# Patient Record
Sex: FEMALE | Race: WHITE | Employment: OTHER | URBAN - METROPOLITAN AREA
[De-identification: names, ages, dates, MRNs, and addresses within clinical notes are randomized per-mention and may not be internally consistent; named-entity substitution may affect disease eponyms.]

---

## 2017-07-04 ENCOUNTER — HOSPITAL ENCOUNTER (OUTPATIENT)
Dept: OTHER | Age: 67
Discharge: HOME | End: 2017-07-04
Attending: INTERNAL MEDICINE | Admitting: INTERNAL MEDICINE

## 2017-07-04 LAB
A/G RATIO: 2.2 (ref 1.1–2.2)
ALBUMIN SERPL-MCNC: 3.9 G/DL (ref 3.4–5)
ALP BLD-CCNC: 56 U/L (ref 40–129)
ALT SERPL-CCNC: 18 U/L (ref 10–40)
ANION GAP SERPL CALCULATED.3IONS-SCNC: 10 MMOL/L (ref 3–16)
AST SERPL-CCNC: 15 U/L (ref 15–37)
BASOPHILS ABSOLUTE: 0 K/UL (ref 0–0.2)
BASOPHILS RELATIVE PERCENT: 0.3 %
BILIRUB SERPL-MCNC: 0.5 MG/DL (ref 0–1)
BUN BLDV-MCNC: 11 MG/DL (ref 7–20)
CALCIUM SERPL-MCNC: 8.8 MG/DL (ref 8.3–10.6)
CHLORIDE BLD-SCNC: 101 MMOL/L (ref 99–110)
CO2: 30 MMOL/L (ref 21–32)
CREAT SERPL-MCNC: 0.7 MG/DL (ref 0.6–1.2)
EOSINOPHILS ABSOLUTE: 0 K/UL (ref 0–0.6)
EOSINOPHILS RELATIVE PERCENT: 0 %
GFR AFRICAN AMERICAN: >60
GFR NON-AFRICAN AMERICAN: >60
GLOBULIN: 1.8 G/DL
GLUCOSE BLD-MCNC: 181 MG/DL (ref 70–99)
HCT VFR BLD CALC: 37.2 % (ref 36–48)
HEMOGLOBIN: 11.9 G/DL (ref 12–16)
LYMPHOCYTES ABSOLUTE: 0.8 K/UL (ref 1–5.1)
LYMPHOCYTES RELATIVE PERCENT: 8.1 %
MCH RBC QN AUTO: 29.1 PG (ref 26–34)
MCHC RBC AUTO-ENTMCNC: 32 G/DL (ref 31–36)
MCV RBC AUTO: 90.9 FL (ref 80–100)
MONOCYTES ABSOLUTE: 0.6 K/UL (ref 0–1.3)
MONOCYTES RELATIVE PERCENT: 5.8 %
NEUTROPHILS ABSOLUTE: 8.3 K/UL (ref 1.7–7.7)
NEUTROPHILS RELATIVE PERCENT: 85.8 %
PDW BLD-RTO: 15.9 % (ref 12.4–15.4)
PLATELET # BLD: 206 K/UL (ref 135–450)
PMV BLD AUTO: 8.6 FL (ref 5–10.5)
POTASSIUM SERPL-SCNC: 3.9 MMOL/L (ref 3.5–5.1)
RBC # BLD: 4.1 M/UL (ref 4–5.2)
SODIUM BLD-SCNC: 141 MMOL/L (ref 136–145)
TOTAL PROTEIN: 5.7 G/DL (ref 6.4–8.2)
WBC # BLD: 9.7 K/UL (ref 4–11)

## 2017-07-05 LAB — C-REACTIVE PROTEIN: 5.2 MG/L (ref 0–5.1)

## 2019-12-28 ENCOUNTER — APPOINTMENT (OUTPATIENT)
Dept: CT IMAGING | Age: 69
DRG: 193 | End: 2019-12-28
Payer: MEDICARE

## 2019-12-28 ENCOUNTER — APPOINTMENT (OUTPATIENT)
Dept: GENERAL RADIOLOGY | Age: 69
DRG: 193 | End: 2019-12-28
Payer: MEDICARE

## 2019-12-28 ENCOUNTER — HOSPITAL ENCOUNTER (INPATIENT)
Age: 69
LOS: 6 days | Discharge: HOME HEALTH | DRG: 193 | End: 2020-01-03
Attending: EMERGENCY MEDICINE | Admitting: HOSPITALIST
Payer: MEDICARE

## 2019-12-28 PROBLEM — R06.09 DOE (DYSPNEA ON EXERTION): Status: ACTIVE | Noted: 2019-12-28

## 2019-12-28 LAB
A/G RATIO: 1.2 (ref 1.1–2.2)
ALBUMIN SERPL-MCNC: 3.5 G/DL (ref 3.4–5)
ALP BLD-CCNC: 54 U/L (ref 40–129)
ALT SERPL-CCNC: 9 U/L (ref 10–40)
ANION GAP SERPL CALCULATED.3IONS-SCNC: 11 MMOL/L (ref 3–16)
AST SERPL-CCNC: 10 U/L (ref 15–37)
BASOPHILS ABSOLUTE: 0 K/UL (ref 0–0.2)
BASOPHILS RELATIVE PERCENT: 0.3 %
BILIRUB SERPL-MCNC: 1.2 MG/DL (ref 0–1)
BUN BLDV-MCNC: 12 MG/DL (ref 7–20)
CALCIUM SERPL-MCNC: 9 MG/DL (ref 8.3–10.6)
CHLORIDE BLD-SCNC: 97 MMOL/L (ref 99–110)
CO2: 30 MMOL/L (ref 21–32)
CREAT SERPL-MCNC: 0.5 MG/DL (ref 0.6–1.2)
EOSINOPHILS ABSOLUTE: 0 K/UL (ref 0–0.6)
EOSINOPHILS RELATIVE PERCENT: 0 %
GFR AFRICAN AMERICAN: >60
GFR NON-AFRICAN AMERICAN: >60
GLOBULIN: 2.9 G/DL
GLUCOSE BLD-MCNC: 216 MG/DL (ref 70–99)
GLUCOSE BLD-MCNC: 249 MG/DL (ref 70–99)
HCT VFR BLD CALC: 41.7 % (ref 36–48)
HEMOGLOBIN: 13.7 G/DL (ref 12–16)
LACTIC ACID, SEPSIS: 0.9 MMOL/L (ref 0.4–1.9)
LYMPHOCYTES ABSOLUTE: 0.5 K/UL (ref 1–5.1)
LYMPHOCYTES RELATIVE PERCENT: 5.3 %
MCH RBC QN AUTO: 29.4 PG (ref 26–34)
MCHC RBC AUTO-ENTMCNC: 32.8 G/DL (ref 31–36)
MCV RBC AUTO: 89.8 FL (ref 80–100)
MONOCYTES ABSOLUTE: 0.7 K/UL (ref 0–1.3)
MONOCYTES RELATIVE PERCENT: 8.1 %
NEUTROPHILS ABSOLUTE: 7.5 K/UL (ref 1.7–7.7)
NEUTROPHILS RELATIVE PERCENT: 86.3 %
PDW BLD-RTO: 16.4 % (ref 12.4–15.4)
PERFORMED ON: ABNORMAL
PLATELET # BLD: 171 K/UL (ref 135–450)
PMV BLD AUTO: 7.9 FL (ref 5–10.5)
POTASSIUM SERPL-SCNC: 4.2 MMOL/L (ref 3.5–5.1)
PRO-BNP: 364 PG/ML (ref 0–124)
PROCALCITONIN: 0.09 NG/ML (ref 0–0.15)
RAPID INFLUENZA  B AGN: NEGATIVE
RAPID INFLUENZA A AGN: NEGATIVE
RBC # BLD: 4.64 M/UL (ref 4–5.2)
SODIUM BLD-SCNC: 138 MMOL/L (ref 136–145)
TOTAL PROTEIN: 6.4 G/DL (ref 6.4–8.2)
TROPONIN: <0.01 NG/ML
WBC # BLD: 8.7 K/UL (ref 4–11)

## 2019-12-28 PROCEDURE — 6370000000 HC RX 637 (ALT 250 FOR IP): Performed by: HOSPITALIST

## 2019-12-28 PROCEDURE — 83605 ASSAY OF LACTIC ACID: CPT

## 2019-12-28 PROCEDURE — 99291 CRITICAL CARE FIRST HOUR: CPT

## 2019-12-28 PROCEDURE — 6360000002 HC RX W HCPCS: Performed by: HOSPITALIST

## 2019-12-28 PROCEDURE — 2700000000 HC OXYGEN THERAPY PER DAY

## 2019-12-28 PROCEDURE — 80053 COMPREHEN METABOLIC PANEL: CPT

## 2019-12-28 PROCEDURE — 87804 INFLUENZA ASSAY W/OPTIC: CPT

## 2019-12-28 PROCEDURE — 84145 PROCALCITONIN (PCT): CPT

## 2019-12-28 PROCEDURE — 96374 THER/PROPH/DIAG INJ IV PUSH: CPT

## 2019-12-28 PROCEDURE — 2500000003 HC RX 250 WO HCPCS: Performed by: HOSPITALIST

## 2019-12-28 PROCEDURE — 6370000000 HC RX 637 (ALT 250 FOR IP): Performed by: NURSE PRACTITIONER

## 2019-12-28 PROCEDURE — 71260 CT THORAX DX C+: CPT

## 2019-12-28 PROCEDURE — 85025 COMPLETE CBC W/AUTO DIFF WBC: CPT

## 2019-12-28 PROCEDURE — 84484 ASSAY OF TROPONIN QUANT: CPT

## 2019-12-28 PROCEDURE — 6360000002 HC RX W HCPCS: Performed by: NURSE PRACTITIONER

## 2019-12-28 PROCEDURE — 87040 BLOOD CULTURE FOR BACTERIA: CPT

## 2019-12-28 PROCEDURE — 94640 AIRWAY INHALATION TREATMENT: CPT

## 2019-12-28 PROCEDURE — 2580000003 HC RX 258: Performed by: HOSPITALIST

## 2019-12-28 PROCEDURE — 94761 N-INVAS EAR/PLS OXIMETRY MLT: CPT

## 2019-12-28 PROCEDURE — 6360000002 HC RX W HCPCS: Performed by: EMERGENCY MEDICINE

## 2019-12-28 PROCEDURE — 71045 X-RAY EXAM CHEST 1 VIEW: CPT

## 2019-12-28 PROCEDURE — 94760 N-INVAS EAR/PLS OXIMETRY 1: CPT

## 2019-12-28 PROCEDURE — 83880 ASSAY OF NATRIURETIC PEPTIDE: CPT

## 2019-12-28 PROCEDURE — 6360000004 HC RX CONTRAST MEDICATION: Performed by: NURSE PRACTITIONER

## 2019-12-28 PROCEDURE — 93005 ELECTROCARDIOGRAM TRACING: CPT | Performed by: NURSE PRACTITIONER

## 2019-12-28 PROCEDURE — 1200000000 HC SEMI PRIVATE

## 2019-12-28 RX ORDER — NICOTINE POLACRILEX 4 MG
15 LOZENGE BUCCAL PRN
Status: DISCONTINUED | OUTPATIENT
Start: 2019-12-28 | End: 2020-01-03 | Stop reason: HOSPADM

## 2019-12-28 RX ORDER — INSULIN LISPRO 100 [IU]/ML
0-12 INJECTION, SOLUTION INTRAVENOUS; SUBCUTANEOUS
Status: DISCONTINUED | OUTPATIENT
Start: 2019-12-28 | End: 2020-01-03 | Stop reason: HOSPADM

## 2019-12-28 RX ORDER — TIMOLOL 5 MG/ML
1 SOLUTION/ DROPS OPHTHALMIC 2 TIMES DAILY
COMMUNITY
End: 2020-10-14 | Stop reason: SINTOL

## 2019-12-28 RX ORDER — TRAVOPROST OPHTHALMIC SOLUTION 0.04 MG/ML
1 SOLUTION OPHTHALMIC NIGHTLY
Status: DISCONTINUED | OUTPATIENT
Start: 2019-12-28 | End: 2020-01-03 | Stop reason: HOSPADM

## 2019-12-28 RX ORDER — ONDANSETRON 2 MG/ML
4 INJECTION INTRAMUSCULAR; INTRAVENOUS EVERY 6 HOURS PRN
Status: DISCONTINUED | OUTPATIENT
Start: 2019-12-28 | End: 2020-01-03 | Stop reason: HOSPADM

## 2019-12-28 RX ORDER — SPIRONOLACTONE 25 MG/1
12.5 TABLET ORAL DAILY
COMMUNITY
End: 2022-07-22

## 2019-12-28 RX ORDER — ALBUTEROL SULFATE 2.5 MG/3ML
5 SOLUTION RESPIRATORY (INHALATION) ONCE
Status: DISCONTINUED | OUTPATIENT
Start: 2019-12-28 | End: 2019-12-28

## 2019-12-28 RX ORDER — SODIUM CHLORIDE 0.9 % (FLUSH) 0.9 %
10 SYRINGE (ML) INJECTION EVERY 12 HOURS SCHEDULED
Status: DISCONTINUED | OUTPATIENT
Start: 2019-12-28 | End: 2020-01-03 | Stop reason: HOSPADM

## 2019-12-28 RX ORDER — TRAVOPROST OPHTHALMIC SOLUTION 0.04 MG/ML
1 SOLUTION OPHTHALMIC NIGHTLY
COMMUNITY

## 2019-12-28 RX ORDER — ASPIRIN 81 MG/1
81 TABLET ORAL DAILY
Status: DISCONTINUED | OUTPATIENT
Start: 2019-12-28 | End: 2020-01-03 | Stop reason: HOSPADM

## 2019-12-28 RX ORDER — ROSUVASTATIN CALCIUM 10 MG/1
10 TABLET, COATED ORAL NIGHTLY
Status: DISCONTINUED | OUTPATIENT
Start: 2019-12-28 | End: 2020-01-03 | Stop reason: HOSPADM

## 2019-12-28 RX ORDER — DEXTROSE MONOHYDRATE 25 G/50ML
12.5 INJECTION, SOLUTION INTRAVENOUS PRN
Status: DISCONTINUED | OUTPATIENT
Start: 2019-12-28 | End: 2020-01-03 | Stop reason: HOSPADM

## 2019-12-28 RX ORDER — ACETAMINOPHEN 325 MG/1
650 TABLET ORAL EVERY 4 HOURS PRN
Status: DISCONTINUED | OUTPATIENT
Start: 2019-12-28 | End: 2020-01-03 | Stop reason: HOSPADM

## 2019-12-28 RX ORDER — IPRATROPIUM BROMIDE AND ALBUTEROL SULFATE 2.5; .5 MG/3ML; MG/3ML
1 SOLUTION RESPIRATORY (INHALATION) ONCE
Status: COMPLETED | OUTPATIENT
Start: 2019-12-28 | End: 2019-12-28

## 2019-12-28 RX ORDER — LEVOTHYROXINE SODIUM 0.1 MG/1
200 TABLET ORAL DAILY
Status: DISCONTINUED | OUTPATIENT
Start: 2019-12-28 | End: 2020-01-03 | Stop reason: HOSPADM

## 2019-12-28 RX ORDER — FUROSEMIDE 10 MG/ML
40 INJECTION INTRAMUSCULAR; INTRAVENOUS DAILY
Status: DISCONTINUED | OUTPATIENT
Start: 2019-12-28 | End: 2019-12-31

## 2019-12-28 RX ORDER — TIMOLOL 5 MG/ML
1 SOLUTION/ DROPS OPHTHALMIC 2 TIMES DAILY
Status: DISCONTINUED | OUTPATIENT
Start: 2019-12-28 | End: 2020-01-03 | Stop reason: HOSPADM

## 2019-12-28 RX ORDER — INSULIN LISPRO 100 [IU]/ML
0.08 INJECTION, SOLUTION INTRAVENOUS; SUBCUTANEOUS
Status: DISCONTINUED | OUTPATIENT
Start: 2019-12-28 | End: 2020-01-03 | Stop reason: HOSPADM

## 2019-12-28 RX ORDER — INSULIN LISPRO 100 [IU]/ML
0-6 INJECTION, SOLUTION INTRAVENOUS; SUBCUTANEOUS NIGHTLY
Status: DISCONTINUED | OUTPATIENT
Start: 2019-12-28 | End: 2020-01-03 | Stop reason: HOSPADM

## 2019-12-28 RX ORDER — IPRATROPIUM BROMIDE AND ALBUTEROL SULFATE 2.5; .5 MG/3ML; MG/3ML
1 SOLUTION RESPIRATORY (INHALATION) 2 TIMES DAILY
Status: DISCONTINUED | OUTPATIENT
Start: 2019-12-28 | End: 2020-01-03 | Stop reason: HOSPADM

## 2019-12-28 RX ORDER — LOSARTAN POTASSIUM 25 MG/1
50 TABLET ORAL DAILY
Status: DISCONTINUED | OUTPATIENT
Start: 2019-12-28 | End: 2020-01-03 | Stop reason: HOSPADM

## 2019-12-28 RX ORDER — SODIUM CHLORIDE 0.9 % (FLUSH) 0.9 %
10 SYRINGE (ML) INJECTION PRN
Status: DISCONTINUED | OUTPATIENT
Start: 2019-12-28 | End: 2020-01-03 | Stop reason: HOSPADM

## 2019-12-28 RX ORDER — FUROSEMIDE 10 MG/ML
40 INJECTION INTRAMUSCULAR; INTRAVENOUS ONCE
Status: COMPLETED | OUTPATIENT
Start: 2019-12-28 | End: 2019-12-28

## 2019-12-28 RX ORDER — DEXTROSE MONOHYDRATE 50 MG/ML
100 INJECTION, SOLUTION INTRAVENOUS PRN
Status: DISCONTINUED | OUTPATIENT
Start: 2019-12-28 | End: 2020-01-03 | Stop reason: HOSPADM

## 2019-12-28 RX ORDER — SIMVASTATIN 40 MG
40 TABLET ORAL NIGHTLY
Status: DISCONTINUED | OUTPATIENT
Start: 2019-12-28 | End: 2019-12-28

## 2019-12-28 RX ADMIN — ASPIRIN 81 MG: 81 TABLET, COATED ORAL at 20:38

## 2019-12-28 RX ADMIN — ALBUTEROL SULFATE 5 MG: 2.5 SOLUTION RESPIRATORY (INHALATION) at 10:42

## 2019-12-28 RX ADMIN — INSULIN GLARGINE 29 UNITS: 100 INJECTION, SOLUTION SUBCUTANEOUS at 20:39

## 2019-12-28 RX ADMIN — FUROSEMIDE 40 MG: 10 INJECTION, SOLUTION INTRAMUSCULAR; INTRAVENOUS at 20:38

## 2019-12-28 RX ADMIN — IPRATROPIUM BROMIDE AND ALBUTEROL SULFATE 1 AMPULE: .5; 3 SOLUTION RESPIRATORY (INHALATION) at 10:42

## 2019-12-28 RX ADMIN — ROSUVASTATIN CALCIUM 10 MG: 10 TABLET, FILM COATED ORAL at 20:38

## 2019-12-28 RX ADMIN — TRAVOPROST OPHTHALMIC SOLUTION 1 DROP: 0.04 SOLUTION OPHTHALMIC at 22:15

## 2019-12-28 RX ADMIN — IPRATROPIUM BROMIDE AND ALBUTEROL SULFATE 1 AMPULE: .5; 3 SOLUTION RESPIRATORY (INHALATION) at 20:42

## 2019-12-28 RX ADMIN — LOSARTAN POTASSIUM 50 MG: 25 TABLET, FILM COATED ORAL at 20:38

## 2019-12-28 RX ADMIN — TIMOLOL 1 DROP: 5 SOLUTION/ DROPS OPHTHALMIC at 22:15

## 2019-12-28 RX ADMIN — FUROSEMIDE 40 MG: 10 INJECTION, SOLUTION INTRAMUSCULAR; INTRAVENOUS at 16:57

## 2019-12-28 RX ADMIN — INSULIN LISPRO 2 UNITS: 100 INJECTION, SOLUTION INTRAVENOUS; SUBCUTANEOUS at 20:40

## 2019-12-28 RX ADMIN — TUBERCULIN PURIFIED PROTEIN DERIVATIVE 5 UNITS: 5 INJECTION, SOLUTION INTRADERMAL at 20:24

## 2019-12-28 RX ADMIN — LEVOTHYROXINE SODIUM 200 MCG: 100 TABLET ORAL at 20:38

## 2019-12-28 RX ADMIN — Medication 10 ML: at 20:42

## 2019-12-28 RX ADMIN — IOPAMIDOL 75 ML: 755 INJECTION, SOLUTION INTRAVENOUS at 12:38

## 2019-12-28 ASSESSMENT — ENCOUNTER SYMPTOMS
VOMITING: 0
SHORTNESS OF BREATH: 1
CHEST TIGHTNESS: 1
ABDOMINAL PAIN: 0
COUGH: 1
DIARRHEA: 0
WHEEZING: 1
NAUSEA: 0

## 2019-12-28 ASSESSMENT — PAIN SCALES - GENERAL
PAINLEVEL_OUTOF10: 0
PAINLEVEL_OUTOF10: 6

## 2019-12-28 ASSESSMENT — PAIN DESCRIPTION - LOCATION: LOCATION: BACK

## 2019-12-28 ASSESSMENT — PAIN DESCRIPTION - PAIN TYPE: TYPE: CHRONIC PAIN

## 2019-12-28 NOTE — H&P
Hospital Medicine History & Physical      PCP: Tony Bustillos MD    Date of Admission: 12/28/2019    Date of Service: Pt seen/examined on 12/28/2019 and Admitted to Inpatient with expected LOS greater than two midnights due to medical therapy. Chief Complaint:  Dyspnea on exertion      History Of Present Illness:      68 y.o. female who has history of hypothyroidism, diabetes mellitus insulin-dependent, essential hypertension, hyperlipidemia presented to [facility] with 4 days history of exertional shortness of breath. The patient sleep in the reclining chair for years that is why  I am not sure about orthopnea or paroxysmal nocturnal dyspnea. She is not a good historian, she told me she is diabetic for 10 years and she is currently on 47 units of long-acting and 20 units of short acting with meals. She also says mild cough however no fever no chills. In the emergency room work-up showed blood sugar 249 otherwise normal BMP, troponin 0.01, proBNP 364 lactic acid unremarkable, CBC also unremarkable. CT scan of chest showed multiple pulmonary nodules in the upper lobe but no evidence of pulmonary embolism CXR showed mild pulmonary edema. Hospitalist service called for further management of this patient. Past Medical History:          Diagnosis Date    Cataracts, bilateral     Diabetes mellitus ([address]     Glaucoma     Hyperlipidemia     Hypertension     Thyroid disease        Past Surgical History:          Procedure Laterality Date    CARPAL TUNNEL RELEASE      bilateral    ELBOW SURGERY      EYE SURGERY      FOOT SURGERY      SHOULDER SURGERY         Medications Prior to Admission:      Prior to Admission medications    Medication Sig Start Date End Date Taking? Authorizing Provider   aspirin EC 81 MG EC tablet Take 1 tablet by mouth. May restart in AM. 1/27/12  Yes Alisia Varner MD   losartan (COZAAR) 50 MG tablet Take 50 mg by mouth.      Yes Historical Provider, MD furosemide (LASIX) 40 MG tablet Take 20 mg by mouth. Yes Historical Provider, MD   levothyroxine (LEVOXYL) 200 MCG tablet Take 200 mcg by mouth. Yes Historical Provider, MD   Insulin Detemir (LEVEMIR FLEXPEN SC) Inject 47 Units into the skin    Yes Historical Provider, MD   simvastatin (ZOCOR) 40 MG tablet Take 40 mg by mouth nightly. Yes Historical Provider, MD   Insulin Lispro, Human, (HUMALOG SC) Inject 20 Units into the skin 3 times daily (with meals) With sliding   Yes Historical Provider, MD   DICLOFENAC PO Take  by mouth. Historical Provider, MD   acetaminophen (TYLENOL) 500 MG tablet Take 500 mg by mouth. Historical Provider, MD   Calcium Carbonate-Vitamin D (CALCIUM + D PO) Take  by mouth. Historical Provider, MD       Allergies:  Patient has no known allergies. Social History:      The patient currently lives at home    TOBACCO:   reports that she has never smoked. She does not have any smokeless tobacco history on file. ETOH:   reports previous alcohol use. Family History:      Reviewed in detail and negative for DM, CAD, Cancer, CVA. Positive as follows:    History reviewed. No pertinent family history. REVIEW OF SYSTEMS:   Pertinent positives as noted in the HPI. All other systems reviewed and negative. PHYSICAL EXAM PERFORMED:    BP (!) 142/83   Pulse 99   Temp 98.3 °F (36.8 °C) (Oral)   Resp 23   Ht 5' 6\" (1.676 m)   Wt 260 lb (117.9 kg)   SpO2 96%   BMI 41.97 kg/m²     General appearance:  No apparent distress, appears stated age and cooperative. HEENT:  Normal cephalic, atraumatic without obvious deformity. Pupils equal, round, and reactive to light. Extra ocular muscles intact. Conjunctivae/corneas clear. Oral mucous membranes moist  Neck: Supple, with full range of motion. No jugular venous distention. Trachea midline. Respiratory:  Normal respiratory effort. Scattered wheezing anteriorly and posteriorly noted, no crackles.   Cardiovascular:

## 2019-12-28 NOTE — ED PROVIDER NOTES
Performed at:  [facility]  [address] [facility],  [place], [address]   Phone [phone]   RAPID INFLUENZA A/B ANTIGENS    Narrative:     Performed at:  [facility]  [address] [facility],  [place], [address]   Phone [phone]   CULTURE BLOOD #2   CULTURE BLOOD #1   TROPONIN    Narrative:     Performed at:  [facility]  [address] [facility],  [place], [address]   Phone [phone]   LACTATE, SEPSIS    Narrative:     Performed at:  [facility]  [address] [facility],  [place], [address]   Phone [phone]       All other labs were within normal range or not returned as of this dictation. EKG: All EKG's are interpreted by the Emergency Department Physician in the absence of a cardiologist.  Please see their note for interpretation of EKG. RADIOLOGY:   Non-plain film images such as CT, Ultrasound and MRI are read by the radiologist. Plain radiographic images are visualized and preliminarily interpreted by the  ED Provider with the below findings:        Interpretation per the Radiologist below, if available at the time of this note:    CT Chest Pulmonary Embolism W Contrast   Final Result   No evidence of pulmonary embolism. Multiple pulmonary nodules. Most severe: 11.0 mm ground glass pulmonary   nodule within the upper lobe. Recommend a non-contrast Chest CT at 3-6   months. Subsequent management based on the most suspicious nodule(s). These guidelines do not apply to immunocompromised patients and patients with   cancer. Follow up in patients with significant comorbidities as clinically   warranted. For lung cancer screening, adhere to Lung-RADS guidelines. Reference: Radiology. 2017; 284(1):228-43. XR CHEST PORTABLE   Final Result   Spectrum of findings suggests mild pulmonary edema. Developing pneumonitis   can not be definitively excluded.            No patient is diabetic, insulin-dependent. Troponin negative. . Lactate unremarkable. 2 cultures pending. XR CHEST PORTABLE (Final result)   Result time 12/28/19 11:53:08   Final result by Greta Wray MD (12/28/19 11:53:08)                Impression:    Spectrum of findings suggests mild pulmonary edema.  Developing pneumonitis  can not be definitively excluded. The patient does remain hypoxic, requiring supplemental oxygen. CT Chest Pulmonary Embolism W Contrast (Final result)   Result time 12/28/19 13:05:40   Final result by Sierra Peabody, MD (12/28/19 13:05:40)                Impression:    No evidence of pulmonary embolism. Multiple pulmonary nodules. Most severe: 11.0 mm ground glass pulmonary  nodule within the upper lobe. Recommend a non-contrast Chest CT at 3-6  months. Subsequent management based on the most suspicious nodule(s). These guidelines do not apply to immunocompromised patients and patients with  cancer. Follow up in patients with significant comorbidities as clinically  warranted. For lung cancer screening, adhere to Lung-RADS guidelines. Reference: Radiology. 2017; 284(1):228-43. She was given nebulized treatments in the emergency department. She was also given an injection of Lasix, patient does not have history of heart failure. She will be admitted for acute respiratory failure with hypoxia and new onset congestive heart failure. Hospitalist is agreeable to admit this patient, echocardiogram ordered. The patient is agreeable regarding plan of care      FINAL IMPRESSION      1. Dyspnea, unspecified type    2. Hypoxia    3.  Acute respiratory failure with hypoxemia (HCC)    4. Lung nodule          DISPOSITION/PLAN   DISPOSITION Admitted 12/28/2019 04:12:10 PM      PATIENT REFERREDTO:  Zamzam Bailey, [address] [address] [phone]            DISCHARGE MEDICATIONS:  New Prescriptions    No

## 2019-12-28 NOTE — ED PROVIDER NOTES
I independently performed a history and physical on Silver Mcginnis. All diagnostic, treatment, and disposition decisions were made by myself in conjunction with the advanced practice provider. Briefly, this is a 68 y.o. female here for shortness of breath worsening over the past 3 days but has been ongoing for about 2 weeks. Associated with nasal congestion, occasional cough, wheezing. Has some chest discomfort as well. No history of asthma or COPD. Does not think her legs have become more swollen or that she has been gaining weight. On exam,   General: Patient is in no acute distress  Skin: No cyanosis  HEENT: Moist mucous membranes  Heart: Regular rate, regular rhythm  Lung: No respiratory distress. B/L wheezes  Abdomen: Soft, nontender  Neuro: Moving all extremities, no facial droop, no slurred speech, answers questions appropriately        EKG  The Ekg interpreted by me in the absence of a cardiologist shows. Tachy sinus rhythm  No acute ST changes or T wave abnormalities     Screenings            MDM  Patient is a 64-year-old woman who presents with shortness of breath, chest discomfort, cough, nasal congestion, bilateral wheezing. May be secondary to fluid overload. Initial chest x-ray showed possible pulmonary edema. Will give Lasix 40 mg. No evidence of pneumonia on CT or pulmonary embolism. Patient hypoxic, now on 3 L saturating 97%. Do not believe that she requires BiPAP at this time    Patient Referrals:  No follow-up provider specified. Discharge Medications:  New Prescriptions    No medications on file       FINAL IMPRESSION  1. Dyspnea, unspecified type    2. Hypoxia        Blood pressure (!) 163/50, pulse 94, temperature 98.3 °F (36.8 °C), temperature source Oral, resp. rate 30, height 5' 6\" (1.676 m), weight 260 lb (117.9 kg), SpO2 97 %.      For further details of [address] [place] [address] emergency department encounter, please see documentation by advanced practice provider,

## 2019-12-29 LAB
ANION GAP SERPL CALCULATED.3IONS-SCNC: 12 MMOL/L (ref 3–16)
BACTERIA: ABNORMAL /HPF
BILIRUBIN URINE: NEGATIVE
BLOOD, URINE: ABNORMAL
BUN BLDV-MCNC: 15 MG/DL (ref 7–20)
C-REACTIVE PROTEIN: 70.6 MG/L (ref 0–5.1)
CALCIUM SERPL-MCNC: 8.8 MG/DL (ref 8.3–10.6)
CHLORIDE BLD-SCNC: 93 MMOL/L (ref 99–110)
CLARITY: ABNORMAL
CO2: 31 MMOL/L (ref 21–32)
COLOR: YELLOW
CREAT SERPL-MCNC: 0.9 MG/DL (ref 0.6–1.2)
EKG ATRIAL RATE: 104 BPM
EKG DIAGNOSIS: NORMAL
EKG P AXIS: 95 DEGREES
EKG P-R INTERVAL: 160 MS
EKG Q-T INTERVAL: 332 MS
EKG QRS DURATION: 70 MS
EKG QTC CALCULATION (BAZETT): 436 MS
EKG R AXIS: 30 DEGREES
EKG T AXIS: 70 DEGREES
EKG VENTRICULAR RATE: 104 BPM
EPITHELIAL CELLS, UA: 8 /HPF (ref 0–5)
ESTIMATED AVERAGE GLUCOSE: 145.6 MG/DL
GFR AFRICAN AMERICAN: >60
GFR NON-AFRICAN AMERICAN: >60
GLUCOSE BLD-MCNC: 129 MG/DL (ref 70–99)
GLUCOSE BLD-MCNC: 159 MG/DL (ref 70–99)
GLUCOSE BLD-MCNC: 199 MG/DL (ref 70–99)
GLUCOSE BLD-MCNC: 216 MG/DL (ref 70–99)
GLUCOSE BLD-MCNC: 237 MG/DL (ref 70–99)
GLUCOSE URINE: NEGATIVE MG/DL
HBA1C MFR BLD: 6.7 %
HCT VFR BLD CALC: 41.7 % (ref 36–48)
HEMOGLOBIN: 13.6 G/DL (ref 12–16)
HYALINE CASTS: 4 /LPF (ref 0–8)
KETONES, URINE: NEGATIVE MG/DL
LEUKOCYTE ESTERASE, URINE: ABNORMAL
MAGNESIUM: 1.9 MG/DL (ref 1.8–2.4)
MCH RBC QN AUTO: 29.4 PG (ref 26–34)
MCHC RBC AUTO-ENTMCNC: 32.6 G/DL (ref 31–36)
MCV RBC AUTO: 90.3 FL (ref 80–100)
MICROSCOPIC EXAMINATION: YES
NITRITE, URINE: NEGATIVE
PDW BLD-RTO: 16.4 % (ref 12.4–15.4)
PERFORMED ON: ABNORMAL
PH UA: 6 (ref 5–8)
PHOSPHORUS: 3.4 MG/DL (ref 2.5–4.9)
PLATELET # BLD: 184 K/UL (ref 135–450)
PMV BLD AUTO: 8 FL (ref 5–10.5)
POTASSIUM SERPL-SCNC: 3.8 MMOL/L (ref 3.5–5.1)
PROTEIN UA: NEGATIVE MG/DL
RBC # BLD: 4.62 M/UL (ref 4–5.2)
RBC UA: 4 /HPF (ref 0–4)
SEDIMENTATION RATE, ERYTHROCYTE: 78 MM/HR (ref 0–30)
SODIUM BLD-SCNC: 136 MMOL/L (ref 136–145)
SPECIFIC GRAVITY UA: 1.02 (ref 1–1.03)
T3 FREE: 1.6 PG/ML (ref 2.3–4.2)
T4 FREE: 1.4 NG/DL (ref 0.9–1.8)
TSH SERPL DL<=0.05 MIU/L-ACNC: 5.72 UIU/ML (ref 0.27–4.2)
URINE TYPE: ABNORMAL
UROBILINOGEN, URINE: 1 E.U./DL
WBC # BLD: 9.5 K/UL (ref 4–11)
WBC UA: 11 /HPF (ref 0–5)
YEAST: PRESENT /HPF

## 2019-12-29 PROCEDURE — 83036 HEMOGLOBIN GLYCOSYLATED A1C: CPT

## 2019-12-29 PROCEDURE — 83735 ASSAY OF MAGNESIUM: CPT

## 2019-12-29 PROCEDURE — 6370000000 HC RX 637 (ALT 250 FOR IP): Performed by: INTERNAL MEDICINE

## 2019-12-29 PROCEDURE — 94640 AIRWAY INHALATION TREATMENT: CPT

## 2019-12-29 PROCEDURE — 94669 MECHANICAL CHEST WALL OSCILL: CPT

## 2019-12-29 PROCEDURE — 84100 ASSAY OF PHOSPHORUS: CPT

## 2019-12-29 PROCEDURE — 36415 COLL VENOUS BLD VENIPUNCTURE: CPT

## 2019-12-29 PROCEDURE — 86140 C-REACTIVE PROTEIN: CPT

## 2019-12-29 PROCEDURE — 84443 ASSAY THYROID STIM HORMONE: CPT

## 2019-12-29 PROCEDURE — 99222 1ST HOSP IP/OBS MODERATE 55: CPT | Performed by: INTERNAL MEDICINE

## 2019-12-29 PROCEDURE — 6360000002 HC RX W HCPCS: Performed by: HOSPITALIST

## 2019-12-29 PROCEDURE — 6370000000 HC RX 637 (ALT 250 FOR IP): Performed by: HOSPITALIST

## 2019-12-29 PROCEDURE — 94761 N-INVAS EAR/PLS OXIMETRY MLT: CPT

## 2019-12-29 PROCEDURE — 1200000000 HC SEMI PRIVATE

## 2019-12-29 PROCEDURE — 81001 URINALYSIS AUTO W/SCOPE: CPT

## 2019-12-29 PROCEDURE — 2580000003 HC RX 258: Performed by: HOSPITALIST

## 2019-12-29 PROCEDURE — 85027 COMPLETE CBC AUTOMATED: CPT

## 2019-12-29 PROCEDURE — 84439 ASSAY OF FREE THYROXINE: CPT

## 2019-12-29 PROCEDURE — 85652 RBC SED RATE AUTOMATED: CPT

## 2019-12-29 PROCEDURE — 84481 FREE ASSAY (FT-3): CPT

## 2019-12-29 PROCEDURE — 80048 BASIC METABOLIC PNL TOTAL CA: CPT

## 2019-12-29 PROCEDURE — 93010 ELECTROCARDIOGRAM REPORT: CPT | Performed by: INTERNAL MEDICINE

## 2019-12-29 PROCEDURE — 6370000000 HC RX 637 (ALT 250 FOR IP): Performed by: NURSE PRACTITIONER

## 2019-12-29 PROCEDURE — 2700000000 HC OXYGEN THERAPY PER DAY

## 2019-12-29 PROCEDURE — 94010 BREATHING CAPACITY TEST: CPT

## 2019-12-29 RX ORDER — PREDNISONE 20 MG/1
40 TABLET ORAL DAILY
Status: DISCONTINUED | OUTPATIENT
Start: 2019-12-29 | End: 2020-01-03 | Stop reason: HOSPADM

## 2019-12-29 RX ORDER — GUAIFENESIN/DEXTROMETHORPHAN 100-10MG/5
5 SYRUP ORAL EVERY 4 HOURS PRN
Status: DISCONTINUED | OUTPATIENT
Start: 2019-12-29 | End: 2020-01-03 | Stop reason: HOSPADM

## 2019-12-29 RX ORDER — LIOTHYRONINE SODIUM 5 UG/1
10 TABLET ORAL DAILY
Status: DISCONTINUED | OUTPATIENT
Start: 2019-12-29 | End: 2020-01-03 | Stop reason: HOSPADM

## 2019-12-29 RX ORDER — IPRATROPIUM BROMIDE AND ALBUTEROL SULFATE 2.5; .5 MG/3ML; MG/3ML
1 SOLUTION RESPIRATORY (INHALATION) EVERY 4 HOURS PRN
Status: DISCONTINUED | OUTPATIENT
Start: 2019-12-29 | End: 2020-01-03 | Stop reason: HOSPADM

## 2019-12-29 RX ORDER — DOXYCYCLINE HYCLATE 100 MG
100 TABLET ORAL EVERY 12 HOURS SCHEDULED
Status: DISCONTINUED | OUTPATIENT
Start: 2019-12-29 | End: 2020-01-03 | Stop reason: HOSPADM

## 2019-12-29 RX ADMIN — ASPIRIN 81 MG: 81 TABLET, COATED ORAL at 09:12

## 2019-12-29 RX ADMIN — TIMOLOL 1 DROP: 5 SOLUTION/ DROPS OPHTHALMIC at 09:12

## 2019-12-29 RX ADMIN — INSULIN LISPRO 9 UNITS: 100 INJECTION, SOLUTION INTRAVENOUS; SUBCUTANEOUS at 12:39

## 2019-12-29 RX ADMIN — IPRATROPIUM BROMIDE AND ALBUTEROL SULFATE 1 AMPULE: .5; 3 SOLUTION RESPIRATORY (INHALATION) at 02:21

## 2019-12-29 RX ADMIN — IPRATROPIUM BROMIDE AND ALBUTEROL SULFATE 1 AMPULE: .5; 3 SOLUTION RESPIRATORY (INHALATION) at 19:38

## 2019-12-29 RX ADMIN — Medication 10 ML: at 21:13

## 2019-12-29 RX ADMIN — ACETAMINOPHEN 650 MG: 325 TABLET, FILM COATED ORAL at 01:35

## 2019-12-29 RX ADMIN — GUAIFENESIN AND DEXTROMETHORPHAN 5 ML: 100; 10 SYRUP ORAL at 02:20

## 2019-12-29 RX ADMIN — PREDNISONE 40 MG: 20 TABLET ORAL at 17:27

## 2019-12-29 RX ADMIN — DOXYCYCLINE HYCLATE 100 MG: 100 TABLET, COATED ORAL at 21:12

## 2019-12-29 RX ADMIN — LOSARTAN POTASSIUM 50 MG: 25 TABLET, FILM COATED ORAL at 09:12

## 2019-12-29 RX ADMIN — Medication 10 ML: at 09:12

## 2019-12-29 RX ADMIN — ENOXAPARIN SODIUM 40 MG: 40 INJECTION SUBCUTANEOUS at 09:11

## 2019-12-29 RX ADMIN — ROSUVASTATIN CALCIUM 10 MG: 10 TABLET, FILM COATED ORAL at 21:12

## 2019-12-29 RX ADMIN — INSULIN LISPRO 9 UNITS: 100 INJECTION, SOLUTION INTRAVENOUS; SUBCUTANEOUS at 17:21

## 2019-12-29 RX ADMIN — INSULIN LISPRO 9 UNITS: 100 INJECTION, SOLUTION INTRAVENOUS; SUBCUTANEOUS at 09:13

## 2019-12-29 RX ADMIN — INSULIN LISPRO 1 UNITS: 100 INJECTION, SOLUTION INTRAVENOUS; SUBCUTANEOUS at 21:13

## 2019-12-29 RX ADMIN — TIMOLOL 1 DROP: 5 SOLUTION/ DROPS OPHTHALMIC at 21:12

## 2019-12-29 RX ADMIN — TRAVOPROST OPHTHALMIC SOLUTION 1 DROP: 0.04 SOLUTION OPHTHALMIC at 21:12

## 2019-12-29 RX ADMIN — INSULIN LISPRO 2 UNITS: 100 INJECTION, SOLUTION INTRAVENOUS; SUBCUTANEOUS at 09:13

## 2019-12-29 RX ADMIN — INSULIN LISPRO 4 UNITS: 100 INJECTION, SOLUTION INTRAVENOUS; SUBCUTANEOUS at 12:39

## 2019-12-29 RX ADMIN — LIOTHYRONINE SODIUM 10 MCG: 5 TABLET ORAL at 14:10

## 2019-12-29 RX ADMIN — FUROSEMIDE 40 MG: 10 INJECTION, SOLUTION INTRAMUSCULAR; INTRAVENOUS at 09:12

## 2019-12-29 RX ADMIN — IPRATROPIUM BROMIDE AND ALBUTEROL SULFATE 1 AMPULE: .5; 3 SOLUTION RESPIRATORY (INHALATION) at 07:03

## 2019-12-29 ASSESSMENT — PAIN SCALES - GENERAL
PAINLEVEL_OUTOF10: 5
PAINLEVEL_OUTOF10: 0
PAINLEVEL_OUTOF10: 0

## 2019-12-29 NOTE — CONSULTS
mL, Intravenous, 2 times per day  sodium chloride flush 0.9 % injection 10 mL, 10 mL, Intravenous, PRN  magnesium hydroxide (MILK OF MAGNESIA) 400 MG/5ML suspension 30 mL, 30 mL, Oral, Daily PRN  ondansetron (ZOFRAN) injection 4 mg, 4 mg, Intravenous, Q6H PRN  enoxaparin (LOVENOX) injection 40 mg, 40 mg, Subcutaneous, Daily  ipratropium-albuterol (DUONEB) nebulizer solution 1 ampule, 1 ampule, Inhalation, BID  acetaminophen (TYLENOL) tablet 650 mg, 650 mg, Oral, Q4H PRN  furosemide (LASIX) injection 40 mg, 40 mg, Intravenous, Daily  glucose (GLUTOSE) 40 % oral gel 15 g, 15 g, Oral, PRN  dextrose 50 % IV solution, 12.5 g, Intravenous, PRN  glucagon (rDNA) injection 1 mg, 1 mg, Intramuscular, PRN  dextrose 5 % solution, 100 mL/hr, Intravenous, PRN  insulin lispro (1 Unit Dial) 9 Units, 0.08 Units/kg, Subcutaneous, TID WC  insulin lispro (1 Unit Dial) 0-12 Units, 0-12 Units, Subcutaneous, TID WC  insulin lispro (1 Unit Dial) 0-6 Units, 0-6 Units, Subcutaneous, Nightly  rosuvastatin (CRESTOR) tablet 10 mg, 10 mg, Oral, Nightly  Timolol Maleate PF 0.5 % SOLN 1 drop (PATIENT SUPPLIED), 1 drop, Ophthalmic, BID  Travoprost (OMAR Free) (TRAVATAN Z) 0.004 % ophthalmic solution SOLN 1 drop (PATIENT SUPPLIED), 1 drop, Both Eyes, Nightly    Allergies   Allergen Reactions    Other Other (See Comments)     Preservatives in eyedrops-blurred vision       Social History:    TOBACCO:   reports that she has never smoked. She does not have any smokeless tobacco history on file. ETOH:   reports previous alcohol use. Patient currently lives independently    Family History:   History reviewed. No pertinent family history.     REVIEW OF SYSTEMS:    Constitutional: negative for fatigue, fevers, malaise and weight loss  Ears, nose, mouth, throat: negative for ear drainage, epistaxis, hoarseness, nasal congestion, sore throat and voice change  Respiratory: negative except for cough, shortness of breath, sputum and wheezing  Cardiovascular: negative for chest pain, chest pressure/discomfort, irregular heart beat, lower extremity edema and palpitations  Gastrointestinal: negative for abdominal pain, constipation, diarrhea, jaundice, melena, odynophagia, reflux symptoms and vomiting  Hematologic/lymphatic: negative for bleeding, easy bruising, lymphadenopathy and petechiae  Musculoskeletal:negative for arthralgias, bone pain, muscle weakness, neck pain and stiff joints  Neurological: negative for dizziness, gait problems, headaches, seizures, speech problems, tremors and weakness  Behavioral/Psych: negative for anxiety, behavior problems, depression, fatigue and sleep disturbance  Endocrine: negative for diabetic symptoms including none, neuropathy, polyphagia, polyuria, polydipsia, vomiting and diarrhea and temperature intolerance  Allergic/Immunologic: negative for anaphylaxis, angioedema, hay fever and urticaria      Objective:     Patient Vitals for the past 8 hrs:   BP Temp Pulse Resp SpO2   12/29/19 1615 130/71 97.4 °F (36.3 °C) 99 18 94 %   12/29/19 1230 119/76 97.7 °F (36.5 °C) 92 18 94 %     I/O last 3 completed shifts: In: 250 [P.O.:240;  I.V.:10]  Out: 750 [Urine:750]  I/O this shift:  In: -   Out: 50 [Urine:50]    Physical Exam:  General Appearance: alert and oriented to person, place and time, well developed and well- nourished, in no acute distress  Skin: warm and dry, no rash or erythema  Head: normocephalic and atraumatic  Eyes: pupils equal, round, and reactive to light, extraocular eye movements intact, conjunctivae normal  ENT: external ear and ear canal normal bilaterally, nose without deformity, nasal mucosa and turbinates normal  Neck: supple and non-tender without mass, no cervical lymphadenopathy  Pulmonary/Chest: wheezing present-bilateral, rhonchi present  Cardiovascular: normal rate, regular rhythm,  no murmurs, rubs, distal pulses intact, no carotid bruits  Abdomen: soft, non-tender, non-distended, normal bowel sounds, no

## 2019-12-29 NOTE — PROGRESS NOTES
12/29/19 0228   Cough/Sputum   Sputum How Obtained Spontaneous cough   Cough Congested;Moist;Non-productive   Sputum Amount None   vpep performed

## 2019-12-29 NOTE — PROGRESS NOTES
Brother brought in Pt's specific eye drops from home. Pt is allergic to some of the additives in substitutes which can be used. Message sent through [address] [place] [address] to see if we may have an order for and if Pt may use her own supply. Awaiting response.

## 2019-12-29 NOTE — PROGRESS NOTES
Pt. States that she wears a home cpap but did not bring with her. Pt. Refused RT offer of a hospital unit to wear.

## 2019-12-29 NOTE — PROGRESS NOTES
4 Eyes Skin Assessment      The patient is being assess for  Admission    I agree that 2 RN's have performed a thorough Head to Toe Skin Assessment on the patient. ALL assessment sites listed below have been assessed. Areas assessed by both nurses: Aniya Mejia  [x]   Head, Face, and Ears   [x]   Shoulders, Back, and Chest  [x]   Arms, Elbows, and Hands   [x]   Coccyx, Sacrum, and IschIum  [x]   Legs, Feet, and Heels        Does the Patient have Skin Breakdown?   No         Roque Prevention initiated:  Yes   Wound Care Orders initiated:  NA      [facility] nurse consulted for Pressure Injury (Stage 3,4, Unstageable, DTI, NWPT, and Complex wounds), New and Established Ostomies:  NA      Nurse 1 eSignature: Electronically signed by Vaughn Guerrero RN on 12/29/19 at 7:47 AM    **SHARE this note so that the co-signing nurse is able to place an eSignature**    Nurse 2 eSignature: Electronically signed by Ena Graves RN on 12/30/19 at 9:07 PM

## 2019-12-29 NOTE — PROGRESS NOTES
[facility] HOSPITALISTS PROGRESS NOTE    12/29/2019 11:12 AM        Name: Leander Hernandez . Admitted: 12/28/2019  Primary Care Provider: Tony Bustillos MD (Tel: [phone])                        Hospital course:   68 y.o. female who has history of hypothyroidism, diabetes mellitus insulin-dependent, essential hypertension, hyperlipidemia presented to [facility] with 4 days history of exertional shortness of breath. Subjective:  . No acute events overnight. Resting well. Pain control. Diet ok. Labs reviewed  Denies any chest pain sob.      Reviewed interval ancillary notes    Current Medications  guaiFENesin-dextromethorphan (ROBITUSSIN DM) 100-10 MG/5ML syrup 5 mL, Q4H PRN  ipratropium-albuterol (DUONEB) nebulizer solution 1 ampule, Q4H PRN  aspirin EC tablet 81 mg, Daily  levothyroxine (SYNTHROID) tablet 200 mcg, Daily  losartan (COZAAR) tablet 50 mg, Daily  sodium chloride flush 0.9 % injection 10 mL, 2 times per day  sodium chloride flush 0.9 % injection 10 mL, PRN  magnesium hydroxide (MILK OF MAGNESIA) 400 MG/5ML suspension 30 mL, Daily PRN  ondansetron (ZOFRAN) injection 4 mg, Q6H PRN  enoxaparin (LOVENOX) injection 40 mg, Daily  ipratropium-albuterol (DUONEB) nebulizer solution 1 ampule, BID  acetaminophen (TYLENOL) tablet 650 mg, Q4H PRN  furosemide (LASIX) injection 40 mg, Daily  glucose (GLUTOSE) 40 % oral gel 15 g, PRN  dextrose 50 % IV solution, PRN  glucagon (rDNA) injection 1 mg, PRN  dextrose 5 % solution, PRN  insulin glargine (LANTUS) injection pen 29 Units, Nightly  insulin lispro (1 Unit Dial) 9 Units, TID WC  insulin lispro (1 Unit Dial) 0-12 Units, TID WC  insulin lispro (1 Unit Dial) 0-6 Units, Nightly  rosuvastatin (CRESTOR) tablet 10 mg, Nightly  Timolol Maleate PF 0.5 % SOLN 1 drop (PATIENT SUPPLIED), BID  Travoprost (OMAR Free) (TRAVATAN Z) 0.004 % ophthalmic solution SOLN 1 drop (PATIENT SUPPLIED), Nightly        Objective:  /68   Pulse 94   Temp 97.8 °F (36.6 °C)   Resp 18   Ht 5' 6\" (1.676 m)   Wt 237 lb 12.8 oz (107.9 kg)   SpO2 91%   BMI 38.38 kg/m²     Intake/Output Summary (Last 24 hours) at 12/29/2019 1112  Last data filed at 12/29/2019 0911  Gross per 24 hour   Intake 250 ml   Output 700 ml   Net -450 ml      Wt Readings from Last 3 Encounters:   12/29/19 237 lb 12.8 oz (107.9 kg)   08/22/14 260 lb (117.9 kg)   01/27/12 240 lb (108.9 kg)       General appearance:  Appears comfortable  Eyes: Sclera clear. Pupils equal.  The patient has congenital nystagmus  ENT: Moist oral mucosa. Trachea midline, no adenopathy. Cardiovascular: Regular rhythm, normal S1, S2. No murmur. No edema in lower extremities  Respiratory: Not using accessory muscles. Some rhonchi and wheezing heard all over the posterior lung fields. GI: Abdomen soft, no tenderness, not distended, normal bowel sounds  Musculoskeletal: No cyanosis in digits, neck supple  Neurology: CN 2-12 grossly intact. No speech or motor deficits  Psych: Normal affect. Alert and oriented in time, place and person  Skin: Warm, dry, normal turgor    Labs and Tests:  CBC:   Recent Labs     12/28/19  1058 12/29/19  0525   WBC 8.7 9.5   HGB 13.7 13.6    184     BMP:    Recent Labs     12/28/19  1058 12/29/19  0524    136   K 4.2 3.8   CL 97* 93*   CO2 30 31   BUN 12 15   CREATININE 0.5* 0.9   GLUCOSE 249* 237*     Hepatic:   Recent Labs     12/28/19  1058   AST 10*   ALT 9*   BILITOT 1.2*   ALKPHOS 47         Problem List     WALLER (dyspnea on exertion)  Type 2 diabetes mellitus  Essential hypertension  Hyperlipidemia  Hypothyroidism    Assessment & Plan:     1. Patient requiring 2 to 3 L to be able to maintain above 92%, may need bedside spirometry , pulmonology consult pending, 2D echo with Doppler tomorrow  2.   We will increase long-acting insulin to 37 units, continue basal bolus correction protocol  3. Blood pressure controlled this morning, continue losartan  4.   Low T3, normal T4 levels TSH elevated,  I will add liothyronine small dose and follow-up thyroid hormones and 1 month        Diet: DIET CARB CONTROL;  Code:Full Code  DVT PPX waldemar Coon MD   12/29/2019 11:12 AM

## 2019-12-30 LAB
C DIFF TOXIN/ANTIGEN: NORMAL
GLUCOSE BLD-MCNC: 168 MG/DL (ref 70–99)
GLUCOSE BLD-MCNC: 200 MG/DL (ref 70–99)
GLUCOSE BLD-MCNC: 229 MG/DL (ref 70–99)
GLUCOSE BLD-MCNC: 261 MG/DL (ref 70–99)
GLUCOSE BLD-MCNC: 263 MG/DL (ref 70–99)
PERFORMED ON: ABNORMAL

## 2019-12-30 PROCEDURE — 6370000000 HC RX 637 (ALT 250 FOR IP): Performed by: INTERNAL MEDICINE

## 2019-12-30 PROCEDURE — 6360000002 HC RX W HCPCS: Performed by: HOSPITALIST

## 2019-12-30 PROCEDURE — 6370000000 HC RX 637 (ALT 250 FOR IP): Performed by: HOSPITALIST

## 2019-12-30 PROCEDURE — 97530 THERAPEUTIC ACTIVITIES: CPT

## 2019-12-30 PROCEDURE — 94669 MECHANICAL CHEST WALL OSCILL: CPT

## 2019-12-30 PROCEDURE — 97165 OT EVAL LOW COMPLEX 30 MIN: CPT

## 2019-12-30 PROCEDURE — 94640 AIRWAY INHALATION TREATMENT: CPT

## 2019-12-30 PROCEDURE — 97161 PT EVAL LOW COMPLEX 20 MIN: CPT

## 2019-12-30 PROCEDURE — 87324 CLOSTRIDIUM AG IA: CPT

## 2019-12-30 PROCEDURE — 94761 N-INVAS EAR/PLS OXIMETRY MLT: CPT

## 2019-12-30 PROCEDURE — 2580000003 HC RX 258: Performed by: HOSPITALIST

## 2019-12-30 PROCEDURE — 87449 NOS EACH ORGANISM AG IA: CPT

## 2019-12-30 PROCEDURE — 97116 GAIT TRAINING THERAPY: CPT

## 2019-12-30 PROCEDURE — 97535 SELF CARE MNGMENT TRAINING: CPT

## 2019-12-30 PROCEDURE — 92523 SPEECH SOUND LANG COMPREHEN: CPT

## 2019-12-30 PROCEDURE — 2700000000 HC OXYGEN THERAPY PER DAY

## 2019-12-30 PROCEDURE — 1200000000 HC SEMI PRIVATE

## 2019-12-30 PROCEDURE — 87086 URINE CULTURE/COLONY COUNT: CPT

## 2019-12-30 RX ADMIN — DOXYCYCLINE HYCLATE 100 MG: 100 TABLET, COATED ORAL at 09:19

## 2019-12-30 RX ADMIN — Medication 10 ML: at 09:20

## 2019-12-30 RX ADMIN — ENOXAPARIN SODIUM 40 MG: 40 INJECTION SUBCUTANEOUS at 09:18

## 2019-12-30 RX ADMIN — LEVOTHYROXINE SODIUM 200 MCG: 100 TABLET ORAL at 05:29

## 2019-12-30 RX ADMIN — FUROSEMIDE 40 MG: 10 INJECTION, SOLUTION INTRAMUSCULAR; INTRAVENOUS at 09:18

## 2019-12-30 RX ADMIN — TRAVOPROST OPHTHALMIC SOLUTION 1 DROP: 0.04 SOLUTION OPHTHALMIC at 21:10

## 2019-12-30 RX ADMIN — INSULIN LISPRO 9 UNITS: 100 INJECTION, SOLUTION INTRAVENOUS; SUBCUTANEOUS at 12:24

## 2019-12-30 RX ADMIN — TIMOLOL 1 DROP: 5 SOLUTION/ DROPS OPHTHALMIC at 09:19

## 2019-12-30 RX ADMIN — ROSUVASTATIN CALCIUM 10 MG: 10 TABLET, FILM COATED ORAL at 21:09

## 2019-12-30 RX ADMIN — PREDNISONE 40 MG: 20 TABLET ORAL at 09:18

## 2019-12-30 RX ADMIN — ASPIRIN 81 MG: 81 TABLET, COATED ORAL at 09:18

## 2019-12-30 RX ADMIN — INSULIN LISPRO 9 UNITS: 100 INJECTION, SOLUTION INTRAVENOUS; SUBCUTANEOUS at 09:21

## 2019-12-30 RX ADMIN — IPRATROPIUM BROMIDE AND ALBUTEROL SULFATE 1 AMPULE: .5; 3 SOLUTION RESPIRATORY (INHALATION) at 20:08

## 2019-12-30 RX ADMIN — LOSARTAN POTASSIUM 50 MG: 25 TABLET, FILM COATED ORAL at 09:18

## 2019-12-30 RX ADMIN — TIMOLOL 1 DROP: 5 SOLUTION/ DROPS OPHTHALMIC at 21:10

## 2019-12-30 RX ADMIN — INSULIN LISPRO 2 UNITS: 100 INJECTION, SOLUTION INTRAVENOUS; SUBCUTANEOUS at 21:11

## 2019-12-30 RX ADMIN — INSULIN LISPRO 9 UNITS: 100 INJECTION, SOLUTION INTRAVENOUS; SUBCUTANEOUS at 17:43

## 2019-12-30 RX ADMIN — LIOTHYRONINE SODIUM 10 MCG: 5 TABLET ORAL at 09:18

## 2019-12-30 RX ADMIN — DOXYCYCLINE HYCLATE 100 MG: 100 TABLET, COATED ORAL at 21:09

## 2019-12-30 RX ADMIN — Medication 10 ML: at 21:10

## 2019-12-30 RX ADMIN — INSULIN LISPRO 4 UNITS: 100 INJECTION, SOLUTION INTRAVENOUS; SUBCUTANEOUS at 09:20

## 2019-12-30 RX ADMIN — INSULIN LISPRO 6 UNITS: 100 INJECTION, SOLUTION INTRAVENOUS; SUBCUTANEOUS at 17:43

## 2019-12-30 RX ADMIN — IPRATROPIUM BROMIDE AND ALBUTEROL SULFATE 1 AMPULE: .5; 3 SOLUTION RESPIRATORY (INHALATION) at 08:09

## 2019-12-30 RX ADMIN — INSULIN LISPRO 2 UNITS: 100 INJECTION, SOLUTION INTRAVENOUS; SUBCUTANEOUS at 12:23

## 2019-12-30 ASSESSMENT — PAIN SCALES - GENERAL
PAINLEVEL_OUTOF10: 0

## 2019-12-30 NOTE — DISCHARGE INSTR - COC
Continuity of Care Form    Patient Name: Jag Maldnoado   :  1950  MRN:  [id number]    Admit date:  2019  Discharge date:  ***    Code Status Order: Full Code   Advance Directives:   [facility] Documentation     Date/Time Healthcare Directive Type of Healthcare Directive Copy in [facility] [address] Agent's Name Healthcare Agent's Phone Number    [phone]  --  --  No, copy requested from family  --  Anyi Fly  --    19  Yes, patient has an advance directive for healthcare treatment  Durable power of  for health care  --  -- Nephew  --  --          Admitting Physician:  Norris Barber MD  PCP: Ni Adkins MD    Discharging Nurse: [facility] Unit/Room#: 5VL-2033/6167-01  Discharging Unit Phone Number: ***    Emergency Contact:   Extended Emergency Contact Information  Primary Emergency Contact: [facility] Phone: [phone]  Relation: Brother/Sister  Secondary Emergency Contact: Ivonne Buckley  Mobile Phone: [phone]  Relation: Niece/Nephew    Past Surgical History:  Past Surgical History:   Procedure Laterality Date    CARPAL TUNNEL RELEASE      bilateral    CHOLECYSTECTOMY      COLONOSCOPY      ELBOW SURGERY      ENDOSCOPY, COLON, DIAGNOSTIC      EYE SURGERY      FOOT SURGERY      SHOULDER SURGERY         Immunization History:   Immunization History   Administered Date(s) Administered    PPD Test 2019       Active Problems:  Patient Active Problem List   Diagnosis Code    WALLER (dyspnea on exertion) R06.09       Isolation/Infection:   Isolation          C Diff Contact        Patient Infection Status     Infection Onset Added Last Indicated Last Indicated By Review Planned Expiration Resolved Resolved By    C-diff Rule Out  19 Clostridium difficile toxin/antigen (Ordered)              Nurse Assessment:  Last Vital Signs: BP (!) 157/74   Pulse 76   Temp 97.4 °F (36.3 °C)   Resp 18   Ht 5' 6\" (1.676 m)   Wt 235 lb 9.6 oz (106.9 kg)   SpO2 92%   BMI 38.03 kg/m²     Last documented pain score (0-10 scale): Pain Level: 0  Last Weight:   Wt Readings from Last 1 Encounters:   12/30/19 235 lb 9.6 oz (106.9 kg)     Mental Status:  oriented, alert, coherent, logical, thought processes intact and able to concentrate and follow conversation    IV Access:  - None    Nursing Mobility/ADLs:  Walking   Independent  Transfer  Independent  Bathing  Independent  Dressing  Independent  [address] Delivery   none    Wound Care Documentation and Therapy:        Elimination:  Continence:   · Bowel: Yes  · Bladder: Yes  Urinary Catheter: None   Colostomy/Ileostomy/Ileal Conduit: No       Date of Last BM: 01/1/2020    Intake/Output Summary (Last 24 hours) at 12/30/2019 1207  Last data filed at 12/30/2019 1108  Gross per 24 hour   Intake 10 ml   Output 1000 ml   Net -990 ml     I/O last 3 completed shifts: In: 10 [I.V.:10]  Out: 650 [Urine:650]    Safety Concerns:     None    Impairments/Disabilities:      Vision    Nutrition Therapy:  Current Nutrition Therapy:   - Oral Diet:  Carb Control 4 carbs/meal (1800kcals/day)    Routes of Feeding: Oral  Liquids: No Restrictions  Daily Fluid Restriction: no  Last Modified Barium Swallow with Video (Video Swallowing Test): not done    Treatments at the Time of Hospital Discharge:   Respiratory Treatments: inhalers rx  Oxygen Therapy:  is on oxygen at 1 L/min per nasal cannula.   Ventilator:    - No ventilator support    Rehab Therapies: Nursing, Physical Therapy, Occupational Therapy, Speech Therapy  Weight Bearing Status/Restrictions: No weight bearing restirctions  Other Medical Equipment (for information only, NOT a DME order):  none  Other Treatments: HOME HEALTH CARE:   HOME HEALTH CARE: LEVEL 1 STANDARD     -Initial home health evaluation to occur within 24-48 hours, in patient home    -Home health agency to establish plan of care for patient over 60 day period    -Medication Reconciliation    -PCP Visit scheduled within seven days of discharge    -PT/OT to evaluate with goal of regaining prior level of functioning    -OT to evaluate if patient has [address] needs for personal care                Patient's personal belongings (please select all that are sent with patient):  Javier    RN SIGNATURE:  Electronically signed by López Mcdaniel RN on 1/3/20 at 10:44 AM    CASE MANAGEMENT/SOCIAL WORK SECTION    Inpatient Status Date:     Readmission Risk Assessment Score:  Readmission Risk              Risk of Unplanned Readmission:        9           Discharging to Facility/ Agency     Name: [facility] will call for Appointment  Phone: [phone]  Fax: [phone]      Dialysis Facility (if applicable)   · Name:  · Address:  · Dialysis Schedule:  · Phone:  · Fax:    / signature: {Esignature:[id number]}    PHYSICIAN SECTION    Prognosis: good  Condition at Discharge:   Rehab Potential (if transferring to Rehab):     Recommended Labs or Other Treatments After Discharge: Home O2, cbc and bmp per lashell puncture on 01/10/20 results to PCP    Physician Certification: I certify the above information and transfer of Sara Reyes  is necessary for the continuing treatment of the diagnosis listed and that she requires home care for  30 days.      Update Admission H&P: No change    PHYSICIAN SIGNATURE:  TARI Valladares / Britta Santana MD on 01/03/20

## 2019-12-30 NOTE — FLOWSHEET NOTE
Vitals:    12/30/19 0826   BP: (!) 157/74   Pulse: 76   Resp: 18   Temp: 97.4 °F (36.3 °C)   SpO2: 92%        12/30/19 0826   Assessment   Charting Type Shift assessment   Neurological   Neuro (WDL) WDL   Level of Consciousness 0   Orientation Level Oriented X4   Cognition Short term memory loss;Poor attention/concentration;Poor judgement; Follows commands;Poor safety awareness   Language Delayed responses   Size R Pupil (mm) 3   R Pupil Shape Other (Comment)  (corectopia)   R Pupil Reaction Brisk   Size L Pupil (mm) 2   L Pupil Shape Other (Comment)  (corectopia)   L Pupil Reaction Brisk   HEENT   HEENT (WDL) X   Right Eye Impaired vision;Stagmis; Other (Comment)  (corectopia)   Left Eye Intact; Impaired vision;Stagmis; Other (Comment)  (corectopia)   Teeth Dentures upper;Dentures lower   Respiratory   Respiratory (WDL) X   Respiratory Pattern Regular   Respiratory Depth Normal   Respiratory Quality/Effort Unlabored;Dyspnea with exertion;Orthopneic   Chest Assessment Chest expansion symmetrical;Trachea midline   L Breath Sounds Expiratory Wheezes; Diminished   R Breath Sounds Expiratory Wheezes; Diminished   Breath Sounds   Right Upper Lobe Expiratory Wheezes   Right Middle Lobe Diminished   Right Lower Lobe Diminished   Left Upper Lobe Expiratory Wheezes   Left Lower Lobe Diminished   Cough/Sputum   Sputum How Obtained Spontaneous cough   Cough Congested;Non-productive   Sputum Amount None   Cardiac   Cardiac (WDL) WDL   Cardiac Regularity Regular   Heart Sounds S1, S2   Cardiac Rhythm NSR;ST  (occasional ST)   Rhythm Interpretation   Pulse 76   Cardiac Monitor   Telemetry Monitor On Yes   Telemetry Audible Yes   Telemetry Alarms Set Yes   Telemetry Box Number [id number]   Telemetry Monitor Alarm Parameters    Gastrointestinal   Abdominal (WDL) WDL   Abdomen Inspection Soft;Rotund; Non-distended   Tenderness Soft; No guarding;Nontender; No rebound   RUQ Bowel Sounds Active   LUQ Bowel Sounds Active   RLQ Bowel Sounds Active   LLQ Bowel Sounds Active   Peripheral Vascular   Peripheral Vascular (WDL) WDL   Edema Right lower extremity; Left lower extremity   RLE Edema +2;Pitting   LLE Edema +2;Pitting   Sensation RLE Full sensation; No numbness; No pain; No tingling   Sensation LLE Full sensation; No numbness; No pain; No tingling   RLE Neurovascular Assessment   Capillary Refill Less than/equal to 3 seconds   Color Kuldip   Temperature Warm   R Pedal Pulse +2   LLE Neurovascular Assessment   Capillary Refill Less than/equal to 3 seconds   Color Kuldip   Temperature Warm   L Pedal Pulse +2   Skin Color/Condition   Skin Color/Condition (WDL) WDL   Skin Integrity   Skin Integrity (WDL) WDL   Musculoskeletal   Musculoskeletal (WDL) X   RL Extremity Full movement;Weakness; Unsteady   LL Extremity Full movement;Weakness; Unsteady   Genitourinary   Genitourinary (WDL) WDL   Flank Tenderness No   Suprapubic Tenderness No   Dysuria No   Anus/Rectum   Anus/Rectum (WDL) WDL   Psychosocial   Psychosocial (WDL) WDL

## 2019-12-30 NOTE — PROGRESS NOTES
Physical Therapy    Facility/Department: [facility] 3A NURSING  Initial Assessment    NAME: Pat Tee  : 1950  MRN: [id number]    Date of Service: 2019    Discharge Recommendations:Alysha Goldberg scored a 19/24 on the AM-PAC short mobility form. Current research shows that an AM-PAC score of 18 or greater is typically associated with a discharge to the patient's home setting. Based on the patients AM-PAC score and their current functional mobility deficits, it is recommended that the patient have 2-3 sessions per week of Physical Therapy at d/c to increase the patients independence. HOME HEALTH CARE: LEVEL 1 STANDARD    - Initial home health evaluation to occur within 24-48 hours, in patient home   - Therapy to evaluate with goal of regaining prior level of functioning   - Therapy to evaluate if patient has [address] needs for personal care      PT Equipment Recommendations  Equipment Needed: No    Assessment   Body structures, Functions, Activity limitations: Decreased functional mobility ; Decreased endurance;Decreased balance  Assessment: Pt presents as slightly below her baseline function and would benefit from skilled PT services to promote safe return to PLOF  Prognosis: Good  Decision Making: Low Complexity  PT Education: Goals;PT Role;Plan of Care  Patient Education: D/C recommendations  REQUIRES PT FOLLOW UP: Yes  Activity Tolerance  Activity Tolerance: Patient Tolerated treatment well       Patient Diagnosis(es): The primary encounter diagnosis was Dyspnea, unspecified type. Diagnoses of Hypoxia, Acute respiratory failure with hypoxemia ([address] and Lung nodule were also pertinent to this visit. has a past medical history of Arthritis, Cataracts, bilateral, CHF (congestive heart failure) ([address] Diabetes mellitus ([address] Glaucoma, Hyperlipidemia, Hypertension, and Thyroid disease. has a past surgical history that includes eye surgery; Foot surgery;  Carpal tunnel release;

## 2019-12-30 NOTE — PLAN OF CARE
Problem: Falls - Risk of:  Goal: Absence of physical injury  Description  Absence of physical injury  Outcome: Ongoing  Note:   Pt is wearing the fall bracelet, S.A.F.E. sign is posted outside door, and yellow blanket is on the bed. Pt informed of fall risks, verbalizes understanding, and agrees to ask for help to ambulate. Will monitor.

## 2019-12-30 NOTE — PROGRESS NOTES
Occupational Therapy   Occupational Therapy Initial Assessment  Date: 2019   Patient Name: Renetta Mitchell  MRN: [id number]     : 1950    Date of Service: 2019    Discharge Recommendations:Alysha Goldberg scored a 22/24 on the -[facility] ADL Inpatient form. At this time, no further OT is recommended upon discharge due to patient safe with ADLs . Recommend patient returns to prior setting with prior services. OT Equipment Recommendations  Equipment Needed: No    Assessment   Performance deficits / Impairments: Decreased endurance  Assessment: Patient presents near baseline function but safe with ADLs. No skilled OT recommended at this time. Treatment Diagnosis: Decreased endurance associated with dyspnea on exertion  Prognosis: Good  Decision Making: Low Complexity  Patient Education: OT role, discharge  REQUIRES OT FOLLOW UP: No  Activity Tolerance  Activity Tolerance: Patient Tolerated treatment well  Safety Devices  Safety Devices in place: Yes  Type of devices: Nurse notified;Gait belt;Left in chair;Call light within reach(MFR no alarm)           Patient Diagnosis(es): The primary encounter diagnosis was Dyspnea, unspecified type. Diagnoses of Hypoxia, Acute respiratory failure with hypoxemia ([address] and Lung nodule were also pertinent to this visit. has a past medical history of Arthritis, Cataracts, bilateral, CHF (congestive heart failure) ([address] Diabetes mellitus ([address] Glaucoma, Hyperlipidemia, Hypertension, and Thyroid disease. has a past surgical history that includes eye surgery; Foot surgery; Carpal tunnel release; Elbow surgery; shoulder surgery; Cholecystectomy; Colonoscopy; and Endoscopy, colon, diagnostic.     Treatment Diagnosis: Decreased endurance associated with dyspnea on exertion      Restrictions  Restrictions/Precautions  Restrictions/Precautions: Fall Risk, Modified Diet(Carb control, C-diff, up with assist, strict I and O)  Position Activity Ambulating  Equipment Used: Standard toilet  Toilet Transfer: Modified independent  ADL  LE Dressing: Modified independent (items given to patient by therapist.  Christiana Riley I)  Tone RUE  RUE Tone: Normotonic  Tone LUE  LUE Tone: Normotonic  Coordination  Movements Are Fluid And Coordinated: Yes        Transfers  Stand Step Transfers: Supervision  Sit to stand: Modified independent  Stand to sit: Modified independent     Cognition  Overall Cognitive Status: Exceptions  Arousal/Alertness: Appropriate responses to stimuli  Following Commands: Follows one step commands with increased time; Follows one step commands with repetition  Attention Span: Difficulty dividing attention; Difficulty attending to directions  Memory: Decreased short term memory  Safety Judgement: Good awareness of safety precautions  Insights: Decreased awareness of deficits  Initiation: Does not require cues  Sequencing: Does not require cues                 LUE AROM (degrees)  LUE AROM : WFL  RUE AROM (degrees)  RUE AROM : WFL                      Plan   Plan  Times per week: eval and d/c      AM-PAC Score        AM-PAC Inpatient Daily Activity Raw Score: 22 (12/30/19 1526)  AM-PAC Inpatient ADL T-Scale Score : 47.1 (12/30/19 1526)  ADL Inpatient CMS 0-100% Score: 25.8 (12/30/19 1526)  ADL Inpatient CMS G-Code Modifier : Zohra Jerez (12/30/19 1526)    Goals  Short term goals  Time Frame for Short term goals: no goals set       Therapy Time   Individual Concurrent Group Co-treatment   Time In 1344         Time Out 1423         Minutes 39              Timed Code Treatment Minutes:   24    Total Treatment Minutes:  [address],    Sameera Lloyd [address]

## 2019-12-30 NOTE — FLOWSHEET NOTE
94   Gastrointestinal   Abdominal (WDL) WDL   Abdomen Inspection Soft;Rotund; Non-distended   Tenderness Soft; No guarding;Nontender; No rebound   RUQ Bowel Sounds Active   LUQ Bowel Sounds Active   RLQ Bowel Sounds Active   LLQ Bowel Sounds Active   Peripheral Vascular   Peripheral Vascular (WDL) WDL   Edema Right lower extremity; Left lower extremity   RLE Edema +2;Pitting   LLE Edema +2;Pitting   Sensation RLE Full sensation; No numbness; No pain; No tingling   Sensation LLE Full sensation; No numbness; No pain; No tingling   RLE Neurovascular Assessment   Capillary Refill Less than/equal to 3 seconds   Color Kuldip   Temperature Warm   R Pedal Pulse +2   LLE Neurovascular Assessment   Capillary Refill Less than/equal to 3 seconds   Color Kuldip   Temperature Warm   L Pedal Pulse +2   Skin Color/Condition   Skin Color/Condition (WDL) WDL   Skin Integrity   Skin Integrity (WDL) WDL   Skin Integrity Excoriation   Location Between buttock   Musculoskeletal   Musculoskeletal (WDL) X   RL Extremity Full movement;Weakness; Unsteady   LL Extremity Full movement;Weakness; Unsteady   Genitourinary   Genitourinary (WDL) WDL   Flank Tenderness No   Suprapubic Tenderness No   Dysuria No   Anus/Rectum   Anus/Rectum (WDL) WDL   Psychosocial   Psychosocial (WDL) WDL

## 2019-12-30 NOTE — PROGRESS NOTES
[facility]    12/30/2019 7:41 AM        Name: Cisco Singh . Admitted: 12/28/2019  Primary Care Provider: Roberto Abernathy MD (Tel: [phone])      Subjective:  Patient is a 67 yo female with hx CHF, DM, HTN, hypothyroidism. She presented with sob, hypoxia, and cough. CT chest negative for PE. Rapid AB negative. Up in bedside chair. States she is feeling a little better. Still requiring O2. Reports cough improving. Denies chest pain, shortness of breath at rest. Sleeps in chair for comfort. Denies urinary symptoms.      Reviewed interval ancillary notes    Current Medications  guaiFENesin-dextromethorphan (ROBITUSSIN DM) 100-10 MG/5ML syrup 5 mL, Q4H PRN  ipratropium-albuterol (DUONEB) nebulizer solution 1 ampule, Q4H PRN  insulin glargine (LANTUS) injection pen 37 Units, Nightly  liothyronine (CYTOMEL) tablet 10 mcg, Daily  predniSONE (DELTASONE) tablet 40 mg, Daily  doxycycline hyclate (VIBRA-TABS) tablet 100 mg, 2 times per day  aspirin EC tablet 81 mg, Daily  levothyroxine (SYNTHROID) tablet 200 mcg, Daily  losartan (COZAAR) tablet 50 mg, Daily  sodium chloride flush 0.9 % injection 10 mL, 2 times per day  sodium chloride flush 0.9 % injection 10 mL, PRN  magnesium hydroxide (MILK OF MAGNESIA) 400 MG/5ML suspension 30 mL, Daily PRN  ondansetron (ZOFRAN) injection 4 mg, Q6H PRN  enoxaparin (LOVENOX) injection 40 mg, Daily  ipratropium-albuterol (DUONEB) nebulizer solution 1 ampule, BID  acetaminophen (TYLENOL) tablet 650 mg, Q4H PRN  furosemide (LASIX) injection 40 mg, Daily  glucose (GLUTOSE) 40 % oral gel 15 g, PRN  dextrose 50 % IV solution, PRN  glucagon (rDNA) injection 1 mg, PRN  dextrose 5 % solution, PRN  insulin lispro (1 Unit Dial) 9 Units, TID WC  insulin lispro (1 Unit Dial) 0-12 Units, TID WC  insulin lispro (1 Unit Dial) 0-6 Units, Nightly  rosuvastatin (CRESTOR) tablet 10 mg,

## 2019-12-30 NOTE — PROGRESS NOTES
[address] [phone] was active with this pt prior to admit. Discharge planner notified. Will follow.

## 2019-12-30 NOTE — PROGRESS NOTES
Speech Language/Pathology   SPEECH LANGUAGE AND CLINICAL BEDSIDE SWALLOWING EVALUATION   Speech Therapy Department     Patient Name:  Renetta Mitchell  :  1950  Pain level: Pt did not report pain. Medical Diagnosis:  WALLER (dyspnea on exertion) [R06.09]  WALLER (dyspnea on exertion) [R06.09]    HPI: \"74 y.o. female who has history of hypothyroidism, diabetes mellitus insulin-dependent, essential hypertension, hyperlipidemia presented to [facility] with 4 days history of exertional shortness of breath. The patient sleep in the reclining chair for years that is why  I am not sure about orthopnea or paroxysmal nocturnal dyspnea. She is not a good historian, she told me she is diabetic for 10 years and she is currently on 47 units of long-acting and 20 units of short acting with meals. She also says mild cough however no fever no chills. In the emergency room work-up showed blood sugar 249 otherwise normal BMP, troponin 0.01, proBNP 364 lactic acid unremarkable, CBC also unremarkable. CT scan of chest showed multiple pulmonary nodules in the upper lobe but no evidence of pulmonary embolism CXR showed mild pulmonary edema. Hospitalist service called for further management of this patient. \"    SLP eval and treat orders received. Per RN, orders are for cog-linguistic evaluation rather than dysphagia evaluation. SLP to evaluate cognition and provide recommendations. SPEECH LANGUAGE EVALUATION:  Speech Language Treatment Diagnosis: Mild cognitive-linguistic deficits    Cognitive-Linguistic:   Pt reported that she noticed difficulty with memory and attention skills since her fall. Pt presented with the ability to answer simple questions and follow commands to complete tasks. Pt presented with difficulty with thought organization for following a conversation- often going off topic and for completing divergent naming tasks (1 item provided independently in 1 minute).  Pt perseverating on divergent naming task and provided additional items after the task had been switched. Pt presented with mild cognitive-linguistic deficits characterized by difficulty with attention, thought organization, executive function tasks, and problem solving tasks. Pt participate in the [facility] Cognitive Assessment (MoCA) to further assess cognitive-linguistic skills. Pt scored a 19/30 indicating mild cognitive-linguistic deficits (normal is >26/30). See results below:   -Visuospatial/Executive: 3/5   -Naming: 3/3   -Attention: 2/6    - calculation: 0/3 - unable to complete mental subtraction starting at 100   -Language: 0/3    - repeating sentence- did not change the meaning of the sentence but was unable to immediately    recall it exactly as heard    - divergent naming- letters that start with /f/: 1 word independently in 1 minute   -Abstraction: 2/2   -Delayed recall: 3/5, improved to 5/5 given category cue   -Orientation: 6/6    Plan: Speech therapy 3-5 times a weeks for speech-language treatment    Discharge Recommendations:  Pt will benefit from continued skilled Speech Therapy for Speech services, prior to returning home. Prior Status: Pt reported previously lived at home with one dog. Pt does not have any family close by. Pt reported independently completing finances and medication management. Speech Language Short-term goals: 1. Pt will improve thought organization via divergent and convergent naming tasks to 80% given minimal cues  2. Pt will improve problem solving skills via real life scenarios to 80%, via graded tasks   3. Pt will improve short term and long term recall via graded tasks to 80% using memory strategies    Patient/Family Education:Education, results and recommendations given to the Pt and nurse, who verbalized understanding    Timed Code Treatment: 0 minutes    Total Treatment Time: 25 minutes     Roger Montenegro Emelda Mule  Speech-Language Pathologist [phone]

## 2019-12-31 LAB
ANION GAP SERPL CALCULATED.3IONS-SCNC: 8 MMOL/L (ref 3–16)
BUN BLDV-MCNC: 27 MG/DL (ref 7–20)
CALCIUM SERPL-MCNC: 9.5 MG/DL (ref 8.3–10.6)
CHLORIDE BLD-SCNC: 96 MMOL/L (ref 99–110)
CO2: 35 MMOL/L (ref 21–32)
CREAT SERPL-MCNC: 0.8 MG/DL (ref 0.6–1.2)
GFR AFRICAN AMERICAN: >60
GFR NON-AFRICAN AMERICAN: >60
GLUCOSE BLD-MCNC: 156 MG/DL (ref 70–99)
GLUCOSE BLD-MCNC: 177 MG/DL (ref 70–99)
GLUCOSE BLD-MCNC: 179 MG/DL (ref 70–99)
GLUCOSE BLD-MCNC: 202 MG/DL (ref 70–99)
GLUCOSE BLD-MCNC: 235 MG/DL (ref 70–99)
PERFORMED ON: ABNORMAL
POTASSIUM SERPL-SCNC: 3.7 MMOL/L (ref 3.5–5.1)
SODIUM BLD-SCNC: 139 MMOL/L (ref 136–145)

## 2019-12-31 PROCEDURE — 6370000000 HC RX 637 (ALT 250 FOR IP): Performed by: INTERNAL MEDICINE

## 2019-12-31 PROCEDURE — 6370000000 HC RX 637 (ALT 250 FOR IP): Performed by: HOSPITALIST

## 2019-12-31 PROCEDURE — 94669 MECHANICAL CHEST WALL OSCILL: CPT

## 2019-12-31 PROCEDURE — 94640 AIRWAY INHALATION TREATMENT: CPT

## 2019-12-31 PROCEDURE — 94761 N-INVAS EAR/PLS OXIMETRY MLT: CPT

## 2019-12-31 PROCEDURE — 2580000003 HC RX 258: Performed by: HOSPITALIST

## 2019-12-31 PROCEDURE — 0100U HC RESPIRPTHGN MULT REV TRANS & AMP PRB TECH 21 TRGT: CPT

## 2019-12-31 PROCEDURE — 6360000002 HC RX W HCPCS: Performed by: HOSPITALIST

## 2019-12-31 PROCEDURE — 80048 BASIC METABOLIC PNL TOTAL CA: CPT

## 2019-12-31 PROCEDURE — 2700000000 HC OXYGEN THERAPY PER DAY

## 2019-12-31 PROCEDURE — 99232 SBSQ HOSP IP/OBS MODERATE 35: CPT | Performed by: INTERNAL MEDICINE

## 2019-12-31 PROCEDURE — 6370000000 HC RX 637 (ALT 250 FOR IP): Performed by: NURSE PRACTITIONER

## 2019-12-31 PROCEDURE — 36415 COLL VENOUS BLD VENIPUNCTURE: CPT

## 2019-12-31 PROCEDURE — 1200000000 HC SEMI PRIVATE

## 2019-12-31 RX ORDER — LANOLIN ALCOHOL/MO/W.PET/CERES
3 CREAM (GRAM) TOPICAL NIGHTLY PRN
Status: DISCONTINUED | OUTPATIENT
Start: 2019-12-31 | End: 2020-01-03 | Stop reason: HOSPADM

## 2019-12-31 RX ORDER — POLYVINYL ALCOHOL 14 MG/ML
1 SOLUTION/ DROPS OPHTHALMIC PRN
Status: DISCONTINUED | OUTPATIENT
Start: 2019-12-31 | End: 2020-01-03 | Stop reason: HOSPADM

## 2019-12-31 RX ORDER — PANTOPRAZOLE SODIUM 40 MG/1
40 TABLET, DELAYED RELEASE ORAL
Status: DISCONTINUED | OUTPATIENT
Start: 2019-12-31 | End: 2020-01-03 | Stop reason: HOSPADM

## 2019-12-31 RX ORDER — DULOXETIN HYDROCHLORIDE 60 MG/1
60 CAPSULE, DELAYED RELEASE ORAL DAILY
Status: DISCONTINUED | OUTPATIENT
Start: 2019-12-31 | End: 2020-01-03 | Stop reason: HOSPADM

## 2019-12-31 RX ADMIN — ASPIRIN 81 MG: 81 TABLET, COATED ORAL at 09:15

## 2019-12-31 RX ADMIN — PANTOPRAZOLE SODIUM 40 MG: 40 TABLET, DELAYED RELEASE ORAL at 16:23

## 2019-12-31 RX ADMIN — TIMOLOL 1 DROP: 5 SOLUTION/ DROPS OPHTHALMIC at 22:06

## 2019-12-31 RX ADMIN — ENOXAPARIN SODIUM 40 MG: 40 INJECTION SUBCUTANEOUS at 09:15

## 2019-12-31 RX ADMIN — Medication 10 ML: at 09:16

## 2019-12-31 RX ADMIN — Medication 10 ML: at 22:04

## 2019-12-31 RX ADMIN — INSULIN LISPRO 4 UNITS: 100 INJECTION, SOLUTION INTRAVENOUS; SUBCUTANEOUS at 16:41

## 2019-12-31 RX ADMIN — IPRATROPIUM BROMIDE AND ALBUTEROL SULFATE 1 AMPULE: .5; 3 SOLUTION RESPIRATORY (INHALATION) at 19:21

## 2019-12-31 RX ADMIN — LIOTHYRONINE SODIUM 10 MCG: 5 TABLET ORAL at 09:15

## 2019-12-31 RX ADMIN — INSULIN LISPRO 9 UNITS: 100 INJECTION, SOLUTION INTRAVENOUS; SUBCUTANEOUS at 16:41

## 2019-12-31 RX ADMIN — INSULIN LISPRO 2 UNITS: 100 INJECTION, SOLUTION INTRAVENOUS; SUBCUTANEOUS at 09:19

## 2019-12-31 RX ADMIN — INSULIN LISPRO 2 UNITS: 100 INJECTION, SOLUTION INTRAVENOUS; SUBCUTANEOUS at 12:10

## 2019-12-31 RX ADMIN — LOSARTAN POTASSIUM 50 MG: 25 TABLET, FILM COATED ORAL at 09:15

## 2019-12-31 RX ADMIN — LEVOTHYROXINE SODIUM 200 MCG: 100 TABLET ORAL at 06:10

## 2019-12-31 RX ADMIN — FUROSEMIDE 40 MG: 10 INJECTION, SOLUTION INTRAMUSCULAR; INTRAVENOUS at 09:16

## 2019-12-31 RX ADMIN — TIMOLOL 1 DROP: 5 SOLUTION/ DROPS OPHTHALMIC at 09:18

## 2019-12-31 RX ADMIN — DOXYCYCLINE HYCLATE 100 MG: 100 TABLET, COATED ORAL at 09:15

## 2019-12-31 RX ADMIN — ROSUVASTATIN CALCIUM 10 MG: 10 TABLET, FILM COATED ORAL at 22:04

## 2019-12-31 RX ADMIN — INSULIN LISPRO 2 UNITS: 100 INJECTION, SOLUTION INTRAVENOUS; SUBCUTANEOUS at 22:04

## 2019-12-31 RX ADMIN — DOXYCYCLINE HYCLATE 100 MG: 100 TABLET, COATED ORAL at 22:03

## 2019-12-31 RX ADMIN — PREDNISONE 40 MG: 20 TABLET ORAL at 09:15

## 2019-12-31 RX ADMIN — INSULIN LISPRO 9 UNITS: 100 INJECTION, SOLUTION INTRAVENOUS; SUBCUTANEOUS at 09:19

## 2019-12-31 RX ADMIN — IPRATROPIUM BROMIDE AND ALBUTEROL SULFATE 1 AMPULE: .5; 3 SOLUTION RESPIRATORY (INHALATION) at 08:20

## 2019-12-31 RX ADMIN — TRAVOPROST OPHTHALMIC SOLUTION 1 DROP: 0.04 SOLUTION OPHTHALMIC at 22:05

## 2019-12-31 RX ADMIN — DULOXETINE HYDROCHLORIDE 60 MG: 60 CAPSULE, DELAYED RELEASE ORAL at 16:23

## 2019-12-31 RX ADMIN — INSULIN LISPRO 9 UNITS: 100 INJECTION, SOLUTION INTRAVENOUS; SUBCUTANEOUS at 12:10

## 2019-12-31 ASSESSMENT — PAIN SCALES - GENERAL
PAINLEVEL_OUTOF10: 0

## 2019-12-31 NOTE — PROGRESS NOTES
[facility]    12/31/2019 12:56 PM        Name: Isidro Tavera . Admitted: 12/28/2019  Primary Care Provider: Alina Munoz MD (Tel: [phone])      Subjective:  Patient is a 69 yo female with hx CHF, DM, HTN, hypothyroidism. She presented with sob, hypoxia, and cough. CT chest negative for PE. Rapid AB negative. Presently up in bedside chair. States breathing has improved although still requiring O2 at 4 liters, was not on this at home. Reports no improvement in cough, especially bothersome at nights. Sleeps up in chair for comfort.  Denies chest pain, shortness of breath at rest.     Reviewed interval ancillary notes    Current Medications  guaiFENesin-dextromethorphan (ROBITUSSIN DM) 100-10 MG/5ML syrup 5 mL, Q4H PRN  ipratropium-albuterol (DUONEB) nebulizer solution 1 ampule, Q4H PRN  insulin glargine (LANTUS) injection pen 37 Units, Nightly  liothyronine (CYTOMEL) tablet 10 mcg, Daily  predniSONE (DELTASONE) tablet 40 mg, Daily  doxycycline hyclate (VIBRA-TABS) tablet 100 mg, 2 times per day  aspirin EC tablet 81 mg, Daily  levothyroxine (SYNTHROID) tablet 200 mcg, Daily  losartan (COZAAR) tablet 50 mg, Daily  sodium chloride flush 0.9 % injection 10 mL, 2 times per day  sodium chloride flush 0.9 % injection 10 mL, PRN  magnesium hydroxide (MILK OF MAGNESIA) 400 MG/5ML suspension 30 mL, Daily PRN  ondansetron (ZOFRAN) injection 4 mg, Q6H PRN  enoxaparin (LOVENOX) injection 40 mg, Daily  ipratropium-albuterol (DUONEB) nebulizer solution 1 ampule, BID  acetaminophen (TYLENOL) tablet 650 mg, Q4H PRN  furosemide (LASIX) injection 40 mg, Daily  glucose (GLUTOSE) 40 % oral gel 15 g, PRN  dextrose 50 % IV solution, PRN  glucagon (rDNA) injection 1 mg, PRN  dextrose 5 % solution, PRN  insulin lispro (1 Unit Dial) 9 Units, TID WC  insulin lispro (1 Unit Dial) 0-12 Units, TID WC  insulin lispro (1 Unit

## 2019-12-31 NOTE — PLAN OF CARE
Problem: Injury - Risk of, Physical Injury:  Goal: Absence of physical injury  Description  Absence of physical injury  Outcome: Ongoing  Note:   Pt is wearing the fall bracelet, S.A.F.E. sign is posted outside door, and yellow blanket is on the bed. Pt informed of fall risks, verbalizes understanding, and agrees to ask for help to ambulate. Will monitor. Problem: Risk for Impaired Skin Integrity  Goal: Tissue integrity - skin and mucous membranes  Description  Structural intactness and normal physiological function of skin and  mucous membranes. Outcome: Ongoing  Note:   Pt has small red spot on coccyx. Pt has chair cushion in place. Q2hr and PRN turn as needed. Zinc cream applied to reddened area. Will monitor.

## 2019-12-31 NOTE — PROGRESS NOTES
results for input(s): PHART, NXR2MHH, PO2ART, QSO9AKW, U4UVAATP, BEART in the last 72 hours. Cultures:    Abx:    Radiology Review:  Pertinent images / reports were reviewed as a part of this visit. Assessment:     1. Multifocal pneumonia  · CT imaging showing multifocal groundglass opacity  · No guiding culture data  · On doxycycline and prednisone  · Still wheezing on exam    2.   Acute hypoxemic respiratory failure  · Continues to require up to 4 L nasal cannula oxygen supplement and support of saturations in the low to mid 90s  · Not ready to discharge home yet  · Wean oxygen as tolerated

## 2020-01-01 PROBLEM — R93.89 ABNORMAL CT OF THE CHEST: Status: ACTIVE | Noted: 2020-01-01

## 2020-01-01 LAB
ANION GAP SERPL CALCULATED.3IONS-SCNC: 9 MMOL/L (ref 3–16)
BLOOD CULTURE, ROUTINE: NORMAL
BUN BLDV-MCNC: 27 MG/DL (ref 7–20)
CALCIUM SERPL-MCNC: 9.1 MG/DL (ref 8.3–10.6)
CHLORIDE BLD-SCNC: 97 MMOL/L (ref 99–110)
CO2: 32 MMOL/L (ref 21–32)
CREAT SERPL-MCNC: 0.7 MG/DL (ref 0.6–1.2)
CULTURE, BLOOD 2: NORMAL
GFR AFRICAN AMERICAN: >60
GFR NON-AFRICAN AMERICAN: >60
GLUCOSE BLD-MCNC: 111 MG/DL (ref 70–99)
GLUCOSE BLD-MCNC: 124 MG/DL (ref 70–99)
GLUCOSE BLD-MCNC: 171 MG/DL (ref 70–99)
GLUCOSE BLD-MCNC: 265 MG/DL (ref 70–99)
GLUCOSE BLD-MCNC: 95 MG/DL (ref 70–99)
HCT VFR BLD CALC: 38.9 % (ref 36–48)
HEMOGLOBIN: 12.8 G/DL (ref 12–16)
MCH RBC QN AUTO: 29.6 PG (ref 26–34)
MCHC RBC AUTO-ENTMCNC: 32.9 G/DL (ref 31–36)
MCV RBC AUTO: 90 FL (ref 80–100)
PDW BLD-RTO: 16.6 % (ref 12.4–15.4)
PERFORMED ON: ABNORMAL
PERFORMED ON: NORMAL
PLATELET # BLD: 220 K/UL (ref 135–450)
PMV BLD AUTO: 8.3 FL (ref 5–10.5)
POTASSIUM REFLEX MAGNESIUM: 3.6 MMOL/L (ref 3.5–5.1)
RBC # BLD: 4.32 M/UL (ref 4–5.2)
REPORT: NORMAL
RESPIRATORY PANEL PCR: NORMAL
SODIUM BLD-SCNC: 138 MMOL/L (ref 136–145)
URINE CULTURE, ROUTINE: NORMAL
WBC # BLD: 8.5 K/UL (ref 4–11)

## 2020-01-01 PROCEDURE — 6370000000 HC RX 637 (ALT 250 FOR IP): Performed by: NURSE PRACTITIONER

## 2020-01-01 PROCEDURE — 2580000003 HC RX 258: Performed by: HOSPITALIST

## 2020-01-01 PROCEDURE — 94640 AIRWAY INHALATION TREATMENT: CPT

## 2020-01-01 PROCEDURE — 80048 BASIC METABOLIC PNL TOTAL CA: CPT

## 2020-01-01 PROCEDURE — 6370000000 HC RX 637 (ALT 250 FOR IP): Performed by: HOSPITALIST

## 2020-01-01 PROCEDURE — 6370000000 HC RX 637 (ALT 250 FOR IP): Performed by: INTERNAL MEDICINE

## 2020-01-01 PROCEDURE — 2700000000 HC OXYGEN THERAPY PER DAY

## 2020-01-01 PROCEDURE — 6360000002 HC RX W HCPCS: Performed by: HOSPITALIST

## 2020-01-01 PROCEDURE — 85027 COMPLETE CBC AUTOMATED: CPT

## 2020-01-01 PROCEDURE — 94761 N-INVAS EAR/PLS OXIMETRY MLT: CPT

## 2020-01-01 PROCEDURE — 99232 SBSQ HOSP IP/OBS MODERATE 35: CPT | Performed by: INTERNAL MEDICINE

## 2020-01-01 PROCEDURE — 36415 COLL VENOUS BLD VENIPUNCTURE: CPT

## 2020-01-01 PROCEDURE — 1200000000 HC SEMI PRIVATE

## 2020-01-01 RX ORDER — POTASSIUM CHLORIDE 20 MEQ/1
10 TABLET, EXTENDED RELEASE ORAL
Status: DISCONTINUED | OUTPATIENT
Start: 2020-01-02 | End: 2020-01-03 | Stop reason: HOSPADM

## 2020-01-01 RX ORDER — SPIRONOLACTONE 25 MG/1
12.5 TABLET ORAL DAILY
Status: DISCONTINUED | OUTPATIENT
Start: 2020-01-01 | End: 2020-01-03 | Stop reason: HOSPADM

## 2020-01-01 RX ORDER — FUROSEMIDE 40 MG/1
40 TABLET ORAL DAILY
Status: DISCONTINUED | OUTPATIENT
Start: 2020-01-02 | End: 2020-01-03 | Stop reason: HOSPADM

## 2020-01-01 RX ADMIN — SPIRONOLACTONE 12.5 MG: 25 TABLET ORAL at 15:22

## 2020-01-01 RX ADMIN — INSULIN LISPRO 1 UNITS: 100 INJECTION, SOLUTION INTRAVENOUS; SUBCUTANEOUS at 22:10

## 2020-01-01 RX ADMIN — DULOXETINE HYDROCHLORIDE 60 MG: 60 CAPSULE, DELAYED RELEASE ORAL at 09:57

## 2020-01-01 RX ADMIN — Medication 10 ML: at 09:58

## 2020-01-01 RX ADMIN — TIMOLOL 1 DROP: 5 SOLUTION/ DROPS OPHTHALMIC at 09:57

## 2020-01-01 RX ADMIN — ENOXAPARIN SODIUM 40 MG: 40 INJECTION SUBCUTANEOUS at 09:58

## 2020-01-01 RX ADMIN — PREDNISONE 40 MG: 20 TABLET ORAL at 09:58

## 2020-01-01 RX ADMIN — LEVOTHYROXINE SODIUM 200 MCG: 100 TABLET ORAL at 07:49

## 2020-01-01 RX ADMIN — ASPIRIN 81 MG: 81 TABLET, COATED ORAL at 09:58

## 2020-01-01 RX ADMIN — IPRATROPIUM BROMIDE AND ALBUTEROL SULFATE 1 AMPULE: .5; 3 SOLUTION RESPIRATORY (INHALATION) at 07:58

## 2020-01-01 RX ADMIN — INSULIN LISPRO 9 UNITS: 100 INJECTION, SOLUTION INTRAVENOUS; SUBCUTANEOUS at 07:50

## 2020-01-01 RX ADMIN — TIMOLOL 1 DROP: 5 SOLUTION/ DROPS OPHTHALMIC at 22:11

## 2020-01-01 RX ADMIN — TRAVOPROST OPHTHALMIC SOLUTION 1 DROP: 0.04 SOLUTION OPHTHALMIC at 22:11

## 2020-01-01 RX ADMIN — LOSARTAN POTASSIUM 50 MG: 25 TABLET, FILM COATED ORAL at 09:58

## 2020-01-01 RX ADMIN — DOXYCYCLINE HYCLATE 100 MG: 100 TABLET, COATED ORAL at 22:10

## 2020-01-01 RX ADMIN — MELATONIN TAB 3 MG 3 MG: 3 TAB at 22:10

## 2020-01-01 RX ADMIN — Medication 10 ML: at 22:11

## 2020-01-01 RX ADMIN — INSULIN LISPRO 9 UNITS: 100 INJECTION, SOLUTION INTRAVENOUS; SUBCUTANEOUS at 16:51

## 2020-01-01 RX ADMIN — DOXYCYCLINE HYCLATE 100 MG: 100 TABLET, COATED ORAL at 10:01

## 2020-01-01 RX ADMIN — IPRATROPIUM BROMIDE AND ALBUTEROL SULFATE 1 AMPULE: .5; 3 SOLUTION RESPIRATORY (INHALATION) at 19:48

## 2020-01-01 RX ADMIN — LIOTHYRONINE SODIUM 10 MCG: 5 TABLET ORAL at 09:58

## 2020-01-01 RX ADMIN — MELATONIN TAB 3 MG 3 MG: 3 TAB at 00:54

## 2020-01-01 RX ADMIN — INSULIN LISPRO 6 UNITS: 100 INJECTION, SOLUTION INTRAVENOUS; SUBCUTANEOUS at 16:51

## 2020-01-01 RX ADMIN — ROSUVASTATIN CALCIUM 10 MG: 10 TABLET, FILM COATED ORAL at 22:10

## 2020-01-01 RX ADMIN — PANTOPRAZOLE SODIUM 40 MG: 40 TABLET, DELAYED RELEASE ORAL at 07:49

## 2020-01-01 RX ADMIN — INSULIN LISPRO 9 UNITS: 100 INJECTION, SOLUTION INTRAVENOUS; SUBCUTANEOUS at 13:44

## 2020-01-01 ASSESSMENT — PAIN SCALES - GENERAL
PAINLEVEL_OUTOF10: 0

## 2020-01-01 NOTE — PLAN OF CARE
Problem: Injury - Risk of, Physical Injury:  Goal: Absence of physical injury  Description  Absence of physical injury  1/1/2020 0538 by Renae Malloy RN  Outcome: Ongoing  Note:   Room is picked up, free of clutter, nonskid socks on. Will continue to monitor. Problem: Risk for Impaired Skin Integrity  Goal: Tissue integrity - skin and mucous membranes  Description  Structural intactness and normal physiological function of skin and  mucous membranes. Outcome: Ongoing  Note:   Chair cushions on. Pt ambulating to bed side commode as tolerated. Zinc cream applied to bottom. Problem: Cardiovascular  Goal: Hemodynamic stability  12/31/2019 2007 by Altaf Hanson RN  Note:           On tele monitor. Daily lab monitor. Pt denies pain and dizziness. Will continue to monitor. Problem: Metabolic:  Goal: Ability to maintain appropriate glucose levels will improve  Description  Ability to maintain appropriate glucose levels will improve  Outcome: Ongoing           Blood sugar check ac/hs. Insulin given per MAR. Problem: Respiratory  Goal: No pulmonary complications  Outcome: Ongoing  Note:   PO abx and PO steroids. Attempted to wean pt off O2. Now sating 93% on 3L nasal cannula. No complains of SOB on exertion. Will continue to monitor.

## 2020-01-01 NOTE — PROCEDURES
Pulmonary Function Testing      Patient name:  Milli Forbes     [facility] Unit #:   [id number]   Date of test:  12/29/2019  Date of interpretation:   1/1/2020    Ms. Milli Forbes is a 68y.o. year-old non smoker. The spirometry data were acceptable and reproducible. Spirometry:  Flow volume loops were obstructed. The FEV-1/FVC ratio was decreased. The FEV-1 was 0.49 liters (20% of predicted), which was severely decreased. The FVC was 1.09 liters (34% of predicted), which was decreased. Response to inhaled bronchodilators (albuterol) was not performed. Lung volumes:  Lung volumes were not tested by plethysmography. Diffusion capacity was  not tested. Interpretation:  Very severe obstruction noted on this spirometry.     Comments:

## 2020-01-01 NOTE — PROGRESS NOTES
[facility]    1/1/2020 11:25 AM        Name: Isidro Tavera . Admitted: 12/28/2019  Primary Care Provider: Alina Munoz MD (Tel: [phone])      Subjective:  Patient is a 67 yo female with hx CHF, DM, HTN, hypothyroidism. She presented with sob, hypoxia, and cough. CT chest negative for PE. Rapid AB negative. Presently up in bedside chair. States breathing has improved, O2 now down to 5 liters. Reports improvement in cough. Respiratory panel negative. Sleeps up in chair for comfort. Denies chest pain, shortness of breath at rest or ambulating to commode chair.  .     Reviewed interval ancillary notes    Current Medications  melatonin tablet 3 mg, Nightly PRN  DULoxetine (CYMBALTA) extended release capsule 60 mg, Daily  polyvinyl alcohol (LIQUIFILM TEARS) 1.4 % ophthalmic solution 1 drop, PRN  pantoprazole (PROTONIX) tablet 40 mg, QAM AC  sodium chloride (OCEAN, BABY AYR) 0.65 % nasal spray 1 spray, Q2H PRN  guaiFENesin-dextromethorphan (ROBITUSSIN DM) 100-10 MG/5ML syrup 5 mL, Q4H PRN  ipratropium-albuterol (DUONEB) nebulizer solution 1 ampule, Q4H PRN  insulin glargine (LANTUS) injection pen 37 Units, Nightly  liothyronine (CYTOMEL) tablet 10 mcg, Daily  predniSONE (DELTASONE) tablet 40 mg, Daily  doxycycline hyclate (VIBRA-TABS) tablet 100 mg, 2 times per day  aspirin EC tablet 81 mg, Daily  levothyroxine (SYNTHROID) tablet 200 mcg, Daily  losartan (COZAAR) tablet 50 mg, Daily  sodium chloride flush 0.9 % injection 10 mL, 2 times per day  sodium chloride flush 0.9 % injection 10 mL, PRN  magnesium hydroxide (MILK OF MAGNESIA) 400 MG/5ML suspension 30 mL, Daily PRN  ondansetron (ZOFRAN) injection 4 mg, Q6H PRN  enoxaparin (LOVENOX) injection 40 mg, Daily  ipratropium-albuterol (DUONEB) nebulizer solution 1 ampule, BID  acetaminophen (TYLENOL) tablet 650 mg, Q4H PRN  glucose (GLUTOSE) 40 % oral

## 2020-01-01 NOTE — PROGRESS NOTES
P Pulmonary and Critical Care    Follow Up Note    Subjective:   CHIEF COMPLAINT / HPI:   Chief Complaint   Patient presents with    Shortness of Breath     pt brought in by [facility], sob x few days worsening this am. EMS reports 87 % RA upon arrival. Pt givevn HHN and doing better upon arrival        Interval history: Slow improvement    Past Medical History:    Reviewed; no changes    Social History:    Reviewed; no changes    REVIEW OF SYSTEMS:    CONSTITUTIONAL:  negative for fevers and chills  RESPIRATORY:  See HPI  CARDIOVASCULAR:  negative for chest pain, palpitations, edema  GASTROINTESTINAL:  negative for nausea, vomiting, diarrhea, constipation and abdominal pain    Objective:   PHYSICAL EXAM:        VITALS:  /62   Pulse 81   Temp 97.3 °F (36.3 °C) (Temporal)   Resp 18   Ht 5' 6\" (1.676 m)   Wt 236 lb 3.2 oz (107.1 kg)   SpO2 96%   BMI 38.12 kg/m²  on 4L NC    24HR INTAKE/OUTPUT:      Intake/Output Summary (Last 24 hours) at 1/1/2020 1401  Last data filed at 1/1/2020 1341  Gross per 24 hour   Intake 1560 ml   Output 1460 ml   Net 100 ml       CONSTITUTIONAL:  awake, alert,  no apparent distress, and appears stated age  LUNGS:  No increased work of breathing and clear to auscultation, no crackles or wheezes  CARDIOVASCULAR: S1 and S2 and no JVD  ABDOMEN:  normal bowel sounds, non-distended and non-tender to palpation  EXT: No edema, no calf tenderness. Pulses are present bilaterally. NEUROLOGIC:  Mental Status Exam:  Level of Alertness:   awake  Orientation:   person, place, time.  Non focal  SKIN:  normal skin color, texture, turgor, no redness, warmth, or swelling at IV sites    DATA:    CBC:  Recent Labs     01/01/20  0512   WBC 8.5   RBC 4.32   HGB 12.8   HCT 38.9      MCV 90.0   MCH 29.6   MCHC 32.9   RDW 16.6*      BMP:  Recent Labs     12/31/19  0442 01/01/20  0512    138   K 3.7 3.6   CL 96* 97*   CO2 35* 32   BUN 27* 27*   CREATININE 0.8 0.7   CALCIUM 9.5 9.1 GLUCOSE 179* 111*      ABG:  No results for input(s): PHART, BTI5XRA, PO2ART, VKM4GAQ, J4WJDDTG, BEART in the last 72 hours. Cultures:    Abx:    Radiology Review:  Pertinent images / reports were reviewed as a part of this visit. Assessment:     1. Multifocal pneumonia  · CT imaging showing multifocal groundglass opacity  · No guiding culture data  · On doxycycline and prednisone  · Wheezing is improved    2. Acute hypoxemic respiratory failure  · Continues to require up to 4 L nasal cannula oxygen supplement and support of saturations in the low to mid 90s  · Not ready to discharge home yet  · Wean oxygen as tolerated    3. COPD  · I reviewed results of spirometry from 12/29.   This does reveal evidence of severe obstruction  · Severity of the obstruction may be overestimated in the face of an acute illness  · However, likely she does have COPD  · May need supplemental oxygen at the time of her discharge

## 2020-01-01 NOTE — PLAN OF CARE
Problem: Respiratory  Goal: O2 Sat > 90%  Note:   Pt lung sounds: wheezes. O2sat>90 on 4 L humidified NC. Pt reminded to IS, C&DB. Medications and breathing treatment administered. I&O recorded. Up to chair. Cannot wean off oxygen with O2 sat >90. Will continue to monitor. Problem: Falls - Risk of:  Goal: Absence of physical injury  Description  Absence of physical injury  Note:   Bed locked in lowest position and 2/4 rails up. Nonskid socks on. Call light and belongings within reach. Instructed pt to call out for needs. Pt verbalized understanding. Will continue to monitor. Problem: Cardiovascular  Goal: Hemodynamic stability  Note:   Pt on telemetry; rate and rhythm monitored. Pt denies dizziness, CP, and palpitations. Electrolytes monitored. Pt instructed to notify nurse if symptoms occur. Will continue to monitor.

## 2020-01-02 LAB
GLUCOSE BLD-MCNC: 189 MG/DL (ref 70–99)
GLUCOSE BLD-MCNC: 244 MG/DL (ref 70–99)
GLUCOSE BLD-MCNC: 249 MG/DL (ref 70–99)
GLUCOSE BLD-MCNC: 98 MG/DL (ref 70–99)
LV EF: 65 %
LVEF MODALITY: NORMAL
PERFORMED ON: ABNORMAL
PERFORMED ON: NORMAL

## 2020-01-02 PROCEDURE — 2580000003 HC RX 258: Performed by: HOSPITALIST

## 2020-01-02 PROCEDURE — 6370000000 HC RX 637 (ALT 250 FOR IP): Performed by: INTERNAL MEDICINE

## 2020-01-02 PROCEDURE — 2700000000 HC OXYGEN THERAPY PER DAY

## 2020-01-02 PROCEDURE — 6370000000 HC RX 637 (ALT 250 FOR IP): Performed by: HOSPITALIST

## 2020-01-02 PROCEDURE — 6360000002 HC RX W HCPCS: Performed by: HOSPITALIST

## 2020-01-02 PROCEDURE — 6370000000 HC RX 637 (ALT 250 FOR IP): Performed by: NURSE PRACTITIONER

## 2020-01-02 PROCEDURE — 97129 THER IVNTJ 1ST 15 MIN: CPT

## 2020-01-02 PROCEDURE — 94669 MECHANICAL CHEST WALL OSCILL: CPT

## 2020-01-02 PROCEDURE — 94640 AIRWAY INHALATION TREATMENT: CPT

## 2020-01-02 PROCEDURE — 94761 N-INVAS EAR/PLS OXIMETRY MLT: CPT

## 2020-01-02 PROCEDURE — 93306 TTE W/DOPPLER COMPLETE: CPT

## 2020-01-02 PROCEDURE — 99232 SBSQ HOSP IP/OBS MODERATE 35: CPT | Performed by: INTERNAL MEDICINE

## 2020-01-02 PROCEDURE — 97130 THER IVNTJ EA ADDL 15 MIN: CPT

## 2020-01-02 PROCEDURE — 1200000000 HC SEMI PRIVATE

## 2020-01-02 RX ADMIN — ENOXAPARIN SODIUM 40 MG: 40 INJECTION SUBCUTANEOUS at 08:12

## 2020-01-02 RX ADMIN — LIOTHYRONINE SODIUM 10 MCG: 5 TABLET ORAL at 08:11

## 2020-01-02 RX ADMIN — PANTOPRAZOLE SODIUM 40 MG: 40 TABLET, DELAYED RELEASE ORAL at 06:44

## 2020-01-02 RX ADMIN — TIMOLOL 1 DROP: 5 SOLUTION/ DROPS OPHTHALMIC at 08:13

## 2020-01-02 RX ADMIN — DOXYCYCLINE HYCLATE 100 MG: 100 TABLET, COATED ORAL at 08:12

## 2020-01-02 RX ADMIN — ASPIRIN 81 MG: 81 TABLET, COATED ORAL at 08:11

## 2020-01-02 RX ADMIN — PREDNISONE 40 MG: 20 TABLET ORAL at 08:11

## 2020-01-02 RX ADMIN — DULOXETINE HYDROCHLORIDE 60 MG: 60 CAPSULE, DELAYED RELEASE ORAL at 08:12

## 2020-01-02 RX ADMIN — ROSUVASTATIN CALCIUM 10 MG: 10 TABLET, FILM COATED ORAL at 20:27

## 2020-01-02 RX ADMIN — TRAVOPROST OPHTHALMIC SOLUTION 1 DROP: 0.04 SOLUTION OPHTHALMIC at 20:30

## 2020-01-02 RX ADMIN — INSULIN LISPRO 2 UNITS: 100 INJECTION, SOLUTION INTRAVENOUS; SUBCUTANEOUS at 20:34

## 2020-01-02 RX ADMIN — POTASSIUM CHLORIDE 10 MEQ: 1500 TABLET, EXTENDED RELEASE ORAL at 08:13

## 2020-01-02 RX ADMIN — DOXYCYCLINE HYCLATE 100 MG: 100 TABLET, COATED ORAL at 20:27

## 2020-01-02 RX ADMIN — SPIRONOLACTONE 12.5 MG: 25 TABLET ORAL at 08:11

## 2020-01-02 RX ADMIN — LOSARTAN POTASSIUM 50 MG: 25 TABLET, FILM COATED ORAL at 08:12

## 2020-01-02 RX ADMIN — IPRATROPIUM BROMIDE AND ALBUTEROL SULFATE 1 AMPULE: .5; 3 SOLUTION RESPIRATORY (INHALATION) at 19:39

## 2020-01-02 RX ADMIN — LEVOTHYROXINE SODIUM 200 MCG: 100 TABLET ORAL at 06:44

## 2020-01-02 RX ADMIN — FUROSEMIDE 40 MG: 40 TABLET ORAL at 08:11

## 2020-01-02 RX ADMIN — IPRATROPIUM BROMIDE AND ALBUTEROL SULFATE 1 AMPULE: .5; 3 SOLUTION RESPIRATORY (INHALATION) at 08:25

## 2020-01-02 RX ADMIN — INSULIN LISPRO 4 UNITS: 100 INJECTION, SOLUTION INTRAVENOUS; SUBCUTANEOUS at 17:20

## 2020-01-02 RX ADMIN — INSULIN LISPRO 9 UNITS: 100 INJECTION, SOLUTION INTRAVENOUS; SUBCUTANEOUS at 12:55

## 2020-01-02 RX ADMIN — INSULIN LISPRO 9 UNITS: 100 INJECTION, SOLUTION INTRAVENOUS; SUBCUTANEOUS at 17:21

## 2020-01-02 RX ADMIN — MELATONIN TAB 3 MG 3 MG: 3 TAB at 20:27

## 2020-01-02 RX ADMIN — TIMOLOL 1 DROP: 5 SOLUTION/ DROPS OPHTHALMIC at 20:29

## 2020-01-02 RX ADMIN — Medication 10 ML: at 08:14

## 2020-01-02 RX ADMIN — INSULIN LISPRO 2 UNITS: 100 INJECTION, SOLUTION INTRAVENOUS; SUBCUTANEOUS at 12:55

## 2020-01-02 RX ADMIN — Medication 10 ML: at 20:27

## 2020-01-02 ASSESSMENT — PAIN SCALES - GENERAL
PAINLEVEL_OUTOF10: 0

## 2020-01-02 NOTE — PROGRESS NOTES
[facility] PROGRESS NOTE    1/2/2020 1:34 PM        Name: Renetta Mitchell . Admitted: 12/28/2019  Primary Care Provider: Shannon Lynne MD (Tel: [phone])      Subjective:  Patient is a 67 yo female with hx CHF, DM, HTN, hypothyroidism. She presented with sob, hypoxia, and cough. CT chest negative for PE. Rapid AB negative. Presently up in bedside chair. Says she is feeling better, shortness of breath much improved, still with congested cough. O2 now down to 1 liter, likely will need on DC. Sleeps up in chair for comfort. Denies chest pain, shortness of breath at rest or ambulating to bathroom. PFTs suggestive of severe obstructive disease.       Reviewed interval ancillary notes    Current Medications  furosemide (LASIX) tablet 40 mg, Daily  spironolactone (ALDACTONE) tablet 12.5 mg, Daily  potassium chloride (KLOR-CON M) extended release tablet 10 mEq, Daily with breakfast  melatonin tablet 3 mg, Nightly PRN  DULoxetine (CYMBALTA) extended release capsule 60 mg, Daily  polyvinyl alcohol (LIQUIFILM TEARS) 1.4 % ophthalmic solution 1 drop, PRN  pantoprazole (PROTONIX) tablet 40 mg, QAM AC  sodium chloride (OCEAN, BABY AYR) 0.65 % nasal spray 1 spray, Q2H PRN  guaiFENesin-dextromethorphan (ROBITUSSIN DM) 100-10 MG/5ML syrup 5 mL, Q4H PRN  ipratropium-albuterol (DUONEB) nebulizer solution 1 ampule, Q4H PRN  insulin glargine (LANTUS) injection pen 37 Units, Nightly  liothyronine (CYTOMEL) tablet 10 mcg, Daily  predniSONE (DELTASONE) tablet 40 mg, Daily  doxycycline hyclate (VIBRA-TABS) tablet 100 mg, 2 times per day  aspirin EC tablet 81 mg, Daily  levothyroxine (SYNTHROID) tablet 200 mcg, Daily  losartan (COZAAR) tablet 50 mg, Daily  sodium chloride flush 0.9 % injection 10 mL, 2 times per day  sodium chloride flush 0.9 % injection 10 mL, PRN  magnesium hydroxide (MILK OF MAGNESIA) 400 MG/5ML suspension 30 mL, Daily PRN  ondansetron (ZOFRAN) injection 4 mg, Q6H PRN  enoxaparin (LOVENOX) injection 40 mg, Daily  ipratropium-albuterol (DUONEB) nebulizer solution 1 ampule, BID  acetaminophen (TYLENOL) tablet 650 mg, Q4H PRN  glucose (GLUTOSE) 40 % oral gel 15 g, PRN  dextrose 50 % IV solution, PRN  glucagon (rDNA) injection 1 mg, PRN  dextrose 5 % solution, PRN  insulin lispro (1 Unit Dial) 9 Units, TID WC  insulin lispro (1 Unit Dial) 0-12 Units, TID WC  insulin lispro (1 Unit Dial) 0-6 Units, Nightly  rosuvastatin (CRESTOR) tablet 10 mg, Nightly  Timolol Maleate PF 0.5 % SOLN 1 drop (PATIENT SUPPLIED), BID  Travoprost (OMAR Free) (TRAVATAN Z) 0.004 % ophthalmic solution SOLN 1 drop (PATIENT SUPPLIED), Nightly        Objective:  /74   Pulse 90   Temp 98.2 °F (36.8 °C) (Oral)   Resp 18   Ht 5' 6\" (1.676 m)   Wt 238 lb 3.2 oz (108 kg)   SpO2 94%   BMI 38.45 kg/m²     Intake/Output Summary (Last 24 hours) at 1/2/2020 1334  Last data filed at 1/2/2020 1250  Gross per 24 hour   Intake 1200 ml   Output 420 ml   Net 780 ml      Wt Readings from Last 3 Encounters:   01/02/20 238 lb 3.2 oz (108 kg)   08/22/14 260 lb (117.9 kg)   01/27/12 240 lb (108.9 kg)     General:  Awake, alert, oriented in NAD  HEENT: Congenital nystagmus  Skin:  Warm and dry. No unusual bruising or rash  Neck:  Supple. No JVD appreciated  Chest:  Normal effort. Diminished, congested cough  Cardiovascular:  RRR, normal S1/S2, no murmur/gallop/rub  Abdomen:  Soft, nontender, +bowel sounds  Extremities:  1+ BLE edema  Neurological: No focal deficits  Psychological: Normal mood and affect    Labs and Tests:  CBC:   Recent Labs     01/01/20  0512   WBC 8.5   HGB 12.8        BMP:    Recent Labs     12/31/19  0442 01/01/20  0512    138   K 3.7 3.6   CL 96* 97*   CO2 35* 32   BUN 27* 27*   CREATININE 0.8 0.7   GLUCOSE 179* 111*     Hepatic:   No results for input(s): AST, ALT, ALB, BILITOT, ALKPHOS in the last 72 hours.     Results for Omar Miller (MRN [id number]) as of 1/2/2020 13:46   Ref. Range 1/1/2020 11:04 1/1/2020 16:37 1/1/2020 22:09 1/2/2020 07:16 1/2/2020 11:20   POC Glucose Latest Ref Range: 70 - 99 mg/dl 124 (H) 265 (H) 171 (H) 98 189 (H)       PFTs 1/1/2020: Interpretation:  Very severe obstruction noted on this spirometry.     CT chest 12/28/2019:  No evidence of pulmonary embolism.       Multiple pulmonary nodules. Most severe: 11.0 mm ground glass pulmonary   nodule within the upper lobe. Recommend a non-contrast Chest CT at 3-6   months. Subsequent management based on the most suspicious nodule(s). CXR 12/28/2019:  Spectrum of findings suggests mild pulmonary edema.  Developing pneumonitis   can not be definitively excluded. Echo 8/29/2019:  Summary:  The left ventricular wall motion is normal.  Overall left ventricular ejection fraction is estimated to be 60-65%. There is moderate concentric left ventricular hypertrophy. Mild valvular aortic stenosis. The Aortic Valve is mildly calcified. A contrast agent was used to demonstrate all left ventricular wall segments. Problem List  Active Problems:    WALLER (dyspnea on exertion)    Abnormal CT of the chest  Resolved Problems:    * No resolved hospital problems. *       Assessment & Plan:   1. Acute hypoxic respiratory failure secondary to multifocal pneumonia. Respiratory panel negative. Still requiring O2, now at 1 liters, will likely need this at home. Management per pulmonary. 2. Abnormal CT scan with multiple pulmonary nodules. On doxycycline (day 4) and prednisone. Recommend repeat CT chest in 3-6 months. Management per pulmonary. Remains afebrile. 3. Diabetes mellitus. A1c 6.7. Controlled. Continue basal and mealtime insulin with medium dose correction sliding scale. Patient requesting dietary for information on carb controlled diet. 4. HTN. Controlled. Continue losartan. 5. Hypothyroidism. Started on levothyroxine for elevated TSH, low T3.

## 2020-01-02 NOTE — PROGRESS NOTES
medication set up she has at home using a lazy susan turntable, pt states she has made a mistake only once and usually doesn't question herself if she's taken AM pills or not (discussed using an object to place by her pills for a visual reminder)   - Discussed if pt goes home with O2 she will have a physical barrier as O2 tubing can be a fall risk  - Pt reports she has to be shown and then do things vs just being told them in order to fully understand (pt with concerns re: at home O2 use)      3. Pt will improve short term and long term recall via graded tasks to 80% using memory strategies. - Recalled MOCA given to her previous date/ unsure of exact score, able to recall date of assessment  - Recalled fx meal items from today (x 1 semantic paraphasia grilled cheese for grilled chicken)   - Following a 3 day delay pt recalled 4/5 unrelated words from STM portion of MOCA, improved to 5/5 with semantic cue   - Recalled seeing 1 MD and 1 NP recalled 1/2 names, 1/2 titles  - Total fx recall this date 70%     Patient/Family Education: Education given to the Pt who verbalized understanding (see above). Assessment: Patient progressing toward goals    Plan: Continue as per plan of care    Discharge Recommendations: Recommend speech therapy via home health to address pt's concerns in order for pt to return to prior level of cognitive function as well as to keep pt as independent as possible in current home setting. Treatment time  Timed Code Treatment Minutes: 27   Total Treatment time: 27     Signature:   Irving Sanches #[id number] 1/2/2020 3:11 PM  Speech-Language Pathologist

## 2020-01-02 NOTE — PLAN OF CARE
Problem: Injury - Risk of, Physical Injury:  Goal: Absence of physical injury  Description  Absence of physical injury  Outcome: Ongoing  Note:   Non-skid socks on. Room cleaned and picked up, free of clutter. Problem: Risk for Impaired Skin Integrity  Goal: Tissue integrity - skin and mucous membranes  Description  Structural intactness and normal physiological function of skin and  mucous membranes. Outcome: Ongoing  Note:   Skin assessment completed. Zinc cream applied to buttocks as needed. Pt turns and reposition in chair Q2 and as needed. Problem: Respiratory  Goal: No pulmonary complications  Outcome: Ongoing     Problem: Respiratory  Goal: O2 Sat > 90%  Outcome: Ongoing  Note:   On 2-3L oxygen at night. Pt use Cpap during night time at home. Problem: Metabolic:  Goal: Ability to maintain appropriate glucose levels will improve  Description  Ability to maintain appropriate glucose levels will improve  Outcome: Ongoing  Note:   Blood sugar check ac/hs. Insulin given per MAR.

## 2020-01-02 NOTE — PLAN OF CARE
Problem: Confusion - Acute:  Goal: Absence of continued neurological deterioration signs and symptoms  Description  Absence of continued neurological deterioration signs and symptoms  Outcome: Ongoing  Goal: Mental status will be restored to baseline  Description  Mental status will be restored to baseline  Outcome: Ongoing     Problem: Discharge Planning:  Goal: Ability to perform activities of daily living will improve  Description  Ability to perform activities of daily living will improve  Outcome: Ongoing  Goal: Participates in care planning  Description  Participates in care planning  Outcome: Ongoing     Problem: Injury - Risk of, Physical Injury:  Goal: Absence of physical injury  Description  Absence of physical injury  1/2/2020 0914 by Loan Kilpatrick RN  Outcome: Ongoing  1/2/2020 0252 by Earnest Clarke RN  Outcome: Ongoing  Note:   Non-skid socks on. Room cleaned and picked up, free of clutter.          Goal: Will remain free from falls  Description  Will remain free from falls  Outcome: Ongoing     Problem: Mood - Altered:  Goal: Mood stable  Description  Mood stable  Outcome: Ongoing  Goal: Absence of abusive behavior  Description  Absence of abusive behavior  Outcome: Ongoing  Goal: Verbalizations of feeling emotionally comfortable while being cared for will increase  Description  Verbalizations of feeling emotionally comfortable while being cared for will increase  Outcome: Ongoing     Problem: Psychomotor Activity - Altered:  Goal: Absence of psychomotor disturbance signs and symptoms  Description  Absence of psychomotor disturbance signs and symptoms  Outcome: Ongoing     Problem: Sensory Perception - Impaired:  Goal: Demonstrations of improved sensory functioning will increase  Description  Demonstrations of improved sensory functioning will increase  Outcome: Ongoing  Goal: Decrease in sensory misperception frequency  Description  Decrease in sensory misperception frequency  Outcome: Ongoing  Goal: Able to refrain from responding to false sensory perceptions  Description  Able to refrain from responding to false sensory perceptions  Outcome: Ongoing  Goal: Demonstrates accurate environmental perceptions  Description  Demonstrates accurate environmental perceptions  Outcome: Ongoing  Goal: Able to distinguish between reality-based and nonreality-based thinking  Description  Able to distinguish between reality-based and nonreality-based thinking  Outcome: Ongoing  Goal: Able to interrupt nonreality-based thinking  Description  Able to interrupt nonreality-based thinking  Outcome: Ongoing     Problem: Sleep Pattern Disturbance:  Goal: Appears well-rested  Description  Appears well-rested  Outcome: Ongoing     Problem: Risk for Impaired Skin Integrity  Goal: Tissue integrity - skin and mucous membranes  Description  Structural intactness and normal physiological function of skin and  mucous membranes. 1/2/2020 0914 by Aaron Camarena RN  Outcome: Ongoing  1/2/2020 0252 by Ruba Geiger RN  Outcome: Ongoing  Note:   Skin assessment completed. Zinc cream applied to buttocks as needed. Pt turns and reposition in chair Q2 and as needed. Problem: Falls - Risk of:  Goal: Absence of physical injury  Description  Absence of physical injury  1/2/2020 0914 by Aaron Camarena RN  Outcome: Ongoing  1/2/2020 0252 by Ruba Geiger RN  Outcome: Ongoing  Note:   Non-skid socks on. Room cleaned and picked up, free of clutter.          Goal: Will remain free from falls  Description  Will remain free from falls  Outcome: Ongoing     Problem: Cardiovascular  Goal: No DVT, peripheral vascular complications  Outcome: Ongoing  Goal: Hemodynamic stability  Outcome: Ongoing     Problem: Respiratory  Goal: No pulmonary complications  3/5/4995 0235 by Aaron Camarena RN  Outcome: Ongoing  1/2/2020 0252 by Ruba Geiger RN  Outcome: Ongoing  Goal: O2 Sat > 90%  1/2/2020 0914 by Aaron Camarena RN  Outcome: Ongoing  1/2/2020 0252 by Aylin Merino Vernestine Lundborg, RN  Outcome: Ongoing  Note:   On 2-3L oxygen at night. Pt use Cpap during night time at home. Problem: Metabolic:  Goal: Ability to maintain appropriate glucose levels will improve  Description  Ability to maintain appropriate glucose levels will improve  1/2/2020 0914 by Nii Stewart RN  Outcome: Ongoing  1/2/2020 0252 by Melissa Harper RN  Outcome: Ongoing  Note:   Blood sugar check ac/hs. Insulin given per MAR.

## 2020-01-03 VITALS
RESPIRATION RATE: 20 BRPM | HEART RATE: 91 BPM | HEIGHT: 66 IN | TEMPERATURE: 98.3 F | OXYGEN SATURATION: 97 % | DIASTOLIC BLOOD PRESSURE: 72 MMHG | SYSTOLIC BLOOD PRESSURE: 131 MMHG | BODY MASS INDEX: 38.18 KG/M2 | WEIGHT: 237.6 LBS

## 2020-01-03 LAB
ANION GAP SERPL CALCULATED.3IONS-SCNC: 12 MMOL/L (ref 3–16)
BUN BLDV-MCNC: 16 MG/DL (ref 7–20)
CALCIUM SERPL-MCNC: 9.2 MG/DL (ref 8.3–10.6)
CHLORIDE BLD-SCNC: 97 MMOL/L (ref 99–110)
CO2: 31 MMOL/L (ref 21–32)
CREAT SERPL-MCNC: 0.7 MG/DL (ref 0.6–1.2)
GFR AFRICAN AMERICAN: >60
GFR NON-AFRICAN AMERICAN: >60
GLUCOSE BLD-MCNC: 110 MG/DL (ref 70–99)
GLUCOSE BLD-MCNC: 194 MG/DL (ref 70–99)
GLUCOSE BLD-MCNC: 305 MG/DL (ref 70–99)
GLUCOSE BLD-MCNC: 75 MG/DL (ref 70–99)
HCT VFR BLD CALC: 39.3 % (ref 36–48)
HEMOGLOBIN: 12.8 G/DL (ref 12–16)
MCH RBC QN AUTO: 29 PG (ref 26–34)
MCHC RBC AUTO-ENTMCNC: 32.7 G/DL (ref 31–36)
MCV RBC AUTO: 88.9 FL (ref 80–100)
PDW BLD-RTO: 16 % (ref 12.4–15.4)
PERFORMED ON: ABNORMAL
PERFORMED ON: ABNORMAL
PERFORMED ON: NORMAL
PLATELET # BLD: 221 K/UL (ref 135–450)
PMV BLD AUTO: 8 FL (ref 5–10.5)
POTASSIUM SERPL-SCNC: 3.6 MMOL/L (ref 3.5–5.1)
RBC # BLD: 4.42 M/UL (ref 4–5.2)
SODIUM BLD-SCNC: 140 MMOL/L (ref 136–145)
WBC # BLD: 8.8 K/UL (ref 4–11)

## 2020-01-03 PROCEDURE — 94640 AIRWAY INHALATION TREATMENT: CPT

## 2020-01-03 PROCEDURE — 6370000000 HC RX 637 (ALT 250 FOR IP): Performed by: NURSE PRACTITIONER

## 2020-01-03 PROCEDURE — 94761 N-INVAS EAR/PLS OXIMETRY MLT: CPT

## 2020-01-03 PROCEDURE — 80048 BASIC METABOLIC PNL TOTAL CA: CPT

## 2020-01-03 PROCEDURE — 97530 THERAPEUTIC ACTIVITIES: CPT

## 2020-01-03 PROCEDURE — 94680 O2 UPTK RST&XERS DIR SIMPLE: CPT

## 2020-01-03 PROCEDURE — 85027 COMPLETE CBC AUTOMATED: CPT

## 2020-01-03 PROCEDURE — 6370000000 HC RX 637 (ALT 250 FOR IP): Performed by: HOSPITALIST

## 2020-01-03 PROCEDURE — 36415 COLL VENOUS BLD VENIPUNCTURE: CPT

## 2020-01-03 PROCEDURE — 2700000000 HC OXYGEN THERAPY PER DAY

## 2020-01-03 PROCEDURE — 2580000003 HC RX 258: Performed by: HOSPITALIST

## 2020-01-03 PROCEDURE — 97116 GAIT TRAINING THERAPY: CPT

## 2020-01-03 PROCEDURE — 6360000002 HC RX W HCPCS: Performed by: HOSPITALIST

## 2020-01-03 PROCEDURE — 6370000000 HC RX 637 (ALT 250 FOR IP): Performed by: INTERNAL MEDICINE

## 2020-01-03 RX ORDER — GUAIFENESIN/DEXTROMETHORPHAN 100-10MG/5
5 SYRUP ORAL EVERY 4 HOURS PRN
Qty: 120 ML | Refills: 0 | Status: SHIPPED | OUTPATIENT
Start: 2020-01-03 | End: 2020-01-13

## 2020-01-03 RX ORDER — PREDNISONE 10 MG/1
TABLET ORAL
Qty: 20 TABLET | Refills: 0 | Status: SHIPPED | OUTPATIENT
Start: 2020-01-03 | End: 2020-07-30

## 2020-01-03 RX ORDER — LIOTHYRONINE SODIUM 5 UG/1
10 TABLET ORAL DAILY
Qty: 30 TABLET | Refills: 3 | Status: CANCELLED | COMMUNITY
Start: 2020-01-03

## 2020-01-03 RX ORDER — DOXYCYCLINE HYCLATE 100 MG
100 TABLET ORAL EVERY 12 HOURS SCHEDULED
Qty: 6 TABLET | Refills: 0 | Status: SHIPPED | OUTPATIENT
Start: 2020-01-03 | End: 2020-01-06

## 2020-01-03 RX ORDER — DULOXETIN HYDROCHLORIDE 60 MG/1
60 CAPSULE, DELAYED RELEASE ORAL DAILY
Qty: 30 CAPSULE | Refills: 3 | COMMUNITY
Start: 2020-01-03

## 2020-01-03 RX ADMIN — DOXYCYCLINE HYCLATE 100 MG: 100 TABLET, COATED ORAL at 07:49

## 2020-01-03 RX ADMIN — FUROSEMIDE 40 MG: 40 TABLET ORAL at 07:49

## 2020-01-03 RX ADMIN — ENOXAPARIN SODIUM 40 MG: 40 INJECTION SUBCUTANEOUS at 07:50

## 2020-01-03 RX ADMIN — ASPIRIN 81 MG: 81 TABLET, COATED ORAL at 07:49

## 2020-01-03 RX ADMIN — IPRATROPIUM BROMIDE AND ALBUTEROL SULFATE 1 AMPULE: .5; 3 SOLUTION RESPIRATORY (INHALATION) at 11:18

## 2020-01-03 RX ADMIN — TIMOLOL 1 DROP: 5 SOLUTION/ DROPS OPHTHALMIC at 08:04

## 2020-01-03 RX ADMIN — PANTOPRAZOLE SODIUM 40 MG: 40 TABLET, DELAYED RELEASE ORAL at 06:19

## 2020-01-03 RX ADMIN — POTASSIUM CHLORIDE 10 MEQ: 1500 TABLET, EXTENDED RELEASE ORAL at 07:49

## 2020-01-03 RX ADMIN — DULOXETINE HYDROCHLORIDE 60 MG: 60 CAPSULE, DELAYED RELEASE ORAL at 07:49

## 2020-01-03 RX ADMIN — Medication 10 ML: at 07:50

## 2020-01-03 RX ADMIN — PREDNISONE 40 MG: 20 TABLET ORAL at 07:49

## 2020-01-03 RX ADMIN — SPIRONOLACTONE 12.5 MG: 25 TABLET ORAL at 07:49

## 2020-01-03 RX ADMIN — INSULIN LISPRO 2 UNITS: 100 INJECTION, SOLUTION INTRAVENOUS; SUBCUTANEOUS at 11:56

## 2020-01-03 RX ADMIN — LEVOTHYROXINE SODIUM 200 MCG: 100 TABLET ORAL at 06:19

## 2020-01-03 RX ADMIN — INSULIN LISPRO 9 UNITS: 100 INJECTION, SOLUTION INTRAVENOUS; SUBCUTANEOUS at 11:55

## 2020-01-03 RX ADMIN — LIOTHYRONINE SODIUM 10 MCG: 5 TABLET ORAL at 07:50

## 2020-01-03 RX ADMIN — LOSARTAN POTASSIUM 50 MG: 25 TABLET, FILM COATED ORAL at 07:50

## 2020-01-03 NOTE — PROGRESS NOTES
Home Oxygen Evaluation     Patients room air at rest saturation SpO2 90%    Patients room air saturation SpO2 85% with exertion      Patients SpO2 on exertion with oxygen   1 lpm = SpO2  88%    2 lpm = SpO2  94%

## 2020-01-03 NOTE — PROGRESS NOTES
Patients baseline Spo2 is 94% on 1LNC wth HR of 76. Patient placed on room air and ambulated approx 20 steps. Patients Spo2 drops to 85% and HR increases to 108. Patient c/o SOB. Patient returned safely to chair in room and placed on 1LNC . Spo2 is greater than 90% after 4 mins of breathing coaching by nurse. CM notified of need for home O2.

## 2020-01-03 NOTE — PLAN OF CARE
Patient was evaluated today for the diagnosis of bronchopneumonia, severe obstructive airway disease. I entered a DME order for home oxygen because the diagnosis and testing requires the patient to have supplemental oxygen. Condition will improve or be benefited by oxygen use. The patient is not able to perform good mobility in a home setting and therefore does require the use of a portable oxygen system. The need for this equipment was discussed with the patient and she understands and is in agreement.   Kindra Varner, APRN-CNP

## 2020-01-03 NOTE — PROGRESS NOTES
Physical Therapy  Facility/Department: [address] NURSING  Daily Treatment Note/Discharge Summary  NAME: Reza Dowling  : 1950  MRN: [id number]    Date of Service: 1/3/2020    Discharge Recommendations:Alysha Goldberg scored a 22/24 on the AM-PAC short mobility form. Current research shows that an AM-PAC score of 18 or greater is typically associated with a discharge to the patient's home setting. Based on the patients AM-PAC score and their current functional mobility deficits, it is recommended that the patient have 2-3 sessions per week of Physical Therapy at d/c to increase the patients independence. HOME HEALTH CARE: LEVEL 1 STANDARD    - Initial home health evaluation to occur within 24-48 hours, in patient home   - Therapy to evaluate with goal of regaining prior level of functioning   - Therapy to evaluate if patient has [address] needs for personal care        PT Equipment Recommendations  Equipment Needed: No(Pt has DME)    Assessment   Body structures, Functions, Activity limitations: Decreased functional mobility ; Decreased endurance;Decreased balance  Assessment: Pt presents as slightly below her baseline function and would benefit from skilled PT services to promote safe return to PLOF. Pt is ambulating increased distances, although does require supplemental O2 at rest and with ambulation. Prognosis: Good  PT Education: Goals;PT Role;Plan of Care;General Safety;Gait Training;Home Exercise Program  Patient Education: D/C recommendations; discussed safety with O2 tubing at home   Barriers to Learning: none  REQUIRES PT FOLLOW UP: No  Activity Tolerance  Activity Tolerance: Patient Tolerated treatment well; Pt SpO2=93-94% on 1L at rest; drops to 84-86% after ambulation on 1L; discussed with RN; pt may need increased O2 with activity at home    Patient Diagnosis(es): The primary encounter diagnosis was Dyspnea, unspecified type.  Diagnoses of Hypoxia, Acute respiratory failure with hypoxemia ([facility] [address] and Lung nodule were also pertinent to this visit. has a past medical history of Arthritis, Cataracts, bilateral, CHF (congestive heart failure) ([address] Diabetes mellitus ([address] Glaucoma, Hyperlipidemia, Hypertension, and Thyroid disease. has a past surgical history that includes eye surgery; Foot surgery; Carpal tunnel release; Elbow surgery; shoulder surgery; Cholecystectomy; Colonoscopy; and Endoscopy, colon, diagnostic. Restrictions  Restrictions/Precautions  Restrictions/Precautions: Fall Risk, Modified Diet, Isolation  Required Braces or Orthoses?: No  Position Activity Restriction  Other position/activity restrictions: 68 y.o. female here for shortness of breath worsening over the past 3 days but has been ongoing for about 2 weeks. Subjective   General  Chart Reviewed: Yes  Response To Previous Treatment: Patient with no complaints from previous session. Family / Caregiver Present: No  Subjective  Subjective: Pt denies pain and is ready to discharge home today.   General Comment  Comments: Pt sitting up in chair upon arrival.  Pain Screening  Patient Currently in Pain: Denies  Vital Signs  Patient Currently in Pain: Denies       Orientation  Orientation  Overall Orientation Status: Within Functional Limits    Objective      Transfers  Sit to Stand: Modified independent  Stand to sit: Modified independent     Ambulation  Surface: level tile  Device: Rolling Walker  Other Apparatus: O2(1L)  Assistance: Modified Independent  Quality of Gait: Pt ambulates with decreased step length, good carolann, upright posture, no LOB, steady with ambulation with RW   Distance: 200 ft x 2  Stairs/Curb  Stairs?: No-Pt does not have steps at home; ramp entrances     Balance  Posture: Good  Sitting - Dynamic: Good  Standing - Static: Good  Standing - Dynamic: Good;-     Exercises  Comments: Pt educated regarding HEP of APs, LAQ, seated marches; discussed HHPT and progression of ex program

## 2020-01-04 NOTE — DISCHARGE SUMMARY
[facility]    Patient Demographics    Patient. Eneida Vela  Date of Birth. 1950  MRN. [id number]     Primary care provider. Helen Duron MD  (Tel: [phone])    Admit date: 12/28/2019    Discharge date (blank if same as Note Date): 1/3/2020  Note Date: 1/3/2020     Reason for Hospitalization. Chief Complaint   Patient presents with    Shortness of Breath     pt brought in by [facility], sob x few days worsening this am. EMS reports 87 % RA upon arrival. Pt givevn HHN and doing better upon arrival          Significant Findings. Active Problems:    WALLER (dyspnea on exertion)    Abnormal CT of the chest  Resolved Problems:    * No resolved hospital problems. *       Problems and results from this hospitalization that need follow up. 1. Acute hypoxic respiratory failure secondary to multifocal pneumonia. Home O2 eval on day of DC showed desaturation to 85% with exertion. DC with home O2.      2. Abnormal CT scan with multiple pulmonary nodules. Treated with doxycycline and prednisone. Recommend repeat CT chest in 3-6 months. 3. PFT demonstrated very severe reversible obstructive airway disease, but PFT was performed during an acute illness and hence may not be accurate. Started on [place]. To follow with pulmonary 1/17/2020.    4. Hypothyroidism. Started on levothyroxine for elevated TSH, low T3. Recommend recheck thyroid studies in 1 month. Significant test results and incidental findings. PFTs 1/1/2020: Interpretation:  Very severe obstruction noted on this spirometry.     CT chest 12/28/2019:  No evidence of pulmonary embolism.       Multiple pulmonary nodules. Most severe: 11.0 mm ground glass pulmonary   nodule within the upper lobe. Recommend a non-contrast Chest CT at 3-6   months.  Subsequent management based on the most suspicious nodule(s).      CXR extended release capsule  Commonly known as:  CYMBALTA  Notes to patient:  Use: Treatment of generalized anxiety disorder  Side effects: Constipation, insomnia, fatigue     guaiFENesin-dextromethorphan 100-10 MG/5ML syrup  Commonly known as:  ROBITUSSIN DM  Take 5 mLs by mouth every 4 hours as needed for Cough     mometasone-formoterol 200-5 MCG/ACT inhaler  Commonly known as:  DULERA  Inhale 2 puffs into the lungs every 12 hours  Notes to patient:  Use: to treat asthma or COPD  Side effects: runny nose, sore throat, headache, oral yeast infection     predniSONE 10 MG tablet  Commonly known as:  DELTASONE  Take 40 mg daily x 2 days, 30 mg daily x 2 days, 20 mg daily x 2 days, 10 mg daily x 2 days  Notes to patient:  Use: treat asthma and copd, inflammation  Side effects: upset stomach, high blood sugars, weight gain, mood changes, and increased chances of infection     sodium chloride 0.65 % nasal spray  Commonly known as:  OCEAN, BABY AYR  1 spray by Nasal route every 2 hours as needed for Congestion        CONTINUE taking these medications    acetaminophen 500 MG tablet  Commonly known as:  TYLENOL  Notes to patient:  Pain/fever reducer  Side effects: upset stomach   Do not take more than 4,000mg tylenol (acetamenophen) in a 24 hour period   Note: Percocet and Norco have tylenol (acetamenophen) in them     aspirin EC 81 MG EC tablet  Notes to patient:  Use: prevents heart attacks and strokes. Side effects: bleeding or bruising more easily, stomach upset. CALCIUM + D PO  Notes to patient:  Use: Maintaining adequate calcium intake  Side effects: Stomach upset     DICLOFENAC PO  Notes to patient: For inflammation  Side effects: irritation at application site     furosemide 40 MG tablet  Commonly known as:  LASIX  Notes to patient:  Use: treat heart failure, fluid retention, lower blood pressure.        Side effects: frequent urination, weakness, muscle cramps, increased sensitivity to light, nausea, and

## 2020-01-06 NOTE — PROGRESS NOTES
Speech/Language Pathology  Discharge Note    Name: Trent Ramesh   : 1950     Speech Therapy/Dysphagia  Pt discharged to home on 1/3/20. Speech-Language Eval completed 19 (see report). No goals met prior to discharge. Recommend: Continued Speech-Language treatment at home, with goals and treatment per [facility]. SPEECH-LANGUAGE GOALS:  1. Pt will improve thought organization via divergent and convergent naming tasks to 80% given minimal cues (goal not met)  2. Pt will improve problem solving skills via real life scenarios to 80%, via graded tasks (goal not met)  3. Pt will improve short term and long term recall via graded tasks to 80% using memory strategies (goal not met)    Ana Wheeler M.S. [id number] [facility]  Speech-Language Pathologist

## 2020-01-10 ENCOUNTER — HOSPITAL ENCOUNTER (OUTPATIENT)
Age: 70
Setting detail: SPECIMEN
Discharge: HOME | End: 2020-01-10
Payer: MEDICARE

## 2020-01-10 LAB
ANION GAP SERPL CALCULATED.3IONS-SCNC: 13 MMOL/L (ref 3–16)
BASOPHILS ABSOLUTE: 0 K/UL (ref 0–0.2)
BASOPHILS RELATIVE PERCENT: 0.2 %
BUN BLDV-MCNC: 15 MG/DL (ref 7–20)
CALCIUM SERPL-MCNC: 9.2 MG/DL (ref 8.3–10.6)
CHLORIDE BLD-SCNC: 95 MMOL/L (ref 99–110)
CO2: 33 MMOL/L (ref 21–32)
CREAT SERPL-MCNC: 0.8 MG/DL (ref 0.6–1.2)
EOSINOPHILS ABSOLUTE: 0 K/UL (ref 0–0.6)
EOSINOPHILS RELATIVE PERCENT: 0 %
GFR AFRICAN AMERICAN: >60
GFR NON-AFRICAN AMERICAN: >60
GLUCOSE BLD-MCNC: 293 MG/DL (ref 70–99)
HCT VFR BLD CALC: 42.2 % (ref 36–48)
HEMOGLOBIN: 13.4 G/DL (ref 12–16)
LYMPHOCYTES ABSOLUTE: 0.7 K/UL (ref 1–5.1)
LYMPHOCYTES RELATIVE PERCENT: 4.8 %
MCH RBC QN AUTO: 29 PG (ref 26–34)
MCHC RBC AUTO-ENTMCNC: 31.8 G/DL (ref 31–36)
MCV RBC AUTO: 91.2 FL (ref 80–100)
MONOCYTES ABSOLUTE: 0.5 K/UL (ref 0–1.3)
MONOCYTES RELATIVE PERCENT: 3.1 %
NEUTROPHILS ABSOLUTE: 14.2 K/UL (ref 1.7–7.7)
NEUTROPHILS RELATIVE PERCENT: 91.9 %
PDW BLD-RTO: 16.6 % (ref 12.4–15.4)
PLATELET # BLD: 218 K/UL (ref 135–450)
PMV BLD AUTO: 8.3 FL (ref 5–10.5)
POTASSIUM SERPL-SCNC: 4.5 MMOL/L (ref 3.5–5.1)
RBC # BLD: 4.63 M/UL (ref 4–5.2)
SODIUM BLD-SCNC: 141 MMOL/L (ref 136–145)
WBC # BLD: 15.4 K/UL (ref 4–11)

## 2020-01-10 PROCEDURE — 80048 BASIC METABOLIC PNL TOTAL CA: CPT

## 2020-01-10 PROCEDURE — 85025 COMPLETE CBC W/AUTO DIFF WBC: CPT

## 2020-01-10 PROCEDURE — 36415 COLL VENOUS BLD VENIPUNCTURE: CPT

## 2020-01-17 ENCOUNTER — OFFICE VISIT (OUTPATIENT)
Dept: PULMONOLOGY | Age: 70
End: 2020-01-17
Payer: MEDICARE

## 2020-01-17 VITALS
BODY MASS INDEX: 39.38 KG/M2 | SYSTOLIC BLOOD PRESSURE: 118 MMHG | HEART RATE: 82 BPM | RESPIRATION RATE: 16 BRPM | WEIGHT: 244 LBS | DIASTOLIC BLOOD PRESSURE: 62 MMHG | OXYGEN SATURATION: 92 %

## 2020-01-17 PROCEDURE — 1111F DSCHRG MED/CURRENT MED MERGE: CPT | Performed by: INTERNAL MEDICINE

## 2020-01-17 PROCEDURE — 4040F PNEUMOC VAC/ADMIN/RCVD: CPT | Performed by: INTERNAL MEDICINE

## 2020-01-17 PROCEDURE — 99213 OFFICE O/P EST LOW 20 MIN: CPT | Performed by: INTERNAL MEDICINE

## 2020-01-17 PROCEDURE — 1090F PRES/ABSN URINE INCON ASSESS: CPT | Performed by: INTERNAL MEDICINE

## 2020-01-17 PROCEDURE — 4004F PT TOBACCO SCREEN RCVD TLK: CPT | Performed by: INTERNAL MEDICINE

## 2020-01-17 PROCEDURE — 1123F ACP DISCUSS/DSCN MKR DOCD: CPT | Performed by: INTERNAL MEDICINE

## 2020-01-17 PROCEDURE — G8484 FLU IMMUNIZE NO ADMIN: HCPCS | Performed by: INTERNAL MEDICINE

## 2020-01-17 PROCEDURE — G8419 CALC BMI OUT NRM PARAM NOF/U: HCPCS | Performed by: INTERNAL MEDICINE

## 2020-01-17 PROCEDURE — G8427 DOCREV CUR MEDS BY ELIG CLIN: HCPCS | Performed by: INTERNAL MEDICINE

## 2020-01-17 PROCEDURE — 3017F COLORECTAL CA SCREEN DOC REV: CPT | Performed by: INTERNAL MEDICINE

## 2020-01-17 PROCEDURE — G8400 PT W/DXA NO RESULTS DOC: HCPCS | Performed by: INTERNAL MEDICINE

## 2020-01-17 ASSESSMENT — ENCOUNTER SYMPTOMS
VOICE CHANGE: 0
RHINORRHEA: 0
SINUS PRESSURE: 0
BACK PAIN: 0
DIARRHEA: 0
WHEEZING: 0
STRIDOR: 0
CONSTIPATION: 0
CHOKING: 0
ABDOMINAL DISTENTION: 0
BLOOD IN STOOL: 0
APNEA: 0
SORE THROAT: 0
SHORTNESS OF BREATH: 0
COUGH: 0
CHEST TIGHTNESS: 0
ANAL BLEEDING: 0
ABDOMINAL PAIN: 0

## 2020-01-17 NOTE — PROGRESS NOTES
abnormal muscle tone. Coordination: Coordination normal.      Deep Tendon Reflexes: Reflexes normal.             Health Maintenance   Topic Date Due    Hepatitis C screen  1950    Diabetic foot exam  10/26/1958    Diabetic retinal exam  10/26/1958    Lipid screen  10/26/1958    Diabetic microalbuminuria test  10/26/1966    Colon cancer screen colonoscopy  10/26/1998    DEXA (modify frequency per FRAX score)  10/26/2013    Breast cancer screen  04/25/2018    Annual Wellness Visit (AWV)  12/28/2019    A1C test (Diabetic or Prediabetic)  12/29/2020    Potassium monitoring  01/10/2021    Creatinine monitoring  01/10/2021    DTaP/Tdap/Td vaccine (2 - Td) 09/08/2027    Flu vaccine  Completed    Shingles Vaccine  Completed    Pneumococcal 65+ years Vaccine  Completed          Assessment/Plan:     Diagnosis Orders   1. Multiple lung nodules on CT  CT CHEST WO CONTRAST   2. History of pneumonia    History of multifocal pneumonia/scattered bilateral faint infiltrates/lung nodule noted on prior CT imaging from 12/28, treated successfully with a course of doxycycline and prednisone. Will discontinue home oxygen, since her O2 sats are normal even with activity on room air. Will repeat CT imaging in March in order to confirm clearance of the infiltrates. Non-smoker. No prior history of asthma. Return in about 2 months (around 3/17/2020).

## 2020-05-18 ENCOUNTER — HOSPITAL ENCOUNTER (OUTPATIENT)
Dept: CT IMAGING | Age: 70
Discharge: HOME | End: 2020-05-18
Payer: MEDICARE

## 2020-05-18 PROCEDURE — 71250 CT THORAX DX C-: CPT

## 2020-07-10 ENCOUNTER — TELEPHONE (OUTPATIENT)
Dept: PULMONOLOGY | Age: 70
End: 2020-07-10

## 2020-07-30 ENCOUNTER — VIRTUAL VISIT (OUTPATIENT)
Dept: PULMONOLOGY | Age: 70
End: 2020-07-30
Payer: MEDICARE

## 2020-07-30 PROCEDURE — 99213 OFFICE O/P EST LOW 20 MIN: CPT | Performed by: INTERNAL MEDICINE

## 2020-07-30 ASSESSMENT — ENCOUNTER SYMPTOMS
VOICE CHANGE: 0
ABDOMINAL DISTENTION: 0
CHEST TIGHTNESS: 0
WHEEZING: 0
APNEA: 0
CONSTIPATION: 0
DIARRHEA: 0
SORE THROAT: 0
BLOOD IN STOOL: 0
ANAL BLEEDING: 0
CHOKING: 0
SINUS PRESSURE: 0
COUGH: 0
RHINORRHEA: 0
SHORTNESS OF BREATH: 0
STRIDOR: 0
ABDOMINAL PAIN: 0

## 2020-07-30 NOTE — PROGRESS NOTES
Ankush Bush     . Pulmonology Telephone Visit    Pursuant to the emergency declaration under the [facility] waiver authority and the [employer] and [employer] General Act this Telephone Visit was insisted, with patient's consent, to reduce the patient's risk of exposure to COVID-19 and provide continuity of care for an established patient. The patient was at home, while the provider was at the clinic. Services were provided through a synchronous discussion through a Telephone Call to substitute for in-person clinic visit, and coded as such. Total time spent with patient was 11 minutes. YOB: 1950     Date of Service:  7/30/2020     Chief Complaint   Patient presents with    Results     ct chest done  05/18/20         HPI telephone visit. Patient wants to know the results of CT chest.  Denies any worsening shortness of breath or cough. Had fall approximately 1 month ago.     Allergies   Allergen Reactions    Other Other (See Comments)     Preservatives in eyedrops-blurred vision    Ace Inhibitors Other (See Comments)    Metoprolol Other (See Comments)    Tetrahydrozoline Hcl Other (See Comments)     Blurry vision    Timolol Other (See Comments)     Blurry vision DO NOT USE     Outpatient Medications Marked as Taking for the 7/30/20 encounter (Virtual Visit) with Kishore Mullen MD   Medication Sig Dispense Refill    DULoxetine (CYMBALTA) 60 MG extended release capsule Take 1 capsule by mouth daily 30 capsule 3    sodium chloride (OCEAN, BABY AYR) 0.65 % nasal spray 1 spray by Nasal route every 2 hours as needed for Congestion 1 Bottle 0    mometasone-formoterol (DULERA) 200-5 MCG/ACT inhaler Inhale 2 puffs into the lungs every 12 hours 1 Inhaler 1    spironolactone (ALDACTONE) 25 MG tablet Take 12.5 mg by mouth daily      Timolol Maleate PF (TIMOPTIC OCUDOSE) 0.5 % SOLN Apply 1 drop to eye 2 times daily      Travoprost, BAK Free, (TRAVATAN Z) 0.004 % SOLN ophthalmic solution Place 1 drop into both eyes nightly      DICLOFENAC PO Take  by mouth.  acetaminophen (TYLENOL) 500 MG tablet Take 500 mg by mouth every 6 hours as needed for Pain or Fever       aspirin EC 81 MG EC tablet Take 81 mg by mouth daily May restart in AM.      losartan (COZAAR) 50 MG tablet Take 50 mg by mouth daily       furosemide (LASIX) 40 MG tablet Take 40 mg by mouth daily       levothyroxine (LEVOXYL) 200 MCG tablet Take 200 mcg by mouth Daily       Insulin Detemir (LEVEMIR FLEXPEN SC) Inject 47 Units into the skin       simvastatin (ZOCOR) 40 MG tablet Take 40 mg by mouth nightly.  Insulin Lispro, Human, (HUMALOG SC) Inject 20 Units into the skin 3 times daily (with meals) With sliding      Calcium Carbonate-Vitamin D (CALCIUM + D PO) Take by mouth daily          Immunization History   Administered Date(s) Administered    PPD Test 12/28/2019       Past Medical History:   Diagnosis Date    Arthritis     back    Cataracts, bilateral     CH[facility] (congestive heart failure) ([facility] [address]     Diabetes mellitus ([facility] [address]     Glaucoma     Hyperlipidemia     Hypertension     Thyroid disease      Past Surgical History:   Procedure Laterality Date    CARPAL TUNNEL RELEASE      bilateral    CHOLECYSTECTOMY      COLONOSCOPY      ELBOW SURGERY      ENDOSCOPY, COLON, DIAGNOSTIC      EYE SURGERY      FOOT SURGERY      SHOULDER SURGERY       History reviewed. No pertinent family history. Review of Systems:  Review of Systems   Constitutional: Negative for activity change, appetite change, fatigue and fever. HENT: Negative for congestion, ear discharge, ear pain, postnasal drip, rhinorrhea, sinus pressure, sneezing, sore throat, tinnitus and voice change. Respiratory: Negative for apnea, cough, choking, chest tightness, shortness of breath, wheezing and stridor.     Cardiovascular: Negative for chest pain, palpitations and leg swelling. Gastrointestinal: Negative for abdominal distention, abdominal pain, anal bleeding, blood in stool, constipation and diarrhea. Skin: Negative for pallor and rash. Allergic/Immunologic: Negative for environmental allergies. Neurological: Negative for dizziness, tremors, seizures, syncope, speech difficulty, weakness, light-headedness, numbness and headaches. Hematological: Negative for adenopathy. Does not bruise/bleed easily. Psychiatric/Behavioral: Negative for sleep disturbance. There were no vitals filed for this visit. Patient-Reported Vitals 7/30/2020   Patient-Reported Weight 244lb   Patient-Reported Height 5ft6in      There is no height or weight on file to calculate BMI. Wt Readings from Last 3 Encounters:   01/17/20 244 lb (110.7 kg)   01/03/20 237 lb 9.6 oz (107.8 kg)   08/22/14 260 lb (117.9 kg)     BP Readings from Last 3 Encounters:   01/17/20 118/62   01/03/20 131/72   08/22/14 (!) 156/94         Physical Exam    Due to the current efforts to prevent transmission of COVID-19 and also the need to preserve PPE for other caregivers, a face-to-face encounter with the patient was not performed. That being said, all relevant records and diagnostic tests were reviewed, including laboratory results and imaging. Please reference any relevant documentation elsewhere. Care will be coordinated with the primary service.       Health Maintenance   Topic Date Due    Hepatitis C screen  1950    Diabetic foot exam  10/26/1958    Diabetic retinal exam  10/26/1958    Lipid screen  10/26/1958    Diabetic microalbuminuria test  10/26/1966    Colon cancer screen colonoscopy  10/26/1998    DEXA (modify frequency per FRAX score)  10/26/2003    Breast cancer screen  04/25/2018    Annual Wellness Visit (AWV)  12/28/2019    Flu vaccine (1) 09/01/2020    A1C test (Diabetic or Prediabetic)  12/29/2020    Potassium monitoring  01/10/2021    Creatinine monitoring  01/10/2021  DTaP/Tdap/Td vaccine (2 - Td) 09/08/2027    Shingles Vaccine  Completed    Pneumococcal 65+ years Vaccine  Completed    Hepatitis A vaccine  Aged Out    Hepatitis B vaccine  Aged Out    Hib vaccine  Aged Out    Meningococcal (ACWY) vaccine  Aged Out          Assessment/Plan:    Hospitalized in December for multifocal pneumonia/asthmatic bronchitis with acute exacerbation. No residual symptoms of note. Repeat CT imaging from May 2020 showed near complete resolution of pneumonia, with multiple subcentimeter lung nodules which perhaps represent scar tissue. Reassured the patient about the findings on the CT. Patient is a non-smoker. Would organize for repeat CT imaging in May 2021-1-year follow-up scan. Follow-up in 1 year.

## 2020-09-30 ENCOUNTER — HOSPITAL ENCOUNTER (INPATIENT)
Age: 70
LOS: 3 days | Discharge: HOME HEALTH | DRG: 853 | End: 2020-10-03
Attending: EMERGENCY MEDICINE | Admitting: PEDIATRICS
Payer: MEDICARE

## 2020-09-30 ENCOUNTER — APPOINTMENT (OUTPATIENT)
Dept: GENERAL RADIOLOGY | Age: 70
DRG: 853 | End: 2020-09-30
Payer: MEDICARE

## 2020-09-30 ENCOUNTER — APPOINTMENT (OUTPATIENT)
Dept: CT IMAGING | Age: 70
DRG: 853 | End: 2020-09-30
Payer: MEDICARE

## 2020-09-30 PROBLEM — R65.10 SIRS (SYSTEMIC INFLAMMATORY RESPONSE SYNDROME) (HCC): Status: ACTIVE | Noted: 2020-09-30

## 2020-09-30 LAB
A/G RATIO: 1.7 (ref 1.1–2.2)
ALBUMIN SERPL-MCNC: 4.7 G/DL (ref 3.4–5)
ALP BLD-CCNC: 58 U/L (ref 40–129)
ALT SERPL-CCNC: 11 U/L (ref 10–40)
ANION GAP SERPL CALCULATED.3IONS-SCNC: 14 MMOL/L (ref 3–16)
AST SERPL-CCNC: 14 U/L (ref 15–37)
BACTERIA: ABNORMAL /HPF
BASOPHILS ABSOLUTE: 0.1 K/UL (ref 0–0.2)
BASOPHILS RELATIVE PERCENT: 1 %
BILIRUB SERPL-MCNC: 0.8 MG/DL (ref 0–1)
BILIRUBIN URINE: ABNORMAL
BLOOD, URINE: NEGATIVE
BUN BLDV-MCNC: 12 MG/DL (ref 7–20)
CALCIUM SERPL-MCNC: 10.1 MG/DL (ref 8.3–10.6)
CHLORIDE BLD-SCNC: 96 MMOL/L (ref 99–110)
CLARITY: ABNORMAL
CO2: 27 MMOL/L (ref 21–32)
COLOR: YELLOW
COMMENT UA: ABNORMAL
CREAT SERPL-MCNC: 0.7 MG/DL (ref 0.6–1.2)
EOSINOPHILS ABSOLUTE: 0 K/UL (ref 0–0.6)
EOSINOPHILS RELATIVE PERCENT: 0 %
EPITHELIAL CELLS, UA: 21 /HPF (ref 0–5)
GFR AFRICAN AMERICAN: >60
GFR NON-AFRICAN AMERICAN: >60
GLOBULIN: 2.8 G/DL
GLUCOSE BLD-MCNC: 147 MG/DL (ref 70–99)
GLUCOSE URINE: NEGATIVE MG/DL
HCT VFR BLD CALC: 49.7 % (ref 36–48)
HEMOGLOBIN: 16.4 G/DL (ref 12–16)
HYALINE CASTS: 7 /LPF (ref 0–8)
INR BLD: 1.01 (ref 0.86–1.14)
KETONES, URINE: 15 MG/DL
LACTIC ACID, SEPSIS: 1 MMOL/L (ref 0.4–1.9)
LEUKOCYTE ESTERASE, URINE: NEGATIVE
LIPASE: 19 U/L (ref 13–60)
LYMPHOCYTES ABSOLUTE: 0.9 K/UL (ref 1–5.1)
LYMPHOCYTES RELATIVE PERCENT: 8.8 %
MCH RBC QN AUTO: 29.6 PG (ref 26–34)
MCHC RBC AUTO-ENTMCNC: 33 G/DL (ref 31–36)
MCV RBC AUTO: 89.7 FL (ref 80–100)
MICROSCOPIC EXAMINATION: YES
MONOCYTES ABSOLUTE: 0.6 K/UL (ref 0–1.3)
MONOCYTES RELATIVE PERCENT: 5.9 %
NEUTROPHILS ABSOLUTE: 8.8 K/UL (ref 1.7–7.7)
NEUTROPHILS RELATIVE PERCENT: 84.3 %
NITRITE, URINE: NEGATIVE
PDW BLD-RTO: 14.7 % (ref 12.4–15.4)
PH UA: 6 (ref 5–8)
PLATELET # BLD: 178 K/UL (ref 135–450)
PMV BLD AUTO: 8.3 FL (ref 5–10.5)
POTASSIUM REFLEX MAGNESIUM: 3.9 MMOL/L (ref 3.5–5.1)
PROCALCITONIN: 0.05 NG/ML (ref 0–0.15)
PROTEIN UA: ABNORMAL MG/DL
PROTHROMBIN TIME: 11.7 SEC (ref 10–13.2)
RBC # BLD: 5.54 M/UL (ref 4–5.2)
RBC UA: ABNORMAL /HPF (ref 0–4)
SEDIMENTATION RATE, ERYTHROCYTE: 26 MM/HR (ref 0–30)
SODIUM BLD-SCNC: 137 MMOL/L (ref 136–145)
SPECIFIC GRAVITY UA: 1.02 (ref 1–1.03)
TOTAL PROTEIN: 7.5 G/DL (ref 6.4–8.2)
TROPONIN: <0.01 NG/ML
URINE REFLEX TO CULTURE: ABNORMAL
URINE TYPE: ABNORMAL
UROBILINOGEN, URINE: 1 E.U./DL
WBC # BLD: 10.4 K/UL (ref 4–11)
WBC UA: 6 /HPF (ref 0–5)

## 2020-09-30 PROCEDURE — 2580000003 HC RX 258: Performed by: EMERGENCY MEDICINE

## 2020-09-30 PROCEDURE — 80053 COMPREHEN METABOLIC PANEL: CPT

## 2020-09-30 PROCEDURE — 84484 ASSAY OF TROPONIN QUANT: CPT

## 2020-09-30 PROCEDURE — 87040 BLOOD CULTURE FOR BACTERIA: CPT

## 2020-09-30 PROCEDURE — 86140 C-REACTIVE PROTEIN: CPT

## 2020-09-30 PROCEDURE — 85025 COMPLETE CBC W/AUTO DIFF WBC: CPT

## 2020-09-30 PROCEDURE — 87086 URINE CULTURE/COLONY COUNT: CPT

## 2020-09-30 PROCEDURE — 1200000000 HC SEMI PRIVATE

## 2020-09-30 PROCEDURE — 83690 ASSAY OF LIPASE: CPT

## 2020-09-30 PROCEDURE — 71045 X-RAY EXAM CHEST 1 VIEW: CPT

## 2020-09-30 PROCEDURE — 74177 CT ABD & PELVIS W/CONTRAST: CPT

## 2020-09-30 PROCEDURE — U0003 INFECTIOUS AGENT DETECTION BY NUCLEIC ACID (DNA OR RNA); SEVERE ACUTE RESPIRATORY SYNDROME CORONAVIRUS 2 (SARS-COV-2) (CORONAVIRUS DISEASE [COVID-19]), AMPLIFIED PROBE TECHNIQUE, MAKING USE OF HIGH THROUGHPUT TECHNOLOGIES AS DESCRIBED BY CMS-2020-01-R: HCPCS

## 2020-09-30 PROCEDURE — 84443 ASSAY THYROID STIM HORMONE: CPT

## 2020-09-30 PROCEDURE — 96365 THER/PROPH/DIAG IV INF INIT: CPT

## 2020-09-30 PROCEDURE — 2700000000 HC OXYGEN THERAPY PER DAY

## 2020-09-30 PROCEDURE — 6360000004 HC RX CONTRAST MEDICATION: Performed by: NURSE PRACTITIONER

## 2020-09-30 PROCEDURE — 85652 RBC SED RATE AUTOMATED: CPT

## 2020-09-30 PROCEDURE — 81001 URINALYSIS AUTO W/SCOPE: CPT

## 2020-09-30 PROCEDURE — 99285 EMERGENCY DEPT VISIT HI MDM: CPT

## 2020-09-30 PROCEDURE — 85610 PROTHROMBIN TIME: CPT

## 2020-09-30 PROCEDURE — 84145 PROCALCITONIN (PCT): CPT

## 2020-09-30 PROCEDURE — 94761 N-INVAS EAR/PLS OXIMETRY MLT: CPT

## 2020-09-30 PROCEDURE — 6360000002 HC RX W HCPCS: Performed by: EMERGENCY MEDICINE

## 2020-09-30 PROCEDURE — 83605 ASSAY OF LACTIC ACID: CPT

## 2020-09-30 PROCEDURE — 93005 ELECTROCARDIOGRAM TRACING: CPT | Performed by: EMERGENCY MEDICINE

## 2020-09-30 RX ORDER — 0.9 % SODIUM CHLORIDE 0.9 %
1000 INTRAVENOUS SOLUTION INTRAVENOUS ONCE
Status: COMPLETED | OUTPATIENT
Start: 2020-09-30 | End: 2020-09-30

## 2020-09-30 RX ADMIN — VANCOMYCIN HYDROCHLORIDE 1000 MG: 1 INJECTION, POWDER, LYOPHILIZED, FOR SOLUTION INTRAVENOUS at 23:55

## 2020-09-30 RX ADMIN — SODIUM CHLORIDE 1000 ML: 9 INJECTION, SOLUTION INTRAVENOUS at 19:50

## 2020-09-30 RX ADMIN — IOPAMIDOL 75 ML: 755 INJECTION, SOLUTION INTRAVENOUS at 21:37

## 2020-09-30 RX ADMIN — CEFEPIME HYDROCHLORIDE 2 G: 2 INJECTION, POWDER, FOR SOLUTION INTRAVENOUS at 23:02

## 2020-09-30 RX ADMIN — SODIUM CHLORIDE 1000 ML: 9 INJECTION, SOLUTION INTRAVENOUS at 19:49

## 2020-09-30 ASSESSMENT — ENCOUNTER SYMPTOMS
VOMITING: 0
DIARRHEA: 0
SHORTNESS OF BREATH: 0
NAUSEA: 0
ABDOMINAL PAIN: 0
CHEST TIGHTNESS: 0

## 2020-09-30 NOTE — ED PROVIDER NOTES
[facility]        Pt Name: Calderon Mauricio  MRN: [id number]  [place] 1950  Date of evaluation: 9/30/2020  Provider: CARLA Rivera - CNP  PCP: Jose Gallegos MD     I have seen and evaluated this patient with my supervising physician Bonnie Ochoa, [address] [place] [address]       Chief Complaint   Patient presents with    Fatigue     in by EMS, patient reporting that she has felt bad for a couple days patient reports she was seen at PCP today, patient states, \"He told me he wants me to do something but I cant remember\"        HISTORY OF PRESENT ILLNESS   (Location, Timing/Onset, Context/Setting, Quality, Duration, Modifying Factors, Severity, Associated Signs and Symptoms)  Note limiting factors. Calderon Mauricio is a 68 y.o. female who presents to the emergency department with complaints of fatigue. She states that she has been fatigued for the past 2 weeks. Reports that she has been unable to make dinners at home and do normal activity. Patient reports still been able to ambulate at home but feels fatigued as she does it. Patient reports that she was at the doctor's office today for her wound on her buttocks and she feels as if they are not treating her appropriately for it. Patient has a known open gluteal sacral wound. She reports it has been there for over a year and is not healing. Patient denies fevers at home. Reports that she does not wear oxygen at home. Patient reports that she has a home care nurse that comes every other day to pack the wound. Denies any headache, fever, lightheadedness, dizziness, visual disturbances. No chest pain or pressure. No neck pain. No shortness of breath, cough, or congestion. No abdominal pain, nausea, vomiting, diarrhea, constipation, or dysuria. No rash.     Nursing Notes were all reviewed and agreed with or any disagreements were addressed in the HPI.    REVIEW OF SYSTEMS    (2-9 systems for level 4, 10 or more for level 5)     Review of Systems   Constitutional: Positive for activity change and fatigue. Negative for chills and fever. Respiratory: Negative for chest tightness and shortness of breath. Cardiovascular: Negative for chest pain. Gastrointestinal: Negative for abdominal pain, diarrhea, nausea and vomiting. Genitourinary: Negative for dysuria. Skin: Positive for wound. All other systems reviewed and are negative. Positives and Pertinent negatives as per HPI. Except as noted above in the ROS, all other systems were reviewed and negative. PAST MEDICAL HISTORY     Past Medical History:   Diagnosis Date    Arthritis     back    Cataracts, bilateral     CHF (congestive heart failure) (HCC)     Diabetes mellitus ([facility] [address]     Glaucoma     Hyperlipidemia     Hypertension     Thyroid disease          SURGICAL HISTORY     Past Surgical History:   Procedure Laterality Date    CARPAL TUNNEL RELEASE      bilateral    CHOLECYSTECTOMY      COLONOSCOPY      ELBOW SURGERY      ENDOSCOPY, COLON, DIAGNOSTIC      EYE SURGERY      FOOT SURGERY      SHOULDER SURGERY           CURRENTMEDICATIONS       Previous Medications    ACETAMINOPHEN (TYLENOL) 500 MG TABLET    Take 500 mg by mouth every 6 hours as needed for Pain or Fever     ASPIRIN EC 81 MG EC TABLET    Take 81 mg by mouth daily May restart in AM.    CALCIUM CARBONATE-VITAMIN D (CALCIUM + D PO)    Take by mouth daily     DICLOFENAC PO    Take  by mouth.     DULOXETINE (CYMBALTA) 60 MG EXTENDED RELEASE CAPSULE    Take 1 capsule by mouth daily    FUROSEMIDE (LASIX) 40 MG TABLET    Take 40 mg by mouth daily     INSULIN DETEMIR (LEVEMIR FLEXPEN SC)    Inject 47 Units into the skin     INSULIN LISPRO, HUMAN, (HUMALOG SC)    Inject 20 Units into the skin 3 times daily (with meals) With sliding    LEVOTHYROXINE (LEVOXYL) 200 MCG TABLET    Take 200 mcg by mouth Daily     LOSARTAN (COZAAR) 50 MG TABLET    Take 50 mg by mouth daily     MOMETASONE-FORMOTEROL (DULERA) 200-5 MCG/ACT INHALER    Inhale 2 puffs into the lungs every 12 hours    SIMVASTATIN (ZOCOR) 40 MG TABLET    Take 40 mg by mouth nightly. SODIUM CHLORIDE (OCEAN, BABY AYR) 0.65 % NASAL SPRAY    1 spray by Nasal route every 2 hours as needed for Congestion    SPIRONOLACTONE (ALDACTONE) 25 MG TABLET    Take 12.5 mg by mouth daily    TIMOLOL MALEATE PF (TIMOPTIC OCUDOSE) 0.5 % SOLN    Apply 1 drop to eye 2 times daily    TRAVOPROST, BAK FREE, (TRAVATAN Z) 0.004 % SOLN OPHTHALMIC SOLUTION    Place 1 drop into both eyes nightly         ALLERGIES     Other; Ace inhibitors; Metoprolol; Tetrahydrozoline hcl; and Timolol    FAMILYHISTORY     History reviewed. No pertinent family history. SOCIAL HISTORY       Social History     Tobacco Use    Smoking status: Never Smoker    Smokeless tobacco: Never Used   Substance Use Topics    Alcohol use: Not Currently    Drug use: Never       SCREENINGS             PHYSICAL EXAM    (up to 7 for level 4, 8 or more for level 5)     ED Triage Vitals [09/30/20 1913]   BP Temp Temp src Pulse Resp SpO2 Height Weight   (!) 151/75 98.5 °F (36.9 °C) -- 133 18 (!) 88 % -- --       Physical Exam  Vitals signs and nursing note reviewed. Constitutional:       Appearance: She is well-developed. She is not diaphoretic. HENT:      Head: Normocephalic and atraumatic. Right Ear: External ear normal.      Left Ear: External ear normal.   Eyes:      General:         Right eye: No discharge. Left eye: No discharge. Neck:      Musculoskeletal: Normal range of motion and neck supple. Vascular: No JVD. Cardiovascular:      Rate and Rhythm: Regular rhythm. Tachycardia present. Heart sounds: Normal heart sounds. Pulmonary:      Effort: Pulmonary effort is normal. No respiratory distress. Breath sounds: Normal breath sounds. Abdominal:      Palpations: Abdomen is soft. Narrative:     Performed at:  [facility]  [address], [address]   Phone [phone]   PROTIME-INR    Narrative:     Performed at:  [facility]  [address], [address]   Phone [phone]   PROCALCITONIN    Narrative:     Performed at:  [facility]  [address], [address]   Phone [phone]   LIPASE    Narrative:     Performed at:  [facility]  [address], [address]   Phone [phone]   LACTATE, SEPSIS    Narrative:     Performed at:  [facility] [address], [address]   Phone [phone]   SEDIMENTATION RATE    Narrative:     Performed at:  [facility] [address], [address] [employer]   Phone [phone]   C-REACTIVE PROTEIN       All other labs were within normal range or not returned as of this dictation. EKG: All EKG's are interpreted by the Emergency Department Physician in the absence of a cardiologist.  Please see their note for interpretation of EKG. RADIOLOGY:   Non-plain film images such as CT, Ultrasound and MRI are read by the radiologist. Plain radiographic images are visualized and preliminarily interpreted by the ED Provider with the below findings:        Interpretation per the Radiologist below, if available at the time of this note:    CT ABDOMEN PELVIS W IV CONTRAST Additional Contrast? None   Final Result   No osseous destructive changes are seen involving the sacrum or coccyx to   suggest definite osteomyelitis. There appears to be some questionable   packing material seen at the region of the superior gluteal crease which   could be related to soft tissue decubitus ulceration. This would be best   assessed with MR imaging.       No acute intra-abdominal or pelvic process. XR CHEST PORTABLE   Final Result   Mild bibasilar atelectasis. Otherwise unremarkable chest.           No results found. PROCEDURES   Unless otherwise noted below, none     Procedures    CRITICAL CARE TIME   The total critical care time spent while evaluating and treating this patient was at least 31 minutes. This excludes time spent doing separately billable procedures. This includes time at the bedside, data interpretation, medication management, obtaining critical history from collateral sources if the patient is unable to provide it directly, and physician consultation. Specifics of interventions taken and potentially life-threatening diagnostic considerations are listed above in the medical decision making. CONSULTS:  PHARMACY TO DOSE VANCOMYCIN      EMERGENCY DEPARTMENT COURSE and DIFFERENTIAL DIAGNOSIS/MDM:   Vitals:    Vitals:    09/30/20 2030 09/30/20 2100 09/30/20 2115 09/30/20 2130   BP: (!) 143/48 (!) 141/55  (!) 157/45   Pulse: 95 101 103 102   Resp: 23 21 22 23   Temp:       SpO2: 94% 92% 91% 90%       Patient was given the following medications:  Medications   vancomycin 1000 mg IVPB in 250 mL D5W addavial (has no administration in time range)   cefepime (MAXIPIME) 2 g IVPB minibag (2 g Intravenous New Bag 9/30/20 2302)   0.9 % sodium chloride bolus (0 mLs Intravenous Stopped 9/30/20 2305)   0.9 % sodium chloride bolus (0 mLs Intravenous Stopped 9/30/20 2305)   iopamidol (ISOVUE-370) 76 % injection 75 mL (75 mLs Intravenous Given 9/30/20 2137)         Briefly, this is a 68year old female who presents to the emergency department with complaints of fatigue. She states that she has been fatigued for the past 2 weeks. Reports that she has been unable to make dinners at home and do normal activity. Patient reports still been able to ambulate at home but feels fatigued as she does it.   Patient reports that she was at the [address] [place] [address] today for her wound on her buttocks and she feels as if they are not treating her appropriately for it. Patient has a known open gluteal sacral wound. She reports it has been there for over a year and is not healing. Patient denies fevers at home. Reports that she does not wear oxygen at home. Patient reports that she has a home care nurse that comes every other day to pack the wound. XR CHEST PORTABLE (Final result)   Result time 09/30/20 20:22:33   Final result by Aidee Andre MD (09/30/20 20:22:33)                 Impression:     Mild bibasilar atelectasis.  Otherwise unremarkable chest.               Patient was given 2 L of IV fluids. CBC shows normal white count. CMP unremarkable. Troponin negative. INR 1.01. Procalcitonin 0.05. Lipase 19.0. Lipase 19.0. Lactic 1.0. Sed rate 26.   UA does show 1+ bacteria and 6 whites. She does meet SIRS criteria. Given vancomycin and cefepime. CT ABDOMEN PELVIS W IV CONTRAST Additional Contrast? None (Final result)   Result time 09/30/20 22:27:14   Final result by Yoli Barry MD (09/30/20 22:27:14)                 Impression:     No osseous destructive changes are seen involving the sacrum or coccyx to   suggest definite osteomyelitis.  There appears to be some questionable   packing material seen at the region of the superior gluteal crease which   could be related to soft tissue decubitus ulceration.  This would be best   assessed with MR imaging. No acute intra-abdominal or pelvic process. She will be admitted to hospitalist services. FINAL IMPRESSION      1. SIRS (systemic inflammatory response syndrome) (HCC)    2. Generalized body aches    3. Urinary tract infection in female    4. Skin ulcer of sacrum with fat layer exposed [facility])          DISPOSITION/PLAN   DISPOSITION Decision To Admit 09/30/2020 10:53:05 PM      PATIENT REFERREDTO:  No follow-up provider specified.     DISCHARGE MEDICATIONS:  New Prescriptions    No medications on file       DISCONTINUED MEDICATIONS:  Discontinued Medications    No medications on file              (Please note that portions of this note were completed with a voice recognition program.  Efforts were made to edit the dictations but occasionally words are mis-transcribed.)    CARLA Vegas CNP (electronically signed)         CARLA Vegas CNP  09/30/20 6247

## 2020-09-30 NOTE — ED TRIAGE NOTES
Patient into ER from home by EMS, patient seems confused was unable to tell this RN what Dr office she was at today and what he instructed her to do, patients SPO2 was low at 88% on RA and patients heart rate high at 136bpm, this RN called Dr. Deal Forget to bedside to evaluate for possible stroke, EMS reports she seemed Alert and oriented in squad. Oxygen placed 2L per NC. Lily Syed RN given report. Will continue to monitor.

## 2020-09-30 NOTE — ED PROVIDER NOTES
I personally evaluated and examined the patient in conjunction with the VENKATESH and agree with the assessment, treatment plan and disposition of the patient as recorded by the [facility]ENKATES[facility]. I reviewed pertinent nursing notes, triage notes, vital signs, past medical history, family and social history, medications, and allergies. Complete review of systems was conducted by the VENKATESH and/or myself. Review of systems is negative except as documented in the history of present illness. Brief HPI: 17-year-old female presents the emergency department with chief complaint of generalized weakness. I was asked by the nurse to evaluate her initially because of possible hypoxia 88% on room air and tachycardia in the 130s. She admits she has been generally weak for the last couple of weeks she complains of some generalized body aches but no fevers chills. Note that she has a history of a chronic sacral decubitus ulcer which she is currently being seen by wound care. Physical Exam: General: Patient is in no acute distress however she is ill-appearing. Head: Normocephalic, atraumatic, pupils are equal and reactive to light. EOMI. Neck: Neck is supple. No JVD noted. Cardiovascular: Capillary refill less than 2 seconds. Tachycardic on the monitor. Lungs: No respiratory distress  Abdomen: soft, non-tender, non-distended   Extremities: no lower extremity edema. Capillary refill is less than 2 seconds   Skin: no cyanosis or pallor; no rashes noted. Stage III-IV sacral decubitus ulcer noted on the sacrum. No surrounding drainage or erythema. Pictured below. Neuro: CN's 2-12 are grossly intact. No focal neurologic deficit appreciated. Aggressive fluid resuscitation including 30 cc/kg estimated ideal body weight IV fluids ordered. CT abdomen pelvis was ordered to evaluate the decubitus ulcer.   Findings noted show  CT ABDOMEN PELVIS W IV CONTRAST Additional Contrast? None (Final result)   Result time 09/30/20

## 2020-10-01 LAB
ANION GAP SERPL CALCULATED.3IONS-SCNC: 9 MMOL/L (ref 3–16)
BASOPHILS ABSOLUTE: 0 K/UL (ref 0–0.2)
BASOPHILS RELATIVE PERCENT: 0.3 %
BUN BLDV-MCNC: 9 MG/DL (ref 7–20)
C-REACTIVE PROTEIN: 4.6 MG/L (ref 0–5.1)
CALCIUM SERPL-MCNC: 8.3 MG/DL (ref 8.3–10.6)
CHLORIDE BLD-SCNC: 103 MMOL/L (ref 99–110)
CO2: 25 MMOL/L (ref 21–32)
CREAT SERPL-MCNC: 0.6 MG/DL (ref 0.6–1.2)
EKG ATRIAL RATE: 132 BPM
EKG DIAGNOSIS: NORMAL
EKG P-R INTERVAL: 152 MS
EKG Q-T INTERVAL: 312 MS
EKG QRS DURATION: 82 MS
EKG QTC CALCULATION (BAZETT): 462 MS
EKG R AXIS: 30 DEGREES
EKG T AXIS: 48 DEGREES
EKG VENTRICULAR RATE: 132 BPM
EOSINOPHILS ABSOLUTE: 0 K/UL (ref 0–0.6)
EOSINOPHILS RELATIVE PERCENT: 0 %
GFR AFRICAN AMERICAN: >60
GFR NON-AFRICAN AMERICAN: >60
GLUCOSE BLD-MCNC: 138 MG/DL (ref 70–99)
GLUCOSE BLD-MCNC: 143 MG/DL (ref 70–99)
GLUCOSE BLD-MCNC: 150 MG/DL (ref 70–99)
GLUCOSE BLD-MCNC: 154 MG/DL (ref 70–99)
GLUCOSE BLD-MCNC: 159 MG/DL (ref 70–99)
HCT VFR BLD CALC: 40.7 % (ref 36–48)
HEMOGLOBIN: 13.2 G/DL (ref 12–16)
LACTIC ACID: 0.7 MMOL/L (ref 0.4–2)
LACTIC ACID: 0.7 MMOL/L (ref 0.4–2)
LACTIC ACID: 0.8 MMOL/L (ref 0.4–2)
LYMPHOCYTES ABSOLUTE: 1 K/UL (ref 1–5.1)
LYMPHOCYTES RELATIVE PERCENT: 13.1 %
MAGNESIUM: 1.8 MG/DL (ref 1.8–2.4)
MCH RBC QN AUTO: 29.3 PG (ref 26–34)
MCHC RBC AUTO-ENTMCNC: 32.5 G/DL (ref 31–36)
MCV RBC AUTO: 90.3 FL (ref 80–100)
MONOCYTES ABSOLUTE: 0.6 K/UL (ref 0–1.3)
MONOCYTES RELATIVE PERCENT: 8.1 %
NEUTROPHILS ABSOLUTE: 6 K/UL (ref 1.7–7.7)
NEUTROPHILS RELATIVE PERCENT: 78.5 %
PDW BLD-RTO: 14.8 % (ref 12.4–15.4)
PERFORMED ON: ABNORMAL
PLATELET # BLD: 156 K/UL (ref 135–450)
PMV BLD AUTO: 7.8 FL (ref 5–10.5)
POTASSIUM REFLEX MAGNESIUM: 3.5 MMOL/L (ref 3.5–5.1)
PROCALCITONIN: 0.03 NG/ML (ref 0–0.15)
RBC # BLD: 4.51 M/UL (ref 4–5.2)
SARS-COV-2, PCR: NOT DETECTED
SODIUM BLD-SCNC: 137 MMOL/L (ref 136–145)
TSH REFLEX: 2.58 UIU/ML (ref 0.27–4.2)
URINE CULTURE, ROUTINE: NORMAL
WBC # BLD: 7.6 K/UL (ref 4–11)

## 2020-10-01 PROCEDURE — 83735 ASSAY OF MAGNESIUM: CPT

## 2020-10-01 PROCEDURE — 94761 N-INVAS EAR/PLS OXIMETRY MLT: CPT

## 2020-10-01 PROCEDURE — 85025 COMPLETE CBC W/AUTO DIFF WBC: CPT

## 2020-10-01 PROCEDURE — 80048 BASIC METABOLIC PNL TOTAL CA: CPT

## 2020-10-01 PROCEDURE — 2580000003 HC RX 258: Performed by: STUDENT IN AN ORGANIZED HEALTH CARE EDUCATION/TRAINING PROGRAM

## 2020-10-01 PROCEDURE — 93010 ELECTROCARDIOGRAM REPORT: CPT | Performed by: INTERNAL MEDICINE

## 2020-10-01 PROCEDURE — 2580000003 HC RX 258: Performed by: NURSE PRACTITIONER

## 2020-10-01 PROCEDURE — 83605 ASSAY OF LACTIC ACID: CPT

## 2020-10-01 PROCEDURE — 2700000000 HC OXYGEN THERAPY PER DAY

## 2020-10-01 PROCEDURE — 6370000000 HC RX 637 (ALT 250 FOR IP): Performed by: PEDIATRICS

## 2020-10-01 PROCEDURE — 6360000002 HC RX W HCPCS: Performed by: PEDIATRICS

## 2020-10-01 PROCEDURE — 36415 COLL VENOUS BLD VENIPUNCTURE: CPT

## 2020-10-01 PROCEDURE — 6370000000 HC RX 637 (ALT 250 FOR IP): Performed by: SURGERY

## 2020-10-01 PROCEDURE — 2580000003 HC RX 258: Performed by: PEDIATRICS

## 2020-10-01 PROCEDURE — 6370000000 HC RX 637 (ALT 250 FOR IP): Performed by: NURSE PRACTITIONER

## 2020-10-01 PROCEDURE — 6360000002 HC RX W HCPCS: Performed by: NURSE PRACTITIONER

## 2020-10-01 PROCEDURE — 1200000000 HC SEMI PRIVATE

## 2020-10-01 PROCEDURE — 6360000002 HC RX W HCPCS: Performed by: STUDENT IN AN ORGANIZED HEALTH CARE EDUCATION/TRAINING PROGRAM

## 2020-10-01 PROCEDURE — 11042 DBRDMT SUBQ TIS 1ST 20SQCM/<: CPT | Performed by: SURGERY

## 2020-10-01 PROCEDURE — 84145 PROCALCITONIN (PCT): CPT

## 2020-10-01 PROCEDURE — 94640 AIRWAY INHALATION TREATMENT: CPT

## 2020-10-01 PROCEDURE — 0JB70ZZ EXCISION OF BACK SUBCUTANEOUS TISSUE AND FASCIA, OPEN APPROACH: ICD-10-PCS | Performed by: SURGERY

## 2020-10-01 RX ORDER — LOSARTAN POTASSIUM 25 MG/1
50 TABLET ORAL DAILY
Status: DISCONTINUED | OUTPATIENT
Start: 2020-10-01 | End: 2020-10-01

## 2020-10-01 RX ORDER — INSULIN LISPRO 100 [IU]/ML
0-3 INJECTION, SOLUTION INTRAVENOUS; SUBCUTANEOUS NIGHTLY
Status: DISCONTINUED | OUTPATIENT
Start: 2020-10-01 | End: 2020-10-03 | Stop reason: HOSPADM

## 2020-10-01 RX ORDER — DEXTROSE MONOHYDRATE 50 MG/ML
100 INJECTION, SOLUTION INTRAVENOUS PRN
Status: DISCONTINUED | OUTPATIENT
Start: 2020-10-01 | End: 2020-10-03 | Stop reason: HOSPADM

## 2020-10-01 RX ORDER — INSULIN LISPRO 100 [IU]/ML
10 INJECTION, SOLUTION INTRAVENOUS; SUBCUTANEOUS
Status: DISCONTINUED | OUTPATIENT
Start: 2020-10-01 | End: 2020-10-03 | Stop reason: HOSPADM

## 2020-10-01 RX ORDER — PROMETHAZINE HYDROCHLORIDE 25 MG/1
12.5 TABLET ORAL EVERY 6 HOURS PRN
Status: DISCONTINUED | OUTPATIENT
Start: 2020-10-01 | End: 2020-10-03 | Stop reason: HOSPADM

## 2020-10-01 RX ORDER — SODIUM CHLORIDE 0.9 % (FLUSH) 0.9 %
10 SYRINGE (ML) INJECTION PRN
Status: DISCONTINUED | OUTPATIENT
Start: 2020-10-01 | End: 2020-10-03 | Stop reason: HOSPADM

## 2020-10-01 RX ORDER — TRAMADOL HYDROCHLORIDE 50 MG/1
50 TABLET ORAL 3 TIMES DAILY PRN
COMMUNITY

## 2020-10-01 RX ORDER — LATANOPROST 50 UG/ML
1 SOLUTION/ DROPS OPHTHALMIC NIGHTLY
Status: DISCONTINUED | OUTPATIENT
Start: 2020-10-01 | End: 2020-10-03 | Stop reason: HOSPADM

## 2020-10-01 RX ORDER — SODIUM CHLORIDE 9 MG/ML
INJECTION, SOLUTION INTRAVENOUS CONTINUOUS
Status: DISCONTINUED | OUTPATIENT
Start: 2020-10-01 | End: 2020-10-03 | Stop reason: HOSPADM

## 2020-10-01 RX ORDER — INSULIN LISPRO 100 [IU]/ML
0-6 INJECTION, SOLUTION INTRAVENOUS; SUBCUTANEOUS
Status: DISCONTINUED | OUTPATIENT
Start: 2020-10-01 | End: 2020-10-03 | Stop reason: HOSPADM

## 2020-10-01 RX ORDER — ACETAMINOPHEN 325 MG/1
650 TABLET ORAL EVERY 6 HOURS PRN
Status: DISCONTINUED | OUTPATIENT
Start: 2020-10-01 | End: 2020-10-03 | Stop reason: HOSPADM

## 2020-10-01 RX ORDER — ASPIRIN 81 MG/1
81 TABLET ORAL DAILY
Status: DISCONTINUED | OUTPATIENT
Start: 2020-10-01 | End: 2020-10-03 | Stop reason: HOSPADM

## 2020-10-01 RX ORDER — DULOXETIN HYDROCHLORIDE 60 MG/1
60 CAPSULE, DELAYED RELEASE ORAL DAILY
Status: DISCONTINUED | OUTPATIENT
Start: 2020-10-01 | End: 2020-10-03 | Stop reason: HOSPADM

## 2020-10-01 RX ORDER — DEXTROSE MONOHYDRATE 25 G/50ML
12.5 INJECTION, SOLUTION INTRAVENOUS PRN
Status: DISCONTINUED | OUTPATIENT
Start: 2020-10-01 | End: 2020-10-03 | Stop reason: HOSPADM

## 2020-10-01 RX ORDER — BUDESONIDE AND FORMOTEROL FUMARATE DIHYDRATE 160; 4.5 UG/1; UG/1
2 AEROSOL RESPIRATORY (INHALATION) 2 TIMES DAILY
Status: DISCONTINUED | OUTPATIENT
Start: 2020-10-01 | End: 2020-10-03 | Stop reason: HOSPADM

## 2020-10-01 RX ORDER — ONDANSETRON 2 MG/ML
4 INJECTION INTRAMUSCULAR; INTRAVENOUS EVERY 6 HOURS PRN
Status: DISCONTINUED | OUTPATIENT
Start: 2020-10-01 | End: 2020-10-03 | Stop reason: HOSPADM

## 2020-10-01 RX ORDER — TRAVOPROST OPHTHALMIC SOLUTION 0.04 MG/ML
1 SOLUTION OPHTHALMIC NIGHTLY
Status: DISCONTINUED | OUTPATIENT
Start: 2020-10-01 | End: 2020-10-01 | Stop reason: CLARIF

## 2020-10-01 RX ORDER — ATORVASTATIN CALCIUM 40 MG/1
40 TABLET, FILM COATED ORAL NIGHTLY
Status: DISCONTINUED | OUTPATIENT
Start: 2020-10-01 | End: 2020-10-03 | Stop reason: HOSPADM

## 2020-10-01 RX ORDER — LEVOTHYROXINE SODIUM 0.1 MG/1
200 TABLET ORAL DAILY
Status: DISCONTINUED | OUTPATIENT
Start: 2020-10-01 | End: 2020-10-03 | Stop reason: HOSPADM

## 2020-10-01 RX ORDER — POLYETHYLENE GLYCOL 3350 17 G/17G
17 POWDER, FOR SOLUTION ORAL DAILY PRN
Status: DISCONTINUED | OUTPATIENT
Start: 2020-10-01 | End: 2020-10-03 | Stop reason: HOSPADM

## 2020-10-01 RX ORDER — LIDOCAINE HYDROCHLORIDE 40 MG/ML
SOLUTION TOPICAL ONCE
Status: COMPLETED | OUTPATIENT
Start: 2020-10-01 | End: 2020-10-01

## 2020-10-01 RX ORDER — NICOTINE POLACRILEX 4 MG
15 LOZENGE BUCCAL PRN
Status: DISCONTINUED | OUTPATIENT
Start: 2020-10-01 | End: 2020-10-03 | Stop reason: HOSPADM

## 2020-10-01 RX ORDER — SODIUM CHLORIDE 0.9 % (FLUSH) 0.9 %
10 SYRINGE (ML) INJECTION EVERY 12 HOURS SCHEDULED
Status: DISCONTINUED | OUTPATIENT
Start: 2020-10-01 | End: 2020-10-03 | Stop reason: HOSPADM

## 2020-10-01 RX ORDER — ACETAMINOPHEN 650 MG/1
650 SUPPOSITORY RECTAL EVERY 6 HOURS PRN
Status: DISCONTINUED | OUTPATIENT
Start: 2020-10-01 | End: 2020-10-03 | Stop reason: HOSPADM

## 2020-10-01 RX ADMIN — Medication 2 PUFF: at 20:50

## 2020-10-01 RX ADMIN — INSULIN LISPRO 10 UNITS: 100 INJECTION, SOLUTION INTRAVENOUS; SUBCUTANEOUS at 12:27

## 2020-10-01 RX ADMIN — LEVOTHYROXINE SODIUM 200 MCG: 0.1 TABLET ORAL at 06:19

## 2020-10-01 RX ADMIN — CEFEPIME HYDROCHLORIDE 2 G: 2 INJECTION, POWDER, FOR SOLUTION INTRAVENOUS at 22:32

## 2020-10-01 RX ADMIN — ASPIRIN 81 MG: 81 TABLET, COATED ORAL at 07:52

## 2020-10-01 RX ADMIN — Medication 10 ML: at 20:39

## 2020-10-01 RX ADMIN — INSULIN LISPRO 10 UNITS: 100 INJECTION, SOLUTION INTRAVENOUS; SUBCUTANEOUS at 08:48

## 2020-10-01 RX ADMIN — Medication 2 PUFF: at 09:49

## 2020-10-01 RX ADMIN — DULOXETINE HYDROCHLORIDE 60 MG: 60 CAPSULE, DELAYED RELEASE ORAL at 07:52

## 2020-10-01 RX ADMIN — DESMOPRESSIN ACETATE 40 MG: 0.2 TABLET ORAL at 20:40

## 2020-10-01 RX ADMIN — ENOXAPARIN SODIUM 40 MG: 40 INJECTION SUBCUTANEOUS at 07:52

## 2020-10-01 RX ADMIN — INSULIN LISPRO 10 UNITS: 100 INJECTION, SOLUTION INTRAVENOUS; SUBCUTANEOUS at 18:49

## 2020-10-01 RX ADMIN — LATANOPROST 1 DROP: 50 SOLUTION OPHTHALMIC at 20:45

## 2020-10-01 RX ADMIN — SODIUM CHLORIDE: 9 INJECTION, SOLUTION INTRAVENOUS at 01:53

## 2020-10-01 RX ADMIN — LIDOCAINE HYDROCHLORIDE: 40 SOLUTION TOPICAL at 15:30

## 2020-10-01 RX ADMIN — INSULIN GLARGINE 30 UNITS: 100 INJECTION, SOLUTION SUBCUTANEOUS at 20:42

## 2020-10-01 RX ADMIN — CEFEPIME HYDROCHLORIDE 2 G: 2 INJECTION, POWDER, FOR SOLUTION INTRAVENOUS at 15:52

## 2020-10-01 RX ADMIN — VANCOMYCIN HYDROCHLORIDE 1250 MG: 10 INJECTION, POWDER, LYOPHILIZED, FOR SOLUTION INTRAVENOUS at 12:27

## 2020-10-01 RX ADMIN — INSULIN LISPRO 1 UNITS: 100 INJECTION, SOLUTION INTRAVENOUS; SUBCUTANEOUS at 20:41

## 2020-10-01 RX ADMIN — INSULIN LISPRO 1 UNITS: 100 INJECTION, SOLUTION INTRAVENOUS; SUBCUTANEOUS at 18:49

## 2020-10-01 ASSESSMENT — PAIN DESCRIPTION - DESCRIPTORS: DESCRIPTORS: ACHING

## 2020-10-01 ASSESSMENT — ENCOUNTER SYMPTOMS
GASTROINTESTINAL NEGATIVE: 1
ALLERGIC/IMMUNOLOGIC NEGATIVE: 1
RESPIRATORY NEGATIVE: 1
EYES NEGATIVE: 1

## 2020-10-01 ASSESSMENT — PAIN DESCRIPTION - ONSET: ONSET: PROGRESSIVE

## 2020-10-01 ASSESSMENT — PAIN DESCRIPTION - PAIN TYPE
TYPE: ACUTE PAIN
TYPE: ACUTE PAIN

## 2020-10-01 ASSESSMENT — PAIN DESCRIPTION - LOCATION
LOCATION: SACRUM
LOCATION: SACRUM

## 2020-10-01 ASSESSMENT — PAIN - FUNCTIONAL ASSESSMENT: PAIN_FUNCTIONAL_ASSESSMENT: ACTIVITIES ARE NOT PREVENTED

## 2020-10-01 ASSESSMENT — PAIN SCALES - GENERAL
PAINLEVEL_OUTOF10: 0
PAINLEVEL_OUTOF10: 5

## 2020-10-01 ASSESSMENT — PAIN DESCRIPTION - PROGRESSION
CLINICAL_PROGRESSION: GRADUALLY WORSENING

## 2020-10-01 ASSESSMENT — PAIN DESCRIPTION - FREQUENCY: FREQUENCY: INTERMITTENT

## 2020-10-01 NOTE — PROGRESS NOTES
Patient arrived to room 5913 from ER. Patient transferred to bed. Patient oriented to room and call light. Fall precautions in place.

## 2020-10-01 NOTE — PLAN OF CARE
Problem: Falls - Risk of:  Goal: Will remain free from falls  Description: Will remain free from falls  Outcome: Ongoing  Note: Patient is a high fall risk. Fall risk interventions are in place including armband, socks, bed alarms, hourly rounding. Plan of care explained to patient, verbalized understanding. Problem: Skin Integrity:  Goal: Will show no infection signs and symptoms  Description: Will show no infection signs and symptoms  Outcome: Ongoing  Note: Patient will remain free from any new skin breakdown. Interventions include turning patient Q2H, keeping skin clean and dry, removing devices that may add pressure to skin, placing preventative dressing if appropriate, and padding bony prominences.

## 2020-10-01 NOTE — PROGRESS NOTES
[facility]    10/1/2020 5:27 PM        Name: Jayme Rush . Admitted: 9/30/2020  Primary Care Provider: Dot Pedroza MD (Tel: [phone])    Brief Course:    Patient is 68years old female with past medical history significant for hypertension hyperlipidemia diabetes, presented to ER for fatigue in the last 2 weeks, admitted for sepsis, secondary to stage IV sacral ulcer. She has been tested for COVID pending result. CC: Fatigue    Subjective:  .   Alert oriented  In no acute distress  Started on Vanco and Zosyn  Switch to cefepime and Vanco.    Reviewed interval ancillary notes    Current Medications  aspirin EC tablet 81 mg, Daily  DULoxetine (CYMBALTA) extended release capsule 60 mg, Daily  levothyroxine (SYNTHROID) tablet 200 mcg, Daily  atorvastatin (LIPITOR) tablet 40 mg, Nightly  insulin lispro (1 Unit Dial) 10 Units, TID WC  sodium chloride flush 0.9 % injection 10 mL, 2 times per day  sodium chloride flush 0.9 % injection 10 mL, PRN  acetaminophen (TYLENOL) tablet 650 mg, Q6H PRN    Or  acetaminophen (TYLENOL) suppository 650 mg, Q6H PRN  polyethylene glycol (GLYCOLAX) packet 17 g, Daily PRN  promethazine (PHENERGAN) tablet 12.5 mg, Q6H PRN    Or  ondansetron (ZOFRAN) injection 4 mg, Q6H PRN  enoxaparin (LOVENOX) injection 40 mg, Daily  insulin lispro (1 Unit Dial) 0-6 Units, TID WC  insulin lispro (1 Unit Dial) 0-3 Units, Nightly  0.9 % sodium chloride infusion, Continuous  [START ON 10/2/2020] influenza quadrivalent split vaccine (FLUZONE;FLUARIX;FLULAVAL;AFLURIA) injection 0.5 mL, Prior to discharge  budesonide-formoterol (SYMBICORT) 160-4.5 MCG/ACT inhaler 2 puff, BID  latanoprost (XALATAN) 0.005 % ophthalmic solution 1 drop, Nightly  insulin glargine (LANTUS;BASAGLAR) injection pen 30 Units, Nightly  glucose (GLUTOSE) 40 % oral gel 15 g, PRN  dextrose 50 % IV solution, Tests:  CBC:   Recent Labs     09/30/20  1947 10/01/20  0508   WBC 10.4 7.6   HGB 16.4* 13.2    156     BMP:    Recent Labs     09/30/20  1947 10/01/20  0508    137   K 3.9 3.5   CL 96* 103   CO2 27 25   BUN 12 9   CREATININE 0.7 0.6   GLUCOSE 147* 154*     Hepatic:   Recent Labs     09/30/20 1947   AST 14*   ALT 11   BILITOT 0.8   ALKPHOS 62     CT ABDOMEN PELVIS W IV CONTRAST Additional Contrast? None   Final Result   No osseous destructive changes are seen involving the sacrum or coccyx to   suggest definite osteomyelitis. There appears to be some questionable   packing material seen at the region of the superior gluteal crease which   could be related to soft tissue decubitus ulceration. This would be best   assessed with MR imaging. No acute intra-abdominal or pelvic process. XR CHEST PORTABLE   Final Result   Mild bibasilar atelectasis. Otherwise unremarkable chest.           Discussed with ICU nurse at bedside. Problem List  Active Problems:    SIRS (systemic inflammatory response syndrome) (HCC)  Resolved Problems:    * No resolved hospital problems. *       Assessment & Plan:   Sepsis  Secondary to sacral wound  Patient is started on Vanco and Zosyn, will switch to cefepime and vancomycin  We will consult surgery for debridement  CAT scan did not show any osteomyelitis    Suspected COVID?   Pending testing  Continue isolation    Type 2 diabetes  Continue home insulin  Continue sliding-scale    History of hypertension  Continue home medication    IV Access: Peripheral  Cline: Yes  Diet: DIET CARB CONTROL; Carb Control: 3 carb choices (45 gms)/meal  Dietary Nutrition Supplements: Low Calorie High Protein Supplement  Code:Full Code  DVT PPX Lovenox   disposition ICU      Félix Ratliff MD   10/1/2020 5:27 PM

## 2020-10-01 NOTE — ED NOTES
Pt placed on 2lt NC O2 due to SPO2 being between 88%-90% without exertion or s/s of distress.  [address] [place] [address] notified.      Levy Beckham RN  09/30/20 6790

## 2020-10-01 NOTE — H&P
HOSPITALISTS HISTORY AND PHYSICAL    9/30/2020 11:18 PM    Patient Information:  Karthik Cunningham is a 68 y.o. female [id number]  PCP:  Hamilton Andrea MD (Tel: [phone] )    Chief complaint:    Chief Complaint   Patient presents with    Fatigue     in by EMS, patient reporting that she has felt bad for a couple days patient reports she was seen at PCP today, patient states, \"He told me he wants me to do something but I cant remember\"         History of Present Illness:  Noé Quinn is a 68 y.o. female with hx HTN, HLD, and DM. Presents to ER for fatigue for the past 2 weeks. She states she has had decreased appetite. She feels dizzy if she tries to stand. She denies any chest pain or shortness of breath. She denies any nausea or vomiting. She is not sure why she doesn't want to eat. She feels \"foggy\" at times. She denies any fevers. She has a large Stage 4 Sacral wound that has been present since March. She is frustrated that is will not heal and has a hard time getting comfortable with it. She saw general surgery today in the office for sacral wound. Dr. Eugenie Urbina from [facility]. In the ER she was tachycardic at 133, /75, and afebrile. She was given 2L of fluids and her heart rate improved. History obtained from patient        REVIEW OF SYSTEMS:   Review of Systems   Constitutional: Positive for activity change, appetite change and fatigue. Negative for fever. HENT: Negative. Eyes: Negative. Respiratory: Negative. Cardiovascular: Negative. Gastrointestinal: Negative. Endocrine: Negative. Genitourinary: Negative. Musculoskeletal: Negative. Skin:        Open wound on buttocks started as a cyst, present since March. Allergic/Immunologic: Negative. Neurological: Positive for dizziness and weakness. Negative for syncope.    Hematological: daily (with meals) With sliding      Calcium Carbonate-Vitamin D (CALCIUM + D PO) Take by mouth daily          Allergies: Allergies   Allergen Reactions    Other Other (See Comments)     Preservatives in eyedrops-blurred vision    Ace Inhibitors Other (See Comments)    Metoprolol Other (See Comments)    Tetrahydrozoline Hcl Other (See Comments)     Blurry vision    Timolol Other (See Comments)     Blurry vision DO NOT USE        Social History:  Patient Lives alone   reports that she has never smoked. She has never used smokeless tobacco. She reports previous alcohol use. She reports that she does not use drugs. Family History:  family history is not on file. , Father and Mother . Father  from Heart Disease    Physical Exam:  BP (!) 157/45   Pulse 102   Temp 98.5 °F (36.9 °C)   Resp 23   SpO2 90%   Physical Exam  Constitutional:       General: She is not in acute distress. Appearance: Normal appearance. She is obese. She is not ill-appearing. HENT:      Head: Normocephalic and atraumatic. Eyes:      Comments: cataracts   Neck:      Musculoskeletal: Normal range of motion. Cardiovascular:      Rate and Rhythm: Regular rhythm. Tachycardia present. Heart sounds: Murmur present. Pulmonary:      Effort: Pulmonary effort is normal. No respiratory distress. Breath sounds: Normal breath sounds. Abdominal:      General: Abdomen is flat. Bowel sounds are normal. There is no distension. Palpations: Abdomen is soft. Musculoskeletal: Normal range of motion. General: Swelling present. Skin:     Comments: Please refer to ER photo of sacral wound Stage 4. Neurological:      General: No focal deficit present. Mental Status: She is alert and oriented to person, place, and time.    Psychiatric:         Mood and Affect: Mood normal.         Behavior: Behavior normal.         Labs:  CBC:   Lab Results   Component Value Date    WBC 10.4 2020    RBC 5.54 09/30/2020    HGB 16.4 09/30/2020    HCT 49.7 09/30/2020    MCV 89.7 09/30/2020    MCH 29.6 09/30/2020    MCHC 33.0 09/30/2020    RDW 14.7 09/30/2020     09/30/2020    MPV 8.3 09/30/2020     BMP:    Lab Results   Component Value Date     09/30/2020    K 3.9 09/30/2020    CL 96 09/30/2020    CO2 27 09/30/2020    BUN 12 09/30/2020    CREATININE 0.7 09/30/2020    CALCIUM 10.1 09/30/2020    GFRAA >60 09/30/2020    LABGLOM >60 09/30/2020    GLUCOSE 147 09/30/2020     CT ABDOMEN PELVIS W IV CONTRAST Additional Contrast? None   Final Result   No osseous destructive changes are seen involving the sacrum or coccyx to   suggest definite osteomyelitis. There appears to be some questionable   packing material seen at the region of the superior gluteal crease which   could be related to soft tissue decubitus ulceration. This would be best   assessed with MR imaging. No acute intra-abdominal or pelvic process. XR CHEST PORTABLE   Final Result   Mild bibasilar atelectasis. Otherwise unremarkable chest.           Chest Xray:   EKG:    I visualized CXR images and EKG strips      Problem List  Active Problems:    * No active hospital problems. *  Resolved Problems:    * No resolved hospital problems. *        Assessment/Plan:   1. Systemic Inflammatory Response Syndrome   Pt will be admitted for further evaluation. No clear source of infection, COVID 19  rule out. Procalcitonin. CT abdomen did not show concern for osteomyelitis in  Sacral wound. Continue hydration. Recheck labs in am.  Will give broad spectrum  antibiotics overnight and reevaluate in am.    2. Type II DM   Will give her reduced dose of levemir given her poor oral intake, will increase if her  diet increases. Will give half her home meal time insulin. 3. Hypertension   BP stable continue meds. Hold lasix and spironolactone. 4. Stage 3 Sacral Wound   Per surgeries note today when they saw her in office.  \"Follow-up gluteal cleft  wound. She underwent previous modified Yanni procedure with subsequent  breakdown just cephalad to the perianal skin. This area has been resistant to  healing. Attempt at a wound VAC has been unsuccessful with positioning. She is  on packing changes with no significant improvement. Examination demonstrates  excellent granulation bed with some proteinaceous exudate. There is no  surrounding cellulitis. Continue current regimen. We will discuss further with  colleagues regarding other possible modalities. \"    5. Hypothyroid   Continue home meds check TSH given she is tachycardic and fatigued         DVT prophylaxis Lovenox   Code status Full   Diet full liquid advance diet  IV access peripheral  Cline Catheter none    Admit as inpatient I anticipate hospitalization spanning more 2 than two midnights for investigation and treatment of the above medically necessary diagnoses. Please note that some part of this chart was generated using Dragon dictation software. Although every effort was made to ensure the accuracy of this automated transcription, some errors in transcription may have occurred inadvertently. If you may need any clarification, please do not hesitate to contact me through [facility].        CARLA Adames CNP    9/30/2020 11:18 PM

## 2020-10-01 NOTE — PLAN OF CARE
Problem: Falls - Risk of:  Goal: Will remain free from falls  Description: Will remain free from falls  10/1/2020 1628 by Elsi Harrison RN  Outcome: Met This Shift  10/1/2020 0232 by Montrell Suarez RN  Outcome: Ongoing  Note: Patient is a high fall risk. Fall risk interventions are in place including armband, socks, bed alarms, hourly rounding. Plan of care explained to patient, verbalized understanding. Goal: Absence of physical injury  Description: Absence of physical injury  Outcome: Met This Shift     Problem: Skin Integrity:  Goal: Will show no infection signs and symptoms  Description: Will show no infection signs and symptoms  10/1/2020 1628 by Elsi Harrison RN  Outcome: Met This Shift  10/1/2020 0232 by Montrell Suarez RN  Outcome: Ongoing  Note: Patient will remain free from any new skin breakdown. Interventions include turning patient Q2H, keeping skin clean and dry, removing devices that may add pressure to skin, placing preventative dressing if appropriate, and padding bony prominences.     Goal: Absence of new skin breakdown  Description: Absence of new skin breakdown  Outcome: Met This Shift     Problem: Pain:  Goal: Pain level will decrease  Description: Pain level will decrease  Outcome: Met This Shift  Goal: Control of acute pain  Description: Control of acute pain  Outcome: Met This Shift     Problem: Nutrition  Goal: Optimal nutrition therapy  Outcome: Met This Shift

## 2020-10-01 NOTE — PROGRESS NOTES
4 Eyes Skin Assessment     NAME:  Jeremias Anton  YOB: 1950  MEDICAL RECORD NUMBER:  [id number]    The patient is being assess for  Admission    I agree that 2 RN's have performed a thorough Head to Toe Skin Assessment on the patient. ALL assessment sites listed below have been assessed. Areas assessed by both nurses:    Head, Face, Ears, Shoulders, Back, Chest, Arms, Elbows, Hands, Sacrum. Buttock, Coccyx, Ischium and Legs. Feet and Heels        Does the Patient have a Wound? Yes wound(s) were present on assessment.  LDA wound assessment was Initiated and completed        Roque Prevention initiated:  Yes   Wound Care Orders initiated:      Pressure Injury (Stage 3,4, Unstageable, DTI, NWPT, and Complex wounds) if present place consult order under [de-identified] Yes    New and Established Ostomies if present place consult order under : NA      Nurse 1 eSignature: Electronically signed by Azul Ruiz RN on 10/1/20 at 2:30 AM EDT    **SHARE this note so that the co-signing nurse is able to place an eSignature**    Nurse 2 eSignature: Electronically signed by Aide Lange RN on 10/1/20 at 3:13 AM EDT

## 2020-10-01 NOTE — ED NOTES
Pt came in due to not feeling well and feeling \"weak\" for a few weeks. Dr Syed Stephen at bedside. Pt's labs, urine and covid sample collected and sent to the lab. Pt medicated per MAR. Pt is now alert and oriented, denies needs a this time. Pt is resting and states she is going to sleep. Pt's fluids are infusing on the pump and the Pt is cycling on the monitor.      Hiral Mendez RN  09/30/20 2054

## 2020-10-01 NOTE — FLOWSHEET NOTE
Pt admitted to unit from Med Surg. Denies pain. VSS. A & O x4.currently has wounds to coccyx and BLE. All dressings done prior to transfer. Pt currently resting in bed while bed remains in lowest and locked position with alarm in place. Call light, bedside table and personal belongings are within reach. Purse and cell phone placed into room cabinet. Pt aware.

## 2020-10-01 NOTE — ED NOTES
Pt report called to [facility] and the [facility] nurse. No questions asked, monitor will be sent as requested.      Mary Mike RN  10/01/20 0040

## 2020-10-01 NOTE — PROGRESS NOTES
Comprehensive Nutrition Assessment    Type and Reason for Visit:  Initial, Consult, Positive Nutrition Screen    Nutrition Recommendations/Plan:   1. Begin Ensure High Protein TID  2. Continue current diet    Nutrition Assessment:  Pt is at risk for nutrition compromise AEB increased protein needs for wound healing. Pt with stage 4 sacral wound and unhealing for the past year. MST=5 although EMR shows wt stable for the past year. Pt report of poor intake r/t extreme fatigue. Pt being screened for COVID and under droplet plus isolation precautions. Will send Ensure High Protein to increase protein intake. Malnutrition Assessment:  Malnutrition Status:  No malnutrition      Estimated Daily Nutrient Needs:  Energy (kcal):  171-3743 (8-15kcal/106kg); Weight Used for Energy Requirements:  Current     Protein (g):  100-118(1.7-2.0g/59kg); Weight Used for Protein Requirements:  Ideal        Fluid (ml/day):  1mL/kcal; Weight Used for Fluid Requirements:  Current      Nutrition Related Findings:  +1 generalized edema. +2.3Liters. Glucose: 138      Wounds:  Stage IV, Pressure Injury       Anthropometric Measures:  · Height: 5' 6\" (167.6 cm)  · Current Body Weight: 232 lb (105.2 kg)   · Ideal Body Weight: 130 lbs; % Ideal Body Weight 178.5 %   · BMI: 37.5  · BMI Categories: Obese Class 2 (BMI 35.0 -39.9)       Nutrition Diagnosis:   · Inadequate protein-energy intake related to increase demand for energy/nutrients as evidenced by wounds, poor intake prior to admission      Nutrition Interventions:   Food and/or Nutrient Delivery:  Continue Current Diet, Start Oral Nutrition Supplement  Nutrition Education/Counseling:  Education not indicated   Coordination of Nutrition Care:  Continued Inpatient Monitoring    Goals:  Meal and supplement intake greater than 50% at all meals.        Nutrition Monitoring and Evaluation:   Food/Nutrient Intake Outcomes:  Food and Nutrient Intake, Supplement Intake  Physical Signs/Symptoms Outcomes:  Skin     Discharge Planning:     Too soon to determine     Electronically signed by Tatyana Lee RD, LD on 10/1/20 at 8:51 AM EDT    Contact: [phone]

## 2020-10-01 NOTE — PLAN OF CARE
Nutrition Problem #1: Inadequate protein-energy intake  Intervention: Food and/or Nutrient Delivery: Continue Current Diet, Start Oral Nutrition Supplement  Nutritional Goals: Meal and supplement intake greater than 50% at all meals.

## 2020-10-01 NOTE — PROGRESS NOTES
Initial assessment completed, VSS, afebrile, pt currently reports 5/10 pain to her sacrum area where her pressure ulcer is, states she takes Tramadol at home which is not ordered here. Dr. Yann Rosa made aware. Pt a/o x4, pt satting in the low 90's on RA, lungs diminished to auscultation. POC discussed with pt, questions/concerns addressed at this time, fall px in place, bed alarm engaged, call light within reach, will continue to monitor.

## 2020-10-01 NOTE — CONSULTS
Bedside Debridement Procedure Note    Procedure: Sharp excisional debridement of epidermis, dermis and subcutaneous tissue on sacral decubitus ulcer, stage 3    Anesthesia: topical lidocaine    Procedure Details   Using curette the wound was sharply debrided    down through and including the removal of epidermis, dermis and subcutaneous tissue. Devitalized Tissue Debrided: fibrin, biofilm and slough    Pre Debridement Measurements: 3x2cm    Post  Debridement Measurements: same    Total Surface Area Debrided:  6 sq cm     Bleeding:  Minimal    Hemostasis Achieved:  by pressure    Procedural Pain:  1  / 10     Post Procedural Pain:  1 / 10     Response to treatment:  Well tolerated by patient. Post-procedure dressin. Wet to dry dressing changed daily and as needed  2. Increase activity, air mattress while in hospital if possible, frequent turning and ambulation to minimize sacral pressure. Patient unclear on why she is not able to ambulate and needs a wheelchair  3. Sacral wound is stage 3, no palpable bone, no cellulitis or drainage, pain is likely from ongoing ischemia from pressure, not infection, this is not the source of her fevers, dizziness and other symptoms  4. If still in house, will re-evaluate in several days and likely debride. If able to be discharged, can follow in [facility], contact information provided in discharge orders    Dakota Whitaker MD, FACS  10/1/2020  3:28 PM

## 2020-10-02 LAB
ANION GAP SERPL CALCULATED.3IONS-SCNC: 10 MMOL/L (ref 3–16)
BASOPHILS ABSOLUTE: 0 K/UL (ref 0–0.2)
BASOPHILS RELATIVE PERCENT: 0.4 %
BUN BLDV-MCNC: 7 MG/DL (ref 7–20)
CALCIUM SERPL-MCNC: 9.3 MG/DL (ref 8.3–10.6)
CHLORIDE BLD-SCNC: 107 MMOL/L (ref 99–110)
CO2: 25 MMOL/L (ref 21–32)
CREAT SERPL-MCNC: 0.7 MG/DL (ref 0.6–1.2)
EOSINOPHILS ABSOLUTE: 0 K/UL (ref 0–0.6)
EOSINOPHILS RELATIVE PERCENT: 0 %
GFR AFRICAN AMERICAN: >60
GFR NON-AFRICAN AMERICAN: >60
GLUCOSE BLD-MCNC: 110 MG/DL (ref 70–99)
GLUCOSE BLD-MCNC: 142 MG/DL (ref 70–99)
GLUCOSE BLD-MCNC: 144 MG/DL (ref 70–99)
GLUCOSE BLD-MCNC: 172 MG/DL (ref 70–99)
GLUCOSE BLD-MCNC: 174 MG/DL (ref 70–99)
HCT VFR BLD CALC: 42.2 % (ref 36–48)
HEMOGLOBIN: 13.8 G/DL (ref 12–16)
LYMPHOCYTES ABSOLUTE: 0.5 K/UL (ref 1–5.1)
LYMPHOCYTES RELATIVE PERCENT: 5.6 %
MCH RBC QN AUTO: 29.4 PG (ref 26–34)
MCHC RBC AUTO-ENTMCNC: 32.6 G/DL (ref 31–36)
MCV RBC AUTO: 90.4 FL (ref 80–100)
MONOCYTES ABSOLUTE: 0.5 K/UL (ref 0–1.3)
MONOCYTES RELATIVE PERCENT: 6.4 %
NEUTROPHILS ABSOLUTE: 7.4 K/UL (ref 1.7–7.7)
NEUTROPHILS RELATIVE PERCENT: 87.6 %
PDW BLD-RTO: 14.8 % (ref 12.4–15.4)
PERFORMED ON: ABNORMAL
PLATELET # BLD: 156 K/UL (ref 135–450)
PMV BLD AUTO: 8.2 FL (ref 5–10.5)
POTASSIUM SERPL-SCNC: 3.3 MMOL/L (ref 3.5–5.1)
RBC # BLD: 4.67 M/UL (ref 4–5.2)
SODIUM BLD-SCNC: 142 MMOL/L (ref 136–145)
VANCOMYCIN TROUGH: 13.1 UG/ML (ref 10–20)
WBC # BLD: 8.4 K/UL (ref 4–11)

## 2020-10-02 PROCEDURE — 94640 AIRWAY INHALATION TREATMENT: CPT

## 2020-10-02 PROCEDURE — 2580000003 HC RX 258: Performed by: STUDENT IN AN ORGANIZED HEALTH CARE EDUCATION/TRAINING PROGRAM

## 2020-10-02 PROCEDURE — 36415 COLL VENOUS BLD VENIPUNCTURE: CPT

## 2020-10-02 PROCEDURE — 80202 ASSAY OF VANCOMYCIN: CPT

## 2020-10-02 PROCEDURE — 6360000002 HC RX W HCPCS: Performed by: NURSE PRACTITIONER

## 2020-10-02 PROCEDURE — 2580000003 HC RX 258: Performed by: PEDIATRICS

## 2020-10-02 PROCEDURE — 2580000003 HC RX 258: Performed by: NURSE PRACTITIONER

## 2020-10-02 PROCEDURE — 90686 IIV4 VACC NO PRSV 0.5 ML IM: CPT | Performed by: PEDIATRICS

## 2020-10-02 PROCEDURE — 1200000000 HC SEMI PRIVATE

## 2020-10-02 PROCEDURE — 6360000002 HC RX W HCPCS: Performed by: STUDENT IN AN ORGANIZED HEALTH CARE EDUCATION/TRAINING PROGRAM

## 2020-10-02 PROCEDURE — 6370000000 HC RX 637 (ALT 250 FOR IP): Performed by: STUDENT IN AN ORGANIZED HEALTH CARE EDUCATION/TRAINING PROGRAM

## 2020-10-02 PROCEDURE — 80048 BASIC METABOLIC PNL TOTAL CA: CPT

## 2020-10-02 PROCEDURE — G0008 ADMIN INFLUENZA VIRUS VAC: HCPCS | Performed by: PEDIATRICS

## 2020-10-02 PROCEDURE — 6370000000 HC RX 637 (ALT 250 FOR IP): Performed by: NURSE PRACTITIONER

## 2020-10-02 PROCEDURE — 6360000002 HC RX W HCPCS: Performed by: PEDIATRICS

## 2020-10-02 PROCEDURE — 94761 N-INVAS EAR/PLS OXIMETRY MLT: CPT

## 2020-10-02 PROCEDURE — 85025 COMPLETE CBC W/AUTO DIFF WBC: CPT

## 2020-10-02 RX ORDER — TRAMADOL HYDROCHLORIDE 50 MG/1
50 TABLET ORAL EVERY 6 HOURS PRN
Status: DISCONTINUED | OUTPATIENT
Start: 2020-10-02 | End: 2020-10-03 | Stop reason: HOSPADM

## 2020-10-02 RX ORDER — POTASSIUM CHLORIDE 750 MG/1
40 TABLET, FILM COATED, EXTENDED RELEASE ORAL ONCE
Status: COMPLETED | OUTPATIENT
Start: 2020-10-02 | End: 2020-10-02

## 2020-10-02 RX ADMIN — INFLUENZA A VIRUS A/VICTORIA/2454/2019 IVR-207 (H1N1) ANTIGEN (PROPIOLACTONE INACTIVATED), INFLUENZA A VIRUS A/HONG KONG/2671/2019 IVR-208 (H3N2) ANTIGEN (PROPIOLACTONE INACTIVATED), INFLUENZA B VIRUS B/VICTORIA/705/2018 BVR-11 ANTIGEN (PROPIOLACTONE INACTIVATED), INFLUENZA B VIRUS B/PHUKET/3073/2013 BVR-1B ANTIGEN (PROPIOLACTONE INACTIVATED) 0.5 ML: 15; 15; 15; 15 INJECTION, SUSPENSION INTRAMUSCULAR at 08:12

## 2020-10-02 RX ADMIN — CEFEPIME HYDROCHLORIDE 2 G: 2 INJECTION, POWDER, FOR SOLUTION INTRAVENOUS at 15:06

## 2020-10-02 RX ADMIN — INSULIN LISPRO 10 UNITS: 100 INJECTION, SOLUTION INTRAVENOUS; SUBCUTANEOUS at 13:20

## 2020-10-02 RX ADMIN — INSULIN GLARGINE 30 UNITS: 100 INJECTION, SOLUTION SUBCUTANEOUS at 20:24

## 2020-10-02 RX ADMIN — VANCOMYCIN HYDROCHLORIDE 1250 MG: 10 INJECTION, POWDER, LYOPHILIZED, FOR SOLUTION INTRAVENOUS at 15:53

## 2020-10-02 RX ADMIN — CEFEPIME HYDROCHLORIDE 2 G: 2 INJECTION, POWDER, FOR SOLUTION INTRAVENOUS at 06:26

## 2020-10-02 RX ADMIN — POTASSIUM CHLORIDE 40 MEQ: 750 TABLET, FILM COATED, EXTENDED RELEASE ORAL at 23:51

## 2020-10-02 RX ADMIN — VANCOMYCIN HYDROCHLORIDE 1250 MG: 10 INJECTION, POWDER, LYOPHILIZED, FOR SOLUTION INTRAVENOUS at 01:25

## 2020-10-02 RX ADMIN — INSULIN LISPRO 10 UNITS: 100 INJECTION, SOLUTION INTRAVENOUS; SUBCUTANEOUS at 08:15

## 2020-10-02 RX ADMIN — LEVOTHYROXINE SODIUM 200 MCG: 0.1 TABLET ORAL at 05:57

## 2020-10-02 RX ADMIN — INSULIN LISPRO 1 UNITS: 100 INJECTION, SOLUTION INTRAVENOUS; SUBCUTANEOUS at 13:20

## 2020-10-02 RX ADMIN — TRAMADOL HYDROCHLORIDE 50 MG: 50 TABLET, FILM COATED ORAL at 20:23

## 2020-10-02 RX ADMIN — DULOXETINE HYDROCHLORIDE 60 MG: 60 CAPSULE, DELAYED RELEASE ORAL at 08:13

## 2020-10-02 RX ADMIN — ASPIRIN 81 MG: 81 TABLET, COATED ORAL at 08:11

## 2020-10-02 RX ADMIN — TRAMADOL HYDROCHLORIDE 50 MG: 50 TABLET, FILM COATED ORAL at 11:24

## 2020-10-02 RX ADMIN — INSULIN LISPRO 1 UNITS: 100 INJECTION, SOLUTION INTRAVENOUS; SUBCUTANEOUS at 08:15

## 2020-10-02 RX ADMIN — DESMOPRESSIN ACETATE 40 MG: 0.2 TABLET ORAL at 20:22

## 2020-10-02 RX ADMIN — LATANOPROST 1 DROP: 50 SOLUTION OPHTHALMIC at 20:23

## 2020-10-02 RX ADMIN — ENOXAPARIN SODIUM 40 MG: 40 INJECTION SUBCUTANEOUS at 08:11

## 2020-10-02 RX ADMIN — Medication 2 PUFF: at 12:30

## 2020-10-02 RX ADMIN — SODIUM CHLORIDE: 9 INJECTION, SOLUTION INTRAVENOUS at 00:39

## 2020-10-02 RX ADMIN — Medication 2 PUFF: at 20:33

## 2020-10-02 ASSESSMENT — PAIN DESCRIPTION - ONSET
ONSET: PROGRESSIVE
ONSET: ON-GOING

## 2020-10-02 ASSESSMENT — PAIN DESCRIPTION - FREQUENCY
FREQUENCY: INTERMITTENT
FREQUENCY: INTERMITTENT

## 2020-10-02 ASSESSMENT — PAIN DESCRIPTION - PAIN TYPE
TYPE: SURGICAL PAIN
TYPE: SURGICAL PAIN

## 2020-10-02 ASSESSMENT — PAIN SCALES - GENERAL
PAINLEVEL_OUTOF10: 5

## 2020-10-02 ASSESSMENT — PAIN DESCRIPTION - PROGRESSION
CLINICAL_PROGRESSION: NOT CHANGED

## 2020-10-02 ASSESSMENT — PAIN DESCRIPTION - LOCATION
LOCATION: BUTTOCKS;COCCYX
LOCATION: BUTTOCKS;COCCYX

## 2020-10-02 ASSESSMENT — PAIN - FUNCTIONAL ASSESSMENT: PAIN_FUNCTIONAL_ASSESSMENT: ACTIVITIES ARE NOT PREVENTED

## 2020-10-02 ASSESSMENT — PAIN DESCRIPTION - DESCRIPTORS
DESCRIPTORS: PINS AND NEEDLES
DESCRIPTORS: SHARP

## 2020-10-02 NOTE — PLAN OF CARE
Problem: Falls - Risk of:  Goal: Will remain free from falls  Description: Will remain free from falls  10/2/2020 0109 by Cleveland Ceron RN  Outcome: Ongoing  Note: Fall precautions in place: bed locked and in lowest position, bed alarm on, 2/4 side rails raised, non-slip socks on, call light and overhead table within reach, patient knows when to appropriately call out for help. Goal: Absence of physical injury  Description: Absence of physical injury  10/2/2020 0109 by Cleveland Ceron RN  Outcome: Ongoing    Problem: Skin Integrity:  Goal: Will show no infection signs and symptoms  Description: Will show no infection signs and symptoms  10/2/2020 0109 by Cleveland Ceron RN  Outcome: Ongoing  Note: Skin assessment completed. Non-healing stage 4 on buttocks.  Dressing changed on 10/1/2020    Goal: Absence of new skin breakdown  Description: Absence of new skin breakdown  10/2/2020 0109 by Cleveland Ceron RN  Outcome: Ongoing     Problem: Pain:  Goal: Pain level will decrease  Description: Pain level will decrease  10/2/2020 0109 by Cleveland Ceron RN  Outcome: Ongoing  Goal: Control of acute pain  Description: Control of acute pain  10/2/2020 0109 by Cleveland Ceron RN  Outcome: Ongoing  Goal: Control of chronic pain  Description: Control of chronic pain  Outcome: Ongoing     Problem: Nutrition  Goal: Optimal nutrition therapy  10/2/2020 0109 by Cleveland Ceron RN  Outcome: Ongoing

## 2020-10-02 NOTE — PROGRESS NOTES
Message sent to [facility], NP: Received a call from lab about lactic acid draws every 6 hours. All the results have been negative. The order is supposed to end at 10/2/2020 at 2345. Would you like to continue this order until then?

## 2020-10-02 NOTE — CARE COORDINATION
Discharge Planning Assessment  Discharge Planning Assessment  RN/SW discharge planner met with patient/ (and family member) to discuss reason for admission, current living situation, and potential needs at the time of discharge    Demographics/Insurance verified Yes - information is correct     Current type of dwelling: single story home with a basement , with no steps to enter it has a ramp on front and side. Patient uses lift chair to go to basement     Patient from ECF/SW confirmed with:n//a     Living arrangements:patient lives alone     Level of function/Support:pt independent with adls' cooks minimally. Pt states has friends that are supportive in area. Family is in another state    PCP:Rosa Yin     Last Visit to PCP: last week in the office     DME:lift chair, rollator, shower chair, life alert, raised toilet seat and grab bars in showe. Active with any community resources/agencies/skilled home care: Active with [facility] [address] for [facility]. Pt is active with [facility] for nursing     Medication compliance issues: denies     Financial issues that could impact healthcare:none         Tentative discharge plan: home with resumed hc services   Discussed and provided facilities of choice if transition to a skilled nursing facility is required at the time of discharge- n/a       Discussed with patient and/or family that on the day of discharge home tentative time of discharge will be between 10 AM and noon.     [facility] the [facility]     Ade Flores, [address] [place] [address]  [phone]

## 2020-10-02 NOTE — PLAN OF CARE
Problem: Falls - Risk of:  Goal: Will remain free from falls  Description: Will remain free from falls  Outcome: Ongoing  Goal: Absence of physical injury  Description: Absence of physical injury  Outcome: Ongoing     Problem: Skin Integrity:  Goal: Will show no infection signs and symptoms  Description: Will show no infection signs and symptoms  Outcome: Ongoing  Goal: Absence of new skin breakdown  Description: Absence of new skin breakdown  Outcome: Ongoing     Problem: Pain:  Goal: Pain level will decrease  Description: Pain level will decrease  Outcome: Ongoing  Goal: Control of acute pain  Description: Control of acute pain  Outcome: Ongoing  Goal: Control of chronic pain  Description: Control of chronic pain  Outcome: Ongoing     Problem: Nutrition  Goal: Optimal nutrition therapy  Outcome: Ongoing    Pt A & O x 4. VSS. C/O coccyx wound that is being controlled with PRN medication. Pt continue admission with IV abx, possibly d/c home with home care on 10/3. Pt currently resting in chair. Falls precautions remain in place. Bedside table, call light, and all personal belongings are within reach. Will continue to monitor.

## 2020-10-02 NOTE — PROGRESS NOTES
Old dressing removed. Sterile guaze moistened with Saline and gently packed into coccyx wound. Dry dressing applied. Pt premedicated prior to dressing change.

## 2020-10-02 NOTE — CARE COORDINATION
CM notified possible d/c tomorrow. CM verified with physician via [employer] that pt will not need IV abx on d/c. CM notified physician will need hc orders to resume services. Pt is active with [facility] for RN. CM called [facility] [phone] and spoke to Joy Hansen and verified. Informed pt may d/c tomorrow. Paperwork and orders can be faxed to [phone] on d/c. CM added information to [employer]. Pt will need [employer] at d/c and meds to bed. CM notified TOMY Pettit and placed on whiteboard notes.      Nico Valera RN, BSN  [phone]

## 2020-10-02 NOTE — DISCHARGE INSTR - COC
(dyspnea on exertion) R06.00    Abnormal CT of the chest R93.89    SIRS (systemic inflammatory response syndrome) ([facility]) R65.10       Isolation/Infection:   Isolation            No Isolation          Patient Infection Status       Infection Onset Added Last Indicated Last Indicated By Review Planned Expiration Resolved Resolved By    None active    Resolved    COVID-19 Rule Out 09/30/20 09/30/20 09/30/20 COVID-19 (Ordered)   10/01/20 Rule-Out Test Resulted    C-diff Rule Out  12/30/19 12/30/19 Clostridium difficile toxin/antigen (Ordered)   12/30/19 Rule-Out Test Resulted            Nurse Assessment:  Last Vital Signs: /77   Pulse 88   Temp 98.1 °F (36.7 °C) (Oral)   Resp 18   Ht 5' 6\" (1.676 m)   Wt 232 lb 9.6 oz (105.5 kg)   SpO2 95%   BMI 37.54 kg/m²     Last documented pain score (0-10 scale): Pain Level: 5  Last Weight:   Wt Readings from Last 1 Encounters:   10/01/20 232 lb 9.6 oz (105.5 kg)     Mental Status:  oriented, alert, coherent, logical, thought processes intact and able to concentrate and follow conversation    IV Access:  - None    Nursing Mobility/ADLs:  Walking   Independent  Transfer  Independent  Bathing  Independent  Dressing  Independent  Toileting  Independent  Feeding  Independent  Med Admin  Independent  Med Delivery   whole    Wound Care Documentation and Therapy:  Wound 10/01/20 Sacrum (Active)   Wound Image   10/01/20 0127   Wound Pressure Stage  4 10/02/20 0803   Dressing Status Intact;Dry;Clean 10/02/20 0803   Dressing Changed Dressing reinforced 10/02/20 0803   Dressing/Treatment ABD; Other (comment) 10/02/20 0803   Wound Cleansed Rinsed/Irrigated with saline 10/01/20 1532   Dressing Change Due 10/02/20 10/02/20 0803   Wound Assessment Drainage;Painful;Red;Pink 10/02/20 0803   Drainage Amount Moderate 10/02/20 0803   Drainage Description Serosanguinous 10/02/20 0803   Odor None 10/02/20 0803   Margins Unattached edges 10/02/20 0803   Saba-wound Assessment Intact; Red 10/02/20 0803   Number of days: 1        Elimination:  Continence:   · Bowel: Yes  · Bladder: Yes  Urinary Catheter: None   Colostomy/Ileostomy/Ileal Conduit: No       Date of Last BM:     Intake/Output Summary (Last 24 hours) at 10/2/2020 1204  Last data filed at 10/1/2020 1655  Gross per 24 hour   Intake 1737.25 ml   Output 0 ml   Net 1737.25 ml     I/O last 3 completed shifts: In: 1977.3 [P.O.:720; I.V.:1257.3]  Out: 0     Safety Concerns: At Risk for Falls    Impairments/Disabilities:      None    Nutrition Therapy:  Current Nutrition Therapy:   - Oral Diet:  Carb Control 3 carbs/meal (1500kcals/day)    Routes of Feeding: Oral  Liquids: Thin Liquids  Daily Fluid Restriction: no  Last Modified Barium Swallow with Video (Video Swallowing Test): not done    Treatments at the Time of Hospital Discharge:   Respiratory Treatments: none  Oxygen Therapy:  is not on home oxygen therapy.   Ventilator:    - No ventilator support    Rehab Therapies:    Weight Bearing Status/Restrictions: No weight bearing restirctions  Other Medical Equipment (for information only, NOT a DME order):  walker  Other Treatments: Wound care:  Daily wet to dry dressing change to sacral wound    Patient's personal belongings (please select all that are sent with patient):       RN SIGNATURE:  Electronically signed by Zena Dutton RN on 10/3/20 at 1:15 PM EDT    CASE MANAGEMENT/SOCIAL WORK SECTION    Inpatient Status Date: 9/30/2020    Readmission Risk Assessment Score:  Readmission Risk              Risk of Unplanned Readmission:        8           Discharging to Facility/ Agency   · Name: [address] [place] [address]   · Address:  · Phone:[phone]  · Fax:[phone]    Dialysis Facility (if applicable)   · Name:  · Address:  · Dialysis Schedule:  · Phone:  · Fax:    / signature: Ricki Hudson RN, BSN  [phone]       PHYSICIAN SECTION    Prognosis: Good    Condition at Discharge: Stable    Rehab Potential (if transferring to Rehab): Good    Recommended Labs or Other Treatments After Discharge: Follow-up with PCP in 1 week, follow-up with surgical wound clinic in 1 week, take Augmentin for 4 more days    Physician Certification: I certify the above information and transfer of Sherryle Shope  is necessary for the continuing treatment of the diagnosis listed and that she requires {Admit to Appropriate Level of Care:58937} for {GREATER/LESS:680055437} 30 days.      Update Admission H&P: No change in H&P    PHYSICIAN SIGNATURE:  Electronically signed by Daniel Valdes MD on 10/3/20 at 12:33 PM EDT

## 2020-10-02 NOTE — PROGRESS NOTES
dextrose 5 % 250 mL IVPB, Q12H  cefepime (MAXIPIME) 2 g IVPB minibag, Q8H        Objective:  /77   Pulse 88   Temp 98.1 °F (36.7 °C) (Oral)   Resp 18   Ht 5' 6\" (1.676 m)   Wt 232 lb 9.6 oz (105.5 kg)   SpO2 95%   BMI 37.54 kg/m²     Intake/Output Summary (Last 24 hours) at 10/2/2020 1439  Last data filed at 10/1/2020 1655  Gross per 24 hour   Intake 240 ml   Output 0 ml   Net 240 ml      Wt Readings from Last 3 Encounters:   10/01/20 232 lb 9.6 oz (105.5 kg)   01/17/20 244 lb (110.7 kg)   01/03/20 237 lb 9.6 oz (107.8 kg)      Constitutional:     Appearance: Normal appearance. She is obese. She is not ill-appearing. HENT:   Head: Normocephalic and atraumatic. Eyes:   Comments: cataracts   Neck:    Musculoskeletal: Normal range of motion. Cardiovascular:   Rate and Rhythm: Regular rhythm. Heart sounds: Murmur present. Pulmonary:    Effort: Pulmonary effort is normal. No respiratory distress. Breath sounds: Normal breath sounds. Abdominal:   General: Abdomen is flat. Bowel sounds are normal. There is no distension. Palpations: Abdomen is soft. Musculoskeletal: Normal range of motion. General: Swelling present. Skin:  Comments: Please refer to ER photo of sacral wound Stage 4. Neurological:   General: No focal deficit present.    Psychiatric:      Mood and Affect: Mood normal.      Labs and Tests:  CBC:   Recent Labs     09/30/20  1949 10/01/20  0508 10/02/20  1058   WBC 10.4 7.6 8.4   HGB 16.4* 13.2 13.8    156 156     BMP:    Recent Labs     09/30/20  1949 10/01/20  0508 10/02/20  1058    137 142   K 3.9 3.5 3.3*   CL 96* 103 107   CO2 27 25 25   BUN 12 9 7   CREATININE 0.7 0.6 0.7   GLUCOSE 147* 154* 142*     Hepatic:   Recent Labs     09/30/20 1947   AST 14*   ALT 11   BILITOT 0.8   ALKPHOS 62     CT ABDOMEN PELVIS W IV CONTRAST Additional Contrast? None   Final Result   No osseous destructive changes are seen involving the sacrum or coccyx to   suggest

## 2020-10-02 NOTE — PROGRESS NOTES
Pt c/o coccyx wound pain. Requesting alternative prn pain medication. Physician notified via perfect serve. Waiting for response.

## 2020-10-02 NOTE — PROGRESS NOTES
Patient's head to toe assessment completed at this time. VSS. Respirations are easy and unlabored. Patient denies pain at this time. Fall precautions in place: bed locked and in lowest position, bed alarm on, 2/4 side rails raised, non-slip socks on, call light and overhead table within reach, patient knows when to appropriately call out for help. Patient denies any other needs at this time. Will continue to monitor.

## 2020-10-02 NOTE — CARE COORDINATION
Patient has coccyx wound that was debrided by Dr Junior Miguel 10/1. Dr Junior Miguel gave wound care orders. Patient was transferred to   from [facility] on 10/1. Discussed case with nurse Jonathan Montoya. Patient has specialty bed, but refuses to get in bed, prefers to stay in chair. Encouraged nurse to make sure patient has pressure redistribution cushion in chair. Also discussed offloading from side to side using pillows, while in chair. Will continue to follow this admission. KIN Velasquez, RN , Inpatient Wound and Ostomy care.

## 2020-10-03 VITALS
DIASTOLIC BLOOD PRESSURE: 73 MMHG | HEIGHT: 66 IN | HEART RATE: 85 BPM | WEIGHT: 238 LBS | BODY MASS INDEX: 38.25 KG/M2 | OXYGEN SATURATION: 94 % | SYSTOLIC BLOOD PRESSURE: 135 MMHG | TEMPERATURE: 97.7 F | RESPIRATION RATE: 18 BRPM

## 2020-10-03 PROBLEM — A41.9 SEPSIS (HCC): Status: ACTIVE | Noted: 2020-10-03

## 2020-10-03 LAB
ANION GAP SERPL CALCULATED.3IONS-SCNC: 9 MMOL/L (ref 3–16)
BASOPHILS ABSOLUTE: 0 K/UL (ref 0–0.2)
BASOPHILS RELATIVE PERCENT: 0.3 %
BUN BLDV-MCNC: 8 MG/DL (ref 7–20)
CALCIUM SERPL-MCNC: 8.9 MG/DL (ref 8.3–10.6)
CHLORIDE BLD-SCNC: 109 MMOL/L (ref 99–110)
CO2: 26 MMOL/L (ref 21–32)
CREAT SERPL-MCNC: 0.6 MG/DL (ref 0.6–1.2)
EOSINOPHILS ABSOLUTE: 0 K/UL (ref 0–0.6)
EOSINOPHILS RELATIVE PERCENT: 0 %
GFR AFRICAN AMERICAN: >60
GFR NON-AFRICAN AMERICAN: >60
GLUCOSE BLD-MCNC: 127 MG/DL (ref 70–99)
GLUCOSE BLD-MCNC: 142 MG/DL (ref 70–99)
GLUCOSE BLD-MCNC: 193 MG/DL (ref 70–99)
HCT VFR BLD CALC: 40.2 % (ref 36–48)
HEMOGLOBIN: 13.2 G/DL (ref 12–16)
LYMPHOCYTES ABSOLUTE: 0.8 K/UL (ref 1–5.1)
LYMPHOCYTES RELATIVE PERCENT: 14.1 %
MCH RBC QN AUTO: 29.7 PG (ref 26–34)
MCHC RBC AUTO-ENTMCNC: 32.8 G/DL (ref 31–36)
MCV RBC AUTO: 90.8 FL (ref 80–100)
MONOCYTES ABSOLUTE: 0.5 K/UL (ref 0–1.3)
MONOCYTES RELATIVE PERCENT: 9.5 %
NEUTROPHILS ABSOLUTE: 4.1 K/UL (ref 1.7–7.7)
NEUTROPHILS RELATIVE PERCENT: 76.1 %
PDW BLD-RTO: 14.5 % (ref 12.4–15.4)
PERFORMED ON: ABNORMAL
PERFORMED ON: ABNORMAL
PLATELET # BLD: 131 K/UL (ref 135–450)
PMV BLD AUTO: 8 FL (ref 5–10.5)
POTASSIUM SERPL-SCNC: 3.6 MMOL/L (ref 3.5–5.1)
RBC # BLD: 4.43 M/UL (ref 4–5.2)
SODIUM BLD-SCNC: 144 MMOL/L (ref 136–145)
WBC # BLD: 5.4 K/UL (ref 4–11)

## 2020-10-03 PROCEDURE — 6360000002 HC RX W HCPCS: Performed by: NURSE PRACTITIONER

## 2020-10-03 PROCEDURE — 6370000000 HC RX 637 (ALT 250 FOR IP): Performed by: NURSE PRACTITIONER

## 2020-10-03 PROCEDURE — 6370000000 HC RX 637 (ALT 250 FOR IP): Performed by: STUDENT IN AN ORGANIZED HEALTH CARE EDUCATION/TRAINING PROGRAM

## 2020-10-03 PROCEDURE — 80048 BASIC METABOLIC PNL TOTAL CA: CPT

## 2020-10-03 PROCEDURE — 85025 COMPLETE CBC W/AUTO DIFF WBC: CPT

## 2020-10-03 PROCEDURE — 94640 AIRWAY INHALATION TREATMENT: CPT

## 2020-10-03 PROCEDURE — 94761 N-INVAS EAR/PLS OXIMETRY MLT: CPT

## 2020-10-03 RX ORDER — AMOXICILLIN AND CLAVULANATE POTASSIUM 875; 125 MG/1; MG/1
1 TABLET, FILM COATED ORAL 2 TIMES DAILY
Qty: 14 TABLET | Refills: 0 | Status: SHIPPED | OUTPATIENT
Start: 2020-10-03 | End: 2020-10-07

## 2020-10-03 RX ADMIN — TRAMADOL HYDROCHLORIDE 50 MG: 50 TABLET, FILM COATED ORAL at 06:27

## 2020-10-03 RX ADMIN — ENOXAPARIN SODIUM 40 MG: 40 INJECTION SUBCUTANEOUS at 09:23

## 2020-10-03 RX ADMIN — INSULIN LISPRO 10 UNITS: 100 INJECTION, SOLUTION INTRAVENOUS; SUBCUTANEOUS at 12:53

## 2020-10-03 RX ADMIN — INSULIN LISPRO 10 UNITS: 100 INJECTION, SOLUTION INTRAVENOUS; SUBCUTANEOUS at 09:27

## 2020-10-03 RX ADMIN — LEVOTHYROXINE SODIUM 200 MCG: 0.1 TABLET ORAL at 06:27

## 2020-10-03 RX ADMIN — DULOXETINE HYDROCHLORIDE 60 MG: 60 CAPSULE, DELAYED RELEASE ORAL at 09:23

## 2020-10-03 RX ADMIN — ASPIRIN 81 MG: 81 TABLET, COATED ORAL at 09:23

## 2020-10-03 RX ADMIN — ACETAMINOPHEN 650 MG: 325 TABLET ORAL at 09:23

## 2020-10-03 RX ADMIN — Medication 2 PUFF: at 09:47

## 2020-10-03 RX ADMIN — INSULIN LISPRO 1 UNITS: 100 INJECTION, SOLUTION INTRAVENOUS; SUBCUTANEOUS at 09:25

## 2020-10-03 ASSESSMENT — PAIN DESCRIPTION - PROGRESSION
CLINICAL_PROGRESSION: NOT CHANGED

## 2020-10-03 ASSESSMENT — PAIN SCALES - GENERAL
PAINLEVEL_OUTOF10: 6
PAINLEVEL_OUTOF10: 5

## 2020-10-03 ASSESSMENT — PAIN DESCRIPTION - LOCATION: LOCATION: BACK

## 2020-10-03 NOTE — CARE COORDINATION
Updated ermias/avs faxed to [id number] [address] at [address], RN, BSN  [phone]

## 2020-10-03 NOTE — DISCHARGE SUMMARY
Hospital Medicine Discharge Summary    Patient ID: Eliazar Weaver      Patient's PCP: Aj James MD    Admit Date: 9/30/2020     Discharge Date:  10/3/2020    Admitting Physician: Jimmy Castanon MD     Discharge Physician: Christa Caldera MD     Discharge Diagnoses: Active Hospital Problems    Diagnosis    Sepsis ([facility] [address] [A41.9]    SIRS (systemic inflammatory response syndrome) ([facility] [address] [R65.10]       The patient was seen and examined on day of discharge and this discharge summary is in conjunction with any daily progress note from day of discharge. Hospital Course:     Patient is 68years old female with past medical history significant for hypertension hyperlipidemia diabetes, presented to ER for fatigue in the last 2 weeks, admitted for sepsis, secondary to stage IV sacral ulcer. She has been tested for COVID testing is negative. In the hospital she was treated for the following medical conditions:    Sepsis secondary to sacral wound, she was started on Vanco and Zosyn, switched to cefepime and vancomycin, seen by surgery and had debridement, CT scan did not show any osteomyelitis, on discharge she was switched to Augmentin for 4 more days,  Type 2 diabetes, history of hypertension. Physical Exam Performed:     /73   Pulse 85   Temp 97.7 °F (36.5 °C) (Oral)   Resp 18   Ht 5' 6\" (1.676 m)   Wt 238 lb (108 kg)   SpO2 94%   BMI 38.41 kg/m²       General appearance:  No apparent distress, appears stated age and cooperative. HEENT:  Normal cephalic, atraumatic without obvious deformity. Pupils equal, round, and reactive to light. Extra ocular muscles intact. Conjunctivae/corneas clear. Neck: Supple, with full range of motion. No jugular venous distention. Trachea midline. Respiratory:  Normal respiratory effort. Clear to auscultation, bilaterally without Rales/Wheezes/Rhonchi.   Cardiovascular:  Regular rate and rhythm with normal S1/S2 without murmurs, rubs or gallops. Abdomen: Soft, non-tender, non-distended with normal bowel sounds. Musculoskeletal:  No clubbing, cyanosis or edema bilaterally. Full range of motion without deformity. Skin: Sacral wound pressure ulcer stage III. Skin color, texture, turgor normal.  No rashes or lesions. Neurologic:  Neurovascularly intact without any focal sensory/motor deficits. Cranial nerves: II-XII intact, grossly non-focal.  Psychiatric:  Alert and oriented, thought content appropriate, normal insight  Capillary Refill: Brisk,< 3 seconds   Peripheral Pulses: +2 palpable, equal bilaterally       Labs: For convenience and continuity at follow-up the following most recent labs are provided:      CBC:    Lab Results   Component Value Date    WBC 5.4 10/03/2020    HGB 13.2 10/03/2020    HCT 40.2 10/03/2020     10/03/2020       Renal:    Lab Results   Component Value Date     10/03/2020    K 3.6 10/03/2020    K 3.5 10/01/2020     10/03/2020    CO2 26 10/03/2020    BUN 8 10/03/2020    CREATININE 0.6 10/03/2020    CALCIUM 8.9 10/03/2020    PHOS 3.4 12/29/2019         Significant Diagnostic Studies    Radiology:   CT ABDOMEN PELVIS W IV CONTRAST Additional Contrast? None   Final Result   No osseous destructive changes are seen involving the sacrum or coccyx to   suggest definite osteomyelitis. There appears to be some questionable   packing material seen at the region of the superior gluteal crease which   could be related to soft tissue decubitus ulceration. This would be best   assessed with MR imaging. No acute intra-abdominal or pelvic process. XR CHEST PORTABLE   Final Result   Mild bibasilar atelectasis. Otherwise unremarkable chest.                Consults:     PHARMACY TO DOSE VANCOMYCIN  PHARMACY TO DOSE VANCOMYCIN  IP CONSULT TO DIETITIAN  IP CONSULT TO GENERAL SURGERY  IP CONSULT TO HOME CARE NEEDS    Disposition: Home with home care for wound care.     Condition at Discharge: Stable    Discharge Instructions/Follow-up: Follow-up with surgical clinic for wound debridement, please take your antibiotic 4 more days, follow-up with PCP in 1 week. Code Status:  Full Code     Activity: activity as tolerated    Diet: diabetic diet      Discharge Medications:     Current Discharge Medication List           Details   amoxicillin-clavulanate (AUGMENTIN) 875-125 MG per tablet Take 1 tablet by mouth 2 times daily for 4 days  Qty: 14 tablet, Refills: 0              Details   traMADol (ULTRAM) 50 MG tablet Take 50 mg by mouth 3 times daily as needed for Pain. DULoxetine (CYMBALTA) 60 MG extended release capsule Take 1 capsule by mouth daily  Qty: 30 capsule, Refills: 3      spironolactone (ALDACTONE) 25 MG tablet Take 12.5 mg by mouth daily      Timolol Maleate PF (TIMOPTIC OCUDOSE) 0.5 % SOLN Apply 1 drop to eye 2 times daily      Travoprost, BAK Free, (TRAVATAN Z) 0.004 % SOLN ophthalmic solution Place 1 drop into both eyes nightly      aspirin EC 81 MG EC tablet Take 81 mg by mouth daily May restart in AM.      furosemide (LASIX) 40 MG tablet Take 40 mg by mouth daily       levothyroxine (LEVOXYL) 200 MCG tablet Take 200 mcg by mouth Daily       Insulin Detemir (LEVEMIR FLEXPEN SC) Inject 33 Units into the skin nightly       simvastatin (ZOCOR) 40 MG tablet Take 40 mg by mouth nightly. Insulin Lispro, Human, (HUMALOG SC) Inject 10 Units into the skin 3 times daily (with meals) With sliding      Calcium Carbonate-Vitamin D (CALCIUM + D PO) Take by mouth daily       sodium chloride (OCEAN, BABY AYR) 0.65 % nasal spray 1 spray by Nasal route every 2 hours as needed for Congestion  Qty: 1 Bottle, Refills: 0      mometasone-formoterol (DULERA) 200-5 MCG/ACT inhaler Inhale 2 puffs into the lungs every 12 hours  Qty: 1 Inhaler, Refills: 1      DICLOFENAC PO Take  by mouth.       acetaminophen (TYLENOL) 500 MG tablet Take 500 mg by mouth every 6 hours as needed for Pain or Fever Time Spent on discharge is more than 30 minutes in the examination, evaluation, counseling and review of medications and discharge plan. Signed:    Hortensia Herrera MD   10/3/2020      Thank you Neli Cornejo MD for the opportunity to be involved in this patient's care. If you have any questions or concerns please feel free to contact me at [phone].

## 2020-10-03 NOTE — PROGRESS NOTES
Physician Progress Note      PATIENTEllard Bachelor  CSN #:                  [id number]  :                       1950  ADMIT DATE:       2020 7:04 PM  [address] [place] DATE:  RESPONDING  PROVIDER #:        Zayda Boss          QUERY TEXT:    Dear Dr. [facility],    Patient admitted with fatigue, chronic sacral wound. Noted documentation of   sepsis in Progress note of 10/1/20. Please indicate one of the following and   document in the medical record: The medical record reflects the following:  Risk Factors: Chronic wound with wound care provided by home care. Diabetic  Clinical Indicators: WBC 10.4. ANC 8.8  LA 0.8 Procalcitonin 0.05. CT showed   no osteomyelitis in sacral wound. Treatment: Antibiotics, Blood cultures, imaging, Surgical consult, aggressive   fluid resuscitation in ER for possible sepsis. Thank you. [email or web address]  Options provided:  -- Sepsis present as evidenced by, Please document evidence.   -- Sepsis was ruled out after study  -- Other - I will add my own diagnosis  -- Disagree - Not applicable / Not valid  -- Disagree - Clinically unable to determine / Unknown  -- Refer to Clinical Documentation Reviewer    PROVIDER RESPONSE TEXT:    Sepsis is present as evidenced by sacral wound stage 3    Query created by: Karlo Hendrix on 10/2/2020 2:30 PM      Electronically signed by:  Zayda Boss 10/3/2020 11:22 AM

## 2020-10-03 NOTE — CARE COORDINATION
CM noted d/c order. CM called [facility] [phone] and left vm with on call line that pt is discharging today and left call back number.      CM faxed to [facility] at [phone]: home care orders, demographics, physician progress note, ermias/avs.     Patient discharged 10/3/2020 to home   All discharge needs met per case management    Ade Flores RN, BSN  [phone]

## 2020-10-04 LAB
BLOOD CULTURE, ROUTINE: NORMAL
CULTURE, BLOOD 2: NORMAL

## 2020-10-14 ENCOUNTER — HOSPITAL ENCOUNTER (OUTPATIENT)
Dept: WOUND CARE | Age: 70
Discharge: HOME | End: 2020-10-14
Payer: MEDICARE

## 2020-10-14 VITALS
RESPIRATION RATE: 18 BRPM | WEIGHT: 233.8 LBS | SYSTOLIC BLOOD PRESSURE: 139 MMHG | HEART RATE: 90 BPM | DIASTOLIC BLOOD PRESSURE: 87 MMHG | BODY MASS INDEX: 37.74 KG/M2 | TEMPERATURE: 97.1 F

## 2020-10-14 PROBLEM — S31.000A SACRAL WOUND, INITIAL ENCOUNTER: Status: ACTIVE | Noted: 2020-10-14

## 2020-10-14 PROCEDURE — 99213 OFFICE O/P EST LOW 20 MIN: CPT

## 2020-10-14 PROCEDURE — 11042 DBRDMT SUBQ TIS 1ST 20SQCM/<: CPT | Performed by: SURGERY

## 2020-10-14 PROCEDURE — 11042 DBRDMT SUBQ TIS 1ST 20SQCM/<: CPT

## 2020-10-14 RX ORDER — BETAMETHASONE DIPROPIONATE 0.05 %
OINTMENT (GRAM) TOPICAL ONCE
Status: CANCELLED | OUTPATIENT
Start: 2020-10-14

## 2020-10-14 RX ORDER — BACITRACIN, NEOMYCIN, POLYMYXIN B 400; 3.5; 5 [USP'U]/G; MG/G; [USP'U]/G
OINTMENT TOPICAL ONCE
Status: CANCELLED | OUTPATIENT
Start: 2020-10-14

## 2020-10-14 RX ORDER — BACITRACIN ZINC AND POLYMYXIN B SULFATE 500; 1000 [USP'U]/G; [USP'U]/G
OINTMENT TOPICAL ONCE
Status: CANCELLED | OUTPATIENT
Start: 2020-10-14

## 2020-10-14 RX ORDER — LIDOCAINE HYDROCHLORIDE 20 MG/ML
JELLY TOPICAL ONCE
Status: CANCELLED | OUTPATIENT
Start: 2020-10-14

## 2020-10-14 RX ORDER — GINSENG 100 MG
CAPSULE ORAL ONCE
Status: CANCELLED | OUTPATIENT
Start: 2020-10-14

## 2020-10-14 RX ORDER — LIDOCAINE HYDROCHLORIDE 40 MG/ML
SOLUTION TOPICAL ONCE
Status: CANCELLED | OUTPATIENT
Start: 2020-10-14

## 2020-10-14 RX ORDER — GENTAMICIN SULFATE 1 MG/G
OINTMENT TOPICAL ONCE
Status: CANCELLED | OUTPATIENT
Start: 2020-10-14

## 2020-10-14 RX ORDER — LIDOCAINE 50 MG/G
OINTMENT TOPICAL ONCE
Status: CANCELLED | OUTPATIENT
Start: 2020-10-14

## 2020-10-14 RX ORDER — LIDOCAINE 40 MG/G
CREAM TOPICAL ONCE
Status: DISCONTINUED | OUTPATIENT
Start: 2020-10-14 | End: 2020-10-15 | Stop reason: HOSPADM

## 2020-10-14 RX ORDER — LIDOCAINE 40 MG/G
CREAM TOPICAL ONCE
Status: CANCELLED | OUTPATIENT
Start: 2020-10-14

## 2020-10-14 RX ORDER — CLOBETASOL PROPIONATE 0.5 MG/G
OINTMENT TOPICAL ONCE
Status: CANCELLED | OUTPATIENT
Start: 2020-10-14

## 2020-10-14 ASSESSMENT — PAIN DESCRIPTION - DESCRIPTORS: DESCRIPTORS: ACHING;SHARP

## 2020-10-14 ASSESSMENT — PAIN SCALES - GENERAL: PAINLEVEL_OUTOF10: 6

## 2020-10-14 ASSESSMENT — PAIN DESCRIPTION - PAIN TYPE: TYPE: ACUTE PAIN

## 2020-10-14 NOTE — PLAN OF CARE
Discharge instructions given. Patient verbalized understanding. Return to [address] [place] [address],[address] in 1 week.   Called/faxed orders to [facility]

## 2020-10-14 NOTE — PROGRESS NOTES
[address] Procedure Note    Jeremias Anton  AGE: 68 y.o. GENDER: female    : 1950  TODAY'S DATE: 10/14/2020    Chief Complaint   Patient presents with    Wound Check     Buttocks/coccyx        History of Present Illness     Jeremias Anton is a 68 y.o. female who presents today for wound evaluation. History of Wound: pressure wound located on the sacral decubitus ulcer stage 3. Wound Pain:  mild  Severity:  2 / 10   Wound Type:  pressure  Modifying Factors:  diabetes, poor glucose control and decreased mobility  Associated Signs/Symptoms:  pain    Procedure Note:     Performed by: Poppy Servin MD    Consent obtained: Yes    Time out taken: Yes    Pain Control: Anesthetic  Anesthetic: 4% Lidocaine Cream     Debridement: Excisional Debridement    Using curette the wound was sharply debrided    down through and including the removal of epidermis, dermis and subcutaneous tissue.         Devitalized Tissue Debrided: fibrin, biofilm and slough    Pre Debridement Measurements:  Are located in the Wound Documentation Flow Sheet     Wound #: 1     Post  Debridement Measurements:  Wound 10/14/20 Coccyx #1 (Active)   Wound Image   10/14/20 0910   Wound Etiology Non-Healing Surgical 10/14/20 0910   Wound Cleansed Cleansed with saline 10/14/20 0917   Wound Length (cm) 3.5 cm 10/14/20 0910   Wound Width (cm) 1.4 cm 10/14/20 0910   Wound Depth (cm) 2.9 cm 10/14/20 0910   Wound Surface Area (cm^2) 4.9 cm^2 10/14/20 0910   Wound Volume (cm^3) 14.21 cm^3 10/14/20 0910   Post-Procedure Length (cm) 3.5 cm 10/14/20 0917   Post-Procedure Width (cm) 1.4 cm 10/14/20 0917   Post-Procedure Depth (cm) 2.9 cm 10/14/20 0917   Post-Procedure Surface Area (cm^2) 4.9 cm^2 10/14/20 0917   Post-Procedure Volume (cm^3) 14.21 cm^3 10/14/20 0917   Wound Assessment Bleeding 10/14/20 0917   Drainage Amount Moderate 10/14/20 0917   Drainage Description Serosanguinous 10/14/20 0910   Odor None 10/14/20 0910 Number of days: 0       Total Surface Area Debrided:  4.9 sq cm     Bleeding:  Minimal    Hemostasis Achieved:  by pressure    Procedural Pain:  1  / 10     Post Procedural Pain:  1 / 10     Response to treatment:  Well tolerated by patient. Assessment:     Wound looks improved. (improved, worse or stable)    Patient tolerated procedure well and was given proper instruction. The nature of the patient's condition was explained in depth. The patient was informed that their compliance to the treatment plan is paramount to successful healing and prevention of further ulceration and/or infection       Plan:     Treatment Plan: With each dressing change, rinse wounds with 0.9% Saline. (May use wound wash or soft contact solution. Both can be purchased at a local drug store). If unable to obtain saline, may use a gentle soap and water. Dressing care: Wet to dry, dry dressing- change daily. Dejan Cardoza 6  Junior Miguel MD, FACS  10/14/2020  9:37 AM

## 2020-10-21 ENCOUNTER — HOSPITAL ENCOUNTER (OUTPATIENT)
Dept: WOUND CARE | Age: 70
Discharge: HOME | End: 2020-10-21
Payer: MEDICARE

## 2020-10-21 VITALS
SYSTOLIC BLOOD PRESSURE: 129 MMHG | TEMPERATURE: 97.8 F | DIASTOLIC BLOOD PRESSURE: 83 MMHG | RESPIRATION RATE: 18 BRPM | HEART RATE: 95 BPM

## 2020-10-21 PROCEDURE — 11042 DBRDMT SUBQ TIS 1ST 20SQCM/<: CPT | Performed by: SURGERY

## 2020-10-21 PROCEDURE — 11042 DBRDMT SUBQ TIS 1ST 20SQCM/<: CPT

## 2020-10-21 RX ORDER — BETAMETHASONE DIPROPIONATE 0.05 %
OINTMENT (GRAM) TOPICAL ONCE
Status: CANCELLED | OUTPATIENT
Start: 2020-10-21

## 2020-10-21 RX ORDER — LIDOCAINE 40 MG/G
CREAM TOPICAL ONCE
Status: DISCONTINUED | OUTPATIENT
Start: 2020-10-21 | End: 2020-10-22 | Stop reason: HOSPADM

## 2020-10-21 RX ORDER — LIDOCAINE HYDROCHLORIDE 40 MG/ML
SOLUTION TOPICAL ONCE
Status: CANCELLED | OUTPATIENT
Start: 2020-10-21

## 2020-10-21 RX ORDER — LIDOCAINE 50 MG/G
OINTMENT TOPICAL ONCE
Status: CANCELLED | OUTPATIENT
Start: 2020-10-21

## 2020-10-21 RX ORDER — CLOBETASOL PROPIONATE 0.5 MG/G
OINTMENT TOPICAL ONCE
Status: CANCELLED | OUTPATIENT
Start: 2020-10-21

## 2020-10-21 RX ORDER — GENTAMICIN SULFATE 1 MG/G
OINTMENT TOPICAL ONCE
Status: CANCELLED | OUTPATIENT
Start: 2020-10-21

## 2020-10-21 RX ORDER — LIDOCAINE HYDROCHLORIDE 20 MG/ML
JELLY TOPICAL ONCE
Status: CANCELLED | OUTPATIENT
Start: 2020-10-21

## 2020-10-21 RX ORDER — BACITRACIN, NEOMYCIN, POLYMYXIN B 400; 3.5; 5 [USP'U]/G; MG/G; [USP'U]/G
OINTMENT TOPICAL ONCE
Status: CANCELLED | OUTPATIENT
Start: 2020-10-21

## 2020-10-21 RX ORDER — BACITRACIN ZINC AND POLYMYXIN B SULFATE 500; 1000 [USP'U]/G; [USP'U]/G
OINTMENT TOPICAL ONCE
Status: CANCELLED | OUTPATIENT
Start: 2020-10-21

## 2020-10-21 RX ORDER — GINSENG 100 MG
CAPSULE ORAL ONCE
Status: CANCELLED | OUTPATIENT
Start: 2020-10-21

## 2020-10-21 RX ORDER — LIDOCAINE 40 MG/G
CREAM TOPICAL ONCE
Status: CANCELLED | OUTPATIENT
Start: 2020-10-21

## 2020-10-21 NOTE — PROGRESS NOTES
[address] Procedure Note    Adolfo Ward  AGE: 68 y.o. GENDER: female    : 1950  TODAY'S DATE: 10/21/2020    Chief Complaint   Patient presents with    Wound Check     buttocks        History of Present Illness     Adolfo Ward is a 68 y.o. female who presents today for wound evaluation. History of Wound: pressure wound located on the sacral decubitus ulcer stage 3. Wound Pain:  mild  Severity:  2 / 10   Wound Type:  pressure  Modifying Factors:  diabetes, poor glucose control and decreased mobility  Associated Signs/Symptoms:  pain    Past Medical History:   Diagnosis Date    Arthritis     back    Cataracts, bilateral     [facility]     Diabetes mellitus ([address]     Glaucoma     Hyperlipidemia     Hypertension     Thyroid disease      Past Surgical History:   Procedure Laterality Date    CARPAL TUNNEL RELEASE      bilateral    CHOLECYSTECTOMY      COLONOSCOPY      ELBOW SURGERY      ENDOSCOPY, COLON, DIAGNOSTIC      EYE SURGERY      FOOT SURGERY      SHOULDER SURGERY       Current Outpatient Medications   Medication Sig Dispense Refill    traMADol (ULTRAM) 50 MG tablet Take 50 mg by mouth 3 times daily as needed for Pain.  DULoxetine (CYMBALTA) 60 MG extended release capsule Take 1 capsule by mouth daily 30 capsule 3    sodium chloride (OCEAN, BABY AYR) 0.65 % nasal spray 1 spray by Nasal route every 2 hours as needed for Congestion 1 Bottle 0    mometasone-formoterol (DULERA) 200-5 MCG/ACT inhaler Inhale 2 puffs into the lungs every 12 hours 1 Inhaler 1    spironolactone (ALDACTONE) 25 MG tablet Take 12.5 mg by mouth daily      Travoprost, BAK Free, (TRAVATAN Z) 0.004 % SOLN ophthalmic solution Place 1 drop into both eyes nightly      DICLOFENAC PO Take  by mouth.       acetaminophen (TYLENOL) 500 MG tablet Take 500 mg by mouth every 6 hours as needed for Pain or Fever       aspirin EC 81 MG EC tablet Take 81 mg by mouth daily May restart in AM.      furosemide (LASIX) 40 MG tablet Take 40 mg by mouth daily       levothyroxine (LEVOXYL) 200 MCG tablet Take 200 mcg by mouth Daily       Insulin Detemir (LEVEMIR FLEXPEN SC) Inject 33 Units into the skin nightly       simvastatin (ZOCOR) 40 MG tablet Take 40 mg by mouth nightly.  Insulin Lispro, Human, (HUMALOG SC) Inject 10 Units into the skin 3 times daily (with meals) With sliding      Calcium Carbonate-Vitamin D (CALCIUM + D PO) Take by mouth daily        Current Facility-Administered Medications   Medication Dose Route Frequency Provider Last Rate Last Dose    lidocaine (LMX) 4 % cream   Topical Once Vincent Counter, MD          Allergies: Other; Ace inhibitors; Metoprolol; Tetrahydrozoline hcl; and Timolol    Procedure Note:     Performed by: Jb Quintana MD    Consent obtained: Yes    Time out taken: Yes    Pain Control: Anesthetic  Anesthetic: 4% Lidocaine Cream     Debridement: Excisional Debridement    Using curette the wound was sharply debrided    down through and including the removal of epidermis, dermis and subcutaneous tissue.         Devitalized Tissue Debrided: fibrin, biofilm and slough    Pre Debridement Measurements:  Are located in the Wound Documentation Flow Sheet     Wound #: 1     Post  Debridement Measurements:  Wound 10/14/20 Coccyx #1 (Active)   Wound Image   10/14/20 0910   Wound Etiology Non-Healing Surgical 10/21/20 0905   Wound Cleansed Cleansed with saline 10/21/20 0918   Wound Length (cm) 3 cm 10/21/20 0905   Wound Width (cm) 0.8 cm 10/21/20 0905   Wound Depth (cm) 1.6 cm 10/21/20 0905   Wound Surface Area (cm^2) 2.4 cm^2 10/21/20 0905   Change in Wound Size % (l*w) 51.02 10/21/20 0905   Wound Volume (cm^3) 3.84 cm^3 10/21/20 0905   Wound Healing % 73 10/21/20 0905   Post-Procedure Length (cm) 3 cm 10/21/20 0918   Post-Procedure Width (cm) 1 cm 10/21/20 0918   Post-Procedure Depth (cm) 1.6 cm 10/21/20 0918   Post-Procedure Surface Area (cm^2) 3 cm^2 10/21/20 0918   Post-Procedure Volume (cm^3) 4.8 cm^3 10/21/20 0918   Wound Assessment Bleeding 10/21/20 0918   Drainage Amount Moderate 10/21/20 0918   Drainage Description Serosanguinous 10/21/20 0905   Odor None 10/21/20 0905   Number of days: 7       Total Surface Area Debrided:  3 sq cm     Bleeding:  Minimal    Hemostasis Achieved:  by pressure    Procedural Pain:  2  / 10     Post Procedural Pain:  2 / 10     Response to treatment:  Well tolerated by patient. Assessment:     Wound looks improved. (improved, worse or stable)    Patient tolerated procedure well and was given proper instruction. The nature of the patient's condition was explained in depth. The patient was informed that their compliance to the treatment plan is paramount to successful healing and prevention of further ulceration and/or infection       Plan:     Treatment Plan: With each dressing change, rinse wounds with 0.9% Saline. (May use wound wash or soft contact solution. Both can be purchased at a local drug store). If unable to obtain saline, may use a gentle soap and water. Dressing care: Wet to dry, dry dressing- change daily. Dejan [address]  Maldonado Willis MD, FACS  10/21/2020  9:35 AM

## 2020-10-21 NOTE — PLAN OF CARE
Discharge instructions given. Patient verbalized understanding. Return to [facility] in 1 week.

## 2020-10-25 ENCOUNTER — HOSPITAL ENCOUNTER (EMERGENCY)
Age: 70
Discharge: HOME | End: 2020-10-25
Payer: MEDICARE

## 2020-10-25 VITALS
DIASTOLIC BLOOD PRESSURE: 68 MMHG | TEMPERATURE: 98.4 F | OXYGEN SATURATION: 91 % | RESPIRATION RATE: 14 BRPM | BODY MASS INDEX: 37.45 KG/M2 | WEIGHT: 233 LBS | HEART RATE: 88 BPM | HEIGHT: 66 IN | SYSTOLIC BLOOD PRESSURE: 133 MMHG

## 2020-10-25 LAB
A/G RATIO: 1.5 (ref 1.1–2.2)
ALBUMIN SERPL-MCNC: 3.5 G/DL (ref 3.4–5)
ALP BLD-CCNC: 57 U/L (ref 40–129)
ALT SERPL-CCNC: 10 U/L (ref 10–40)
ANION GAP SERPL CALCULATED.3IONS-SCNC: 9 MMOL/L (ref 3–16)
AST SERPL-CCNC: 9 U/L (ref 15–37)
BASOPHILS ABSOLUTE: 0.1 K/UL (ref 0–0.2)
BASOPHILS RELATIVE PERCENT: 0.8 %
BILIRUB SERPL-MCNC: 0.5 MG/DL (ref 0–1)
BUN BLDV-MCNC: 9 MG/DL (ref 7–20)
CALCIUM SERPL-MCNC: 9.2 MG/DL (ref 8.3–10.6)
CHLORIDE BLD-SCNC: 102 MMOL/L (ref 99–110)
CO2: 26 MMOL/L (ref 21–32)
CREAT SERPL-MCNC: 0.5 MG/DL (ref 0.6–1.2)
EOSINOPHILS ABSOLUTE: 0 K/UL (ref 0–0.6)
EOSINOPHILS RELATIVE PERCENT: 0 %
GFR AFRICAN AMERICAN: >60
GFR NON-AFRICAN AMERICAN: >60
GLOBULIN: 2.3 G/DL
GLUCOSE BLD-MCNC: 178 MG/DL (ref 70–99)
HCT VFR BLD CALC: 42.5 % (ref 36–48)
HEMOGLOBIN: 14.2 G/DL (ref 12–16)
LACTIC ACID, SEPSIS: 1 MMOL/L (ref 0.4–1.9)
LYMPHOCYTES ABSOLUTE: 1 K/UL (ref 1–5.1)
LYMPHOCYTES RELATIVE PERCENT: 11.5 %
MCH RBC QN AUTO: 29.8 PG (ref 26–34)
MCHC RBC AUTO-ENTMCNC: 33.5 G/DL (ref 31–36)
MCV RBC AUTO: 89.1 FL (ref 80–100)
MONOCYTES ABSOLUTE: 0.5 K/UL (ref 0–1.3)
MONOCYTES RELATIVE PERCENT: 6.4 %
NEUTROPHILS ABSOLUTE: 6.9 K/UL (ref 1.7–7.7)
NEUTROPHILS RELATIVE PERCENT: 81.3 %
PDW BLD-RTO: 14.5 % (ref 12.4–15.4)
PLATELET # BLD: 162 K/UL (ref 135–450)
PMV BLD AUTO: 8 FL (ref 5–10.5)
POTASSIUM SERPL-SCNC: 3.9 MMOL/L (ref 3.5–5.1)
RBC # BLD: 4.77 M/UL (ref 4–5.2)
SODIUM BLD-SCNC: 137 MMOL/L (ref 136–145)
TOTAL PROTEIN: 5.8 G/DL (ref 6.4–8.2)
WBC # BLD: 8.4 K/UL (ref 4–11)

## 2020-10-25 PROCEDURE — 6370000000 HC RX 637 (ALT 250 FOR IP): Performed by: NURSE PRACTITIONER

## 2020-10-25 PROCEDURE — 83605 ASSAY OF LACTIC ACID: CPT

## 2020-10-25 PROCEDURE — 36415 COLL VENOUS BLD VENIPUNCTURE: CPT

## 2020-10-25 PROCEDURE — 85025 COMPLETE CBC W/AUTO DIFF WBC: CPT

## 2020-10-25 PROCEDURE — 99283 EMERGENCY DEPT VISIT LOW MDM: CPT

## 2020-10-25 PROCEDURE — 80053 COMPREHEN METABOLIC PANEL: CPT

## 2020-10-25 PROCEDURE — 87040 BLOOD CULTURE FOR BACTERIA: CPT

## 2020-10-25 RX ORDER — HYDROCODONE BITARTRATE AND ACETAMINOPHEN 5; 325 MG/1; MG/1
2 TABLET ORAL ONCE
Status: COMPLETED | OUTPATIENT
Start: 2020-10-25 | End: 2020-10-25

## 2020-10-25 RX ORDER — HYDROCODONE BITARTRATE AND ACETAMINOPHEN 5; 325 MG/1; MG/1
1 TABLET ORAL EVERY 6 HOURS PRN
Qty: 10 TABLET | Refills: 0 | Status: SHIPPED | OUTPATIENT
Start: 2020-10-25 | End: 2020-10-28

## 2020-10-25 RX ADMIN — HYDROCODONE BITARTRATE AND ACETAMINOPHEN 2 TABLET: 5; 325 TABLET ORAL at 20:52

## 2020-10-25 ASSESSMENT — ENCOUNTER SYMPTOMS
SHORTNESS OF BREATH: 0
NAUSEA: 0
DIARRHEA: 0
CHEST TIGHTNESS: 0
VOMITING: 0
ABDOMINAL PAIN: 0

## 2020-10-25 ASSESSMENT — PAIN SCALES - GENERAL: PAINLEVEL_OUTOF10: 5

## 2020-10-25 NOTE — ED PROVIDER NOTES
[facility]        Pt Name: Noé Quinn  MRN: [id number]  [place] 1950  Date of evaluation: 10/25/2020  Provider: CARLA Jovel - CNP  PCP: Hamilton Andrea MD    VENKATESH. I have evaluated this patient. My supervising physician was available for consultation. CHIEF COMPLAINT       Chief Complaint   Patient presents with    Abscess     Pt in from [facility] from home, recently here for sepsis, states she has abscess on buttocks that is causing her pain. Walked to cot. Glucose 251. HISTORY OF PRESENT ILLNESS   (Location, Timing/Onset, Context/Setting, Quality, Duration, Modifying Factors, Severity, Associated Signs and Symptoms)  Note limiting factors. Noé Quinn is a 65 y.o. female presents to the emergency department with complaint of sacral wound pain. Patient reports that she is currently being treated for sacral wound and was discharged from the hospital recently, has an upcoming appointment with wound care, Dr. Nahid Angel, general surgery, Wednesday of this week. States that she called EMS today because she is not able to eat and drink and she is concerned that her \"sepsis\" has returned. Reports that her pain is untreated with the Ultram that she takes. Home care did come today and change the dressing. Denies any headache, fever, lightheadedness, dizziness, visual disturbances. No chest pain or pressure. No neck or back pain. No shortness of breath, cough, or congestion. No abdominal pain, nausea, vomiting, diarrhea, constipation, or dysuria. No rash. Nursing Notes were all reviewed and agreed with or any disagreements were addressed in the HPI. REVIEW OF SYSTEMS    (2-9 systems for level 4, 10 or more for level 5)     Review of Systems   Constitutional: Negative for activity change, chills and fever.    Respiratory: Negative for chest tightness and shortness of breath. Cardiovascular: Negative for chest pain. Gastrointestinal: Negative for abdominal pain, diarrhea, nausea and vomiting. Genitourinary: Negative for dysuria. Skin: Positive for wound. All other systems reviewed and are negative. Positives and Pertinent negatives as per HPI. Except as noted above in the ROS, all other systems were reviewed and negative. PAST MEDICAL HISTORY     Past Medical History:   Diagnosis Date    Arthritis     back    Cataracts, bilateral     CHF (congestive heart failure) (HCC)     Diabetes mellitus ([facility]     Glaucoma     Hyperlipidemia     Hypertension     Thyroid disease          SURGICAL HISTORY     Past Surgical History:   Procedure Laterality Date    CARPAL TUNNEL RELEASE      bilateral    CHOLECYSTECTOMY      COLONOSCOPY      ELBOW SURGERY      ENDOSCOPY, COLON, DIAGNOSTIC      EYE SURGERY      FOOT SURGERY      SHOULDER SURGERY           CURRENTMEDICATIONS       Discharge Medication List as of 10/25/2020  8:59 PM      CONTINUE these medications which have NOT CHANGED    Details   traMADol (ULTRAM) 50 MG tablet Take 50 mg by mouth 3 times daily as needed for Pain. Historical Med      DULoxetine (CYMBALTA) 60 MG extended release capsule Take 1 capsule by mouth daily, Disp-30 capsule, R-3Historical Med      sodium chloride (OCEAN, BABY AYR) 0.65 % nasal spray 1 spray by Nasal route every 2 hours as needed for Congestion, Disp-1 Bottle, R-0Normal      mometasone-formoterol (DULERA) 200-5 MCG/ACT inhaler Inhale 2 puffs into the lungs every 12 hours, Disp-1 Inhaler, R-1Normal      spironolactone (ALDACTONE) 25 MG tablet Take 12.5 mg by mouth dailyHistorical Med      Travoprost, BAK Free, (TRAVATAN Z) 0.004 % SOLN ophthalmic solution Place 1 drop into both eyes nightlyHistorical Med      DICLOFENAC PO Take  by mouth. Historical Med      acetaminophen (TYLENOL) 500 MG tablet Take 500 mg by mouth every 6 hours as needed for Pain or Fever Historical Med aspirin EC 81 MG EC tablet Take 81 mg by mouth daily May restart in AM.Historical Med      furosemide (LASIX) 40 MG tablet Take 40 mg by mouth daily Historical Med      levothyroxine (LEVOXYL) 200 MCG tablet Take 200 mcg by mouth Daily Historical Med      Insulin Detemir (LEVEMIR FLEXPEN SC) Inject 33 Units into the skin nightly Historical Med      simvastatin (ZOCOR) 40 MG tablet Take 40 mg by mouth nightly. Historical Med      Insulin Lispro, Human, (HUMALOG SC) Inject 10 Units into the skin 3 times daily (with meals) With slidingHistorical Med      Calcium Carbonate-Vitamin D (CALCIUM + D PO) Take by mouth daily Historical Med               ALLERGIES     Other; Ace inhibitors; Metoprolol; Tetrahydrozoline hcl; and Timolol    FAMILYHISTORY     History reviewed. No pertinent family history. SOCIAL HISTORY       Social History     Tobacco Use    Smoking status: Never Smoker    Smokeless tobacco: Never Used   Substance Use Topics    Alcohol use: Not Currently    Drug use: Never       SCREENINGS             PHYSICAL EXAM    (up to 7 for level 4, 8 or more for level 5)     ED Triage Vitals   BP Temp Temp Source Pulse Resp SpO2 Height Weight   10/25/20 1915 10/25/20 1915 10/25/20 1915 10/25/20 1915 -- 10/25/20 1915 10/25/20 1921 10/25/20 1921   (!) 150/55 98.4 °F (36.9 °C) Oral 88  94 % 5' 6\" (1.676 m) 233 lb (105.7 kg)       Physical Exam  Vitals signs and nursing note reviewed. Constitutional:       Appearance: She is well-developed. She is not diaphoretic. HENT:      Head: Normocephalic and atraumatic. Right Ear: External ear normal.      Left Ear: External ear normal.   Eyes:      General:         Right eye: No discharge. Left eye: No discharge. Neck:      Musculoskeletal: Normal range of motion and neck supple. Vascular: No JVD. Cardiovascular:      Rate and Rhythm: Normal rate and regular rhythm. Pulses: Normal pulses. Heart sounds: Normal heart sounds. Pulmonary:      Effort: Pulmonary effort is normal. No respiratory distress. Breath sounds: Normal breath sounds. Abdominal:      Palpations: Abdomen is soft. Musculoskeletal: Normal range of motion. Skin:     General: Skin is warm and dry. Coloration: Skin is not pale. Comments: See photo   Neurological:      Mental Status: She is alert and oriented to person, place, and time. Psychiatric:         Behavior: Behavior normal.         DIAGNOSTIC RESULTS   LABS:    Labs Reviewed   COMPREHENSIVE METABOLIC PANEL - Abnormal; Notable for the following components:       Result Value    Glucose 178 (*)     CREATININE 0.5 (*)     Total Protein 5.8 (*)     AST 9 (*)     All other components within normal limits    Narrative:     Performed at:  [facility] [address], [place], [facility] [place]   Phone [phone]   CULTURE, BLOOD 1   CULTURE, BLOOD 2   CBC WITH AUTO DIFFERENTIAL    Narrative:     Performed at:  [facility] [address], [place], [facility] [place]   Phone [phone]   LACTATE, SEPSIS    Narrative:     Performed at:  [facility] [address], [place], [facility] [place]   Phone [phone]       All other labs were within normal range or not returned as of this dictation. EKG: All EKG's are interpreted by the Emergency Department Physician in the absence of a cardiologist.  Please see their note for interpretation of EKG. RADIOLOGY:   Non-plain film images such as CT, Ultrasound and MRI are read by the radiologist. Plain radiographic images are visualized and preliminarily interpreted by the ED Provider with the below findings:        Interpretation per the Radiologist below, if available at the time of this note:    No orders to display     No results found.         PROCEDURES   Unless otherwise noted below, none     Procedures    CRITICAL CARE TIME N/A    CONSULTS:  None      EMERGENCY DEPARTMENT COURSE and DIFFERENTIAL DIAGNOSIS/MDM:   Vitals:    Vitals:    10/25/20 1935 10/25/20 2000 10/25/20 2015 10/25/20 2030   BP: (!) 115/53 (!) 117/97  133/68   Pulse:       Resp:       Temp:       TempSrc:       SpO2: 93%  90% 91%   Weight:       Height:           Patient was given the following medications:  Medications   HYDROcodone-acetaminophen (NORCO) 5-325 MG per tablet 2 tablet (2 tablets Oral Given 10/25/20 2052)           Briefly, this is a 68year old female that  presents to the emergency department with complaint of sacral wound pain. Patient reports that she is currently being treated for sacral wound and was discharged from the hospital recently, has an upcoming appointment with wound care, Dr. Nahid Angel, general surgery, Wednesday of this week. States that she called EMS today because she is not able to eat and drink and she is concerned that her \"sepsis\" has returned. Reports that her pain is untreated with the Ultram that she takes. Home care did come today and change the dressing. Labs are unremarkable, she is not febrile nor she tachycardic. Pain was improved with Norco.  She was encouraged to keep her follow-up appointment with Dr. Piero Whitaker on Wednesday. Home care is coming out tomorrow to change wet-to-dry dressing. I did repack this wound this evening with fresh dressing after taking picture. Patient is comfortable with disposition home. I estimate there is LOW risk for COMPARTMENT SYNDROME, TENDON OR NEUROVASCULAR INJURY, FOREIGN BODY OR signs of INFECTION thus I consider the discharge disposition reasonable. FINAL IMPRESSION      1. Healing wound    2.  Pain associated with wound          DISPOSITION/PLAN   DISPOSITION Decision To Discharge 10/25/2020 08:38:31 PM      PATIENT REFERREDTO:  Nnamdi Jaimes MD  [address] [address] [phone]      go to your appointment on Wednesday      DISCHARGE MEDICATIONS:  Discharge Medication List as of 10/25/2020  8:59 PM      START taking these medications    Details   HYDROcodone-acetaminophen (NORCO) 5-325 MG per tablet Take 1 tablet by mouth every 6 hours as needed for Pain for up to 3 days.  Do not take with Ultram, if you are taking [address]- do not take ultram, Disp-10 tablet,R-0Print             DISCONTINUED MEDICATIONS:  Discharge Medication List as of 10/25/2020  8:59 PM                 (Please note that portions of this note were completed with a voice recognition program.  Efforts were made to edit the dictations but occasionally words are mis-transcribed.)    CARLA Ureña CNP (electronically signed)           CARLA Ureña CNP  10/26/20 0032

## 2020-10-26 NOTE — ED NOTES
Nursing Discharge Notes:  -Patient discharged at this time in no acute distress after verbalizing understanding of discharge instructions.  -A copy of the AVS was reviewed with pt.  -Pt received applicable scripts which were reviewed with pt by this RN. -Pt was given the opportunity to ask questions before signing for discharge. -IV removed and dressing applied.    -Pt left ambulatory to lobby / discharge area. Patient Education:  Learner - Patient. Motivation and Readiness To Learn - Medium to High  Barriers To Learning - None  Learning Preference / Provided Instructions - Both written and verbal discharge instructions.        Claire Valle RN  10/25/20 0241

## 2020-10-28 ENCOUNTER — HOSPITAL ENCOUNTER (OUTPATIENT)
Dept: WOUND CARE | Age: 70
Discharge: HOME | End: 2020-10-28
Payer: MEDICARE

## 2020-10-28 VITALS
BODY MASS INDEX: 35.99 KG/M2 | TEMPERATURE: 96.2 F | DIASTOLIC BLOOD PRESSURE: 82 MMHG | HEART RATE: 89 BPM | WEIGHT: 223 LBS | SYSTOLIC BLOOD PRESSURE: 138 MMHG

## 2020-10-28 PROCEDURE — 11042 DBRDMT SUBQ TIS 1ST 20SQCM/<: CPT | Performed by: SURGERY

## 2020-10-28 PROCEDURE — 11042 DBRDMT SUBQ TIS 1ST 20SQCM/<: CPT

## 2020-10-28 RX ORDER — LIDOCAINE 50 MG/G
OINTMENT TOPICAL ONCE
Status: CANCELLED | OUTPATIENT
Start: 2020-10-28

## 2020-10-28 RX ORDER — CLOBETASOL PROPIONATE 0.5 MG/G
OINTMENT TOPICAL ONCE
Status: CANCELLED | OUTPATIENT
Start: 2020-10-28

## 2020-10-28 RX ORDER — LIDOCAINE 40 MG/G
CREAM TOPICAL ONCE
Status: DISCONTINUED | OUTPATIENT
Start: 2020-10-28 | End: 2020-10-29 | Stop reason: HOSPADM

## 2020-10-28 RX ORDER — LIDOCAINE 40 MG/G
CREAM TOPICAL ONCE
Status: CANCELLED | OUTPATIENT
Start: 2020-10-28

## 2020-10-28 RX ORDER — GINSENG 100 MG
CAPSULE ORAL ONCE
Status: CANCELLED | OUTPATIENT
Start: 2020-10-28

## 2020-10-28 RX ORDER — BACITRACIN, NEOMYCIN, POLYMYXIN B 400; 3.5; 5 [USP'U]/G; MG/G; [USP'U]/G
OINTMENT TOPICAL ONCE
Status: CANCELLED | OUTPATIENT
Start: 2020-10-28

## 2020-10-28 RX ORDER — LIDOCAINE HYDROCHLORIDE 40 MG/ML
SOLUTION TOPICAL ONCE
Status: CANCELLED | OUTPATIENT
Start: 2020-10-28

## 2020-10-28 RX ORDER — GENTAMICIN SULFATE 1 MG/G
OINTMENT TOPICAL ONCE
Status: CANCELLED | OUTPATIENT
Start: 2020-10-28

## 2020-10-28 RX ORDER — BACITRACIN ZINC AND POLYMYXIN B SULFATE 500; 1000 [USP'U]/G; [USP'U]/G
OINTMENT TOPICAL ONCE
Status: CANCELLED | OUTPATIENT
Start: 2020-10-28

## 2020-10-28 RX ORDER — BETAMETHASONE DIPROPIONATE 0.05 %
OINTMENT (GRAM) TOPICAL ONCE
Status: CANCELLED | OUTPATIENT
Start: 2020-10-28

## 2020-10-28 RX ORDER — LIDOCAINE HYDROCHLORIDE 20 MG/ML
JELLY TOPICAL ONCE
Status: CANCELLED | OUTPATIENT
Start: 2020-10-28

## 2020-10-28 ASSESSMENT — PAIN DESCRIPTION - LOCATION: LOCATION: SACRUM

## 2020-10-28 ASSESSMENT — PAIN DESCRIPTION - PROGRESSION: CLINICAL_PROGRESSION: NOT CHANGED

## 2020-10-28 ASSESSMENT — PAIN SCALES - GENERAL: PAINLEVEL_OUTOF10: 6

## 2020-10-28 ASSESSMENT — PAIN DESCRIPTION - PAIN TYPE: TYPE: CHRONIC PAIN

## 2020-10-28 ASSESSMENT — PAIN DESCRIPTION - ONSET: ONSET: ON-GOING

## 2020-10-28 ASSESSMENT — PAIN DESCRIPTION - DESCRIPTORS: DESCRIPTORS: ACHING

## 2020-10-28 ASSESSMENT — PAIN DESCRIPTION - FREQUENCY: FREQUENCY: CONTINUOUS

## 2020-10-28 NOTE — PROGRESS NOTES
[address] Progress and Procedure Note    Toyin Villa  AGE: 65 y.o. GENDER: female    : 1950  TODAY'S DATE: 10/28/2020    Chief Complaint   Patient presents with    Wound Check     sacrum  F/U        History of Present Illness     Toyin Villa is a 65 y.o. female who presents today for wound evaluation. History of Wound: pressure wound located on the sacral decubitus ulcer stage 3. Wound Pain:  mild  Severity:  2 / 10   Wound Type:  pressure  Modifying Factors:  diabetes, poor glucose control, decreased mobility and unintentional weight loss  Associated Signs/Symptoms:  pain    Past Medical History:   Diagnosis Date    Arthritis     back    Cataracts, bilateral     [facility]     Diabetes mellitus [facility]     Glaucoma     Hyperlipidemia     Hypertension     Thyroid disease      Past Surgical History:   Procedure Laterality Date    CARPAL TUNNEL RELEASE      bilateral    CHOLECYSTECTOMY      COLONOSCOPY      ELBOW SURGERY      ENDOSCOPY, COLON, DIAGNOSTIC      EYE SURGERY      FOOT SURGERY      SHOULDER SURGERY       Current Outpatient Medications   Medication Sig Dispense Refill    HYDROcodone-acetaminophen (NORCO) 5-325 MG per tablet Take 1 tablet by mouth every 6 hours as needed for Pain for up to 3 days. Do not take with Ultram, if you are taking NORCO- do not take ultram 10 tablet 0    traMADol (ULTRAM) 50 MG tablet Take 50 mg by mouth 3 times daily as needed for Pain.       DULoxetine (CYMBALTA) 60 MG extended release capsule Take 1 capsule by mouth daily 30 capsule 3    sodium chloride (OCEAN, BABY AYR) 0.65 % nasal spray 1 spray by Nasal route every 2 hours as needed for Congestion 1 Bottle 0    mometasone-formoterol (DULERA) 200-5 MCG/ACT inhaler Inhale 2 puffs into the lungs every 12 hours 1 Inhaler 1    spironolactone (ALDACTONE) 25 MG tablet Take 12.5 mg by mouth daily      Travoprost, BAK Free, (TRAVATAN Z) 0. 004 % SOLN ophthalmic solution Place 1 drop into both eyes nightly      DICLOFENAC PO Take  by mouth.  acetaminophen (TYLENOL) 500 MG tablet Take 500 mg by mouth every 6 hours as needed for Pain or Fever       aspirin EC 81 MG EC tablet Take 81 mg by mouth daily May restart in AM.      furosemide (LASIX) 40 MG tablet Take 40 mg by mouth daily       levothyroxine (LEVOXYL) 200 MCG tablet Take 200 mcg by mouth Daily       Insulin Detemir (LEVEMIR FLEXPEN SC) Inject 33 Units into the skin nightly       simvastatin (ZOCOR) 40 MG tablet Take 40 mg by mouth nightly.  Insulin Lispro, Human, (HUMALOG SC) Inject 10 Units into the skin 3 times daily (with meals) With sliding      Calcium Carbonate-Vitamin D (CALCIUM + D PO) Take by mouth daily        Current Facility-Administered Medications   Medication Dose Route Frequency Provider Last Rate Last Dose    lidocaine (LMX) 4 % cream   Topical Once Colton Giang MD          Social History:   Social History     Tobacco Use    Smoking status: Never Smoker    Smokeless tobacco: Never Used   Substance Use Topics    Alcohol use: Not Currently    Drug use: Never     Allergies: Other; Ace inhibitors; Metoprolol; Tetrahydrozoline hcl; and Timolol    Procedure: Indications:  Based on my examination of this patient's wound(s)/ulcer(s) today, debridement is required to promote healing and evaluate the wound base. Performed by: Zohra Vaughn MD    Consent obtained: Yes    Time out taken: Yes    Pain Control: Anesthetic  Anesthetic: 4% Lidocaine Cream     Debridement: Excisional Debridement    Using curette the wound was sharply debrided    down through and including the removal of epidermis, dermis and subcutaneous tissue.         Devitalized Tissue Debrided: fibrin, biofilm and slough    Pre Debridement Measurements:  Are located in the Wound Documentation Flow Sheet     Wound #: 1     Post  Debridement Measurements:  Wound 10/14/20 Coccyx #1 (Active) Wound Image   10/14/20 0910   Wound Etiology Non-Healing Surgical 10/28/20 0923   Wound Cleansed Cleansed with saline 10/28/20 0948   Dressing/Treatment Moist to dry 10/21/20 0918   Wound Length (cm) 3 cm 10/28/20 0923   Wound Width (cm) 1 cm 10/28/20 0923   Wound Depth (cm) 1.4 cm 10/28/20 0923   Wound Surface Area (cm^2) 3 cm^2 10/28/20 0923   Change in Wound Size % (l*w) 38.78 10/28/20 0923   Wound Volume (cm^3) 4.2 cm^3 10/28/20 0923   Wound Healing % 70 10/28/20 0923   Post-Procedure Length (cm) 3 cm 10/28/20 0948   Post-Procedure Width (cm) 1 cm 10/28/20 0948   Post-Procedure Depth (cm) 1.4 cm 10/28/20 0948   Post-Procedure Surface Area (cm^2) 3 cm^2 10/28/20 0948   Post-Procedure Volume (cm^3) 4.2 cm^3 10/28/20 0948   Wound Assessment Bleeding 10/28/20 0948   Drainage Amount Moderate 10/28/20 0948   Drainage Description Serosanguinous;Green 10/28/20 0923   Odor None 10/28/20 0923   Saba-wound Assessment Intact 10/28/20 0923   Number of days: 14       Percent of Wound(s)/Ulcer(s) Debrided: 100%    Total Surface Area Debrided:  3 sq cm     Bleeding:  Minimal    Hemostasis Achieved:  by pressure    Procedural Pain:  1  / 10     Post Procedural Pain:  1 / 10     Response to treatment:  Well tolerated by patient. Assessment:     Wound looks improved. (improved, worse or stable)    Patient tolerated procedure well and was given proper instruction. The nature of the patient's condition was explained in depth. The patient was informed that their compliance to the treatment plan is paramount to successful healing and prevention of further ulceration and/or infection     Plan:     Treatment Plan:       Treatment Note please see attached Discharge Instructions    Written patient dismissal instructions given to patient and signed by patient or POA.          Discharge Instructions       [address]  Telephone: [phone] Monday  - Thursday 8:00 am - 4:00 pm and Friday 8:00 am - 1:00pm. If you need help with your wound outside these hours and cannot wait until we are again available, contact your PCP or go to the hospital emergency room. PLEASE NOTE: IF YOU ARE UNABLE TO OBTAIN WOUND SUPPLIES, CONTINUE TO USE THE SUPPLIES YOU HAVE AVAILABLE UNTIL YOU ARE ABLE TO REACH US. IT IS MOST IMPORTANT TO KEEP THE WOUND COVERED AT ALL TIMES       Dakota Echeverria MD, FACS  10/28/2020  10:00 AM

## 2020-10-29 LAB
BLOOD CULTURE, ROUTINE: NORMAL
CULTURE, BLOOD 2: NORMAL

## 2020-11-04 ENCOUNTER — HOSPITAL ENCOUNTER (OUTPATIENT)
Dept: WOUND CARE | Age: 70
Discharge: HOME | End: 2020-11-04
Payer: MEDICARE

## 2020-11-04 VITALS
TEMPERATURE: 97 F | DIASTOLIC BLOOD PRESSURE: 69 MMHG | RESPIRATION RATE: 16 BRPM | SYSTOLIC BLOOD PRESSURE: 122 MMHG | HEART RATE: 88 BPM

## 2020-11-04 PROCEDURE — 11042 DBRDMT SUBQ TIS 1ST 20SQCM/<: CPT

## 2020-11-04 PROCEDURE — 11042 DBRDMT SUBQ TIS 1ST 20SQCM/<: CPT | Performed by: SURGERY

## 2020-11-04 RX ORDER — CLOBETASOL PROPIONATE 0.5 MG/G
OINTMENT TOPICAL ONCE
Status: CANCELLED | OUTPATIENT
Start: 2020-11-04

## 2020-11-04 RX ORDER — GINSENG 100 MG
CAPSULE ORAL ONCE
Status: CANCELLED | OUTPATIENT
Start: 2020-11-04

## 2020-11-04 RX ORDER — BACITRACIN, NEOMYCIN, POLYMYXIN B 400; 3.5; 5 [USP'U]/G; MG/G; [USP'U]/G
OINTMENT TOPICAL ONCE
Status: CANCELLED | OUTPATIENT
Start: 2020-11-04

## 2020-11-04 RX ORDER — BACITRACIN ZINC AND POLYMYXIN B SULFATE 500; 1000 [USP'U]/G; [USP'U]/G
OINTMENT TOPICAL ONCE
Status: CANCELLED | OUTPATIENT
Start: 2020-11-04

## 2020-11-04 RX ORDER — LIDOCAINE HYDROCHLORIDE 20 MG/ML
JELLY TOPICAL ONCE
Status: CANCELLED | OUTPATIENT
Start: 2020-11-04

## 2020-11-04 RX ORDER — LIDOCAINE 50 MG/G
OINTMENT TOPICAL ONCE
Status: CANCELLED | OUTPATIENT
Start: 2020-11-04

## 2020-11-04 RX ORDER — LIDOCAINE 40 MG/G
CREAM TOPICAL ONCE
Status: DISCONTINUED | OUTPATIENT
Start: 2020-11-04 | End: 2020-11-05 | Stop reason: HOSPADM

## 2020-11-04 RX ORDER — LIDOCAINE 40 MG/G
CREAM TOPICAL ONCE
Status: CANCELLED | OUTPATIENT
Start: 2020-11-04

## 2020-11-04 RX ORDER — LIDOCAINE HYDROCHLORIDE 40 MG/ML
SOLUTION TOPICAL ONCE
Status: CANCELLED | OUTPATIENT
Start: 2020-11-04

## 2020-11-04 RX ORDER — BETAMETHASONE DIPROPIONATE 0.05 %
OINTMENT (GRAM) TOPICAL ONCE
Status: CANCELLED | OUTPATIENT
Start: 2020-11-04

## 2020-11-04 RX ORDER — GENTAMICIN SULFATE 1 MG/G
OINTMENT TOPICAL ONCE
Status: CANCELLED | OUTPATIENT
Start: 2020-11-04

## 2020-11-04 NOTE — PROGRESS NOTES
MG EC tablet Take 81 mg by mouth daily May restart in AM.      furosemide (LASIX) 40 MG tablet Take 40 mg by mouth daily       levothyroxine (LEVOXYL) 200 MCG tablet Take 200 mcg by mouth Daily       Insulin Detemir (LEVEMIR FLEXPEN SC) Inject 33 Units into the skin nightly       simvastatin (ZOCOR) 40 MG tablet Take 40 mg by mouth nightly.  Insulin Lispro, Human, (HUMALOG SC) Inject 10 Units into the skin 3 times daily (with meals) With sliding      Calcium Carbonate-Vitamin D (CALCIUM + D PO) Take by mouth daily        Current Facility-Administered Medications   Medication Dose Route Frequency Provider Last Rate Last Dose    lidocaine (LMX) 4 % cream   Topical Once Selene Yadav MD          Social History:   Social History     Tobacco Use    Smoking status: Never Smoker    Smokeless tobacco: Never Used   Substance Use Topics    Alcohol use: Not Currently    Drug use: Never     Allergies: Other; Ace inhibitors; Metoprolol; Tetrahydrozoline hcl; and Timolol    Procedure: Indications:  Based on my examination of this patient's wound(s)/ulcer(s) today, debridement is required to promote healing and evaluate the wound base. Performed by: Nohemi Wong MD    Consent obtained: Yes    Time out taken: Yes    Pain Control: Anesthetic  Anesthetic: 4% Lidocaine Cream     Debridement: Excisional Debridement    Using curette the wound was sharply debrided    down through and including the removal of epidermis, dermis and subcutaneous tissue.         Devitalized Tissue Debrided: fibrin, biofilm and slough    Pre Debridement Measurements:  Are located in the Wound Documentation Flow Sheet     Wound #: 1     Post  Debridement Measurements:  Wound 10/14/20 Coccyx #1 (Active)   Wound Image   10/14/20 0910   Wound Etiology Non-Healing Surgical 11/04/20 0919   Wound Cleansed Cleansed with saline 11/04/20 0937   Dressing/Treatment Moist to dry 10/21/20 0918   Wound Length (cm) 3 cm 11/04/20 0919   Wound Width (cm) 1 cm 11/04/20 0919   Wound Depth (cm) 3 cm 11/04/20 0919   Wound Surface Area (cm^2) 3 cm^2 11/04/20 0919   Change in Wound Size % (l*w) 38.78 11/04/20 0919   Wound Volume (cm^3) 9 cm^3 11/04/20 0919   Wound Healing % 37 11/04/20 0919   Post-Procedure Length (cm) 3 cm 11/04/20 0937   Post-Procedure Width (cm) 1 cm 11/04/20 0937   Post-Procedure Depth (cm) 3 cm 11/04/20 0937   Post-Procedure Surface Area (cm^2) 3 cm^2 11/04/20 0937   Post-Procedure Volume (cm^3) 9 cm^3 11/04/20 0937   Wound Assessment Bleeding 11/04/20 0937   Drainage Amount Moderate 11/04/20 0937   Drainage Description Serosanguinous 11/04/20 0919   Odor None 11/04/20 0919   Saba-wound Assessment Fragile 11/04/20 0919   Number of days: 21       Percent of Wound(s)/Ulcer(s) Debrided: 100%    Total Surface Area Debrided:  3 sq cm     Bleeding:  Minimal    Hemostasis Achieved:  by pressure    Procedural Pain:  1  / 10     Post Procedural Pain:  1 / 10     Response to treatment:  Well tolerated by patient. Assessment:     Wound looks improved. (improved, worse or stable)    Patient tolerated procedure well and was given proper instruction. The nature of the patient's condition was explained in depth. The patient was informed that their compliance to the treatment plan is paramount to successful healing and prevention of further ulceration and/or infection     Plan:     Treatment Plan:       Treatment Note please see attached Discharge Instructions    Written patient dismissal instructions given to patient and signed by patient or POA. Discharge Instructions       [facility], [address] [place] [address] Road  Telephone: [phone]     Discharge Instructions:  Keep weight off wounds and reposition every 2 hours if applicable. Avoid standing for long periods of time.      If wound(s) is on your lower extremity, elevate legs to the level of the heart or above for 30 minutes 4-5 NOTE: IF YOU ARE UNABLE TO OBTAIN WOUND SUPPLIES, CONTINUE TO USE THE SUPPLIES YOU HAVE AVAILABLE UNTIL YOU ARE ABLE TO REACH US. IT IS MOST IMPORTANT TO KEEP THE WOUND COVERED AT ALL TIMES       Dakota Whitaker MD, FACS  11/4/2020  9:50 AM

## 2020-11-04 NOTE — PLAN OF CARE
Discharge instructions given. Patient verbalized understanding. Return to [facility] in 1 week.

## 2020-11-11 ENCOUNTER — HOSPITAL ENCOUNTER (OUTPATIENT)
Dept: WOUND CARE | Age: 70
Discharge: HOME | End: 2020-11-11
Payer: MEDICARE

## 2020-11-11 VITALS
TEMPERATURE: 97 F | HEART RATE: 83 BPM | DIASTOLIC BLOOD PRESSURE: 70 MMHG | SYSTOLIC BLOOD PRESSURE: 111 MMHG | RESPIRATION RATE: 16 BRPM

## 2020-11-11 PROCEDURE — 11042 DBRDMT SUBQ TIS 1ST 20SQCM/<: CPT | Performed by: SURGERY

## 2020-11-11 PROCEDURE — 11042 DBRDMT SUBQ TIS 1ST 20SQCM/<: CPT

## 2020-11-11 RX ORDER — BACITRACIN ZINC AND POLYMYXIN B SULFATE 500; 1000 [USP'U]/G; [USP'U]/G
OINTMENT TOPICAL ONCE
Status: CANCELLED | OUTPATIENT
Start: 2020-11-11

## 2020-11-11 RX ORDER — BETAMETHASONE DIPROPIONATE 0.05 %
OINTMENT (GRAM) TOPICAL ONCE
Status: CANCELLED | OUTPATIENT
Start: 2020-11-11

## 2020-11-11 RX ORDER — LIDOCAINE HYDROCHLORIDE 40 MG/ML
SOLUTION TOPICAL ONCE
Status: CANCELLED | OUTPATIENT
Start: 2020-11-11

## 2020-11-11 RX ORDER — CLOBETASOL PROPIONATE 0.5 MG/G
OINTMENT TOPICAL ONCE
Status: CANCELLED | OUTPATIENT
Start: 2020-11-11

## 2020-11-11 RX ORDER — GENTAMICIN SULFATE 1 MG/G
OINTMENT TOPICAL ONCE
Status: CANCELLED | OUTPATIENT
Start: 2020-11-11

## 2020-11-11 RX ORDER — LIDOCAINE HYDROCHLORIDE 20 MG/ML
JELLY TOPICAL ONCE
Status: CANCELLED | OUTPATIENT
Start: 2020-11-11

## 2020-11-11 RX ORDER — BACITRACIN, NEOMYCIN, POLYMYXIN B 400; 3.5; 5 [USP'U]/G; MG/G; [USP'U]/G
OINTMENT TOPICAL ONCE
Status: CANCELLED | OUTPATIENT
Start: 2020-11-11

## 2020-11-11 RX ORDER — GINSENG 100 MG
CAPSULE ORAL ONCE
Status: CANCELLED | OUTPATIENT
Start: 2020-11-11

## 2020-11-11 RX ORDER — LIDOCAINE 50 MG/G
OINTMENT TOPICAL ONCE
Status: CANCELLED | OUTPATIENT
Start: 2020-11-11

## 2020-11-11 RX ORDER — LIDOCAINE 40 MG/G
CREAM TOPICAL ONCE
Status: DISCONTINUED | OUTPATIENT
Start: 2020-11-11 | End: 2020-11-12 | Stop reason: HOSPADM

## 2020-11-11 RX ORDER — LIDOCAINE 40 MG/G
CREAM TOPICAL ONCE
Status: CANCELLED | OUTPATIENT
Start: 2020-11-11

## 2020-11-11 NOTE — PROGRESS NOTES
[address] Progress and Procedure Note    Jonny Fischer  AGE: 65 y.o. GENDER: female    : 1950  TODAY'S DATE: 2020    Chief Complaint   Patient presents with    Wound Check     buttocks        History of Present Illness     Jonny Fischer is a 65 y.o. female who presents today for wound evaluation. History of Wound: pressure wound located on the sacral decubitus ulcer stage 3. Wound Pain:  mild  Severity:  2 / 10   Wound Type:  pressure  Modifying Factors:  diabetes, poor glucose control, decreased mobility and weight loss  Associated Signs/Symptoms:  pain    Past Medical History:   Diagnosis Date    Arthritis     back    Cataracts, bilateral     [facility]     Diabetes mellitus [facility]     Glaucoma     Hyperlipidemia     Hypertension     Thyroid disease      Past Surgical History:   Procedure Laterality Date    CARPAL TUNNEL RELEASE      bilateral    CHOLECYSTECTOMY      COLONOSCOPY      ELBOW SURGERY      ENDOSCOPY, COLON, DIAGNOSTIC      EYE SURGERY      FOOT SURGERY      SHOULDER SURGERY       Current Outpatient Medications   Medication Sig Dispense Refill    traMADol (ULTRAM) 50 MG tablet Take 50 mg by mouth 3 times daily as needed for Pain.  DULoxetine (CYMBALTA) 60 MG extended release capsule Take 1 capsule by mouth daily 30 capsule 3    sodium chloride (OCEAN, BABY AYR) 0.65 % nasal spray 1 spray by Nasal route every 2 hours as needed for Congestion 1 Bottle 0    mometasone-formoterol (DULERA) 200-5 MCG/ACT inhaler Inhale 2 puffs into the lungs every 12 hours 1 Inhaler 1    spironolactone (ALDACTONE) 25 MG tablet Take 12.5 mg by mouth daily      Travoprost, BAK Free, (TRAVATAN Z) 0.004 % SOLN ophthalmic solution Place 1 drop into both eyes nightly      DICLOFENAC PO Take  by mouth.       acetaminophen (TYLENOL) 500 MG tablet Take 500 mg by mouth every 6 hours as needed for Pain or Fever       aspirin EC 81 MG EC tablet Take 81 mg by mouth daily May restart in AM.      furosemide (LASIX) 40 MG tablet Take 40 mg by mouth daily       levothyroxine (LEVOXYL) 200 MCG tablet Take 200 mcg by mouth Daily       Insulin Detemir (LEVEMIR FLEXPEN SC) Inject 33 Units into the skin nightly       simvastatin (ZOCOR) 40 MG tablet Take 40 mg by mouth nightly.  Insulin Lispro, Human, (HUMALOG SC) Inject 10 Units into the skin 3 times daily (with meals) With sliding      Calcium Carbonate-Vitamin D (CALCIUM + D PO) Take by mouth daily        Current Facility-Administered Medications   Medication Dose Route Frequency Provider Last Rate Last Dose    lidocaine (LMX) 4 % cream   Topical Once Alla Recinos MD          Social History:   Social History     Tobacco Use    Smoking status: Never Smoker    Smokeless tobacco: Never Used   Substance Use Topics    Alcohol use: Not Currently    Drug use: Never     Allergies: Other; Ace inhibitors; Metoprolol; Tetrahydrozoline hcl; and Timolol    Procedure: Indications:  Based on my examination of this patient's wound(s)/ulcer(s) today, debridement is required to promote healing and evaluate the wound base. Performed by: Phoebe Hernandez MD    Consent obtained: Yes    Time out taken: Yes    Pain Control: Anesthetic  Anesthetic: 4% Lidocaine Cream     Debridement: Excisional Debridement    Using curette the wound was sharply debrided    down through and including the removal of epidermis, dermis and subcutaneous tissue.         Devitalized Tissue Debrided: fibrin, biofilm and slough    Pre Debridement Measurements:  Are located in the Wound Documentation Flow Sheet     Wound #: 1     Post  Debridement Measurements:  Wound 10/14/20 Coccyx #1 (Active)   Wound Image   10/14/20 0910   Wound Etiology Non-Healing Surgical 11/11/20 0949   Wound Cleansed Cleansed with saline 11/11/20 1004   Dressing/Treatment Moist to dry 11/04/20 0937   Wound Length (cm) 3 cm 11/11/20 0949   Wound Width (cm) 1 cm 11/11/20 0949   Wound Depth (cm) 3 cm 11/11/20 0949   Wound Surface Area (cm^2) 3 cm^2 11/11/20 0949   Change in Wound Size % (l*w) 38.78 11/11/20 0949   Wound Volume (cm^3) 9 cm^3 11/11/20 0949   Wound Healing % 37 11/11/20 0949   Post-Procedure Length (cm) 3 cm 11/11/20 1004   Post-Procedure Width (cm) 1 cm 11/11/20 1004   Post-Procedure Depth (cm) 3 cm 11/11/20 1004   Post-Procedure Surface Area (cm^2) 3 cm^2 11/11/20 1004   Post-Procedure Volume (cm^3) 9 cm^3 11/11/20 1004   Wound Assessment Bleeding 11/11/20 1004   Drainage Amount Moderate 11/11/20 1004   Drainage Description Serosanguinous 11/11/20 0949   Odor None 11/11/20 0949   Saba-wound Assessment Intact 11/11/20 0949   Number of days: 28       Percent of Wound(s)/Ulcer(s) Debrided: 100%    Total Surface Area Debrided:  3 sq cm     Bleeding:  Minimal    Hemostasis Achieved:  by pressure    Procedural Pain:  2  / 10     Post Procedural Pain:  2 / 10     Response to treatment:  Well tolerated by patient. Assessment:     Wound looks improved. (improved, worse or stable)    Patient tolerated procedure well and was given proper instruction. The nature of the patient's condition was explained in depth. The patient was informed that their compliance to the treatment plan is paramount to successful healing and prevention of further ulceration and/or infection     Plan:     Treatment Plan:       Treatment Note please see attached Discharge Instructions    Written patient dismissal instructions given to patient and signed by patient or POA. Discharge Instructions       [facility], [address] [place] [address]  Telephone: [phone]     Discharge Instructions:  Keep weight off wounds and reposition every 2 hours if applicable. Avoid standing for long periods of time.      If wound(s) is on your lower extremity, elevate legs to the level of the heart or above for 30 minutes 4-5 times a day and/or when sitting. Do not get wounds wet in bath or shower unless otherwise instructed by your physician. If your wound is on you foot or leg, you may purchase a cast bag. Please ask at the pharmacy. When taking antibiotics take entire prescription as ordered by MD do not stop taking until medicine is all gone. Exercise as tolerated. No Smoking. Smoking prohibits wound healing. If Vascular testing is ordered, please call [facility] ([phone]) to schedule. Vascular tests ordered by Wound Care Physicians may take up to 2 hours to complete. Please keep that in mind when scheduling. If Vascular testing is scheduled, please bring supplies to replace your dressing after testing is done. The vascular department does not stock supplies. Wound: Sacrum     With each dressing change, rinse wounds with 0.9% Saline. (May use wound wash or soft contact solution. Both can be purchased at a local drug store). If unable to obtain saline, may use a gentle soap and water. Dressing care: Wet to dry, dry dressing- change daily. Important dietary reminders:  1. Increase Protein intake for optimal wound healing  2. No added salt to reduce any swelling  3. If diabetic, good glucose control  4. If you smoke, smoking affects wound healing, we ask that you refrain from smoking. Follow up with Dr Silvio Barron In 1 week in the wound care center. Call [phone] for any questions or concerns. [facility] Agency/Supply Company: [employer] Information: Should you experience any significant changes in your wound(s) or have questions about your wound care, please contact the [facility] at [phone] Monday  - Thursday 8:00 am - 4:00 pm and Friday 8:00 am - 1:00pm. If you need help with your wound outside these hours and cannot wait until we are again available, contact your PCP or go to the hospital emergency room.      PLEASE NOTE: IF YOU ARE UNABLE TO OBTAIN WOUND SUPPLIES, CONTINUE TO USE THE SUPPLIES YOU HAVE AVAILABLE UNTIL YOU ARE ABLE TO REACH US. IT IS MOST IMPORTANT TO KEEP THE WOUND COVERED AT ALL TIMES       Dakota Lara MD, FACS  11/11/2020  12:49 PM

## 2020-11-11 NOTE — PLAN OF CARE
Discharge instructions given. Patient verbalized understanding. Return to [facility] in 1 week.

## 2020-11-18 ENCOUNTER — HOSPITAL ENCOUNTER (OUTPATIENT)
Dept: WOUND CARE | Age: 70
Discharge: HOME | End: 2020-11-18
Payer: MEDICARE

## 2020-11-18 VITALS
RESPIRATION RATE: 16 BRPM | TEMPERATURE: 97.1 F | DIASTOLIC BLOOD PRESSURE: 71 MMHG | HEART RATE: 91 BPM | SYSTOLIC BLOOD PRESSURE: 123 MMHG

## 2020-11-18 PROCEDURE — 11042 DBRDMT SUBQ TIS 1ST 20SQCM/<: CPT | Performed by: SURGERY

## 2020-11-18 PROCEDURE — 11042 DBRDMT SUBQ TIS 1ST 20SQCM/<: CPT

## 2020-11-18 RX ORDER — LIDOCAINE HYDROCHLORIDE 40 MG/ML
SOLUTION TOPICAL ONCE
Status: DISCONTINUED | OUTPATIENT
Start: 2020-11-18 | End: 2020-11-19 | Stop reason: HOSPADM

## 2020-11-18 RX ORDER — LIDOCAINE HYDROCHLORIDE 20 MG/ML
JELLY TOPICAL ONCE
Status: CANCELLED | OUTPATIENT
Start: 2020-11-18

## 2020-11-18 RX ORDER — LIDOCAINE HYDROCHLORIDE 40 MG/ML
SOLUTION TOPICAL ONCE
Status: CANCELLED | OUTPATIENT
Start: 2020-11-18

## 2020-11-18 RX ORDER — BACITRACIN ZINC AND POLYMYXIN B SULFATE 500; 1000 [USP'U]/G; [USP'U]/G
OINTMENT TOPICAL ONCE
Status: CANCELLED | OUTPATIENT
Start: 2020-11-18

## 2020-11-18 RX ORDER — LIDOCAINE 50 MG/G
OINTMENT TOPICAL ONCE
Status: CANCELLED | OUTPATIENT
Start: 2020-11-18

## 2020-11-18 RX ORDER — GENTAMICIN SULFATE 1 MG/G
OINTMENT TOPICAL ONCE
Status: CANCELLED | OUTPATIENT
Start: 2020-11-18

## 2020-11-18 RX ORDER — BETAMETHASONE DIPROPIONATE 0.05 %
OINTMENT (GRAM) TOPICAL ONCE
Status: CANCELLED | OUTPATIENT
Start: 2020-11-18

## 2020-11-18 RX ORDER — LIDOCAINE 40 MG/G
CREAM TOPICAL ONCE
Status: CANCELLED | OUTPATIENT
Start: 2020-11-18

## 2020-11-18 RX ORDER — BACITRACIN, NEOMYCIN, POLYMYXIN B 400; 3.5; 5 [USP'U]/G; MG/G; [USP'U]/G
OINTMENT TOPICAL ONCE
Status: CANCELLED | OUTPATIENT
Start: 2020-11-18

## 2020-11-18 RX ORDER — CLOBETASOL PROPIONATE 0.5 MG/G
OINTMENT TOPICAL ONCE
Status: CANCELLED | OUTPATIENT
Start: 2020-11-18

## 2020-11-18 RX ORDER — GINSENG 100 MG
CAPSULE ORAL ONCE
Status: CANCELLED | OUTPATIENT
Start: 2020-11-18

## 2020-11-18 NOTE — PLAN OF CARE
[phone] for any questions or concerns. Your  is [facility] Agency/Supply Company: [employer] Information: Should you experience any significant changes in your wound(s) or have questions about your wound care, please contact the [place] [address] at [phone] Monday  - Thursday 8:00 am - 4:00 pm and Friday 8:00 am - 1:00pm. If you need help with your wound outside these hours and cannot wait until we are again available, contact your PCP or go to the hospital emergency room. PLEASE NOTE: IF YOU ARE UNABLE TO OBTAIN WOUND SUPPLIES, CONTINUE TO USE THE SUPPLIES YOU HAVE AVAILABLE UNTIL YOU ARE ABLE TO REACH US. IT IS MOST IMPORTANT TO KEEP THE WOUND COVERED AT ALL TIMES        Skilled nurse to evaluate and treat for wound care. Change dressing as ordered  once a day on Monday, Tuesday, Wednesday, Thursday, Friday, Saturday and Sunday using clean technique. Lightly Pack. Patient/Family/caregiver may change dressings as needed as instructed when Home Care unavailable.     WOUNDS REQUIRING DRESSING Changes:     Wound 10/14/20 Coccyx #1 (Active)   Wound Image   10/14/20 0910   Wound Etiology Non-Healing Surgical 11/18/20 0918   Wound Cleansed Cleansed with saline 11/18/20 0918   Dressing/Treatment Moist to dry 11/04/20 0937   Wound Length (cm) 2 cm 11/18/20 0918   Wound Width (cm) 1 cm 11/18/20 0918   Wound Depth (cm) 3 cm 11/18/20 0918   Wound Surface Area (cm^2) 2 cm^2 11/18/20 0918   Change in Wound Size % (l*w) 59.18 11/18/20 0918   Wound Volume (cm^3) 6 cm^3 11/18/20 0918   Wound Healing % 58 11/18/20 0918   Post-Procedure Length (cm) 2 cm 11/18/20 0931   Post-Procedure Width (cm) 1 cm 11/18/20 0931   Post-Procedure Depth (cm) 3 cm 11/18/20 0931   Post-Procedure Surface Area (cm^2) 2 cm^2 11/18/20 0931   Post-Procedure Volume (cm^3) 6 cm^3 11/18/20 0931   Wound Assessment Bleeding 11/18/20 0931   Drainage Amount Moderate 11/18/20 0918 Drainage Description Serosanguinous 11/18/20 0918   Odor None 11/18/20 0918   Saba-wound Assessment Intact 11/18/20 0918   Margins Defined edges 11/18/20 0918   Number of days: 35          Patient seen and treated on 11/18/2020    Francisco Junior MD NPI: [id number]       (provider/NPI)

## 2020-11-18 NOTE — PLAN OF CARE
Discharge instructions given. Patient verbalized understanding. Return to [address] [place] [address],[address] in 1 week.   Called/faxed orders to  [facility]

## 2020-11-18 NOTE — PROGRESS NOTES
[address] Progress and Procedure Note    Veronica Arango  AGE: 65 y.o. GENDER: female    : 1950  TODAY'S DATE: 2020    Chief Complaint   Patient presents with    Wound Check     coccyx        History of Present Illness     Veronica Arango is a 65 y.o. female who presents today for wound evaluation. History of Wound: pressure wound located on the sacral decubitus ulcer stage 3. Wound Pain:  mild  Severity:  1 / 10   Wound Type:  pressure  Modifying Factors:  diabetes, poor glucose control, decreased mobility and weight loss  Associated Signs/Symptoms:  none    Past Medical History:   Diagnosis Date    Arthritis     back    Cataracts, bilateral     [facility]     Diabetes mellitus ([address]     Glaucoma     Hyperlipidemia     Hypertension     Thyroid disease      Past Surgical History:   Procedure Laterality Date    CARPAL TUNNEL RELEASE      bilateral    CHOLECYSTECTOMY      COLONOSCOPY      ELBOW SURGERY      ENDOSCOPY, COLON, DIAGNOSTIC      EYE SURGERY      FOOT SURGERY      SHOULDER SURGERY       Current Outpatient Medications   Medication Sig Dispense Refill    traMADol (ULTRAM) 50 MG tablet Take 50 mg by mouth 3 times daily as needed for Pain.  DULoxetine (CYMBALTA) 60 MG extended release capsule Take 1 capsule by mouth daily 30 capsule 3    sodium chloride (OCEAN, BABY AYR) 0.65 % nasal spray 1 spray by Nasal route every 2 hours as needed for Congestion 1 Bottle 0    mometasone-formoterol (DULERA) 200-5 MCG/ACT inhaler Inhale 2 puffs into the lungs every 12 hours 1 Inhaler 1    spironolactone (ALDACTONE) 25 MG tablet Take 12.5 mg by mouth daily      Travoprost, BAK Free, (TRAVATAN Z) 0.004 % SOLN ophthalmic solution Place 1 drop into both eyes nightly      DICLOFENAC PO Take  by mouth.       acetaminophen (TYLENOL) 500 MG tablet Take 500 mg by mouth every 6 hours as needed for Pain or Fever       aspirin EC 11/18/20 0918   Wound Width (cm) 1 cm 11/18/20 0918   Wound Depth (cm) 3 cm 11/18/20 0918   Wound Surface Area (cm^2) 2 cm^2 11/18/20 0918   Change in Wound Size % (l*w) 59.18 11/18/20 0918   Wound Volume (cm^3) 6 cm^3 11/18/20 0918   Wound Healing % 58 11/18/20 0918   Post-Procedure Length (cm) 2 cm 11/18/20 0931   Post-Procedure Width (cm) 1 cm 11/18/20 0931   Post-Procedure Depth (cm) 3 cm 11/18/20 0931   Post-Procedure Surface Area (cm^2) 2 cm^2 11/18/20 0931   Post-Procedure Volume (cm^3) 6 cm^3 11/18/20 0931   Wound Assessment Bleeding 11/18/20 0931   Drainage Amount Moderate 11/18/20 0918   Drainage Description Serosanguinous 11/18/20 0918   Odor None 11/18/20 0918   Saba-wound Assessment Intact 11/18/20 0918   Margins Defined edges 11/18/20 0918   Number of days: 35       Percent of Wound(s)/Ulcer(s) Debrided: 100%    Total Surface Area Debrided:  2 sq cm     Bleeding:  Minimal    Hemostasis Achieved:  by pressure    Procedural Pain:  1  / 10     Post Procedural Pain:  1 / 10     Response to treatment:  Well tolerated by patient. Assessment:     Wound looks improved. (improved, worse or stable)    Patient tolerated procedure well and was given proper instruction. The nature of the patient's condition was explained in depth. The patient was informed that their compliance to the treatment plan is paramount to successful healing and prevention of further ulceration and/or infection     Plan:     Treatment Plan:       Treatment Note please see attached Discharge Instructions    Written patient dismissal instructions given to patient and signed by patient or POA. Discharge Instructions       [facility], [address] [place] [address]  Telephone: [phone]     Discharge Instructions:  Keep weight off wounds and reposition every 2 hours if applicable. Avoid standing for long periods of time.      If wound(s) is on your lower extremity, elevate legs to the level of the heart or above for 30 minutes 4-5 times a day and/or when sitting. Do not get wounds wet in bath or shower unless otherwise instructed by your physician. If your wound is on you foot or leg, you may purchase a cast bag. Please ask at the pharmacy. When taking antibiotics take entire prescription as ordered by MD do not stop taking until medicine is all gone. Exercise as tolerated. No Smoking. Smoking prohibits wound healing. If Vascular testing is ordered, please call [address] [place] [address] ([phone]) to schedule. Vascular tests ordered by Wound Care Physicians may take up to 2 hours to complete. Please keep that in mind when scheduling. If Vascular testing is scheduled, please bring supplies to replace your dressing after testing is done. The vascular department does not stock supplies. Wound: Sacrum     With each dressing change, rinse wounds with 0.9% Saline. (May use wound wash or soft contact solution. Both can be purchased at a local drug store). If unable to obtain saline, may use a gentle soap and water. Dressing care: Wet to dry, dry dressing- change daily. Important dietary reminders:  1. Increase Protein intake for optimal wound healing  2. No added salt to reduce any swelling  3. If diabetic, good glucose control  4. If you smoke, smoking affects wound healing, we ask that you refrain from smoking. Follow up with Dr Paul Ayala In 1 week in the wound care center. Call [phone] for any questions or concerns.      Your  is [facility] Agency/Supply Company: [employer] Information: Should you experience any significant changes in your wound(s) or have questions about your wound care, please contact the [facility] at [phone] Monday  - Thursday 8:00 am - 4:00 pm and Friday 8:00 am - 1:00pm. If you need help with your wound outside these hours and cannot wait until we are again available, contact your PCP or go to the hospital emergency room. PLEASE NOTE: IF YOU ARE UNABLE TO OBTAIN WOUND SUPPLIES, CONTINUE TO USE THE SUPPLIES YOU HAVE AVAILABLE UNTIL YOU ARE ABLE TO REACH US. IT IS MOST IMPORTANT TO KEEP THE WOUND COVERED AT ALL TIMES       Dakota Telles MD, FACS  11/18/2020  9:48 AM

## 2020-11-25 ENCOUNTER — HOSPITAL ENCOUNTER (OUTPATIENT)
Dept: WOUND CARE | Age: 70
Discharge: HOME | End: 2020-11-25
Payer: MEDICARE

## 2020-11-25 VITALS — SYSTOLIC BLOOD PRESSURE: 124 MMHG | HEART RATE: 83 BPM | RESPIRATION RATE: 16 BRPM | DIASTOLIC BLOOD PRESSURE: 71 MMHG

## 2020-11-25 PROCEDURE — 11042 DBRDMT SUBQ TIS 1ST 20SQCM/<: CPT

## 2020-11-25 PROCEDURE — 11042 DBRDMT SUBQ TIS 1ST 20SQCM/<: CPT | Performed by: SURGERY

## 2020-11-25 RX ORDER — LIDOCAINE 40 MG/G
CREAM TOPICAL ONCE
Status: CANCELLED | OUTPATIENT
Start: 2020-11-25

## 2020-11-25 RX ORDER — LIDOCAINE 50 MG/G
OINTMENT TOPICAL ONCE
Status: CANCELLED | OUTPATIENT
Start: 2020-11-25

## 2020-11-25 RX ORDER — BACITRACIN ZINC AND POLYMYXIN B SULFATE 500; 1000 [USP'U]/G; [USP'U]/G
OINTMENT TOPICAL ONCE
Status: CANCELLED | OUTPATIENT
Start: 2020-11-25

## 2020-11-25 RX ORDER — BACITRACIN, NEOMYCIN, POLYMYXIN B 400; 3.5; 5 [USP'U]/G; MG/G; [USP'U]/G
OINTMENT TOPICAL ONCE
Status: CANCELLED | OUTPATIENT
Start: 2020-11-25

## 2020-11-25 RX ORDER — LIDOCAINE HYDROCHLORIDE 40 MG/ML
SOLUTION TOPICAL ONCE
Status: CANCELLED | OUTPATIENT
Start: 2020-11-25

## 2020-11-25 RX ORDER — GINSENG 100 MG
CAPSULE ORAL ONCE
Status: CANCELLED | OUTPATIENT
Start: 2020-11-25

## 2020-11-25 RX ORDER — LIDOCAINE 40 MG/G
CREAM TOPICAL ONCE
Status: DISCONTINUED | OUTPATIENT
Start: 2020-11-25 | End: 2020-11-26 | Stop reason: HOSPADM

## 2020-11-25 RX ORDER — GENTAMICIN SULFATE 1 MG/G
OINTMENT TOPICAL ONCE
Status: CANCELLED | OUTPATIENT
Start: 2020-11-25

## 2020-11-25 RX ORDER — BETAMETHASONE DIPROPIONATE 0.05 %
OINTMENT (GRAM) TOPICAL ONCE
Status: CANCELLED | OUTPATIENT
Start: 2020-11-25

## 2020-11-25 RX ORDER — LIDOCAINE HYDROCHLORIDE 20 MG/ML
JELLY TOPICAL ONCE
Status: CANCELLED | OUTPATIENT
Start: 2020-11-25

## 2020-11-25 RX ORDER — CLOBETASOL PROPIONATE 0.5 MG/G
OINTMENT TOPICAL ONCE
Status: CANCELLED | OUTPATIENT
Start: 2020-11-25

## 2020-11-25 NOTE — PLAN OF CARE
Discharge instructions given. Patient verbalized understanding. Return to [address] [place] [address] in 1 week.

## 2020-11-25 NOTE — PROGRESS NOTES
[address] Progress and Procedure Note    Noé Quinn  AGE: 65 y.o. GENDER: female    : 1950  TODAY'S DATE: 2020    Chief Complaint   Patient presents with    Wound Check     Pressure wound coccyx        History of Present Illness     Noé Quinn is a 65 y.o. female who presents today for wound evaluation. History of Wound: pressure wound located on the sacral decubitus ulcer stage 3. Wound Pain:  mild  Severity:  1 / 10   Wound Type:  pressure  Modifying Factors:  diabetes, poor glucose control, decreased mobility and weight loss  Associated Signs/Symptoms:  none    Past Medical History:   Diagnosis Date    Arthritis     back    Cataracts, bilateral     [facility])     Diabetes mellitus ([address]     Glaucoma     Hyperlipidemia     Hypertension     Thyroid disease      Past Surgical History:   Procedure Laterality Date    CARPAL TUNNEL RELEASE      bilateral    CHOLECYSTECTOMY      COLONOSCOPY      ELBOW SURGERY      ENDOSCOPY, COLON, DIAGNOSTIC      EYE SURGERY      FOOT SURGERY      SHOULDER SURGERY       Current Outpatient Medications   Medication Sig Dispense Refill    traMADol (ULTRAM) 50 MG tablet Take 50 mg by mouth 3 times daily as needed for Pain.  DULoxetine (CYMBALTA) 60 MG extended release capsule Take 1 capsule by mouth daily 30 capsule 3    sodium chloride (OCEAN, BABY AYR) 0.65 % nasal spray 1 spray by Nasal route every 2 hours as needed for Congestion 1 Bottle 0    mometasone-formoterol (DULERA) 200-5 MCG/ACT inhaler Inhale 2 puffs into the lungs every 12 hours 1 Inhaler 1    spironolactone (ALDACTONE) 25 MG tablet Take 12.5 mg by mouth daily      Travoprost, BAK Free, (TRAVATAN Z) 0.004 % SOLN ophthalmic solution Place 1 drop into both eyes nightly      DICLOFENAC PO Take  by mouth.       acetaminophen (TYLENOL) 500 MG tablet Take 500 mg by mouth every 6 hours as needed for Pain or Fever  aspirin EC 81 MG EC tablet Take 81 mg by mouth daily May restart in AM.      furosemide (LASIX) 40 MG tablet Take 40 mg by mouth daily       levothyroxine (LEVOXYL) 200 MCG tablet Take 200 mcg by mouth Daily       Insulin Detemir (LEVEMIR FLEXPEN SC) Inject 33 Units into the skin nightly       simvastatin (ZOCOR) 40 MG tablet Take 40 mg by mouth nightly.  Insulin Lispro, Human, (HUMALOG SC) Inject 10 Units into the skin 3 times daily (with meals) With sliding      Calcium Carbonate-Vitamin D (CALCIUM + D PO) Take by mouth daily        Current Facility-Administered Medications   Medication Dose Route Frequency Provider Last Rate Last Dose    lidocaine (LMX) 4 % cream   Topical Once Anyi Maciel MD          Social History:   Social History     Tobacco Use    Smoking status: Never Smoker    Smokeless tobacco: Never Used   Substance Use Topics    Alcohol use: Not Currently    Drug use: Never     Allergies: Other; Ace inhibitors; Metoprolol; Tetrahydrozoline hcl; and Timolol    Procedure: Indications:  Based on my examination of this patient's wound(s)/ulcer(s) today, debridement is required to promote healing and evaluate the wound base. Performed by: Kenzie Enrique MD    Consent obtained: Yes    Time out taken: Yes    Pain Control: Anesthetic  Anesthetic: 4% Lidocaine Cream     Debridement: Excisional Debridement    Using curette the wound was sharply debrided    down through and including the removal of epidermis, dermis and subcutaneous tissue.         Devitalized Tissue Debrided: fibrin, biofilm and slough    Pre Debridement Measurements:  Are located in the Wound Documentation Flow Sheet     Wound #: 1     Post  Debridement Measurements:  Wound 10/14/20 Coccyx #1 (Active)   Wound Image   11/25/20 0983   Wound Etiology Non-Healing Surgical 11/25/20 0951   Wound Cleansed Cleansed with saline 11/25/20 0950   Dressing/Treatment Dry dressing;Moist to dry 11/25/20 1022   Wound Length (cm) if applicable. Avoid standing for long periods of time. If wound(s) is on your lower extremity, elevate legs to the level of the heart or above for 30 minutes 4-5 times a day and/or when sitting. Do not get wounds wet in bath or shower unless otherwise instructed by your physician. If your wound is on you foot or leg, you may purchase a cast bag. Please ask at the pharmacy. When taking antibiotics take entire prescription as ordered by MD do not stop taking until medicine is all gone. Exercise as tolerated. No Smoking. Smoking prohibits wound healing. If Vascular testing is ordered, please call [address] [place] [address] [phone]) to schedule. Vascular tests ordered by Wound Care Physicians may take up to 2 hours to complete. Please keep that in mind when scheduling. If Vascular testing is scheduled, please bring supplies to replace your dressing after testing is done. The vascular department does not stock supplies. Wound: Sacrum     With each dressing change, rinse wounds with 0.9% Saline. (May use wound wash or soft contact solution. Both can be purchased at a local drug store). If unable to obtain saline, may use a gentle soap and water. Dressing care: Wet to dry, dry dressing- change daily. Important dietary reminders:  1. Increase Protein intake for optimal wound healing  2. No added salt to reduce any swelling  3. If diabetic, good glucose control  4. If you smoke, smoking affects wound healing, we ask that you refrain from smoking. Follow up with Dr Silver Fegruson In 1 week in the wound care center. Call [phone] for any questions or concerns.      Your  is [facility] Agency/Supply Company: [employer] Information: Should you experience any significant changes in your wound(s) or have questions about your wound care, please contact the [facility] at [phone] Monday  - Thursday 8:00 am - 4:00 pm and Friday 8:00 am - 1:00pm. If you need help with your wound outside these hours and cannot wait until we are again available, contact your PCP or go to the hospital emergency room. PLEASE NOTE: IF YOU ARE UNABLE TO OBTAIN WOUND SUPPLIES, CONTINUE TO USE THE SUPPLIES YOU HAVE AVAILABLE UNTIL YOU ARE ABLE TO REACH US. IT IS MOST IMPORTANT TO KEEP THE WOUND COVERED AT ALL TIMES       Dakota Echeverria MD, FACS  11/25/2020  11:05 AM

## 2020-12-09 ENCOUNTER — HOSPITAL ENCOUNTER (OUTPATIENT)
Dept: WOUND CARE | Age: 70
Discharge: HOME | End: 2020-12-09
Payer: MEDICARE

## 2020-12-09 VITALS — RESPIRATION RATE: 16 BRPM | HEART RATE: 87 BPM | DIASTOLIC BLOOD PRESSURE: 85 MMHG | SYSTOLIC BLOOD PRESSURE: 133 MMHG

## 2020-12-09 PROCEDURE — 11042 DBRDMT SUBQ TIS 1ST 20SQCM/<: CPT

## 2020-12-09 PROCEDURE — 11042 DBRDMT SUBQ TIS 1ST 20SQCM/<: CPT | Performed by: SURGERY

## 2020-12-09 RX ORDER — LIDOCAINE 50 MG/G
OINTMENT TOPICAL ONCE
Status: CANCELLED | OUTPATIENT
Start: 2020-12-09

## 2020-12-09 RX ORDER — LIDOCAINE 40 MG/G
CREAM TOPICAL ONCE
Status: CANCELLED | OUTPATIENT
Start: 2020-12-09

## 2020-12-09 RX ORDER — LIDOCAINE HYDROCHLORIDE 20 MG/ML
JELLY TOPICAL ONCE
Status: CANCELLED | OUTPATIENT
Start: 2020-12-09

## 2020-12-09 RX ORDER — BACITRACIN, NEOMYCIN, POLYMYXIN B 400; 3.5; 5 [USP'U]/G; MG/G; [USP'U]/G
OINTMENT TOPICAL ONCE
Status: CANCELLED | OUTPATIENT
Start: 2020-12-09

## 2020-12-09 RX ORDER — CLOBETASOL PROPIONATE 0.5 MG/G
OINTMENT TOPICAL ONCE
Status: CANCELLED | OUTPATIENT
Start: 2020-12-09

## 2020-12-09 RX ORDER — BACITRACIN ZINC AND POLYMYXIN B SULFATE 500; 1000 [USP'U]/G; [USP'U]/G
OINTMENT TOPICAL ONCE
Status: CANCELLED | OUTPATIENT
Start: 2020-12-09

## 2020-12-09 RX ORDER — LIDOCAINE HYDROCHLORIDE 40 MG/ML
SOLUTION TOPICAL ONCE
Status: CANCELLED | OUTPATIENT
Start: 2020-12-09

## 2020-12-09 RX ORDER — GINSENG 100 MG
CAPSULE ORAL ONCE
Status: CANCELLED | OUTPATIENT
Start: 2020-12-09

## 2020-12-09 RX ORDER — LIDOCAINE 40 MG/G
CREAM TOPICAL ONCE
Status: DISCONTINUED | OUTPATIENT
Start: 2020-12-09 | End: 2020-12-10 | Stop reason: HOSPADM

## 2020-12-09 RX ORDER — GENTAMICIN SULFATE 1 MG/G
OINTMENT TOPICAL ONCE
Status: CANCELLED | OUTPATIENT
Start: 2020-12-09

## 2020-12-09 RX ORDER — BETAMETHASONE DIPROPIONATE 0.05 %
OINTMENT (GRAM) TOPICAL ONCE
Status: CANCELLED | OUTPATIENT
Start: 2020-12-09

## 2020-12-09 NOTE — PLAN OF CARE
Discharge instructions given. Patient verbalized understanding. Return to [address] [place] [address],[address] in 2 weeks.

## 2020-12-09 NOTE — PROGRESS NOTES
[address] Progress and Procedure Note    Jv Olivas  AGE: 65 y.o. GENDER: female    : 1950  TODAY'S DATE: 2020    Chief Complaint   Patient presents with    Wound Check     Buttocks        History of Present Illness     Jv Olivas is a 65 y.o. female who presents today for wound evaluation. History of Wound: pressure wound located on the sacral decubitus ulcer stage 3. Wound Pain:  mild  Severity:  1 / 10   Wound Type:  pressure  Modifying Factors:  diabetes, poor glucose control, decreased mobility and weight loss  Associated Signs/Symptoms:  none    Past Medical History:   Diagnosis Date    Arthritis     back    Cataracts, bilateral     [facility]     Diabetes mellitus ([facility] [address]     Glaucoma     Hyperlipidemia     Hypertension     Thyroid disease      Past Surgical History:   Procedure Laterality Date    CARPAL TUNNEL RELEASE      bilateral    CHOLECYSTECTOMY      COLONOSCOPY      ELBOW SURGERY      ENDOSCOPY, COLON, DIAGNOSTIC      EYE SURGERY      FOOT SURGERY      SHOULDER SURGERY       Current Outpatient Medications   Medication Sig Dispense Refill    traMADol (ULTRAM) 50 MG tablet Take 50 mg by mouth 3 times daily as needed for Pain.  DULoxetine (CYMBALTA) 60 MG extended release capsule Take 1 capsule by mouth daily 30 capsule 3    sodium chloride (OCEAN, BABY AYR) 0.65 % nasal spray 1 spray by Nasal route every 2 hours as needed for Congestion 1 Bottle 0    mometasone-formoterol (DULERA) 200-5 MCG/ACT inhaler Inhale 2 puffs into the lungs every 12 hours 1 Inhaler 1    spironolactone (ALDACTONE) 25 MG tablet Take 12.5 mg by mouth daily      Travoprost, BAK Free, (TRAVATAN Z) 0.004 % SOLN ophthalmic solution Place 1 drop into both eyes nightly      DICLOFENAC PO Take  by mouth.       acetaminophen (TYLENOL) 500 MG tablet Take 500 mg by mouth every 6 hours as needed for Pain or Fever       aspirin EC 81 MG EC tablet Take 81 mg by mouth daily May restart in AM.      furosemide (LASIX) 40 MG tablet Take 40 mg by mouth daily       levothyroxine (LEVOXYL) 200 MCG tablet Take 200 mcg by mouth Daily       Insulin Detemir (LEVEMIR FLEXPEN SC) Inject 33 Units into the skin nightly       simvastatin (ZOCOR) 40 MG tablet Take 40 mg by mouth nightly.  Insulin Lispro, Human, (HUMALOG SC) Inject 10 Units into the skin 3 times daily (with meals) With sliding      Calcium Carbonate-Vitamin D (CALCIUM + D PO) Take by mouth daily        Current Facility-Administered Medications   Medication Dose Route Frequency Provider Last Rate Last Dose    lidocaine (LMX) 4 % cream   Topical Once Danae Simental MD          Social History:   Social History     Tobacco Use    Smoking status: Never Smoker    Smokeless tobacco: Never Used   Substance Use Topics    Alcohol use: Not Currently    Drug use: Never     Allergies: Other; Ace inhibitors; Metoprolol; Tetrahydrozoline hcl; and Timolol    Procedure: Indications:  Based on my examination of this patient's wound(s)/ulcer(s) today, debridement is required to promote healing and evaluate the wound base. Performed by: Ashia Snyder MD    Consent obtained: Yes    Time out taken: Yes    Pain Control: Anesthetic  Anesthetic: 4% Lidocaine Cream     Debridement: Excisional Debridement    Using curette the wound was sharply debrided    down through and including the removal of epidermis, dermis and subcutaneous tissue.         Devitalized Tissue Debrided: fibrin, biofilm and slough    Pre Debridement Measurements:  Are located in the Wound Documentation Flow Sheet     Wound #: 1     Post  Debridement Measurements:  Wound 10/14/20 Coccyx #1 (Active)   Wound Image   11/25/20 0925   Wound Etiology Non-Healing Surgical 12/09/20 0921   Wound Cleansed Cleansed with saline 12/09/20 0937   Dressing/Treatment Dry dressing;Moist to dry 11/25/20 1022   Wound Length (cm) 2 cm 12/09/20 6094   Wound Width (cm) 1 cm 12/09/20 0921   Wound Depth (cm) 2.3 cm 12/09/20 0921   Wound Surface Area (cm^2) 2 cm^2 12/09/20 0921   Change in Wound Size % (l*w) 59.18 12/09/20 0921   Wound Volume (cm^3) 4.6 cm^3 12/09/20 0921   Wound Healing % 68 12/09/20 0921   Post-Procedure Length (cm) 2 cm 12/09/20 0937   Post-Procedure Width (cm) 1 cm 12/09/20 0937   Post-Procedure Depth (cm) 2.3 cm 12/09/20 0937   Post-Procedure Surface Area (cm^2) 2 cm^2 12/09/20 0937   Post-Procedure Volume (cm^3) 4.6 cm^3 12/09/20 0937   Wound Assessment Bleeding 12/09/20 0937   Drainage Amount Moderate 12/09/20 0937   Drainage Description Green 12/09/20 0921   Odor None 12/09/20 0921   Saba-wound Assessment Intact 12/09/20 0921   Margins Attached edges; Defined edges 12/09/20 0921   Wound Thickness Description not for Pressure Injury Full thickness 12/09/20 0921   Number of days: 56       Percent of Wound(s)/Ulcer(s) Debrided: 100%    Total Surface Area Debrided:  2 sq cm     Bleeding:  Minimal    Hemostasis Achieved:  by pressure    Procedural Pain:  1  / 10     Post Procedural Pain:  1 / 10     Response to treatment:  Well tolerated by patient. Assessment:     Wound looks improved. (improved, worse or stable)    Patient tolerated procedure well and was given proper instruction. The nature of the patient's condition was explained in depth. The patient was informed that their compliance to the treatment plan is paramount to successful healing and prevention of further ulceration and/or infection     Plan:     Treatment Plan:       Treatment Note please see attached Discharge Instructions    Written patient dismissal instructions given to patient and signed by patient or POA.          Discharge [address]  Telephone: [phone]      Discharge Instructions:  Keep weight off wounds and reposition every 2 hours if applicable. Avoid standing for long periods of time.      If wound(s) is on your lower extremity, elevate legs to the level of the heart or above for 30 minutes 4-5 times a day and/or when sitting.      Do not get wounds wet in bath or shower unless otherwise instructed by your physician. If your wound is on you foot or leg, you may purchase a cast bag. Please ask at the pharmacy.     When taking antibiotics take entire prescription as ordered by MD do not stop taking until medicine is all gone. Exercise as tolerated. No Smoking. Smoking prohibits wound healing.     If Vascular testing is ordered, [email or web address] [phone]) to schedule.     Vascular tests ordered by Wound Care Physicians may take up to 2 hours to complete. Please keep that in mind when scheduling.      If Vascular testing is scheduled, please bring supplies to replace your dressing after testing is done. The vascular department does not stock supplies.      Wound: Sacrum      With each dressing change, rinse wounds with 0.9% Saline. (May use wound wash or soft contact solution. Both can be purchased at a local drug store). If unable to obtain saline, may use a gentle soap and water.     Dressing care: Wet to dry, dry dressing- change daily.     Important dietary reminders:  1. Increase Protein intake for optimal wound healing  2. No added salt to reduce any swelling  3. If diabetic, good glucose control  4.  If you smoke, smoking affects wound healing, we ask that you refrain from smoking.     Follow up with Dr Deneen Telles In 2 weeks in the wound care center.      Call [phone] for any questions or concerns.      Your  is [employer]: [facility] [address] Information: Should you experience any significant changes in your wound(s) or have questions about your wound care, please contact the [facility] at [phone] Monday  - Thursday 8:00 am - 4:00 pm and Friday 8:00 am - 1:00pm. If you need help with your wound outside these hours and cannot wait until we are again available, contact your PCP or go to the hospital emergency room.      PLEASE NOTE: IF YOU ARE UNABLE TO [address], CONTINUE TO USE THE SUPPLIES YOU HAVE AVAILABLE UNTIL YOU ARE ABLE TO [address]. IT IS MOST IMPORTANT TO KEEP THE WOUND COVERED AT ALL TIMES       Dakota Miguel MD, FACS  12/9/2020  3:04 PM

## 2020-12-23 ENCOUNTER — HOSPITAL ENCOUNTER (OUTPATIENT)
Dept: WOUND CARE | Age: 70
Discharge: HOME | End: 2020-12-23
Payer: MEDICARE

## 2020-12-23 VITALS
BODY MASS INDEX: 35.99 KG/M2 | WEIGHT: 223 LBS | TEMPERATURE: 96.2 F | HEART RATE: 87 BPM | SYSTOLIC BLOOD PRESSURE: 130 MMHG | DIASTOLIC BLOOD PRESSURE: 76 MMHG

## 2020-12-23 PROCEDURE — 11042 DBRDMT SUBQ TIS 1ST 20SQCM/<: CPT | Performed by: SURGERY

## 2020-12-23 PROCEDURE — 11042 DBRDMT SUBQ TIS 1ST 20SQCM/<: CPT

## 2020-12-23 RX ORDER — BACITRACIN, NEOMYCIN, POLYMYXIN B 400; 3.5; 5 [USP'U]/G; MG/G; [USP'U]/G
OINTMENT TOPICAL ONCE
Status: CANCELLED | OUTPATIENT
Start: 2020-12-23 | End: 2020-12-23

## 2020-12-23 RX ORDER — LIDOCAINE HYDROCHLORIDE 20 MG/ML
JELLY TOPICAL ONCE
Status: CANCELLED | OUTPATIENT
Start: 2020-12-23 | End: 2020-12-23

## 2020-12-23 RX ORDER — BACITRACIN ZINC AND POLYMYXIN B SULFATE 500; 1000 [USP'U]/G; [USP'U]/G
OINTMENT TOPICAL ONCE
Status: CANCELLED | OUTPATIENT
Start: 2020-12-23 | End: 2020-12-23

## 2020-12-23 RX ORDER — CLOBETASOL PROPIONATE 0.5 MG/G
OINTMENT TOPICAL ONCE
Status: CANCELLED | OUTPATIENT
Start: 2020-12-23 | End: 2020-12-23

## 2020-12-23 RX ORDER — LIDOCAINE HYDROCHLORIDE 40 MG/ML
SOLUTION TOPICAL ONCE
Status: CANCELLED | OUTPATIENT
Start: 2020-12-23 | End: 2020-12-23

## 2020-12-23 RX ORDER — BETAMETHASONE DIPROPIONATE 0.05 %
OINTMENT (GRAM) TOPICAL ONCE
Status: CANCELLED | OUTPATIENT
Start: 2020-12-23 | End: 2020-12-23

## 2020-12-23 RX ORDER — LIDOCAINE 50 MG/G
OINTMENT TOPICAL ONCE
Status: CANCELLED | OUTPATIENT
Start: 2020-12-23 | End: 2020-12-23

## 2020-12-23 RX ORDER — LIDOCAINE 40 MG/G
CREAM TOPICAL ONCE
Status: CANCELLED | OUTPATIENT
Start: 2020-12-23 | End: 2020-12-23

## 2020-12-23 RX ORDER — GENTAMICIN SULFATE 1 MG/G
OINTMENT TOPICAL ONCE
Status: CANCELLED | OUTPATIENT
Start: 2020-12-23 | End: 2020-12-23

## 2020-12-23 RX ORDER — LIDOCAINE 40 MG/G
CREAM TOPICAL ONCE
Status: DISCONTINUED | OUTPATIENT
Start: 2020-12-23 | End: 2020-12-24 | Stop reason: HOSPADM

## 2020-12-23 RX ORDER — GINSENG 100 MG
CAPSULE ORAL ONCE
Status: CANCELLED | OUTPATIENT
Start: 2020-12-23 | End: 2020-12-23

## 2020-12-23 ASSESSMENT — PAIN SCALES - GENERAL: PAINLEVEL_OUTOF10: 5

## 2020-12-23 ASSESSMENT — PAIN DESCRIPTION - ONSET: ONSET: ON-GOING

## 2020-12-23 ASSESSMENT — PAIN DESCRIPTION - PAIN TYPE: TYPE: CHRONIC PAIN

## 2020-12-23 ASSESSMENT — PAIN DESCRIPTION - DESCRIPTORS: DESCRIPTORS: ACHING

## 2020-12-23 ASSESSMENT — PAIN DESCRIPTION - FREQUENCY: FREQUENCY: INTERMITTENT

## 2020-12-23 ASSESSMENT — PAIN DESCRIPTION - LOCATION: LOCATION: SACRUM

## 2020-12-23 ASSESSMENT — PAIN DESCRIPTION - PROGRESSION: CLINICAL_PROGRESSION: NOT CHANGED

## 2020-12-23 NOTE — PROGRESS NOTES
[address] Progress and Procedure Note    Giovanni Perkins  AGE: 65 y.o. GENDER: female    : 1950  TODAY'S DATE: 2020    Chief Complaint   Patient presents with    Wound Check     sacrum F/U        History of Present Illness     Giovanni Perkins is a 65 y.o. female who presents today for wound evaluation. History of Wound: pressure wound located on the sacral decubitus ulcer stage 3. Wound Pain:  mild  Severity:  1 / 10   Wound Type:  pressure  Modifying Factors:  diabetes, poor glucose control, decreased mobility and weight loss  Associated Signs/Symptoms:  none    Past Medical History:   Diagnosis Date    Arthritis     back    Cataracts, bilateral     [facility] ([facility])     Diabetes mellitus ([facility] [address]     Glaucoma     Hyperlipidemia     Hypertension     Thyroid disease      Past Surgical History:   Procedure Laterality Date    CARPAL TUNNEL RELEASE      bilateral    CHOLECYSTECTOMY      COLONOSCOPY      ELBOW SURGERY      ENDOSCOPY, COLON, DIAGNOSTIC      EYE SURGERY      FOOT SURGERY      SHOULDER SURGERY       Current Outpatient Medications   Medication Sig Dispense Refill    traMADol (ULTRAM) 50 MG tablet Take 50 mg by mouth 3 times daily as needed for Pain.  DULoxetine (CYMBALTA) 60 MG extended release capsule Take 1 capsule by mouth daily 30 capsule 3    sodium chloride (OCEAN, BABY AYR) 0.65 % nasal spray 1 spray by Nasal route every 2 hours as needed for Congestion 1 Bottle 0    mometasone-formoterol (DULERA) 200-5 MCG/ACT inhaler Inhale 2 puffs into the lungs every 12 hours 1 Inhaler 1    spironolactone (ALDACTONE) 25 MG tablet Take 12.5 mg by mouth daily      Travoprost, BAK Free, (TRAVATAN Z) 0.004 % SOLN ophthalmic solution Place 1 drop into both eyes nightly      DICLOFENAC PO Take  by mouth.       acetaminophen (TYLENOL) 500 MG tablet Take 500 mg by mouth every 6 hours as needed for Pain or Fever       aspirin EC 81 MG EC tablet Take 81 mg by mouth daily May restart in AM.      furosemide (LASIX) 40 MG tablet Take 40 mg by mouth daily       levothyroxine (LEVOXYL) 200 MCG tablet Take 200 mcg by mouth Daily       Insulin Detemir (LEVEMIR FLEXPEN SC) Inject 33 Units into the skin nightly       simvastatin (ZOCOR) 40 MG tablet Take 40 mg by mouth nightly.  Insulin Lispro, Human, (HUMALOG SC) Inject 10 Units into the skin 3 times daily (with meals) With sliding      Calcium Carbonate-Vitamin D (CALCIUM + D PO) Take by mouth daily        Current Facility-Administered Medications   Medication Dose Route Frequency Provider Last Rate Last Admin    lidocaine (LMX) 4 % cream   Topical Once Carlos Guzman MD          Social History:   Social History     Tobacco Use    Smoking status: Never Smoker    Smokeless tobacco: Never Used   Substance Use Topics    Alcohol use: Not Currently    Drug use: Never     Allergies: Other, Ace inhibitors, Metoprolol, Tetrahydrozoline hcl, and Timolol    Procedure: Indications:  Based on my examination of this patient's wound(s)/ulcer(s) today, debridement is required to promote healing and evaluate the wound base. Performed by: Rafa Foreman MD    Consent obtained: Yes    Time out taken: Yes    Pain Control: Anesthetic  Anesthetic: 4% Lidocaine Cream     Debridement: Excisional Debridement    Using curette the wound was sharply debrided    down through and including the removal of epidermis, dermis and subcutaneous tissue.         Devitalized Tissue Debrided: fibrin, biofilm and slough    Pre Debridement Measurements:  Are located in the Wound Documentation Flow Sheet     Wound #: 1     Post  Debridement Measurements:  Wound 10/14/20 Coccyx #1 (Active)   Wound Image   11/25/20 0925   Wound Etiology Non-Healing Surgical 12/23/20 0925   Wound Cleansed Cleansed with saline 12/23/20 0935   Dressing/Treatment Dry dressing;Moist to dry 11/25/20 1022   Wound Length (cm) 1.7 cm 12/23/20 0925   Wound Width (cm) 0.8 cm 12/23/20 0925   Wound Depth (cm) 2.2 cm 12/23/20 0925   Wound Surface Area (cm^2) 1.36 cm^2 12/23/20 0925   Change in Wound Size % (l*w) 72.24 12/23/20 0925   Wound Volume (cm^3) 2.99 cm^3 12/23/20 0925   Wound Healing % 79 12/23/20 0925   Post-Procedure Length (cm) 1.7 cm 12/23/20 0935   Post-Procedure Width (cm) 0.8 cm 12/23/20 0935   Post-Procedure Depth (cm) 2.2 cm 12/23/20 0935   Post-Procedure Surface Area (cm^2) 1.36 cm^2 12/23/20 0935   Post-Procedure Volume (cm^3) 2.99 cm^3 12/23/20 0935   Wound Assessment Bleeding 12/23/20 0935   Drainage Amount Moderate 12/23/20 0935   Drainage Description Rexene Snipe 12/23/20 0925   Odor None 12/23/20 0925   Saba-wound Assessment Intact 12/23/20 0925   Margins Attached edges; Defined edges 12/23/20 0925   Wound Thickness Description not for Pressure Injury Full thickness 12/23/20 0925   Number of days: 70       Percent of Wound(s)/Ulcer(s) Debrided: 100%    Total Surface Area Debrided:  1.36 sq cm     Bleeding:  Minimal    Hemostasis Achieved:  by pressure    Procedural Pain:  1  / 10     Post Procedural Pain:  1 / 10     Response to treatment:  Well tolerated by patient. Assessment:     Wound looks improved. (improved, worse or stable)    Patient tolerated procedure well and was given proper instruction. The nature of the patient's condition was explained in depth. The patient was informed that their compliance to the treatment plan is paramount to successful healing and prevention of further ulceration and/or infection     Plan:     Treatment Plan:       Treatment Note please see attached Discharge Instructions    Written patient dismissal instructions given to patient and signed by patient or POA.          Discharge [address]  Telephone: [phone]     FAX [phone]      Discharge Instructions:  Keep weight off wounds and reposition every 2 hours if applicable. Avoid standing for long periods of time.      If wound(s) is on your lower extremity, elevate legs to the level of the heart or above for 30 minutes 4-5 times a day and/or when sitting.      Do not get wounds wet in bath or shower unless otherwise instructed by your physician. If your wound is on you foot or leg, you may purchase a cast bag. Please ask at the pharmacy.     When taking antibiotics take entire prescription as ordered by MD do not stop taking until medicine is all gone. Exercise as tolerated. No Smoking. Smoking prohibits wound healing.     If Vascular testing is ordered, please call [address] [place] [address] [phone]) to schedule.     Vascular tests ordered by Wound Care Physicians may take up to 2 hours to complete. Please keep that in mind when scheduling.      If Vascular testing is scheduled, please bring supplies to replace your dressing after testing is done. The vascular department does not stock supplies.      Wound: Sacrum      With each dressing change, rinse wounds with 0.9% Saline. (May use wound wash or soft contact solution. Both can be purchased at a local drug store). If unable to obtain saline, may use a gentle soap and water.     Dressing care: Wet to dry, dry dressing- change daily.     Important dietary reminders:  1. Increase Protein intake for optimal wound healing  2. No added salt to reduce any swelling  3. If diabetic, good glucose control  4.  If you smoke, smoking affects wound healing, we ask that you refrain from smoking.     Follow up with Dr Portia Buckner In 2 weeks in the wound care center.      Call [phone] for any questions or concerns.      Your  is [address] [place] [address] [employer]: [facility] [address] Information: Should you experience any significant changes in your wound(s) or have questions about your wound care, please contact the [facility] jb [phone] Monday  - Thursday 8:00 am - 4:00 pm and Friday 8:00 am - 1:00pm. If you need help with your wound outside these hours and cannot wait until we are again available, contact your PCP or go to the hospital emergency room.      PLEASE NOTE: IF YOU ARE UNABLE TO [address], CONTINUE TO USE THE SUPPLIES YOU HAVE AVAILABLE UNTIL YOU ARE ABLE TO [address]. IT IS MOST IMPORTANT TO KEEP THE WOUND COVERED AT ALL TIMES       Dakota Bhatia MD, FACS  12/23/2020  9:54 AM

## 2020-12-23 NOTE — PLAN OF CARE
Discharge instructions given. Patient verbalized understanding. Return to [facility] in 1 week.

## 2021-01-06 ENCOUNTER — HOSPITAL ENCOUNTER (OUTPATIENT)
Dept: WOUND CARE | Age: 71
Discharge: HOME | End: 2021-01-06
Payer: MEDICARE

## 2021-01-06 VITALS
TEMPERATURE: 96.9 F | SYSTOLIC BLOOD PRESSURE: 110 MMHG | RESPIRATION RATE: 16 BRPM | HEART RATE: 89 BPM | DIASTOLIC BLOOD PRESSURE: 73 MMHG

## 2021-01-06 DIAGNOSIS — S31.000A SACRAL WOUND, INITIAL ENCOUNTER: Primary | ICD-10-CM

## 2021-01-06 PROCEDURE — 11042 DBRDMT SUBQ TIS 1ST 20SQCM/<: CPT | Performed by: SURGERY

## 2021-01-06 PROCEDURE — 11042 DBRDMT SUBQ TIS 1ST 20SQCM/<: CPT

## 2021-01-06 RX ORDER — GINSENG 100 MG
CAPSULE ORAL ONCE
Status: CANCELLED | OUTPATIENT
Start: 2021-01-06 | End: 2021-01-06

## 2021-01-06 RX ORDER — LIDOCAINE HYDROCHLORIDE 20 MG/ML
JELLY TOPICAL ONCE
Status: CANCELLED | OUTPATIENT
Start: 2021-01-06 | End: 2021-01-06

## 2021-01-06 RX ORDER — LIDOCAINE 40 MG/G
CREAM TOPICAL ONCE
Status: CANCELLED | OUTPATIENT
Start: 2021-01-06 | End: 2021-01-06

## 2021-01-06 RX ORDER — LIDOCAINE 50 MG/G
OINTMENT TOPICAL ONCE
Status: CANCELLED | OUTPATIENT
Start: 2021-01-06 | End: 2021-01-06

## 2021-01-06 RX ORDER — GENTAMICIN SULFATE 1 MG/G
OINTMENT TOPICAL ONCE
Status: CANCELLED | OUTPATIENT
Start: 2021-01-06 | End: 2021-01-06

## 2021-01-06 RX ORDER — LIDOCAINE 40 MG/G
CREAM TOPICAL ONCE
Status: DISCONTINUED | OUTPATIENT
Start: 2021-01-06 | End: 2021-01-07 | Stop reason: HOSPADM

## 2021-01-06 RX ORDER — CLOBETASOL PROPIONATE 0.5 MG/G
OINTMENT TOPICAL ONCE
Status: CANCELLED | OUTPATIENT
Start: 2021-01-06 | End: 2021-01-06

## 2021-01-06 RX ORDER — BACITRACIN, NEOMYCIN, POLYMYXIN B 400; 3.5; 5 [USP'U]/G; MG/G; [USP'U]/G
OINTMENT TOPICAL ONCE
Status: CANCELLED | OUTPATIENT
Start: 2021-01-06 | End: 2021-01-06

## 2021-01-06 RX ORDER — LIDOCAINE HYDROCHLORIDE 40 MG/ML
SOLUTION TOPICAL ONCE
Status: CANCELLED | OUTPATIENT
Start: 2021-01-06 | End: 2021-01-06

## 2021-01-06 RX ORDER — BETAMETHASONE DIPROPIONATE 0.05 %
OINTMENT (GRAM) TOPICAL ONCE
Status: CANCELLED | OUTPATIENT
Start: 2021-01-06 | End: 2021-01-06

## 2021-01-06 RX ORDER — BACITRACIN ZINC AND POLYMYXIN B SULFATE 500; 1000 [USP'U]/G; [USP'U]/G
OINTMENT TOPICAL ONCE
Status: CANCELLED | OUTPATIENT
Start: 2021-01-06 | End: 2021-01-06

## 2021-01-06 NOTE — PROGRESS NOTES
[facility]  [address] [facility], [address] [place] [address] Road  Telephone: [phone]        [place]atee Sumit Zamudio Fax # [phone]      Discharge Instructions       [facility]  [address] [facility], [address]  Telephone: [phone]     FAX [phone]      Discharge Instructions:  Keep weight off wounds and reposition every 2 hours if applicable. Avoid standing for long periods of time.      If wound(s) is on your lower extremity, elevate legs to the level of the heart or above for 30 minutes 4-5 times a day and/or when sitting.      Do not get wounds wet in bath or shower unless otherwise instructed by your physician. If your wound is on you foot or leg, you may purchase a cast bag. Please ask at the pharmacy.     When taking antibiotics take entire prescription as ordered by MD do not stop taking until medicine is all gone. Exercise as tolerated. No Smoking. Smoking prohibits wound healing.     If Vascular testing is ordered, please call [address] [place] [address] [phone]) to schedule.     Vascular tests ordered by Wound Care Physicians may take up to 2 hours to complete. Please keep that in mind when scheduling.      If Vascular testing is scheduled, please bring supplies to replace your dressing after testing is done. The vascular department does not stock supplies.      Wound: Sacrum      With each dressing change, rinse wounds with 0.9% Saline. (May use wound wash or soft contact solution. Both can be purchased at a local drug store). If unable to obtain saline, may use a gentle soap and water.     Dressing care: Pack with Collagen, dry dressing- change daily.     Important dietary reminders:  1. Increase Protein intake for optimal wound healing  2. No added salt to reduce any swelling  3. If diabetic, good glucose control  4.  If you smoke, smoking affects wound healing, we ask that you refrain from smoking.     Follow up with Dr Carter Monte In 1 week in the wound care [place].      Call [phone] for any questions or concerns.      Your  is [address] Agency/Supply Company: [employer] Information: Should you experience any significant changes in your wound(s) or have questions about your wound care, please contact the [address] [phone] Monday  - Thursday 8:00 am - 4:00 pm and Friday 8:00 am - 1:00pm. If you need help with your wound outside these hours and cannot wait until we are again available, contact your PCP or go to the hospital emergency room.      PLEASE NOTE: IF YOU ARE UNABLE TO OBTAIN WOUND SUPPLIES, CONTINUE TO USE THE SUPPLIES YOU HAVE AVAILABLE UNTIL YOU ARE ABLE TO [facility]. IT IS MOST IMPORTANT TO KEEP THE WOUND COVERED AT ALL TIMES        Skilled nurse to evaluate and treat for wound care. Change dressing as ordered  once a day on Monday, Tuesday, Wednesday, Thursday, Friday, Saturday and Sunday using clean technique. Patient/Family/caregiver may change dressings as needed as instructed when Home Care unavailable.     WOUNDS REQUIRING DRESSING Changes:     Wound 10/14/20 Coccyx #1 (Active)   Wound Image   01/06/21 0946   Wound Etiology Non-Healing Surgical 01/06/21 0946   Wound Cleansed Cleansed with saline 01/06/21 0956   Dressing/Treatment Dry dressing;Moist to dry 11/25/20 1022   Wound Length (cm) 1.5 cm 01/06/21 0946   Wound Width (cm) 0.5 cm 01/06/21 0946   Wound Depth (cm) 2.5 cm 01/06/21 0946   Wound Surface Area (cm^2) 0.75 cm^2 01/06/21 0946   Change in Wound Size % (l*w) 84.69 01/06/21 0946   Wound Volume (cm^3) 1.88 cm^3 01/06/21 0946   Wound Healing % 87 01/06/21 0946   Post-Procedure Length (cm) 1.5 cm 01/06/21 0956   Post-Procedure Width (cm) 0.5 cm 01/06/21 0956   Post-Procedure Depth (cm) 2.5 cm 01/06/21 0956   Post-Procedure Surface Area (cm^2) 0.75 cm^2 01/06/21 0956   Post-Procedure Volume (cm^3) 1.88 cm^3 01/06/21 0956   Wound Assessment Bleeding 01/06/21 0956 Drainage Amount Moderate 01/06/21 0956   Drainage Description Green 01/06/21 0946   Odor None 01/06/21 0946   Saba-wound Assessment Intact 01/06/21 0946   Margins Attached edges 01/06/21 0946   Wound Thickness Description not for Pressure Injury Full thickness 01/06/21 0946   Number of days: 84          Patient seen and treated on 1/6/2021    By Archie Liz MD NPI: [id number]  (provider/NPI)

## 2021-01-06 NOTE — PROGRESS NOTES
[address] Progress and Procedure Note    Michelle Marrufo  AGE: 65 y.o. GENDER: female    : 1950  TODAY'S DATE: 2021    Chief Complaint   Patient presents with    Wound Check     Coccyx wound        History of Present Illness     Michelle Marrufo is a 65 y.o. female who presents today for wound evaluation. History of Wound: pressure wound located on the sacral decubitus ulcer stage 3  Wound Pain:  mild  Severity:  1 / 10   Wound Type:  pressure  Modifying Factors:  diabetes, poor glucose control, decreased mobility and weight loss  Associated Signs/Symptoms:  none    Past Medical History:   Diagnosis Date    Arthritis     back    Cataracts, bilateral     [facility]     Diabetes mellitus [facility]     Glaucoma     Hyperlipidemia     Hypertension     Thyroid disease      Past Surgical History:   Procedure Laterality Date    CARPAL TUNNEL RELEASE      bilateral    CHOLECYSTECTOMY      COLONOSCOPY      ELBOW SURGERY      ENDOSCOPY, COLON, DIAGNOSTIC      EYE SURGERY      FOOT SURGERY      SHOULDER SURGERY       Current Outpatient Medications   Medication Sig Dispense Refill    traMADol (ULTRAM) 50 MG tablet Take 50 mg by mouth 3 times daily as needed for Pain.  DULoxetine (CYMBALTA) 60 MG extended release capsule Take 1 capsule by mouth daily 30 capsule 3    sodium chloride (OCEAN, BABY AYR) 0.65 % nasal spray 1 spray by Nasal route every 2 hours as needed for Congestion 1 Bottle 0    mometasone-formoterol (DULERA) 200-5 MCG/ACT inhaler Inhale 2 puffs into the lungs every 12 hours 1 Inhaler 1    spironolactone (ALDACTONE) 25 MG tablet Take 12.5 mg by mouth daily      Travoprost, BAK Free, (TRAVATAN Z) 0.004 % SOLN ophthalmic solution Place 1 drop into both eyes nightly      DICLOFENAC PO Take  by mouth.       acetaminophen (TYLENOL) 500 MG tablet Take 500 mg by mouth every 6 hours as needed for Pain or Fever       aspirin EC 81 MG EC tablet Take 81 mg by mouth daily May restart in AM.      furosemide (LASIX) 40 MG tablet Take 40 mg by mouth daily       levothyroxine (LEVOXYL) 200 MCG tablet Take 200 mcg by mouth Daily       Insulin Detemir (LEVEMIR FLEXPEN SC) Inject 33 Units into the skin nightly       simvastatin (ZOCOR) 40 MG tablet Take 40 mg by mouth nightly.  Insulin Lispro, Human, (HUMALOG SC) Inject 10 Units into the skin 3 times daily (with meals) With sliding      Calcium Carbonate-Vitamin D (CALCIUM + D PO) Take by mouth daily        Current Facility-Administered Medications   Medication Dose Route Frequency Provider Last Rate Last Admin    lidocaine (LMX) 4 % cream   Topical Once David Hernandez MD          Social History:   Social History     Tobacco Use    Smoking status: Never Smoker    Smokeless tobacco: Never Used   Substance Use Topics    Alcohol use: Not Currently    Drug use: Never     Allergies: Other, Ace inhibitors, Metoprolol, Tetrahydrozoline hcl, and Timolol    Procedure: Indications:  Based on my examination of this patient's wound(s)/ulcer(s) today, debridement is required to promote healing and evaluate the wound base. Performed by: Kiki Lowe MD    Consent obtained: Yes    Time out taken: Yes    Pain Control: Anesthetic  Anesthetic: 4% Lidocaine Cream     Debridement: Excisional Debridement    Using curette the wound was sharply debrided    down through and including the removal of epidermis, dermis and subcutaneous tissue.         Devitalized Tissue Debrided: fibrin, biofilm and slough    Pre Debridement Measurements:  Are located in the Wound Documentation Flow Sheet     Wound #: 1     Post  Debridement Measurements:  Wound 10/14/20 Coccyx #1 (Active)   Wound Image   01/06/21 0946   Wound Etiology Non-Healing Surgical 01/06/21 0946   Wound Cleansed Cleansed with saline 01/06/21 0956   Dressing/Treatment Dry dressing;Moist to dry 11/25/20 1022   Wound Length (cm) 1.5 cm 01/06/21 0946   Wound Width (cm) 0.5 cm 01/06/21 0946   Wound Depth (cm) 2.5 cm 01/06/21 0946   Wound Surface Area (cm^2) 0.75 cm^2 01/06/21 0946   Change in Wound Size % (l*w) 84.69 01/06/21 0946   Wound Volume (cm^3) 1.88 cm^3 01/06/21 0946   Wound Healing % 87 01/06/21 0946   Post-Procedure Length (cm) 1.5 cm 01/06/21 0956   Post-Procedure Width (cm) 0.5 cm 01/06/21 0956   Post-Procedure Depth (cm) 2.5 cm 01/06/21 0956   Post-Procedure Surface Area (cm^2) 0.75 cm^2 01/06/21 0956   Post-Procedure Volume (cm^3) 1.88 cm^3 01/06/21 0956   Wound Assessment Bleeding 01/06/21 0956   Drainage Amount Moderate 01/06/21 0956   Drainage Description Green 01/06/21 0946   Odor None 01/06/21 0946   Saba-wound Assessment Intact 01/06/21 0946   Margins Attached edges 01/06/21 0946   Wound Thickness Description not for Pressure Injury Full thickness 01/06/21 0946   Number of days: 84       Percent of Wound(s)/Ulcer(s) Debrided: 100%    Total Surface Area Debrided:  0.75 sq cm     Bleeding:  Minimal    Hemostasis Achieved:  by pressure    Procedural Pain:  1  / 10     Post Procedural Pain:  1 / 10     Response to treatment:  Well tolerated by patient. Assessment:     Wound looks improved. (improved, worse or stable)    Patient tolerated procedure well and was given proper instruction. The nature of the patient's condition was explained in depth. The patient was informed that their compliance to the treatment plan is paramount to successful healing and prevention of further ulceration and/or infection     Plan:     Treatment Plan:       Treatment Note please see attached Discharge Instructions    Written patient dismissal instructions given to patient and signed by patient or POA.          Discharge Instructions       [facility], [address] [place] [address] Road  Telephone: [phone]     FAX [phone]      Discharge Instructions:  Keep weight off wounds and reposition every 2 hours if applicable. Avoid standing for long periods of time.      If wound(s) is on your lower extremity, elevate legs to the level of the heart or above for 30 minutes 4-5 times a day and/or when sitting.      Do not get wounds wet in bath or shower unless otherwise instructed by your physician. If your wound is on you foot or leg, you may purchase a cast bag. Please ask at the pharmacy.     When taking antibiotics take entire prescription as ordered by MD do not stop taking until medicine is all gone. Exercise as tolerated. No Smoking. Smoking prohibits wound healing.     If Vascular testing is ordered, please call [address] [place] [address] [phone]) to schedule.     Vascular tests ordered by Wound Care Physicians may take up to 2 hours to complete. Please keep that in mind when scheduling.      If Vascular testing is scheduled, please bring supplies to replace your dressing after testing is done. The vascular department does not stock supplies.      Wound: Sacrum      With each dressing change, rinse wounds with 0.9% Saline. (May use wound wash or soft contact solution. Both can be purchased at a local drug store). If unable to obtain saline, may use a gentle soap and water.     Dressing care: Pack with Collagen, dry dressing- change daily.     Important dietary reminders:  1. Increase Protein intake for optimal wound healing  2. No added salt to reduce any swelling  3. If diabetic, good glucose control  4.  If you smoke, smoking affects wound healing, we ask that you refrain from smoking.     Follow up with Dr Saritha Olivera In 1 week in the wound care center.      Call [phone] for any questions or concerns.      Your  is [address] [place] [address] [place] Agency/[employer] Company: [address] Information: Should you experience any significant changes in your wound(s) or have questions about your wound care, please contact the [facility] [address] [phone] Monday  - Thursday 8:00 am - 4:00 pm and Friday 8:00 am - 1:00pm. If you need help with your wound outside these hours and cannot wait until we are again available, contact your PCP or go to the hospital emergency room.      PLEASE NOTE: IF YOU ARE UNABLE TO [address], CONTINUE TO USE THE SUPPLIES YOU HAVE AVAILABLE UNTIL YOU ARE ABLE TO [address]. IT IS MOST IMPORTANT TO KEEP THE WOUND COVERED AT ALL TIMES       Douglas B. Marylene Levin MD, FACS  1/6/2021  12:39 PM

## 2021-01-06 NOTE — PLAN OF CARE
Discharge instructions given. Patient verbalized understanding. Return to [facility] in 1 week.   Called/faxed orders to [facility]

## 2021-01-13 ENCOUNTER — HOSPITAL ENCOUNTER (OUTPATIENT)
Dept: WOUND CARE | Age: 71
Discharge: HOME | End: 2021-01-13
Payer: MEDICARE

## 2021-01-13 VITALS — SYSTOLIC BLOOD PRESSURE: 118 MMHG | HEART RATE: 94 BPM | DIASTOLIC BLOOD PRESSURE: 71 MMHG | RESPIRATION RATE: 16 BRPM

## 2021-01-13 DIAGNOSIS — S31.000A SACRAL WOUND, INITIAL ENCOUNTER: Primary | ICD-10-CM

## 2021-01-13 PROCEDURE — 11042 DBRDMT SUBQ TIS 1ST 20SQCM/<: CPT | Performed by: SURGERY

## 2021-01-13 PROCEDURE — 11042 DBRDMT SUBQ TIS 1ST 20SQCM/<: CPT

## 2021-01-13 RX ORDER — LIDOCAINE 40 MG/G
CREAM TOPICAL ONCE
Status: DISCONTINUED | OUTPATIENT
Start: 2021-01-13 | End: 2021-01-14 | Stop reason: HOSPADM

## 2021-01-13 RX ORDER — LIDOCAINE HYDROCHLORIDE 20 MG/ML
JELLY TOPICAL ONCE
Status: CANCELLED | OUTPATIENT
Start: 2021-01-13 | End: 2021-01-13

## 2021-01-13 RX ORDER — BACITRACIN, NEOMYCIN, POLYMYXIN B 400; 3.5; 5 [USP'U]/G; MG/G; [USP'U]/G
OINTMENT TOPICAL ONCE
Status: CANCELLED | OUTPATIENT
Start: 2021-01-13 | End: 2021-01-13

## 2021-01-13 RX ORDER — GENTAMICIN SULFATE 1 MG/G
OINTMENT TOPICAL ONCE
Status: CANCELLED | OUTPATIENT
Start: 2021-01-13 | End: 2021-01-13

## 2021-01-13 RX ORDER — LIDOCAINE HYDROCHLORIDE 40 MG/ML
SOLUTION TOPICAL ONCE
Status: CANCELLED | OUTPATIENT
Start: 2021-01-13 | End: 2021-01-13

## 2021-01-13 RX ORDER — LIDOCAINE 50 MG/G
OINTMENT TOPICAL ONCE
Status: CANCELLED | OUTPATIENT
Start: 2021-01-13 | End: 2021-01-13

## 2021-01-13 RX ORDER — CLOBETASOL PROPIONATE 0.5 MG/G
OINTMENT TOPICAL ONCE
Status: CANCELLED | OUTPATIENT
Start: 2021-01-13 | End: 2021-01-13

## 2021-01-13 RX ORDER — LIDOCAINE 40 MG/G
CREAM TOPICAL ONCE
Status: CANCELLED | OUTPATIENT
Start: 2021-01-13 | End: 2021-01-13

## 2021-01-13 RX ORDER — BACITRACIN ZINC AND POLYMYXIN B SULFATE 500; 1000 [USP'U]/G; [USP'U]/G
OINTMENT TOPICAL ONCE
Status: CANCELLED | OUTPATIENT
Start: 2021-01-13 | End: 2021-01-13

## 2021-01-13 RX ORDER — GINSENG 100 MG
CAPSULE ORAL ONCE
Status: CANCELLED | OUTPATIENT
Start: 2021-01-13 | End: 2021-01-13

## 2021-01-13 RX ORDER — BETAMETHASONE DIPROPIONATE 0.05 %
OINTMENT (GRAM) TOPICAL ONCE
Status: CANCELLED | OUTPATIENT
Start: 2021-01-13 | End: 2021-01-13

## 2021-01-13 NOTE — PLAN OF CARE
Discharge instructions given. Patient verbalized understanding. Return to [facility] in 1 week.

## 2021-01-13 NOTE — PROGRESS NOTES
[address] Progress and Procedure Note    Michelle Marrufo  AGE: 65 y.o. GENDER: female    : 1950  TODAY'S DATE: 2021    Chief Complaint   Patient presents with    Wound Check     Follow up        History of Present Illness     Michelle Marrufo is a 65 y.o. female who presents today for wound evaluation. History of Wound: pressure wound located on the sacral decubitus ulcer stage 3. Wound Pain:  mild  Severity:  1 / 10   Wound Type:  pressure  Modifying Factors:  diabetes, poor glucose control, decreased mobility and weight loss  Associated Signs/Symptoms:  none    Past Medical History:   Diagnosis Date    Arthritis     back    Cataracts, bilateral     [facility]     [facility] mellitus [facility] [address]     Glaucoma     Hyperlipidemia     Hypertension     Thyroid disease      Past Surgical History:   Procedure Laterality Date    CARPAL TUNNEL RELEASE      bilateral    CHOLECYSTECTOMY      COLONOSCOPY      ELBOW SURGERY      ENDOSCOPY, COLON, DIAGNOSTIC      EYE SURGERY      FOOT SURGERY      SHOULDER SURGERY       Current Outpatient Medications   Medication Sig Dispense Refill    traMADol (ULTRAM) 50 MG tablet Take 50 mg by mouth 3 times daily as needed for Pain.  DULoxetine (CYMBALTA) 60 MG extended release capsule Take 1 capsule by mouth daily 30 capsule 3    sodium chloride (OCEAN, BABY AYR) 0.65 % nasal spray 1 spray by Nasal route every 2 hours as needed for Congestion 1 Bottle 0    mometasone-formoterol (DULERA) 200-5 MCG/ACT inhaler Inhale 2 puffs into the lungs every 12 hours 1 Inhaler 1    spironolactone (ALDACTONE) 25 MG tablet Take 12.5 mg by mouth daily      Travoprost, BAK Free, (TRAVATAN Z) 0.004 % SOLN ophthalmic solution Place 1 drop into both eyes nightly      DICLOFENAC PO Take  by mouth.       acetaminophen (TYLENOL) 500 MG tablet Take 500 mg by mouth every 6 hours as needed for Pain or Fever       aspirin EC 81 MG EC tablet Take 81 mg by mouth daily May restart in AM.      furosemide (LASIX) 40 MG tablet Take 40 mg by mouth daily       levothyroxine (LEVOXYL) 200 MCG tablet Take 200 mcg by mouth Daily       Insulin Detemir (LEVEMIR FLEXPEN SC) Inject 33 Units into the skin nightly       simvastatin (ZOCOR) 40 MG tablet Take 40 mg by mouth nightly.  Insulin Lispro, Human, (HUMALOG SC) Inject 10 Units into the skin 3 times daily (with meals) With sliding      Calcium Carbonate-Vitamin D (CALCIUM + D PO) Take by mouth daily        Current Facility-Administered Medications   Medication Dose Route Frequency Provider Last Rate Last Admin    lidocaine (LMX) 4 % cream   Topical Once Travis Schultz MD          Social History:   Social History     Tobacco Use    Smoking status: Never Smoker    Smokeless tobacco: Never Used   Substance Use Topics    Alcohol use: Not Currently    Drug use: Never     Allergies: Other, Ace inhibitors, Metoprolol, Tetrahydrozoline hcl, and Timolol    Procedure: Indications:  Based on my examination of this patient's wound(s)/ulcer(s) today, debridement is required to promote healing and evaluate the wound base. Performed by: Andrez March MD    Consent obtained: Yes    Time out taken: Yes    Pain Control: Anesthetic  Anesthetic: 4% Lidocaine Cream     Debridement: Excisional Debridement    Using curette the wound was sharply debrided    down through and including the removal of epidermis, dermis and subcutaneous tissue.         Devitalized Tissue Debrided: fibrin, biofilm and slough    Pre Debridement Measurements:  Are located in the Wound Documentation Flow Sheet     Wound #: 1     Post  Debridement Measurements:  Wound 10/14/20 Coccyx #1 (Active)   Wound Image   01/06/21 0946   Wound Etiology Non-Healing Surgical 01/13/21 0941   Wound Cleansed Cleansed with saline 01/13/21 0958   Dressing/Treatment Dry dressing;Moist to dry 11/25/20 1022   Wound Length (cm) 1.1 cm 01/13/21 0941   Wound Width (cm) 0.5 cm 01/13/21 0941   Wound Depth (cm) 2 cm 01/13/21 0941   Wound Surface Area (cm^2) 0.55 cm^2 01/13/21 0941   Change in Wound Size % (l*w) 88.78 01/13/21 0941   Wound Volume (cm^3) 1.1 cm^3 01/13/21 0941   Wound Healing % 92 01/13/21 0941   Post-Procedure Length (cm) 1.1 cm 01/13/21 0958   Post-Procedure Width (cm) 0.5 cm 01/13/21 0958   Post-Procedure Depth (cm) 2 cm 01/13/21 0958   Post-Procedure Surface Area (cm^2) 0.55 cm^2 01/13/21 0958   Post-Procedure Volume (cm^3) 1.1 cm^3 01/13/21 0958   Wound Assessment Bleeding 01/13/21 0958   Drainage Amount Moderate 01/13/21 0958   Drainage Description Yellow 01/13/21 0941   Odor None 01/13/21 0941   Saba-wound Assessment Intact 01/13/21 0941   Margins Defined edges 01/13/21 0941   Wound Thickness Description not for Pressure Injury Full thickness 01/13/21 0941   Number of days: 91       Percent of Wound(s)/Ulcer(s) Debrided: 100%    Total Surface Area Debrided:  0.55 sq cm     Bleeding:  Minimal    Hemostasis Achieved:  by pressure    Procedural Pain:  1  / 10     Post Procedural Pain:  1 / 10     Response to treatment:  Well tolerated by patient. Assessment:     Wound looks improved. (improved, worse or stable)    Patient tolerated procedure well and was given proper instruction. The nature of the patient's condition was explained in depth. The patient was informed that their compliance to the treatment plan is paramount to successful healing and prevention of further ulceration and/or infection     Plan:     Treatment Plan:       Treatment Note please see attached Discharge Instructions    Written patient dismissal instructions given to patient and signed by patient or POA.          Discharge Instructions       [facility], [address] [place] [address]  Telephone: [phone]     FAX [phone]      Discharge Instructions:  Keep weight off wounds and reposition every 2 hours if applicable. Avoid standing for long periods of time.      If wound(s) is on your lower extremity, elevate legs to the level of the heart or above for 30 minutes 4-5 times a day and/or when sitting.      Do not get wounds wet in bath or shower unless otherwise instructed by your physician. If your wound is on you foot or leg, you may purchase a cast bag. Please ask at the pharmacy.     When taking antibiotics take entire prescription as ordered by MD do not stop taking until medicine is all gone. Exercise as tolerated. No Smoking. Smoking prohibits wound healing.     If Vascular testing is ordered, please call [address] [place] [address] [phone]) to schedule.     Vascular tests ordered by Wound Care Physicians may take up to 2 hours to complete. Please keep that in mind when scheduling.      If Vascular testing is scheduled, please bring supplies to replace your dressing after testing is done. The vascular department does not stock supplies.      Wound: Sacrum      With each dressing change, rinse wounds with 0.9% Saline. (May use wound wash or soft contact solution. Both can be purchased at a local drug store). If unable to obtain saline, may use a gentle soap and water.     Dressing care: Pack with Collagen, dry dressing- change daily.     Important dietary reminders:  1. Increase Protein intake for optimal wound healing  2. No added salt to reduce any swelling  3. If diabetic, good glucose control  4.  If you smoke, smoking affects wound healing, we ask that you refrain from smoking.     Follow up with Dr Saritha Olivera In 1 week in the wound care center.      Call [phone] for any questions or concerns.      Your  is [place] Agency[employer] Company: [facility] [address] Information: Should you experience any significant changes in your wound(s) or have questions about your wound care, please contact the [address] [place] [phone] Monday  - Thursday 8:00 am - 4:00 pm and Friday 8:00 am - 1:00pm. If you need help with your wound outside these hours and cannot wait until we are again available, contact your PCP or go to the hospital emergency room.      PLEASE NOTE: IF YOU ARE UNABLE TO [address], CONTINUE TO USE THE SUPPLIES YOU HAVE AVAILABLE UNTIL YOU ARE ABLE TO [facility]. IT IS MOST IMPORTANT TO KEEP THE WOUND COVERED AT ALL TIMES       Dakota Charles MD, FACS  1/13/2021  10:13 AM

## 2021-01-20 ENCOUNTER — HOSPITAL ENCOUNTER (OUTPATIENT)
Dept: WOUND CARE | Age: 71
Discharge: HOME | End: 2021-01-20
Payer: MEDICARE

## 2021-01-20 VITALS — RESPIRATION RATE: 16 BRPM | HEART RATE: 91 BPM | SYSTOLIC BLOOD PRESSURE: 121 MMHG | DIASTOLIC BLOOD PRESSURE: 74 MMHG

## 2021-01-20 DIAGNOSIS — S31.000A SACRAL WOUND, INITIAL ENCOUNTER: Primary | ICD-10-CM

## 2021-01-20 PROCEDURE — 11042 DBRDMT SUBQ TIS 1ST 20SQCM/<: CPT | Performed by: SURGERY

## 2021-01-20 PROCEDURE — 11042 DBRDMT SUBQ TIS 1ST 20SQCM/<: CPT

## 2021-01-20 RX ORDER — GINSENG 100 MG
CAPSULE ORAL ONCE
Status: CANCELLED | OUTPATIENT
Start: 2021-01-20 | End: 2021-01-20

## 2021-01-20 RX ORDER — LIDOCAINE HYDROCHLORIDE 20 MG/ML
JELLY TOPICAL ONCE
Status: CANCELLED | OUTPATIENT
Start: 2021-01-20 | End: 2021-01-20

## 2021-01-20 RX ORDER — LIDOCAINE 50 MG/G
OINTMENT TOPICAL ONCE
Status: CANCELLED | OUTPATIENT
Start: 2021-01-20 | End: 2021-01-20

## 2021-01-20 RX ORDER — BACITRACIN ZINC AND POLYMYXIN B SULFATE 500; 1000 [USP'U]/G; [USP'U]/G
OINTMENT TOPICAL ONCE
Status: CANCELLED | OUTPATIENT
Start: 2021-01-20 | End: 2021-01-20

## 2021-01-20 RX ORDER — CLOBETASOL PROPIONATE 0.5 MG/G
OINTMENT TOPICAL ONCE
Status: CANCELLED | OUTPATIENT
Start: 2021-01-20 | End: 2021-01-20

## 2021-01-20 RX ORDER — BETAMETHASONE DIPROPIONATE 0.05 %
OINTMENT (GRAM) TOPICAL ONCE
Status: CANCELLED | OUTPATIENT
Start: 2021-01-20 | End: 2021-01-20

## 2021-01-20 RX ORDER — GENTAMICIN SULFATE 1 MG/G
OINTMENT TOPICAL ONCE
Status: CANCELLED | OUTPATIENT
Start: 2021-01-20 | End: 2021-01-20

## 2021-01-20 RX ORDER — LIDOCAINE HYDROCHLORIDE 40 MG/ML
SOLUTION TOPICAL ONCE
Status: CANCELLED | OUTPATIENT
Start: 2021-01-20 | End: 2021-01-20

## 2021-01-20 RX ORDER — BACITRACIN, NEOMYCIN, POLYMYXIN B 400; 3.5; 5 [USP'U]/G; MG/G; [USP'U]/G
OINTMENT TOPICAL ONCE
Status: CANCELLED | OUTPATIENT
Start: 2021-01-20 | End: 2021-01-20

## 2021-01-20 RX ORDER — LIDOCAINE 40 MG/G
CREAM TOPICAL ONCE
Status: DISCONTINUED | OUTPATIENT
Start: 2021-01-20 | End: 2021-01-21 | Stop reason: HOSPADM

## 2021-01-20 RX ORDER — LIDOCAINE 40 MG/G
CREAM TOPICAL ONCE
Status: CANCELLED | OUTPATIENT
Start: 2021-01-20 | End: 2021-01-20

## 2021-01-20 NOTE — PROGRESS NOTES
[address] Progress and Procedure Note    Stephen Nielsen  AGE: 65 y.o. GENDER: female    : 1950  TODAY'S DATE: 2021    Chief Complaint   Patient presents with    Wound Check     buttocks        History of Present Illness     Stephen Nielsen is a 65 y.o. female who presents today for wound evaluation. History of Wound: pressure wound located on the sacral decubitus ulcer stage 3. Wound Pain:  mild  Severity:  1 / 10   Wound Type:  pressure  Modifying Factors:  diabetes, poor glucose control, decreased mobility and weight loss  Associated Signs/Symptoms:  none    Past Medical History:   Diagnosis Date    Arthritis     back    Cataracts, bilateral     [facility])     Diabetes mellitus ([facility] [address]     Glaucoma     Hyperlipidemia     Hypertension     Thyroid disease      Past Surgical History:   Procedure Laterality Date    CARPAL TUNNEL RELEASE      bilateral    CHOLECYSTECTOMY      COLONOSCOPY      ELBOW SURGERY      ENDOSCOPY, COLON, DIAGNOSTIC      EYE SURGERY      FOOT SURGERY      SHOULDER SURGERY       Current Outpatient Medications   Medication Sig Dispense Refill    traMADol (ULTRAM) 50 MG tablet Take 50 mg by mouth 3 times daily as needed for Pain.  DULoxetine (CYMBALTA) 60 MG extended release capsule Take 1 capsule by mouth daily 30 capsule 3    sodium chloride (OCEAN, BABY AYR) 0.65 % nasal spray 1 spray by Nasal route every 2 hours as needed for Congestion 1 Bottle 0    mometasone-formoterol (DULERA) 200-5 MCG/ACT inhaler Inhale 2 puffs into the lungs every 12 hours 1 Inhaler 1    spironolactone (ALDACTONE) 25 MG tablet Take 12.5 mg by mouth daily      Travoprost, BAK Free, (TRAVATAN Z) 0.004 % SOLN ophthalmic solution Place 1 drop into both eyes nightly      DICLOFENAC PO Take  by mouth.       acetaminophen (TYLENOL) 500 MG tablet Take 500 mg by mouth every 6 hours as needed for Pain or Fever       aspirin EC 81 MG EC tablet Take 81 mg by mouth daily May restart in AM.      furosemide (LASIX) 40 MG tablet Take 40 mg by mouth daily       levothyroxine (LEVOXYL) 200 MCG tablet Take 200 mcg by mouth Daily       Insulin Detemir (LEVEMIR FLEXPEN SC) Inject 33 Units into the skin nightly       simvastatin (ZOCOR) 40 MG tablet Take 40 mg by mouth nightly.  Insulin Lispro, Human, (HUMALOG SC) Inject 10 Units into the skin 3 times daily (with meals) With sliding      Calcium Carbonate-Vitamin D (CALCIUM + D PO) Take by mouth daily        Current Facility-Administered Medications   Medication Dose Route Frequency Provider Last Rate Last Admin    lidocaine (LMX) 4 % cream   Topical Once Rodrigo Meneses MD          Social History:   Social History     Tobacco Use    Smoking status: Never Smoker    Smokeless tobacco: Never Used   Substance Use Topics    Alcohol use: Not Currently    Drug use: Never     Allergies: Other, Ace inhibitors, Metoprolol, Tetrahydrozoline hcl, and Timolol    Procedure: Indications:  Based on my examination of this patient's wound(s)/ulcer(s) today, debridement is required to promote healing and evaluate the wound base. Performed by: Nuris Costa MD    Consent obtained: Yes    Time out taken: Yes    Pain Control: Anesthetic  Anesthetic: 4% Lidocaine Cream     Debridement: Excisional Debridement    Using curette the wound was sharply debrided    down through and including the removal of epidermis, dermis and subcutaneous tissue.         Devitalized Tissue Debrided: fibrin, biofilm and slough    Pre Debridement Measurements:  Are located in the Wound Documentation Flow Sheet     Wound #: 1     Post  Debridement Measurements:  Wound 10/14/20 Coccyx #1 (Active)   Wound Image   01/06/21 0946   Wound Etiology Non-Healing Surgical 01/20/21 0951   Wound Cleansed Cleansed with saline 01/20/21 1009   Dressing/Treatment Dry dressing;Moist to dry 11/25/20 1022   Wound Length (cm) 1.2 cm 01/20/21 0951   Wound Width (cm) 0.9 cm 01/20/21 0951   Wound Depth (cm) 2.1 cm 01/20/21 0951   Wound Surface Area (cm^2) 1.08 cm^2 01/20/21 0951   Change in Wound Size % (l*w) 77.96 01/20/21 0951   Wound Volume (cm^3) 2.27 cm^3 01/20/21 0951   Wound Healing % 84 01/20/21 0951   Post-Procedure Length (cm) 1.2 cm 01/20/21 1009   Post-Procedure Width (cm) 0.9 cm 01/20/21 1009   Post-Procedure Depth (cm) 2.1 cm 01/20/21 1009   Post-Procedure Surface Area (cm^2) 1.08 cm^2 01/20/21 1009   Post-Procedure Volume (cm^3) 2.27 cm^3 01/20/21 1009   Wound Assessment Bleeding 01/20/21 1009   Drainage Amount Moderate 01/20/21 1009   Drainage Description Yellow 01/20/21 0951   Odor None 01/20/21 0951   Saba-wound Assessment Intact 01/20/21 0951   Margins Defined edges 01/20/21 0951   Wound Thickness Description not for Pressure Injury Full thickness 01/20/21 0951   Number of days: 98       Percent of Wound(s)/Ulcer(s) Debrided: 100%    Total Surface Area Debrided:  1.08 sq cm     Bleeding:  Minimal    Hemostasis Achieved:  by pressure    Procedural Pain:  1  / 10     Post Procedural Pain:  2 / 10     Response to treatment:  Well tolerated by patient. Assessment:     Wound looks improved. (improved, worse or stable)    Patient tolerated procedure well and was given proper instruction. The nature of the patient's condition was explained in depth. The patient was informed that their compliance to the treatment plan is paramount to successful healing and prevention of further ulceration and/or infection     Plan:     Treatment Plan:       Treatment Note please see attached Discharge Instructions    Written patient dismissal instructions given to patient and signed by patient or POA.          Discharge Instructions       [facility], [address] [place] [address] Road  Telephone: [phone]     FAX [phone]      Discharge Instructions:  Keep weight off wounds and reposition every 2 hours if applicable. Avoid standing for long periods of time.      If wound(s) is on your lower extremity, elevate legs to the level of the heart or above for 30 minutes 4-5 times a day and/or when sitting.      Do not get wounds wet in bath or shower unless otherwise instructed by your physician. If your wound is on you foot or leg, you may purchase a cast bag. Please ask at the pharmacy.     When taking antibiotics take entire prescription as ordered by MD do not stop taking until medicine is all gone. Exercise as tolerated. No Smoking. Smoking prohibits wound healing.     If Vascular testing is ordered, please call [address] [place] [address] [phone]) to schedule.     Vascular tests ordered by Wound Care Physicians may take up to 2 hours to complete. Please keep that in mind when scheduling.      If Vascular testing is scheduled, please bring supplies to replace your dressing after testing is done. The vascular department does not stock supplies.      Wound: Sacrum      With each dressing change, rinse wounds with 0.9% Saline. (May use wound wash or soft contact solution. Both can be purchased at a local drug store). If unable to obtain saline, may use a gentle soap and water.     Dressing care: Pack with Collagen, dry dressing- change daily.     Important dietary reminders:  1. Increase Protein intake for optimal wound healing  2. No added salt to reduce any swelling  3. If diabetic, good glucose control  4.  If you smoke, smoking affects wound healing, we ask that you refrain from smoking.     Follow up with Dr Pedro Her In 1 week in the wound care center.      Call [phone] for any questions or concerns.      Your  is [facility] Agency/Supply Company: [facility] [address] Information: Should you experience any significant changes in your wound(s) or have questions about your wound care, please contact the [facility] 30  [phone] Monday  - Thursday 8:00 am - 4:00 pm and Friday 8:00 am - 1:00pm. If you need help with your wound outside these hours and cannot wait until we are again available, contact your PCP or go to the hospital emergency room.      PLEASE NOTE: IF YOU ARE UNABLE TO [address], CONTINUE TO USE THE SUPPLIES YOU HAVE AVAILABLE UNTIL YOU ARE ABLE TO [address]. IT IS MOST IMPORTANT TO KEEP THE WOUND COVERED AT ALL TIMES       Dakota Beebe MD, FACS  1/20/2021  10:14 AM

## 2021-01-20 NOTE — PLAN OF CARE
Discharge instructions given. Patient verbalized understanding. Return to [facility] in 1 week.

## 2021-01-27 ENCOUNTER — HOSPITAL ENCOUNTER (OUTPATIENT)
Dept: WOUND CARE | Age: 71
Discharge: HOME | End: 2021-01-27
Payer: MEDICARE

## 2021-01-27 VITALS — HEART RATE: 90 BPM | DIASTOLIC BLOOD PRESSURE: 77 MMHG | RESPIRATION RATE: 16 BRPM | SYSTOLIC BLOOD PRESSURE: 133 MMHG

## 2021-01-27 DIAGNOSIS — S31.000A SACRAL WOUND, INITIAL ENCOUNTER: Primary | ICD-10-CM

## 2021-01-27 PROCEDURE — 11042 DBRDMT SUBQ TIS 1ST 20SQCM/<: CPT

## 2021-01-27 PROCEDURE — 11042 DBRDMT SUBQ TIS 1ST 20SQCM/<: CPT | Performed by: SURGERY

## 2021-01-27 RX ORDER — LIDOCAINE 40 MG/G
CREAM TOPICAL ONCE
Status: DISCONTINUED | OUTPATIENT
Start: 2021-01-27 | End: 2021-01-28 | Stop reason: HOSPADM

## 2021-01-27 RX ORDER — BETAMETHASONE DIPROPIONATE 0.05 %
OINTMENT (GRAM) TOPICAL ONCE
Status: CANCELLED | OUTPATIENT
Start: 2021-01-27 | End: 2021-01-27

## 2021-01-27 RX ORDER — LIDOCAINE HYDROCHLORIDE 20 MG/ML
JELLY TOPICAL ONCE
Status: CANCELLED | OUTPATIENT
Start: 2021-01-27 | End: 2021-01-27

## 2021-01-27 RX ORDER — LIDOCAINE 50 MG/G
OINTMENT TOPICAL ONCE
Status: CANCELLED | OUTPATIENT
Start: 2021-01-27 | End: 2021-01-27

## 2021-01-27 RX ORDER — CLOBETASOL PROPIONATE 0.5 MG/G
OINTMENT TOPICAL ONCE
Status: CANCELLED | OUTPATIENT
Start: 2021-01-27 | End: 2021-01-27

## 2021-01-27 RX ORDER — LIDOCAINE 40 MG/G
CREAM TOPICAL ONCE
Status: CANCELLED | OUTPATIENT
Start: 2021-01-27 | End: 2021-01-27

## 2021-01-27 RX ORDER — GENTAMICIN SULFATE 1 MG/G
OINTMENT TOPICAL ONCE
Status: CANCELLED | OUTPATIENT
Start: 2021-01-27 | End: 2021-01-27

## 2021-01-27 RX ORDER — BACITRACIN ZINC AND POLYMYXIN B SULFATE 500; 1000 [USP'U]/G; [USP'U]/G
OINTMENT TOPICAL ONCE
Status: CANCELLED | OUTPATIENT
Start: 2021-01-27 | End: 2021-01-27

## 2021-01-27 RX ORDER — BACITRACIN, NEOMYCIN, POLYMYXIN B 400; 3.5; 5 [USP'U]/G; MG/G; [USP'U]/G
OINTMENT TOPICAL ONCE
Status: CANCELLED | OUTPATIENT
Start: 2021-01-27 | End: 2021-01-27

## 2021-01-27 RX ORDER — LIDOCAINE HYDROCHLORIDE 40 MG/ML
SOLUTION TOPICAL ONCE
Status: CANCELLED | OUTPATIENT
Start: 2021-01-27 | End: 2021-01-27

## 2021-01-27 RX ORDER — GINSENG 100 MG
CAPSULE ORAL ONCE
Status: CANCELLED | OUTPATIENT
Start: 2021-01-27 | End: 2021-01-27

## 2021-01-27 NOTE — PROGRESS NOTES
[facility]  [address] [facility], [address] [place] [address] Road  Telephone: [phone]        [facility] AT [facility] Fax # [phone]      Discharge Instructions       [facility]  [address] [facility], [address] [place] [address] Road  Telephone: [phone]     FAX [phone]      Discharge Instructions:  Keep weight off wounds and reposition every 2 hours if applicable. Avoid standing for long periods of time.      If wound(s) is on your lower extremity, elevate legs to the level of the heart or above for 30 minutes 4-5 times a day and/or when sitting.      Do not get wounds wet in bath or shower unless otherwise instructed by your physician. If your wound is on you foot or leg, you may purchase a cast bag. Please ask at the pharmacy.     When taking antibiotics take entire prescription as ordered by MD do not stop taking until medicine is all gone. Exercise as tolerated. No Smoking. Smoking prohibits wound healing.     If Vascular testing is ordered, please call [address] [place] [address] [phone]) to schedule.     Vascular tests ordered by Wound Care Physicians may take up to 2 hours to complete. Please keep that in mind when scheduling.      If Vascular testing is scheduled, please bring supplies to replace your dressing after testing is done. The vascular department does not stock supplies.      Wound: Sacrum      With each dressing change, rinse wounds with 0.9% Saline. (May use wound wash or soft contact solution. Both can be purchased at a local drug store). If unable to obtain saline, may use a gentle soap and water.     Dressing care: Loosely pack with Hydroferra Blue Rope (moisten Hydroferra Blue rope with saline), dry dressing- change daily.     Important dietary reminders:  1. Increase Protein intake for optimal wound healing  2. No added salt to reduce any swelling  3. If diabetic, good glucose control  4.  If you smoke, smoking affects wound healing, we ask that you refrain from smoking.     Follow up with Dr Marge Schmid In 1 week in the wound care center.      Call [phone] for any questions or concerns.      Your  is [address] Agency/Supply Company: [employer] Information: Should you experience any significant changes in your wound(s) or have questions about your wound care, please contact the [facility] 30 HR [phone] Monday  - Thursday 8:00 am - 4:00 pm and Friday 8:00 am - 1:00pm. If you need help with your wound outside these hours and cannot wait until we are again available, contact your PCP or go to the hospital emergency room.      PLEASE NOTE: IF YOU ARE UNABLE TO OBTAIN WOUND SUPPLIES, CONTINUE TO USE THE SUPPLIES YOU HAVE AVAILABLE UNTIL YOU ARE ABLE TO [facility]. IT IS MOST IMPORTANT TO KEEP THE WOUND COVERED AT ALL TIMES        Skilled nurse to evaluate and treat for wound care. Change dressing as ordered  once a day on Monday, Tuesday, Wednesday, Thursday, Friday, Saturday and Sunday using clean technique. Patient/Family/caregiver may change dressings as needed as instructed when Home Care unavailable.     WOUNDS REQUIRING DRESSING Changes:     Wound 10/14/20 Coccyx #1 (Active)   Wound Image   01/27/21 1023   Wound Etiology Non-Healing Surgical 01/27/21 1023   Wound Cleansed Cleansed with saline 01/27/21 1034   Dressing/Treatment Dry dressing;Hydrofera blue 01/27/21 1034   Wound Length (cm) 1.5 cm 01/27/21 1023   Wound Width (cm) 1 cm 01/27/21 1023   Wound Depth (cm) 2 cm 01/27/21 1023   Wound Surface Area (cm^2) 1.5 cm^2 01/27/21 1023   Change in Wound Size % (l*w) 69.39 01/27/21 1023   Wound Volume (cm^3) 3 cm^3 01/27/21 1023   Wound Healing % 79 01/27/21 1023   Post-Procedure Length (cm) 1.5 cm 01/27/21 1034   Post-Procedure Width (cm) 1 cm 01/27/21 1034   Post-Procedure Depth (cm) 2 cm 01/27/21 1034   Post-Procedure Surface Area (cm^2) 1.5 cm^2 01/27/21 1034   Post-Procedure Volume (cm^3) 3 cm^3 01/27/21 1034   Wound Assessment Bleeding 01/27/21 1034   Drainage Amount Moderate 01/27/21 1034   Drainage Description Serosanguinous 01/27/21 1023   Odor None 01/27/21 1023   Saba-wound Assessment Intact 01/27/21 1023   Margins Defined edges 01/27/21 1023   Wound Thickness Description not for Pressure Injury Full thickness 01/27/21 1023   Number of days: 105          Patient seen and treated on 1/27/2021    By Erasmo Miller MD NPI: [id number]  (provider/NPI)

## 2021-01-27 NOTE — PROGRESS NOTES
needed for Pain or Fever       aspirin EC 81 MG EC tablet Take 81 mg by mouth daily May restart in AM.      furosemide (LASIX) 40 MG tablet Take 40 mg by mouth daily       levothyroxine (LEVOXYL) 200 MCG tablet Take 200 mcg by mouth Daily       Insulin Detemir (LEVEMIR FLEXPEN SC) Inject 33 Units into the skin nightly       simvastatin (ZOCOR) 40 MG tablet Take 40 mg by mouth nightly.  Insulin Lispro, Human, (HUMALOG SC) Inject 10 Units into the skin 3 times daily (with meals) With sliding      Calcium Carbonate-Vitamin D (CALCIUM + D PO) Take by mouth daily        Current Facility-Administered Medications   Medication Dose Route Frequency Provider Last Rate Last Admin    lidocaine (LMX) 4 % cream   Topical Once Benji Cortes MD          Social History:   Social History     Tobacco Use    Smoking status: Never Smoker    Smokeless tobacco: Never Used   Substance Use Topics    Alcohol use: Not Currently    Drug use: Never     Allergies: Other, Ace inhibitors, Metoprolol, Tetrahydrozoline hcl, and Timolol    Procedure: Indications:  Based on my examination of this patient's wound(s)/ulcer(s) today, debridement is required to promote healing and evaluate the wound base. Performed by: Celia Do MD    Consent obtained: Yes    Time out taken: Yes    Pain Control: Anesthetic  Anesthetic: 4% Lidocaine Cream     Debridement: Excisional Debridement    Using curette the wound was sharply debrided    down through and including the removal of epidermis, dermis and subcutaneous tissue.         Devitalized Tissue Debrided: fibrin, biofilm and slough    Pre Debridement Measurements:  Are located in the Wound Documentation Flow Sheet     Wound #: 1     Post  Debridement Measurements:  Wound 10/14/20 Coccyx #1 (Active)   Wound Image   01/27/21 1023   Wound Etiology Non-Healing Surgical 01/27/21 1023   Wound Cleansed Cleansed with saline 01/27/21 1034   Dressing/Treatment Moist to dry 01/20/21 1043 Wound Length (cm) 1.5 cm 01/27/21 1023   Wound Width (cm) 1 cm 01/27/21 1023   Wound Depth (cm) 2 cm 01/27/21 1023   Wound Surface Area (cm^2) 1.5 cm^2 01/27/21 1023   Change in Wound Size % (l*w) 69.39 01/27/21 1023   Wound Volume (cm^3) 3 cm^3 01/27/21 1023   Wound Healing % 79 01/27/21 1023   Post-Procedure Length (cm) 1.5 cm 01/27/21 1034   Post-Procedure Width (cm) 1 cm 01/27/21 1034   Post-Procedure Depth (cm) 2 cm 01/27/21 1034   Post-Procedure Surface Area (cm^2) 1.5 cm^2 01/27/21 1034   Post-Procedure Volume (cm^3) 3 cm^3 01/27/21 1034   Wound Assessment Bleeding 01/27/21 1034   Drainage Amount Moderate 01/27/21 1034   Drainage Description Serosanguinous 01/27/21 1023   Odor None 01/27/21 1023   Saba-wound Assessment Intact 01/27/21 1023   Margins Defined edges 01/27/21 1023   Wound Thickness Description not for Pressure Injury Full thickness 01/27/21 1023   Number of days: 105       Percent of Wound(s)/Ulcer(s) Debrided: 100%    Total Surface Area Debrided:  1.5 sq cm     Bleeding:  Minimal    Hemostasis Achieved:  by pressure    Procedural Pain:  1  / 10     Post Procedural Pain:  2 / 10     Response to treatment:  Well tolerated by patient. Assessment:     Wound looks improved. (improved, worse or stable)    Patient tolerated procedure well and was given proper instruction. The nature of the patient's condition was explained in depth. The patient was informed that their compliance to the treatment plan is paramount to successful healing and prevention of further ulceration and/or infection     Plan:     Treatment Plan:       Treatment Note please see attached Discharge Instructions    Written patient dismissal instructions given to patient and signed by patient or POA.          Discharge [address]  Telephone: [phone]     FAX [phone]      Discharge Instructions:  Keep weight off wounds and reposition every 2 hours if applicable. Avoid standing for long periods of time.      If wound(s) is on your lower extremity, elevate legs to the level of the heart or above for 30 minutes 4-5 times a day and/or when sitting.      Do not get wounds wet in bath or shower unless otherwise instructed by your physician. If your wound is on you foot or leg, you may purchase a cast bag. Please ask at the pharmacy.     When taking antibiotics take entire prescription as ordered by MD do not stop taking until medicine is all gone. Exercise as tolerated. No Smoking. Smoking prohibits wound healing.     If Vascular testing is ordered, please call [address] [place] [address] [phone]) to schedule.     Vascular tests ordered by Wound Care Physicians may take up to 2 hours to complete. Please keep that in mind when scheduling.      If Vascular testing is scheduled, please bring supplies to replace your dressing after testing is done. The vascular department does not stock supplies.      Wound: Sacrum      With each dressing change, rinse wounds with 0.9% Saline. (May use wound wash or soft contact solution. Both can be purchased at a local drug store). If unable to obtain saline, may use a gentle soap and water.     Dressing care: Loosely pack with Hydroferra Blue Rope, dry dressing- change daily.     Important dietary reminders:  1. Increase Protein intake for optimal wound healing  2. No added salt to reduce any swelling  3. If diabetic, good glucose control  4.  If you smoke, smoking affects wound healing, we ask that you refrain from smoking.     Follow up with Dr Nelly Cervantes In 1 week in the wound care center.      Call [phone] for any questions or concerns.      Your  is [address] [place] [address] [email or web address] Agency/[employer] Company: [facility] [address] Information: Should you experience any significant changes in your wound(s) or have questions about your wound care, please contact the [facility] 30 CR [phone] Monday  - Thursday 8:00 am - 4:00 pm and Friday 8:00 am - 1:00pm. If you need help with your wound outside these hours and cannot wait until we are again available, contact your PCP or go to the hospital emergency room.      PLEASE NOTE: IF YOU ARE UNABLE TO [address], CONTINUE TO USE THE SUPPLIES YOU HAVE AVAILABLE UNTIL YOU ARE ABLE TO [address] [facility] [address]. IT IS MOST IMPORTANT TO KEEP THE WOUND COVERED AT ALL TIMES       Dakota Sanchez MD, FACS  1/27/2021  10:37 AM

## 2021-01-27 NOTE — PLAN OF CARE
Discharge instructions given. Patient verbalized understanding. Return to [address] [place] [address] [address] in 1 week.   Called/faxed orders to [place]

## 2021-02-03 ENCOUNTER — HOSPITAL ENCOUNTER (OUTPATIENT)
Dept: WOUND CARE | Age: 71
Discharge: HOME | End: 2021-02-03
Payer: MEDICARE

## 2021-02-10 ENCOUNTER — HOSPITAL ENCOUNTER (OUTPATIENT)
Dept: WOUND CARE | Age: 71
Discharge: HOME | End: 2021-02-10
Payer: MEDICARE

## 2021-02-10 VITALS
HEART RATE: 73 BPM | WEIGHT: 223 LBS | SYSTOLIC BLOOD PRESSURE: 124 MMHG | BODY MASS INDEX: 35.99 KG/M2 | DIASTOLIC BLOOD PRESSURE: 67 MMHG | TEMPERATURE: 97.7 F

## 2021-02-10 DIAGNOSIS — S31.000A SACRAL WOUND, INITIAL ENCOUNTER: Primary | ICD-10-CM

## 2021-02-10 PROCEDURE — 11042 DBRDMT SUBQ TIS 1ST 20SQCM/<: CPT | Performed by: SURGERY

## 2021-02-10 PROCEDURE — 11042 DBRDMT SUBQ TIS 1ST 20SQCM/<: CPT

## 2021-02-10 RX ORDER — BETAMETHASONE DIPROPIONATE 0.05 %
OINTMENT (GRAM) TOPICAL ONCE
Status: CANCELLED | OUTPATIENT
Start: 2021-02-10 | End: 2021-02-10

## 2021-02-10 RX ORDER — GENTAMICIN SULFATE 1 MG/G
OINTMENT TOPICAL ONCE
Status: CANCELLED | OUTPATIENT
Start: 2021-02-10 | End: 2021-02-10

## 2021-02-10 RX ORDER — GINSENG 100 MG
CAPSULE ORAL ONCE
Status: CANCELLED | OUTPATIENT
Start: 2021-02-10 | End: 2021-02-10

## 2021-02-10 RX ORDER — BACITRACIN, NEOMYCIN, POLYMYXIN B 400; 3.5; 5 [USP'U]/G; MG/G; [USP'U]/G
OINTMENT TOPICAL ONCE
Status: CANCELLED | OUTPATIENT
Start: 2021-02-10 | End: 2021-02-10

## 2021-02-10 RX ORDER — LIDOCAINE 50 MG/G
OINTMENT TOPICAL ONCE
Status: CANCELLED | OUTPATIENT
Start: 2021-02-10 | End: 2021-02-10

## 2021-02-10 RX ORDER — LIDOCAINE HYDROCHLORIDE 40 MG/ML
SOLUTION TOPICAL ONCE
Status: CANCELLED | OUTPATIENT
Start: 2021-02-10 | End: 2021-02-10

## 2021-02-10 RX ORDER — BACITRACIN ZINC AND POLYMYXIN B SULFATE 500; 1000 [USP'U]/G; [USP'U]/G
OINTMENT TOPICAL ONCE
Status: CANCELLED | OUTPATIENT
Start: 2021-02-10 | End: 2021-02-10

## 2021-02-10 RX ORDER — CLOBETASOL PROPIONATE 0.5 MG/G
OINTMENT TOPICAL ONCE
Status: CANCELLED | OUTPATIENT
Start: 2021-02-10 | End: 2021-02-10

## 2021-02-10 RX ORDER — LIDOCAINE 40 MG/G
CREAM TOPICAL ONCE
Status: DISCONTINUED | OUTPATIENT
Start: 2021-02-10 | End: 2021-02-11 | Stop reason: HOSPADM

## 2021-02-10 RX ORDER — LIDOCAINE HYDROCHLORIDE 20 MG/ML
JELLY TOPICAL ONCE
Status: CANCELLED | OUTPATIENT
Start: 2021-02-10 | End: 2021-02-10

## 2021-02-10 RX ORDER — LIDOCAINE 40 MG/G
CREAM TOPICAL ONCE
Status: CANCELLED | OUTPATIENT
Start: 2021-02-10 | End: 2021-02-10

## 2021-02-10 ASSESSMENT — PAIN DESCRIPTION - ONSET: ONSET: ON-GOING

## 2021-02-10 ASSESSMENT — PAIN DESCRIPTION - PROGRESSION: CLINICAL_PROGRESSION: GRADUALLY WORSENING

## 2021-02-10 ASSESSMENT — PAIN DESCRIPTION - LOCATION: LOCATION: BUTTOCKS

## 2021-02-10 NOTE — PROGRESS NOTES
[address] Progress and Procedure Note    Airam Loo  AGE: 65 y.o. GENDER: female    : 1950  TODAY'S DATE: 2/10/2021    Chief Complaint   Patient presents with    Wound Check     buttock  F/U        History of Present Illness     Airam Loo is a 65 y.o. female who presents today for wound evaluation. History of Wound: pressure wound located on the sacral decubitus ulcer stage 3. Wound Pain:  mild  Severity:  1 / 10   Wound Type:  pressure  Modifying Factors:  diabetes, poor glucose control, decreased mobility and weight loss and prior pilonidal cyst excision  Associated Signs/Symptoms:  none    Past Medical History:   Diagnosis Date    Arthritis     back    Cataracts, bilateral     CHF (congestive heart failure) (HCC)     Diabetes mellitus ([address]     Glaucoma     Hyperlipidemia     Hypertension     Thyroid disease      Past Surgical History:   Procedure Laterality Date    CARPAL TUNNEL RELEASE      bilateral    CHOLECYSTECTOMY      COLONOSCOPY      ELBOW SURGERY      ENDOSCOPY, COLON, DIAGNOSTIC      EYE SURGERY      FOOT SURGERY      SHOULDER SURGERY       Current Outpatient Medications   Medication Sig Dispense Refill    traMADol (ULTRAM) 50 MG tablet Take 50 mg by mouth 3 times daily as needed for Pain.  DULoxetine (CYMBALTA) 60 MG extended release capsule Take 1 capsule by mouth daily 30 capsule 3    sodium chloride (OCEAN, BABY AYR) 0.65 % nasal spray 1 spray by Nasal route every 2 hours as needed for Congestion 1 Bottle 0    mometasone-formoterol (DULERA) 200-5 MCG/ACT inhaler Inhale 2 puffs into the lungs every 12 hours 1 Inhaler 1    spironolactone (ALDACTONE) 25 MG tablet Take 12.5 mg by mouth daily      Travoprost, BAK Free, (TRAVATAN Z) 0.004 % SOLN ophthalmic solution Place 1 drop into both eyes nightly      DICLOFENAC PO Take  by mouth.       acetaminophen (TYLENOL) 500 MG tablet Take 500 mg by mouth every 6 hours as needed for Pain or Fever       aspirin EC 81 MG EC tablet Take 81 mg by mouth daily May restart in AM.      furosemide (LASIX) 40 MG tablet Take 40 mg by mouth daily       levothyroxine (LEVOXYL) 200 MCG tablet Take 200 mcg by mouth Daily       Insulin Detemir (LEVEMIR FLEXPEN SC) Inject 33 Units into the skin nightly       simvastatin (ZOCOR) 40 MG tablet Take 40 mg by mouth nightly.  Insulin Lispro, Human, (HUMALOG SC) Inject 10 Units into the skin 3 times daily (with meals) With sliding      Calcium Carbonate-Vitamin D (CALCIUM + D PO) Take by mouth daily        Current Facility-Administered Medications   Medication Dose Route Frequency Provider Last Rate Last Admin    lidocaine (LMX) 4 % cream   Topical Once Minnie Syed MD          Social History:   Social History     Tobacco Use    Smoking status: Never Smoker    Smokeless tobacco: Never Used   Substance Use Topics    Alcohol use: Not Currently    Drug use: Never     Allergies: Other, Ace inhibitors, Metoprolol, Tetrahydrozoline hcl, and Timolol    Procedure: Indications:  Based on my examination of this patient's wound(s)/ulcer(s) today, debridement is required to promote healing and evaluate the wound base. Performed by: Shady Decker MD    Consent obtained: Yes    Time out taken: Yes    Pain Control: Anesthetic  Anesthetic: 4% Lidocaine Cream     Debridement: Excisional Debridement    Using curette the wound was sharply debrided    down through and including the removal of epidermis, dermis and subcutaneous tissue.         Devitalized Tissue Debrided: fibrin, biofilm and slough    Pre Debridement Measurements:  Are located in the Wound Documentation Flow Sheet     Wound #: 1     Post  Debridement Measurements:  Wound 10/14/20 Coccyx #1 (Active)   Wound Image   01/27/21 1023   Wound Etiology Non-Healing Surgical 02/10/21 0935   Wound Cleansed Cleansed with saline 02/10/21 0949   Dressing/Treatment Collagen;Dry dressing 02/10/21 1966   Wound Length (cm) 1.5 cm 02/10/21 0935   Wound Width (cm) 0.5 cm 02/10/21 0935   Wound Depth (cm) 2 cm 02/10/21 0935   Wound Surface Area (cm^2) 0.75 cm^2 02/10/21 0935   Change in Wound Size % (l*w) 84.69 02/10/21 0935   Wound Volume (cm^3) 1.5 cm^3 02/10/21 0935   Wound Healing % 89 02/10/21 0935   Post-Procedure Length (cm) 1.5 cm 02/10/21 0949   Post-Procedure Width (cm) 0.5 cm 02/10/21 0949   Post-Procedure Depth (cm) 2 cm 02/10/21 0949   Post-Procedure Surface Area (cm^2) 0.75 cm^2 02/10/21 0949   Post-Procedure Volume (cm^3) 1.5 cm^3 02/10/21 0949   Wound Assessment Bleeding 02/10/21 0949   Drainage Amount Moderate 02/10/21 0949   Drainage Description Serosanguinous;Brown 02/10/21 0935   Odor None 02/10/21 0935   Saba-wound Assessment Maceration 02/10/21 0935   Margins Defined edges 02/10/21 0935   Wound Thickness Description not for Pressure Injury Full thickness 02/10/21 0935   Number of days: 119       Percent of Wound(s)/Ulcer(s) Debrided: 100%    Total Surface Area Debrided:  0.75 sq cm     Bleeding:  Minimal    Hemostasis Achieved:  by pressure    Procedural Pain:  1  / 10     Post Procedural Pain:  1 / 10     Response to treatment:  Well tolerated by patient. Assessment:     Wound looks stable. (improved, worse or stable)    Patient tolerated procedure well and was given proper instruction. The nature of the patient's condition was explained in depth. The patient was informed that their compliance to the treatment plan is paramount to successful healing and prevention of further ulceration and/or infection     Plan:     Treatment Plan:       Treatment Note please see attached Discharge Instructions    Written patient dismissal instructions given to patient and signed by patient or POA.          Discharge [address], [address] [place] [address]  Telephone: [phone]     FAX [phone]      Discharge Instructions:  Keep weight off wounds and reposition every 2 hours if applicable. Avoid standing for long periods of time.      If wound(s) is on your lower extremity, elevate legs to the level of the heart or above for 30 minutes 4-5 times a day and/or when sitting.      Do not get wounds wet in bath or shower unless otherwise instructed by your physician. If your wound is on you foot or leg, you may purchase a cast bag. Please ask at the pharmacy.     When taking antibiotics take entire prescription as ordered by MD do not stop taking until medicine is all gone. Exercise as tolerated. No Smoking. Smoking prohibits wound healing.     If Vascular testing is ordered, please call [facility] [phone]) to schedule.     Vascular tests ordered by Wound Care Physicians may take up to 2 hours to complete. Please keep that in mind when scheduling.      If Vascular testing is scheduled, please bring supplies to replace your dressing after testing is done. The vascular department does not stock supplies.      Wound: Sacrum      With each dressing change, rinse wounds with 0.9% Saline. (May use wound wash or soft contact solution. Both can be purchased at a local drug store). If unable to obtain saline, may use a gentle soap and water.     Dressing care: Loosely pack with Collagen (be sure to get into tunnel at 1200), dry dressing- change daily.     Important dietary reminders:  1. Increase Protein intake for optimal wound healing  2. No added salt to reduce any swelling  3. If diabetic, good glucose control  4.  If you smoke, smoking affects wound healing, we ask that you refrain from smoking.     Follow up with Dr Alexia Daniels In 1 week in the wound care center.      Call [phone] for any questions or concerns.      Your  is  [employer]: [address] Information: Should you experience any significant changes in your wound(s) or have questions about your wound care, please contact the [facility] [place] [address] [place] [phone] Monday  - Thursday 8:00 am - 4:00 pm and Friday 8:00 am - 1:00pm. If you need help with your wound outside these hours and cannot wait until we are again available, contact your PCP or go to the hospital emergency room.      PLEASE NOTE: IF YOU ARE UNABLE TO [address], CONTINUE TO USE THE SUPPLIES YOU HAVE AVAILABLE UNTIL YOU ARE ABLE TO [address]. IT IS MOST IMPORTANT TO KEEP THE WOUND COVERED AT ALL TIMES       Dakota Beebe MD, FACS  2/10/2021  1:49 PM

## 2021-02-10 NOTE — PROGRESS NOTES
[facility]  [address], [address]  Telephone: [phone]        [facility] AT [facility] Fax # [phone]    Discharge Instructions       [facility]  [address], [address] [place] [address] Road  Telephone: [phone]     FAX [phone]      Discharge Instructions:  Keep weight off wounds and reposition every 2 hours if applicable. Avoid standing for long periods of time.      If wound(s) is on your lower extremity, elevate legs to the level of the heart or above for 30 minutes 4-5 times a day and/or when sitting.      Do not get wounds wet in bath or shower unless otherwise instructed by your physician. If your wound is on you foot or leg, you may purchase a cast bag. Please ask at the pharmacy.     When taking antibiotics take entire prescription as ordered by MD do not stop taking until medicine is all gone. Exercise as tolerated. No Smoking. Smoking prohibits wound healing.     If Vascular testing is ordered, please call [address] [place] [address] [phone]) to schedule.     Vascular tests ordered by Wound Care Physicians may take up to 2 hours to complete. Please keep that in mind when scheduling.      If Vascular testing is scheduled, please bring supplies to replace your dressing after testing is done. The vascular department does not stock supplies.      Wound: Sacrum      With each dressing change, rinse wounds with 0.9% Saline. (May use wound wash or soft contact solution. Both can be purchased at a local drug store). If unable to obtain saline, may use a gentle soap and water.     Dressing care: Loosely pack with Collagen (be sure to get into tunnel at 1200), dry dressing- change daily.     Important dietary reminders:  1. Increase Protein intake for optimal wound healing  2. No added salt to reduce any swelling  3. If diabetic, good glucose control  4.  If you smoke, smoking affects wound healing, we ask that you refrain from smoking.     Follow up with Dr Carter Monte In 1 week in the wound care center.      Call [phone] for any questions or concerns.      Your  is [employer]: [employer] Information: Should you experience any significant changes in your wound(s) or have questions about your wound care, please contact the [facility] [address] [phone] Monday  - Thursday 8:00 am - 4:00 pm and Friday 8:00 am - 1:00pm. If you need help with your wound outside these hours and cannot wait until we are again available, contact your PCP or go to the hospital emergency room.      PLEASE NOTE: IF YOU ARE UNABLE TO OBTAIN WOUND SUPPLIES, CONTINUE TO USE THE SUPPLIES YOU HAVE AVAILABLE UNTIL YOU ARE ABLE TO [facility]. IT IS MOST IMPORTANT TO KEEP THE WOUND COVERED AT ALL TIMES        Skilled nurse to evaluate and treat for wound care. Change dressing as ordered  once a day on Monday, Tuesday, Wednesday, Thursday, Friday, Saturday and Sunday using clean technique. Patient/Family/caregiver may change dressings as needed as instructed when Home Care unavailable.     WOUNDS REQUIRING DRESSING Changes:     Wound 10/14/20 Coccyx #1 (Active)   Wound Image   01/27/21 1023   Wound Etiology Non-Healing Surgical 02/10/21 0935   Wound Cleansed Cleansed with saline 02/10/21 0949   Dressing/Treatment Dry dressing;Hydrofera blue 01/27/21 1034   Wound Length (cm) 1.5 cm 02/10/21 0935   Wound Width (cm) 0.5 cm 02/10/21 0935   Wound Depth (cm) 2 cm 02/10/21 0935   Wound Surface Area (cm^2) 0.75 cm^2 02/10/21 0935   Change in Wound Size % (l*w) 84.69 02/10/21 0935   Wound Volume (cm^3) 1.5 cm^3 02/10/21 0935   Wound Healing % 89 02/10/21 0935   Post-Procedure Length (cm) 1.5 cm 02/10/21 0949   Post-Procedure Width (cm) 0.5 cm 02/10/21 0949   Post-Procedure Depth (cm) 2 cm 02/10/21 0949   Post-Procedure Surface Area (cm^2) 0.75 cm^2 02/10/21 0949   Post-Procedure Volume (cm^3) 1.5 cm^3 02/10/21 0949   Wound Assessment Bleeding 02/10/21 0949   Drainage Amount Moderate 02/10/21 0949   Drainage Description Serosanguinous;Brown 02/10/21 0935   Odor None 02/10/21 0935   Saba-wound Assessment Maceration 02/10/21 0935   Margins Defined edges 02/10/21 0935   Wound Thickness Description not for Pressure Injury Full thickness 02/10/21 0935   Number of days: 119          Patient seen and treated on 2/10/2021    By Jered Vela MD NPI: [id number]  (provider/NPI)

## 2021-02-10 NOTE — PLAN OF CARE
Discharge instructions given. Patient verbalized understanding. Return to [facility] in 1 week.   Called/faxed orders to [facility]

## 2021-02-17 ENCOUNTER — HOSPITAL ENCOUNTER (OUTPATIENT)
Dept: WOUND CARE | Age: 71
Discharge: HOME | End: 2021-02-17
Payer: MEDICARE

## 2021-02-24 ENCOUNTER — HOSPITAL ENCOUNTER (OUTPATIENT)
Dept: WOUND CARE | Age: 71
Discharge: HOME | End: 2021-02-24
Payer: MEDICARE

## 2021-02-24 VITALS
DIASTOLIC BLOOD PRESSURE: 78 MMHG | HEART RATE: 81 BPM | SYSTOLIC BLOOD PRESSURE: 119 MMHG | RESPIRATION RATE: 16 BRPM | TEMPERATURE: 96.9 F

## 2021-02-24 DIAGNOSIS — S31.000A SACRAL WOUND, INITIAL ENCOUNTER: Primary | ICD-10-CM

## 2021-02-24 PROCEDURE — 11042 DBRDMT SUBQ TIS 1ST 20SQCM/<: CPT | Performed by: SURGERY

## 2021-02-24 PROCEDURE — 11042 DBRDMT SUBQ TIS 1ST 20SQCM/<: CPT

## 2021-02-24 RX ORDER — LIDOCAINE HYDROCHLORIDE 40 MG/ML
SOLUTION TOPICAL ONCE
Status: CANCELLED | OUTPATIENT
Start: 2021-02-24 | End: 2021-02-24

## 2021-02-24 RX ORDER — LIDOCAINE HYDROCHLORIDE 20 MG/ML
JELLY TOPICAL ONCE
Status: CANCELLED | OUTPATIENT
Start: 2021-02-24 | End: 2021-02-24

## 2021-02-24 RX ORDER — BACITRACIN, NEOMYCIN, POLYMYXIN B 400; 3.5; 5 [USP'U]/G; MG/G; [USP'U]/G
OINTMENT TOPICAL ONCE
Status: CANCELLED | OUTPATIENT
Start: 2021-02-24 | End: 2021-02-24

## 2021-02-24 RX ORDER — CLOBETASOL PROPIONATE 0.5 MG/G
OINTMENT TOPICAL ONCE
Status: CANCELLED | OUTPATIENT
Start: 2021-02-24 | End: 2021-02-24

## 2021-02-24 RX ORDER — BETAMETHASONE DIPROPIONATE 0.05 %
OINTMENT (GRAM) TOPICAL ONCE
Status: CANCELLED | OUTPATIENT
Start: 2021-02-24 | End: 2021-02-24

## 2021-02-24 RX ORDER — GENTAMICIN SULFATE 1 MG/G
OINTMENT TOPICAL ONCE
Status: CANCELLED | OUTPATIENT
Start: 2021-02-24 | End: 2021-02-24

## 2021-02-24 RX ORDER — GINSENG 100 MG
CAPSULE ORAL ONCE
Status: CANCELLED | OUTPATIENT
Start: 2021-02-24 | End: 2021-02-24

## 2021-02-24 RX ORDER — LIDOCAINE 40 MG/G
CREAM TOPICAL ONCE
Status: DISCONTINUED | OUTPATIENT
Start: 2021-02-24 | End: 2021-02-25 | Stop reason: HOSPADM

## 2021-02-24 RX ORDER — LIDOCAINE 50 MG/G
OINTMENT TOPICAL ONCE
Status: CANCELLED | OUTPATIENT
Start: 2021-02-24 | End: 2021-02-24

## 2021-02-24 RX ORDER — BACITRACIN ZINC AND POLYMYXIN B SULFATE 500; 1000 [USP'U]/G; [USP'U]/G
OINTMENT TOPICAL ONCE
Status: CANCELLED | OUTPATIENT
Start: 2021-02-24 | End: 2021-02-24

## 2021-02-24 RX ORDER — LIDOCAINE 40 MG/G
CREAM TOPICAL ONCE
Status: CANCELLED | OUTPATIENT
Start: 2021-02-24 | End: 2021-02-24

## 2021-02-24 NOTE — PROGRESS NOTES
[address] Progress and Procedure Note    Kirti Reina  AGE: 65 y.o. GENDER: female    : 1950  TODAY'S DATE: 2021    Chief Complaint   Patient presents with    Wound Check     buttocks        History of Present Illness     Kirti Reina is a 65 y.o. female who presents today for wound evaluation. History of Wound: pressure wound located on the sacral decubitus ulcer stage 3. Wound Pain:  mild  Severity:  1 / 10   Wound Type:  pressure  Modifying Factors:  diabetes, poor glucose control, decreased mobility and weight loss and prior pilonidal cyst excision  Associated Signs/Symptoms:  none    Past Medical History:   Diagnosis Date    Arthritis     back    Cataracts, bilateral     CHF (congestive heart failure) (HCC)     Diabetes mellitus ([address]     Glaucoma     Hyperlipidemia     Hypertension     Thyroid disease      Past Surgical History:   Procedure Laterality Date    CARPAL TUNNEL RELEASE      bilateral    CHOLECYSTECTOMY      COLONOSCOPY      ELBOW SURGERY      ENDOSCOPY, COLON, DIAGNOSTIC      EYE SURGERY      FOOT SURGERY      SHOULDER SURGERY       Current Outpatient Medications   Medication Sig Dispense Refill    traMADol (ULTRAM) 50 MG tablet Take 50 mg by mouth 3 times daily as needed for Pain.  DULoxetine (CYMBALTA) 60 MG extended release capsule Take 1 capsule by mouth daily 30 capsule 3    sodium chloride (OCEAN, BABY AYR) 0.65 % nasal spray 1 spray by Nasal route every 2 hours as needed for Congestion 1 Bottle 0    mometasone-formoterol (DULERA) 200-5 MCG/ACT inhaler Inhale 2 puffs into the lungs every 12 hours 1 Inhaler 1    spironolactone (ALDACTONE) 25 MG tablet Take 12.5 mg by mouth daily      Travoprost, BAK Free, (TRAVATAN Z) 0.004 % SOLN ophthalmic solution Place 1 drop into both eyes nightly      DICLOFENAC PO Take  by mouth.       acetaminophen (TYLENOL) 500 MG tablet Take 500 mg by mouth every 6 hours as needed for Pain or Fever       aspirin EC 81 MG EC tablet Take 81 mg by mouth daily May restart in AM.      furosemide (LASIX) 40 MG tablet Take 40 mg by mouth daily       levothyroxine (LEVOXYL) 200 MCG tablet Take 200 mcg by mouth Daily       Insulin Detemir (LEVEMIR FLEXPEN SC) Inject 33 Units into the skin nightly       simvastatin (ZOCOR) 40 MG tablet Take 40 mg by mouth nightly.  Insulin Lispro, Human, (HUMALOG SC) Inject 10 Units into the skin 3 times daily (with meals) With sliding      Calcium Carbonate-Vitamin D (CALCIUM + D PO) Take by mouth daily        Current Facility-Administered Medications   Medication Dose Route Frequency Provider Last Rate Last Admin    lidocaine (LMX) 4 % cream   Topical Once Marcos Peres MD          Social History:   Social History     Tobacco Use    Smoking status: Never Smoker    Smokeless tobacco: Never Used   Substance Use Topics    Alcohol use: Not Currently    Drug use: Never     Allergies: Other, Ace inhibitors, Metoprolol, Tetrahydrozoline hcl, and Timolol    Procedure: Indications:  Based on my examination of this patient's wound(s)/ulcer(s) today, debridement is required to promote healing and evaluate the wound base. Performed by: Nohemy Barr MD    Consent obtained: Yes    Time out taken: Yes    Pain Control: Anesthetic  Anesthetic: 4% Lidocaine Cream     Debridement: Excisional Debridement    Using curette the wound was sharply debrided    down through and including the removal of epidermis, dermis and subcutaneous tissue.         Devitalized Tissue Debrided: fibrin, biofilm and slough    Pre Debridement Measurements:  Are located in the Wound Documentation Flow Sheet     Wound #: 1     Post  Debridement Measurements:  Wound 10/14/20 Coccyx #1 (Active)   Wound Image   01/27/21 1023   Wound Etiology Non-Healing Surgical 02/24/21 0915   Wound Cleansed Cleansed with saline 02/24/21 0940   Dressing/Treatment Collagen;Dry dressing 02/10/21 0990 Wound Length (cm) 1.5 cm 02/24/21 0915   Wound Width (cm) 0.5 cm 02/24/21 0915   Wound Depth (cm) 1.8 cm 02/24/21 0915   Wound Surface Area (cm^2) 0.75 cm^2 02/24/21 0915   Change in Wound Size % (l*w) 84.69 02/24/21 0915   Wound Volume (cm^3) 1.35 cm^3 02/24/21 0915   Wound Healing % 90 02/24/21 0915   Post-Procedure Length (cm) 1.5 cm 02/24/21 0940   Post-Procedure Width (cm) 0.5 cm 02/24/21 0940   Post-Procedure Depth (cm) 1.8 cm 02/24/21 0940   Post-Procedure Surface Area (cm^2) 0.75 cm^2 02/24/21 0940   Post-Procedure Volume (cm^3) 1.35 cm^3 02/24/21 0940   Distance Tunneling (cm) 2 cm 02/24/21 0915   Tunneling Position ___ O'Clock 1200 02/24/21 0915   Wound Assessment Bleeding 02/24/21 0940   Drainage Amount Moderate 02/24/21 0940   Drainage Description Serosanguinous 02/24/21 0915   Odor None 02/24/21 0915   Saba-wound Assessment Maceration 02/24/21 0915   Margins Defined edges 02/24/21 0915   Wound Thickness Description not for Pressure Injury Full thickness 02/24/21 0915   Number of days: 133       Percent of Wound(s)/Ulcer(s) Debrided: 100%    Total Surface Area Debrided:  0.75 sq cm     Bleeding:  Minimal    Hemostasis Achieved:  by pressure    Procedural Pain:  1 / 10     Post Procedural Pain:  2 / 10     Response to treatment:  Well tolerated by patient. Assessment:     Wound looks improved. (improved, worse or stable)    Patient tolerated procedure well and was given proper instruction. The nature of the patient's condition was explained in depth. The patient was informed that their compliance to the treatment plan is paramount to successful healing and prevention of further ulceration and/or infection     Plan:     Treatment Plan:       Treatment Note please see attached Discharge Instructions    Written patient dismissal instructions given to patient and signed by patient or POA.          Discharge Instructions       [facility] [address]  Telephone: [phone]     FAX [phone]      Discharge Instructions:  Keep weight off wounds and reposition every 2 hours if applicable. Avoid standing for long periods of time.      If wound(s) is on your lower extremity, elevate legs to the level of the heart or above for 30 minutes 4-5 times a day and/or when sitting.      Do not get wounds wet in bath or shower unless otherwise instructed by your physician. If your wound is on you foot or leg, you may purchase a cast bag. Please ask at the pharmacy.     When taking antibiotics take entire prescription as ordered by MD do not stop taking until medicine is all gone. Exercise as tolerated. No Smoking. Smoking prohibits wound healing.     If Vascular testing is ordered, please call [address] [place] [address] [phone]) to schedule.     Vascular tests ordered by Wound Care Physicians may take up to 2 hours to complete. Please keep that in mind when scheduling.      If Vascular testing is scheduled, please bring supplies to replace your dressing after testing is done. The vascular department does not stock supplies.      Wound: Sacrum      With each dressing change, rinse wounds with 0.9% Saline. (May use wound wash or soft contact solution. Both can be purchased at a local drug store). If unable to obtain saline, may use a gentle soap and water.     Dressing care: Loosely pack with Collagen (be sure to get into tunnel at 1200), dry dressing- change Monday, Wednesday, & Friday.     Important dietary reminders:  1. Increase Protein intake for optimal wound healing  2. No added salt to reduce any swelling  3. If diabetic, good glucose control  4.  If you smoke, smoking affects wound healing, we ask that you refrain from smoking.     Follow up with Dr Scott Ma In 1 week in the wound care center.      Call [phone] for any questions or concerns.      Your  is [facility] [employer]: [employer] [address] Information: Should you experience any significant changes in your wound(s) or have questions about your wound care, please contact the [facility] [address]  [phone] Monday  - Thursday 8:00 am - 4:00 pm and Friday 8:00 am - 1:00pm. If you need help with your wound outside these hours and cannot wait until we are again available, contact your PCP or go to the hospital emergency room.      PLEASE NOTE: IF YOU ARE UNABLE TO [address], CONTINUE TO USE THE SUPPLIES YOU HAVE AVAILABLE UNTIL YOU ARE ABLE TO [address]. IT IS MOST IMPORTANT TO KEEP THE WOUND COVERED AT ALL TIMES       Douglas B. Alanda Riedel MD, FACS  2/24/2021  12:54 PM

## 2021-02-24 NOTE — PLAN OF CARE
Discharge instructions given. Patient verbalized understanding. Return to [address] [place] [address],[address] in 1 week.

## 2021-03-03 ENCOUNTER — HOSPITAL ENCOUNTER (OUTPATIENT)
Dept: WOUND CARE | Age: 71
Discharge: HOME | End: 2021-03-03
Payer: MEDICARE

## 2021-03-03 VITALS
SYSTOLIC BLOOD PRESSURE: 141 MMHG | HEART RATE: 93 BPM | TEMPERATURE: 97.3 F | BODY MASS INDEX: 35.99 KG/M2 | DIASTOLIC BLOOD PRESSURE: 73 MMHG | WEIGHT: 223 LBS

## 2021-03-03 DIAGNOSIS — S31.000A SACRAL WOUND, INITIAL ENCOUNTER: Primary | ICD-10-CM

## 2021-03-03 PROCEDURE — 11042 DBRDMT SUBQ TIS 1ST 20SQCM/<: CPT

## 2021-03-03 PROCEDURE — 11042 DBRDMT SUBQ TIS 1ST 20SQCM/<: CPT | Performed by: SURGERY

## 2021-03-03 RX ORDER — LIDOCAINE HYDROCHLORIDE 20 MG/ML
JELLY TOPICAL ONCE
Status: CANCELLED | OUTPATIENT
Start: 2021-03-03 | End: 2021-03-03

## 2021-03-03 RX ORDER — BACITRACIN, NEOMYCIN, POLYMYXIN B 400; 3.5; 5 [USP'U]/G; MG/G; [USP'U]/G
OINTMENT TOPICAL ONCE
Status: CANCELLED | OUTPATIENT
Start: 2021-03-03 | End: 2021-03-03

## 2021-03-03 RX ORDER — LIDOCAINE HYDROCHLORIDE 40 MG/ML
SOLUTION TOPICAL ONCE
Status: CANCELLED | OUTPATIENT
Start: 2021-03-03 | End: 2021-03-03

## 2021-03-03 RX ORDER — LIDOCAINE 40 MG/G
CREAM TOPICAL ONCE
Status: CANCELLED | OUTPATIENT
Start: 2021-03-03 | End: 2021-03-03

## 2021-03-03 RX ORDER — LIDOCAINE 40 MG/G
CREAM TOPICAL ONCE
Status: DISCONTINUED | OUTPATIENT
Start: 2021-03-03 | End: 2021-03-04 | Stop reason: HOSPADM

## 2021-03-03 RX ORDER — CLOBETASOL PROPIONATE 0.5 MG/G
OINTMENT TOPICAL ONCE
Status: CANCELLED | OUTPATIENT
Start: 2021-03-03 | End: 2021-03-03

## 2021-03-03 RX ORDER — GENTAMICIN SULFATE 1 MG/G
OINTMENT TOPICAL ONCE
Status: CANCELLED | OUTPATIENT
Start: 2021-03-03 | End: 2021-03-03

## 2021-03-03 RX ORDER — LIDOCAINE 50 MG/G
OINTMENT TOPICAL ONCE
Status: CANCELLED | OUTPATIENT
Start: 2021-03-03 | End: 2021-03-03

## 2021-03-03 RX ORDER — GINSENG 100 MG
CAPSULE ORAL ONCE
Status: CANCELLED | OUTPATIENT
Start: 2021-03-03 | End: 2021-03-03

## 2021-03-03 RX ORDER — BETAMETHASONE DIPROPIONATE 0.05 %
OINTMENT (GRAM) TOPICAL ONCE
Status: CANCELLED | OUTPATIENT
Start: 2021-03-03 | End: 2021-03-03

## 2021-03-03 RX ORDER — BACITRACIN ZINC AND POLYMYXIN B SULFATE 500; 1000 [USP'U]/G; [USP'U]/G
OINTMENT TOPICAL ONCE
Status: CANCELLED | OUTPATIENT
Start: 2021-03-03 | End: 2021-03-03

## 2021-03-03 ASSESSMENT — PAIN DESCRIPTION - LOCATION: LOCATION: COCCYX

## 2021-03-03 ASSESSMENT — PAIN DESCRIPTION - ONSET: ONSET: ON-GOING

## 2021-03-03 ASSESSMENT — PAIN DESCRIPTION - FREQUENCY: FREQUENCY: INTERMITTENT

## 2021-03-03 ASSESSMENT — PAIN DESCRIPTION - PROGRESSION: CLINICAL_PROGRESSION: NOT CHANGED

## 2021-03-03 NOTE — PROGRESS NOTES
[address] Progress and Procedure Note    Barron Gray  AGE: 65 y.o. GENDER: female    : 1950  TODAY'S DATE: 3/3/2021    Chief Complaint   Patient presents with    Wound Check     buttock F/U        History of Present Illness     Barron Gray is a 65 y.o. female who presents today for wound evaluation. History of Wound: pressure wound located on the sacral decubitus ulcer stage 3. Wound Pain:  mild  Severity:  1 / 10   Wound Type:  pressure  Modifying Factors:  diabetes, poor glucose control, decreased mobility and weight loss and prior pilonidal cyst excision  Associated Signs/Symptoms:  none    Past Medical History:   Diagnosis Date    Arthritis     back    Cataracts, bilateral     CHF (congestive heart failure) (HCC)     Diabetes mellitus ([address]     Glaucoma     Hyperlipidemia     Hypertension     Thyroid disease      Past Surgical History:   Procedure Laterality Date    CARPAL TUNNEL RELEASE      bilateral    CHOLECYSTECTOMY      COLONOSCOPY      ELBOW SURGERY      ENDOSCOPY, COLON, DIAGNOSTIC      EYE SURGERY      FOOT SURGERY      SHOULDER SURGERY       Current Outpatient Medications   Medication Sig Dispense Refill    traMADol (ULTRAM) 50 MG tablet Take 50 mg by mouth 3 times daily as needed for Pain.  DULoxetine (CYMBALTA) 60 MG extended release capsule Take 1 capsule by mouth daily 30 capsule 3    sodium chloride (OCEAN, BABY AYR) 0.65 % nasal spray 1 spray by Nasal route every 2 hours as needed for Congestion 1 Bottle 0    mometasone-formoterol (DULERA) 200-5 MCG/ACT inhaler Inhale 2 puffs into the lungs every 12 hours 1 Inhaler 1    spironolactone (ALDACTONE) 25 MG tablet Take 12.5 mg by mouth daily      Travoprost, BAK Free, (TRAVATAN Z) 0.004 % SOLN ophthalmic solution Place 1 drop into both eyes nightly      DICLOFENAC PO Take  by mouth.       acetaminophen (TYLENOL) 500 MG tablet Take 500 mg by mouth every 6 hours as needed for Pain or Fever       aspirin EC 81 MG EC tablet Take 81 mg by mouth daily May restart in AM.      furosemide (LASIX) 40 MG tablet Take 40 mg by mouth daily       levothyroxine (LEVOXYL) 200 MCG tablet Take 200 mcg by mouth Daily       Insulin Detemir (LEVEMIR FLEXPEN SC) Inject 33 Units into the skin nightly       simvastatin (ZOCOR) 40 MG tablet Take 40 mg by mouth nightly.  Insulin Lispro, Human, (HUMALOG SC) Inject 10 Units into the skin 3 times daily (with meals) With sliding      Calcium Carbonate-Vitamin D (CALCIUM + D PO) Take by mouth daily        Current Facility-Administered Medications   Medication Dose Route Frequency Provider Last Rate Last Admin    lidocaine (LMX) 4 % cream   Topical Once Charly Almendarez MD          Social History:   Social History     Tobacco Use    Smoking status: Never Smoker    Smokeless tobacco: Never Used   Substance Use Topics    Alcohol use: Not Currently    Drug use: Never     Allergies: Other, Ace inhibitors, Metoprolol, Tetrahydrozoline hcl, and Timolol    Procedure: Indications:  Based on my examination of this patient's wound(s)/ulcer(s) today, debridement is required to promote healing and evaluate the wound base. Performed by: Tr Artis MD    Consent obtained: Yes    Time out taken: Yes    Pain Control: Anesthetic  Anesthetic: 4% Lidocaine Cream     Debridement: Excisional Debridement    Using curette and #15 blade scalpel the wound was sharply debrided    down through and including the removal of epidermis, dermis and subcutaneous tissue.         Devitalized Tissue Debrided: fibrin, biofilm and slough    Pre Debridement Measurements:  Are located in the Wound Documentation Flow Sheet     Wound #: 1     Post  Debridement Measurements:  Wound 10/14/20 Coccyx #1 (Active)   Wound Image   01/27/21 1023   Wound Etiology Non-Healing Surgical 03/03/21 0941   Wound Cleansed Cleansed with saline 03/03/21 0957   Dressing/Treatment Collagen;Dry [address]  Telephone: [phone]     FAX [phone]      Discharge Instructions:  Keep weight off wounds and reposition every 2 hours if applicable. Avoid standing for long periods of time.      If wound(s) is on your lower extremity, elevate legs to the level of the heart or above for 30 minutes 4-5 times a day and/or when sitting.      Do not get wounds wet in bath or shower unless otherwise instructed by your physician. If your wound is on you foot or leg, you may purchase a cast bag. Please ask at the pharmacy.     When taking antibiotics take entire prescription as ordered by MD do not stop taking until medicine is all gone. Exercise as tolerated. No Smoking. Smoking prohibits wound healing.     If Vascular testing is ordered, please call [address] [place] [address] [phone]) to schedule.     Vascular tests ordered by Wound Care Physicians may take up to 2 hours to complete. Please keep that in mind when scheduling.      If Vascular testing is scheduled, please bring supplies to replace your dressing after testing is done. The vascular department does not stock supplies.      Wound: Sacrum      With each dressing change, rinse wounds with 0.9% Saline. (May use wound wash or soft contact solution. Both can be purchased at a local drug store). If unable to obtain saline, may use a gentle soap and water.     Dressing care: Loosely pack with Collagen, dry dressing- change Monday, Wednesday, & Friday.     Important dietary reminders:  1. Increase Protein intake for optimal wound healing  2. No added salt to reduce any swelling  3. If diabetic, good glucose control  4.  If you smoke, smoking affects wound healing, we ask that you refrain from smoking.     Follow up with Dr Carter Monte In 1 week in the wound care center.      Call [phone] for any questions or concerns.      Your  is [employer]: [address] [address] Information: Should you experience any significant changes in your wound(s) or have questions about your wound care, please contact the [facility] [address] [phone] Monday  - Thursday 8:00 am - 4:00 pm and Friday 8:00 am - 1:00pm. If you need help with your wound outside these hours and cannot wait until we are again available, contact your PCP or go to the hospital emergency room.      PLEASE NOTE: IF YOU ARE UNABLE TO [address] , CONTINUE TO USE THE SUPPLIES YOU HAVE AVAILABLE UNTIL YOU ARE ABLE TO [facility]. IT IS MOST IMPORTANT TO KEEP THE WOUND COVERED AT ALL TIMES       Dakota De La Garza MD, FACS  3/3/2021  12:59 PM

## 2021-03-03 NOTE — PLAN OF CARE
Discharge instructions given. Patient verbalized understanding. Return to [facility] in 1 week.

## 2021-03-10 ENCOUNTER — HOSPITAL ENCOUNTER (OUTPATIENT)
Dept: WOUND CARE | Age: 71
Discharge: HOME | End: 2021-03-10
Payer: MEDICARE

## 2021-03-10 VITALS
DIASTOLIC BLOOD PRESSURE: 80 MMHG | RESPIRATION RATE: 16 BRPM | HEART RATE: 90 BPM | TEMPERATURE: 97.1 F | SYSTOLIC BLOOD PRESSURE: 133 MMHG

## 2021-03-10 DIAGNOSIS — S31.000A SACRAL WOUND, INITIAL ENCOUNTER: ICD-10-CM

## 2021-03-10 DIAGNOSIS — L89.153 PRESSURE ULCER OF SACRAL REGION, STAGE 3 (HCC): Primary | ICD-10-CM

## 2021-03-10 PROCEDURE — 11042 DBRDMT SUBQ TIS 1ST 20SQCM/<: CPT

## 2021-03-10 PROCEDURE — 11042 DBRDMT SUBQ TIS 1ST 20SQCM/<: CPT | Performed by: SURGERY

## 2021-03-10 RX ORDER — LIDOCAINE HYDROCHLORIDE 20 MG/ML
JELLY TOPICAL ONCE
Status: CANCELLED | OUTPATIENT
Start: 2021-03-10 | End: 2021-03-10

## 2021-03-10 RX ORDER — LIDOCAINE 40 MG/G
CREAM TOPICAL ONCE
Status: DISCONTINUED | OUTPATIENT
Start: 2021-03-10 | End: 2021-03-11 | Stop reason: HOSPADM

## 2021-03-10 RX ORDER — GENTAMICIN SULFATE 1 MG/G
OINTMENT TOPICAL ONCE
Status: CANCELLED | OUTPATIENT
Start: 2021-03-10 | End: 2021-03-10

## 2021-03-10 RX ORDER — LIDOCAINE 50 MG/G
OINTMENT TOPICAL ONCE
Status: CANCELLED | OUTPATIENT
Start: 2021-03-10 | End: 2021-03-10

## 2021-03-10 RX ORDER — LIDOCAINE 40 MG/G
CREAM TOPICAL ONCE
Status: CANCELLED | OUTPATIENT
Start: 2021-03-10 | End: 2021-03-10

## 2021-03-10 RX ORDER — BACITRACIN ZINC AND POLYMYXIN B SULFATE 500; 1000 [USP'U]/G; [USP'U]/G
OINTMENT TOPICAL ONCE
Status: CANCELLED | OUTPATIENT
Start: 2021-03-10 | End: 2021-03-10

## 2021-03-10 RX ORDER — GINSENG 100 MG
CAPSULE ORAL ONCE
Status: CANCELLED | OUTPATIENT
Start: 2021-03-10 | End: 2021-03-10

## 2021-03-10 RX ORDER — LIDOCAINE HYDROCHLORIDE 40 MG/ML
SOLUTION TOPICAL ONCE
Status: CANCELLED | OUTPATIENT
Start: 2021-03-10 | End: 2021-03-10

## 2021-03-10 RX ORDER — BACITRACIN, NEOMYCIN, POLYMYXIN B 400; 3.5; 5 [USP'U]/G; MG/G; [USP'U]/G
OINTMENT TOPICAL ONCE
Status: CANCELLED | OUTPATIENT
Start: 2021-03-10 | End: 2021-03-10

## 2021-03-10 RX ORDER — CLOBETASOL PROPIONATE 0.5 MG/G
OINTMENT TOPICAL ONCE
Status: CANCELLED | OUTPATIENT
Start: 2021-03-10 | End: 2021-03-10

## 2021-03-10 RX ORDER — BETAMETHASONE DIPROPIONATE 0.05 %
OINTMENT (GRAM) TOPICAL ONCE
Status: CANCELLED | OUTPATIENT
Start: 2021-03-10 | End: 2021-03-10

## 2021-03-10 NOTE — PLAN OF CARE
Discharge instructions given. Patient verbalized understanding. Return to [address] [place] [address],[address] in 1 week.

## 2021-03-10 NOTE — PROGRESS NOTES
[address] Progress and Procedure Note    Krissy Vazquez  AGE: 65 y.o. GENDER: female    : 1950  TODAY'S DATE: 3/10/2021    Chief Complaint   Patient presents with    Wound Check     buttocks        History of Present Illness     Krissy Vazquez is a 65 y.o. female who presents today for wound evaluation. History of Wound: pressure wound located on the sacral decubitus ulcer stage 3. Wound Pain:  mild  Severity:  1 / 10   Wound Type:  pressure  Modifying Factors:  diabetes, poor glucose control, decreased mobility and weight loss and prior pilonidal cyst excision  Associated Signs/Symptoms:  none    Past Medical History:   Diagnosis Date    Arthritis     back    Cataracts, bilateral     [facility]     Diabetes mellitus ([address]     Glaucoma     Hyperlipidemia     Hypertension     Thyroid disease      Past Surgical History:   Procedure Laterality Date    CARPAL TUNNEL RELEASE      bilateral    CHOLECYSTECTOMY      COLONOSCOPY      ELBOW SURGERY      ENDOSCOPY, COLON, DIAGNOSTIC      EYE SURGERY      FOOT SURGERY      SHOULDER SURGERY       Current Outpatient Medications   Medication Sig Dispense Refill    traMADol (ULTRAM) 50 MG tablet Take 50 mg by mouth 3 times daily as needed for Pain.  DULoxetine (CYMBALTA) 60 MG extended release capsule Take 1 capsule by mouth daily 30 capsule 3    sodium chloride (OCEAN, BABY AYR) 0.65 % nasal spray 1 spray by Nasal route every 2 hours as needed for Congestion 1 Bottle 0    mometasone-formoterol (DULERA) 200-5 MCG/ACT inhaler Inhale 2 puffs into the lungs every 12 hours 1 Inhaler 1    spironolactone (ALDACTONE) 25 MG tablet Take 12.5 mg by mouth daily      Travoprost, BAK Free, (TRAVATAN Z) 0.004 % SOLN ophthalmic solution Place 1 drop into both eyes nightly      DICLOFENAC PO Take  by mouth.       acetaminophen (TYLENOL) 500 MG tablet Take 500 mg by mouth every 6 hours as needed for Pain or Fever       aspirin EC 81 MG EC tablet Take 81 mg by mouth daily May restart in AM.      furosemide (LASIX) 40 MG tablet Take 40 mg by mouth daily       levothyroxine (LEVOXYL) 200 MCG tablet Take 200 mcg by mouth Daily       Insulin Detemir (LEVEMIR FLEXPEN SC) Inject 33 Units into the skin nightly       simvastatin (ZOCOR) 40 MG tablet Take 40 mg by mouth nightly.  Insulin Lispro, Human, (HUMALOG SC) Inject 10 Units into the skin 3 times daily (with meals) With sliding      Calcium Carbonate-Vitamin D (CALCIUM + D PO) Take by mouth daily        Current Facility-Administered Medications   Medication Dose Route Frequency Provider Last Rate Last Admin    lidocaine (LMX) 4 % cream   Topical Once Cassi Downey MD          Social History:   Social History     Tobacco Use    Smoking status: Never Smoker    Smokeless tobacco: Never Used   Substance Use Topics    Alcohol use: Not Currently    Drug use: Never     Allergies: Other, Ace inhibitors, Metoprolol, Tetrahydrozoline hcl, and Timolol    Procedure: Indications:  Based on my examination of this patient's wound(s)/ulcer(s) today, debridement is required to promote healing and evaluate the wound base. Performed by: Jes Torre MD    Consent obtained: Yes    Time out taken: Yes    Pain Control: Anesthetic  Anesthetic: 4% Lidocaine Cream     Debridement: Excisional Debridement    Using curette the wound was sharply debrided    down through and including the removal of epidermis, dermis and subcutaneous tissue.         Devitalized Tissue Debrided: fibrin, biofilm and slough    Pre Debridement Measurements:  Are located in the Wound Documentation Flow Sheet     Wound #: 1     Post  Debridement Measurements:  Wound 10/14/20 Coccyx #1 (Active)   Wound Image   01/27/21 1023   Wound Etiology Pressure Stage  3 03/10/21 0924   Wound Cleansed Cleansed with saline 03/10/21 0936   Dressing/Treatment Collagen;Dry dressing 03/03/21 1106 Wound Length (cm) 2.9 cm 03/10/21 0924   Wound Width (cm) 1 cm 03/10/21 0924   Wound Depth (cm) 2.7 cm 03/10/21 0924   Wound Surface Area (cm^2) 2.9 cm^2 03/10/21 0924   Change in Wound Size % (l*w) 40.82 03/10/21 0924   Wound Volume (cm^3) 7.83 cm^3 03/10/21 0924   Wound Healing % 45 03/10/21 0924   Post-Procedure Length (cm) 2.9 cm 03/10/21 0936   Post-Procedure Width (cm) 1 cm 03/10/21 0936   Post-Procedure Depth (cm) 2.7 cm 03/10/21 0936   Post-Procedure Surface Area (cm^2) 2.9 cm^2 03/10/21 0936   Post-Procedure Volume (cm^3) 7.83 cm^3 03/10/21 0936   Distance Tunneling (cm) 2.5 cm 03/03/21 0941   Tunneling Position ___ O'Clock 1200 03/03/21 0941   Wound Assessment Bleeding 03/10/21 0936   Drainage Amount Moderate 03/10/21 0936   Drainage Description Brown 03/10/21 0924   Odor None 03/10/21 0924   Saba-wound Assessment Maceration 03/10/21 0924   Margins Defined edges 03/10/21 0924   Wound Thickness Description not for Pressure Injury Full thickness 03/10/21 0924   Number of days: 147       Percent of Wound(s)/Ulcer(s) Debrided: 100%    Total Surface Area Debrided:  2.9 sq cm     Bleeding:  Minimal    Hemostasis Achieved:  by pressure    Procedural Pain:  1  / 10     Post Procedural Pain:  2 / 10     Response to treatment:  Well tolerated by patient. Assessment:     Wound looks improved. (improved, worse or stable)    Patient tolerated procedure well and was given proper instruction. The nature of the patient's condition was explained in depth. The patient was informed that their compliance to the treatment plan is paramount to successful healing and prevention of further ulceration and/or infection     Plan:     Treatment Plan:       Treatment Note please see attached Discharge Instructions    Written patient dismissal instructions given to patient and signed by patient or POA.          Discharge Instructions       [place] [address]   [address] [place] [address], [address] [place] [address]  Telephone: [phone]     FAX [phone]      Discharge Instructions:  Keep weight off wounds and reposition every 2 hours if applicable. Avoid standing for long periods of time.      If wound(s) is on your lower extremity, elevate legs to the level of the heart or above for 30 minutes 4-5 times a day and/or when sitting.      Do not get wounds wet in bath or shower unless otherwise instructed by your physician. If your wound is on you foot or leg, you may purchase a cast bag. Please ask at the pharmacy.     When taking antibiotics take entire prescription as ordered by MD do not stop taking until medicine is all gone. Exercise as tolerated. No Smoking. Smoking prohibits wound healing.     If Vascular testing is ordered, please call [address] [place] [address] [phone]) to schedule.     Vascular tests ordered by Wound Care Physicians may take up to 2 hours to complete. Please keep that in mind when scheduling.      If Vascular testing is scheduled, please bring supplies to replace your dressing after testing is done. The vascular department does not stock supplies.      Wound: Sacrum      With each dressing change, rinse wounds with 0.9% Saline. (May use wound wash or soft contact solution. Both can be purchased at a local drug store). If unable to obtain saline, may use a gentle soap and water.     Dressing care: Loosely pack with Collagen, dry dressing- change Monday, Wednesday, & Friday.     Important dietary reminders:  1. Increase Protein intake for optimal wound healing  2. No added salt to reduce any swelling  3. If diabetic, good glucose control  4.  If you smoke, smoking affects wound healing, we ask that you refrain from smoking.     Follow up with Dr Vicki Beebe In 1 week in the wound care center.      Call [phone] for any questions or concerns.      Your  is [employer]: [email or web address] Information: Should you experience any significant changes in your wound(s) or have

## 2021-03-17 ENCOUNTER — HOSPITAL ENCOUNTER (OUTPATIENT)
Dept: WOUND CARE | Age: 71
Discharge: HOME | End: 2021-03-17
Payer: MEDICARE

## 2021-03-17 VITALS — RESPIRATION RATE: 16 BRPM | DIASTOLIC BLOOD PRESSURE: 81 MMHG | HEART RATE: 80 BPM | SYSTOLIC BLOOD PRESSURE: 127 MMHG

## 2021-03-17 DIAGNOSIS — S31.000A SACRAL WOUND, INITIAL ENCOUNTER: Primary | ICD-10-CM

## 2021-03-17 PROCEDURE — 11042 DBRDMT SUBQ TIS 1ST 20SQCM/<: CPT

## 2021-03-17 PROCEDURE — 11042 DBRDMT SUBQ TIS 1ST 20SQCM/<: CPT | Performed by: SURGERY

## 2021-03-17 RX ORDER — BACITRACIN ZINC AND POLYMYXIN B SULFATE 500; 1000 [USP'U]/G; [USP'U]/G
OINTMENT TOPICAL ONCE
Status: CANCELLED | OUTPATIENT
Start: 2021-03-17 | End: 2021-03-17

## 2021-03-17 RX ORDER — BETAMETHASONE DIPROPIONATE 0.05 %
OINTMENT (GRAM) TOPICAL ONCE
Status: CANCELLED | OUTPATIENT
Start: 2021-03-17 | End: 2021-03-17

## 2021-03-17 RX ORDER — LIDOCAINE 40 MG/G
CREAM TOPICAL ONCE
Status: DISCONTINUED | OUTPATIENT
Start: 2021-03-17 | End: 2021-03-18 | Stop reason: HOSPADM

## 2021-03-17 RX ORDER — GINSENG 100 MG
CAPSULE ORAL ONCE
Status: CANCELLED | OUTPATIENT
Start: 2021-03-17 | End: 2021-03-17

## 2021-03-17 RX ORDER — LIDOCAINE 40 MG/G
CREAM TOPICAL ONCE
Status: CANCELLED | OUTPATIENT
Start: 2021-03-17 | End: 2021-03-17

## 2021-03-17 RX ORDER — LIDOCAINE HYDROCHLORIDE 20 MG/ML
JELLY TOPICAL ONCE
Status: CANCELLED | OUTPATIENT
Start: 2021-03-17 | End: 2021-03-17

## 2021-03-17 RX ORDER — CLOBETASOL PROPIONATE 0.5 MG/G
OINTMENT TOPICAL ONCE
Status: CANCELLED | OUTPATIENT
Start: 2021-03-17 | End: 2021-03-17

## 2021-03-17 RX ORDER — GENTAMICIN SULFATE 1 MG/G
OINTMENT TOPICAL ONCE
Status: CANCELLED | OUTPATIENT
Start: 2021-03-17 | End: 2021-03-17

## 2021-03-17 RX ORDER — BACITRACIN, NEOMYCIN, POLYMYXIN B 400; 3.5; 5 [USP'U]/G; MG/G; [USP'U]/G
OINTMENT TOPICAL ONCE
Status: CANCELLED | OUTPATIENT
Start: 2021-03-17 | End: 2021-03-17

## 2021-03-17 RX ORDER — LIDOCAINE 50 MG/G
OINTMENT TOPICAL ONCE
Status: CANCELLED | OUTPATIENT
Start: 2021-03-17 | End: 2021-03-17

## 2021-03-17 RX ORDER — LIDOCAINE HYDROCHLORIDE 40 MG/ML
SOLUTION TOPICAL ONCE
Status: CANCELLED | OUTPATIENT
Start: 2021-03-17 | End: 2021-03-17

## 2021-03-17 NOTE — PROGRESS NOTES
[address] Progress and Procedure Note    Michelle Marrufo  AGE: 65 y.o. GENDER: female    : 1950  TODAY'S DATE: 3/17/2021    Chief Complaint   Patient presents with    Wound Check     buttocks        History of Present Illness     Michelle Marrufo is a 65 y.o. female who presents today for wound evaluation. History of Wound: pressure wound located on the sacral decubitus ulcer stage 3. Wound Pain:  mild  Severity:  1 / 10   Wound Type:  pressure  Modifying Factors:  diabetes, poor glucose control, decreased mobility and weight loss and prior pilonidal cyst excision  Associated Signs/Symptoms:  none    Past Medical History:   Diagnosis Date    Arthritis     back    Cataracts, bilateral     [facility]est[facility]     Diabetes mellitus ([address]     Glaucoma     Hyperlipidemia     Hypertension     Thyroid disease      Past Surgical History:   Procedure Laterality Date    CARPAL TUNNEL RELEASE      bilateral    CHOLECYSTECTOMY      COLONOSCOPY      ELBOW SURGERY      ENDOSCOPY, COLON, DIAGNOSTIC      EYE SURGERY      FOOT SURGERY      SHOULDER SURGERY       Current Outpatient Medications   Medication Sig Dispense Refill    traMADol (ULTRAM) 50 MG tablet Take 50 mg by mouth 3 times daily as needed for Pain.  DULoxetine (CYMBALTA) 60 MG extended release capsule Take 1 capsule by mouth daily 30 capsule 3    sodium chloride (OCEAN, BABY AYR) 0.65 % nasal spray 1 spray by Nasal route every 2 hours as needed for Congestion 1 Bottle 0    mometasone-formoterol (DULERA) 200-5 MCG/ACT inhaler Inhale 2 puffs into the lungs every 12 hours 1 Inhaler 1    spironolactone (ALDACTONE) 25 MG tablet Take 12.5 mg by mouth daily      Travoprost, BAK Free, (TRAVATAN Z) 0.004 % SOLN ophthalmic solution Place 1 drop into both eyes nightly      DICLOFENAC PO Take  by mouth.       acetaminophen (TYLENOL) 500 MG tablet Take 500 mg by mouth every 6 hours as needed for Pain or Fever       aspirin EC 81 MG EC tablet Take 81 mg by mouth daily May restart in AM.      furosemide (LASIX) 40 MG tablet Take 40 mg by mouth daily       levothyroxine (LEVOXYL) 200 MCG tablet Take 200 mcg by mouth Daily       Insulin Detemir (LEVEMIR FLEXPEN SC) Inject 33 Units into the skin nightly       simvastatin (ZOCOR) 40 MG tablet Take 40 mg by mouth nightly.  Insulin Lispro, Human, (HUMALOG SC) Inject 10 Units into the skin 3 times daily (with meals) With sliding      Calcium Carbonate-Vitamin D (CALCIUM + D PO) Take by mouth daily        Current Facility-Administered Medications   Medication Dose Route Frequency Provider Last Rate Last Admin    lidocaine (LMX) 4 % cream   Topical Once Benji Cortes MD          Social History:   Social History     Tobacco Use    Smoking status: Never Smoker    Smokeless tobacco: Never Used   Substance Use Topics    Alcohol use: Not Currently    Drug use: Never     Allergies: Other, Ace inhibitors, Metoprolol, Tetrahydrozoline hcl, and Timolol    Procedure: Indications:  Based on my examination of this patient's wound(s)/ulcer(s) today, debridement is required to promote healing and evaluate the wound base. Performed by: Celia Do MD    Consent obtained: Yes    Time out taken: Yes    Pain Control: Anesthetic  Anesthetic: 4% Lidocaine Cream     Debridement: Excisional Debridement    Using curette the wound was sharply debrided    down through and including the removal of epidermis, dermis and subcutaneous tissue.         Devitalized Tissue Debrided: fibrin, biofilm and slough    Pre Debridement Measurements:  Are located in the Wound Documentation Flow Sheet     Wound #: 1     Post  Debridement Measurements:  Wound 10/14/20 Coccyx #1 (Active)   Wound Image   01/27/21 1023   Wound Etiology Pressure Stage  3 03/17/21 0958   Wound Cleansed Cleansed with saline 03/17/21 1022   Dressing/Treatment Collagen;Dry dressing 03/03/21 1106   Wound Length (cm) 2.3 cm 03/17/21 0958   Wound Width (cm) 1 cm 03/17/21 0958   Wound Depth (cm) 2.8 cm 03/17/21 0958   Wound Surface Area (cm^2) 2.3 cm^2 03/17/21 0958   Change in Wound Size % (l*w) 53.06 03/17/21 0958   Wound Volume (cm^3) 6.44 cm^3 03/17/21 0958   Wound Healing % 55 03/17/21 0958   Post-Procedure Length (cm) 2.3 cm 03/17/21 1022   Post-Procedure Width (cm) 1 cm 03/17/21 1022   Post-Procedure Depth (cm) 2.8 cm 03/17/21 1022   Post-Procedure Surface Area (cm^2) 2.3 cm^2 03/17/21 1022   Post-Procedure Volume (cm^3) 6.44 cm^3 03/17/21 1022   Distance Tunneling (cm) 2.5 cm 03/03/21 0941   Tunneling Position ___ O'Clock 1200 03/03/21 0941   Wound Assessment Bleeding 03/17/21 1022   Drainage Amount Moderate 03/17/21 1022   Drainage Description Brown 03/17/21 0958   Odor None 03/17/21 0958   Saba-wound Assessment Maceration 03/17/21 0958   Margins Defined edges 03/17/21 0958   Wound Thickness Description not for Pressure Injury Full thickness 03/17/21 0958   Number of days: 154       Percent of Wound(s)/Ulcer(s) Debrided: 100%    Total Surface Area Debrided:  2.3 sq cm     Bleeding:  Minimal    Hemostasis Achieved:  by pressure    Procedural Pain:  1  / 10     Post Procedural Pain:  1 / 10     Response to treatment:  Well tolerated by patient. Assessment:     Wound looks improved. (improved, worse or stable)    Patient tolerated procedure well and was given proper instruction. The nature of the patient's condition was explained in depth. The patient was informed that their compliance to the treatment plan is paramount to successful healing and prevention of further ulceration and/or infection     Plan:     Treatment Plan:       Treatment Note please see attached Discharge Instructions    Written patient dismissal instructions given to patient and signed by patient or POA.          Discharge Instructions       [address]  Telephone: [phone]     FAX [phone]      Discharge Instructions:  Keep weight off wounds and reposition every 2 hours if applicable. Avoid standing for long periods of time.      If wound(s) is on your lower extremity, elevate legs to the level of the heart or above for 30 minutes 4-5 times a day and/or when sitting.      Do not get wounds wet in bath or shower unless otherwise instructed by your physician. If your wound is on you foot or leg, you may purchase a cast bag. Please ask at the pharmacy.     When taking antibiotics take entire prescription as ordered by MD do not stop taking until medicine is all gone. Exercise as tolerated. No Smoking. Smoking prohibits wound healing.     If Vascular testing is ordered, please call [address] [place] [address] [phone]) to schedule.     Vascular tests ordered by Wound Care Physicians may take up to 2 hours to complete. Please keep that in mind when scheduling.      If Vascular testing is scheduled, please bring supplies to replace your dressing after testing is done. The vascular department does not stock supplies.      Wound: Sacrum      With each dressing change, rinse wounds with 0.9% Saline. (May use wound wash or soft contact solution. Both can be purchased at a local drug store). If unable to obtain saline, may use a gentle soap and water.     Dressing care: Triad to irritated area around wound with each dressing change. Loosely pack with Collagen, dry dressing- change Monday, Wednesday, & Friday.     Important dietary reminders:  1. Increase Protein intake for optimal wound healing  2. No added salt to reduce any swelling  3. If diabetic, good glucose control  4.  If you smoke, smoking affects wound healing, we ask that you refrain from smoking.     Follow up with Dr Sandra Syed In 1 week in the wound care center.      Call [phone] for any questions or concerns.      Your  is [facility] [employer]: [employer] [address] Information: Should you experience any significant changes in your wound(s) or have questions about your wound care, please contact the [address] [phone] Monday  - Thursday 8:00 am - 4:00 pm and Friday 8:00 am - 1:00pm. If you need help with your wound outside these hours and cannot wait until we are again available, contact your PCP or go to the hospital emergency room.      PLEASE NOTE: IF YOU ARE UNABLE TO [address], CONTINUE TO USE THE SUPPLIES YOU HAVE AVAILABLE UNTIL YOU ARE ABLE TO [address]. IT IS MOST IMPORTANT TO KEEP THE WOUND COVERED AT ALL TIMES       Dakota Crowell MD, FACS  3/17/2021  12:10 PM

## 2021-03-17 NOTE — PROGRESS NOTES
[facility]  [address] [facility], [address]  Telephone: [phone]        [facility], [place], operated by [facility] AT [facility] Fax # [phone]      Discharge Instructions       [facility]  [address] [facility], [address]  Telephone: [phone]     FAX [phone]      Discharge Instructions:  Keep weight off wounds and reposition every 2 hours if applicable. Avoid standing for long periods of time.      If wound(s) is on your lower extremity, elevate legs to the level of the heart or above for 30 minutes 4-5 times a day and/or when sitting.      Do not get wounds wet in bath or shower unless otherwise instructed by your physician. If your wound is on you foot or leg, you may purchase a cast bag. Please ask at the pharmacy.     When taking antibiotics take entire prescription as ordered by MD do not stop taking until medicine is all gone. Exercise as tolerated. No Smoking. Smoking prohibits wound healing.     If Vascular testing is ordered, please call [address] [place] [address] [phone]) to schedule.     Vascular tests ordered by Wound Care Physicians may take up to 2 hours to complete. Please keep that in mind when scheduling.      If Vascular testing is scheduled, please bring supplies to replace your dressing after testing is done. The vascular department does not stock supplies.      Wound: Sacrum      With each dressing change, rinse wounds with 0.9% Saline. (May use wound wash or soft contact solution. Both can be purchased at a local drug store). If unable to obtain saline, may use a gentle soap and water.     Dressing care: Triad to irritated area around wound with each dressing change. Loosely pack with Collagen, dry dressing- change Monday, Wednesday, & Friday.     Important dietary reminders:  1. Increase Protein intake for optimal wound healing  2. No added salt to reduce any swelling  3. If diabetic, good glucose control  4.  If you smoke, smoking affects wound healing, we ask that you refrain from smoking.     Follow up with Dr Vicki Beebe In 1 week in the wound care center.      Call [phone] for any questions or concerns.      Your  is [employer]: Cornelio Santos Information: Should you experience any significant changes in your wound(s) or have questions about your wound care, please contact the [facility] 30 EP [phone] Monday  - Thursday 8:00 am - 4:00 pm and Friday 8:00 am - 1:00pm. If you need help with your wound outside these hours and cannot wait until we are again available, contact your PCP or go to the hospital emergency room.      PLEASE NOTE: IF YOU ARE UNABLE TO OBTAIN WOUND SUPPLIES, CONTINUE TO USE THE SUPPLIES YOU HAVE AVAILABLE UNTIL YOU ARE ABLE [facility]. IT IS MOST IMPORTANT TO KEEP THE WOUND COVERED AT ALL TIMES        Skilled nurse to evaluate and treat for wound care. Change dressing as ordered  once a day on Monday and Friday using clean technique. Patient/Family/caregiver may change dressings as needed as instructed when Home Care unavailable.     WOUNDS REQUIRING DRESSING Changes:     Wound 10/14/20 Coccyx #1 (Active)   Wound Image   01/27/21 1023   Wound Etiology Pressure Stage  3 03/17/21 0958   Wound Cleansed Cleansed with saline 03/17/21 1022   Dressing/Treatment Collagen;Dry dressing 03/03/21 1106   Wound Length (cm) 2.3 cm 03/17/21 0958   Wound Width (cm) 1 cm 03/17/21 0958   Wound Depth (cm) 2.8 cm 03/17/21 0958   Wound Surface Area (cm^2) 2.3 cm^2 03/17/21 0958   Change in Wound Size % (l*w) 53.06 03/17/21 0958   Wound Volume (cm^3) 6.44 cm^3 03/17/21 0958   Wound Healing % 55 03/17/21 0958   Post-Procedure Length (cm) 2.3 cm 03/17/21 1022   Post-Procedure Width (cm) 1 cm 03/17/21 1022   Post-Procedure Depth (cm) 2.8 cm 03/17/21 1022   Post-Procedure Surface Area (cm^2) 2.3 cm^2 03/17/21 1022   Post-Procedure Volume (cm^3) 6.44 cm^3 03/17/21 1022   Distance Tunneling (cm) 2.5 cm 03/03/21 0941   Tunneling Position ___ O'Clock 1200 03/03/21 0941   Wound Assessment Bleeding 03/17/21 1022   Drainage Amount Moderate 03/17/21 1022   Drainage Description Brown 03/17/21 0958   Odor None 03/17/21 0958   Saba-wound Assessment Maceration 03/17/21 0958   Margins Defined edges 03/17/21 0958   Wound Thickness Description not for Pressure Injury Full thickness 03/17/21 0958   Number of days: 154          Patient seen and treated on 3/17/2021    By Archie Liz MD NPI: [id number]  (provider/NPI)

## 2021-03-17 NOTE — PLAN OF CARE
Discharge instructions given. Patient verbalized understanding. Return to [address] [place] [address] [address] in 1 week.   Called/faxed orders to [place]

## 2021-03-24 ENCOUNTER — HOSPITAL ENCOUNTER (OUTPATIENT)
Dept: WOUND CARE | Age: 71
Discharge: HOME | End: 2021-03-24
Payer: MEDICARE

## 2021-03-24 VITALS — TEMPERATURE: 97.3 F | SYSTOLIC BLOOD PRESSURE: 118 MMHG | HEART RATE: 80 BPM | DIASTOLIC BLOOD PRESSURE: 72 MMHG

## 2021-03-24 DIAGNOSIS — S31.000A SACRAL WOUND, INITIAL ENCOUNTER: Primary | ICD-10-CM

## 2021-03-24 PROCEDURE — 11042 DBRDMT SUBQ TIS 1ST 20SQCM/<: CPT | Performed by: SURGERY

## 2021-03-24 PROCEDURE — 11042 DBRDMT SUBQ TIS 1ST 20SQCM/<: CPT

## 2021-03-24 RX ORDER — LIDOCAINE HYDROCHLORIDE 40 MG/ML
SOLUTION TOPICAL ONCE
Status: CANCELLED | OUTPATIENT
Start: 2021-03-24 | End: 2021-03-24

## 2021-03-24 RX ORDER — CLOBETASOL PROPIONATE 0.5 MG/G
OINTMENT TOPICAL ONCE
Status: CANCELLED | OUTPATIENT
Start: 2021-03-24 | End: 2021-03-24

## 2021-03-24 RX ORDER — GINSENG 100 MG
CAPSULE ORAL ONCE
Status: CANCELLED | OUTPATIENT
Start: 2021-03-24 | End: 2021-03-24

## 2021-03-24 RX ORDER — BACITRACIN ZINC AND POLYMYXIN B SULFATE 500; 1000 [USP'U]/G; [USP'U]/G
OINTMENT TOPICAL ONCE
Status: CANCELLED | OUTPATIENT
Start: 2021-03-24 | End: 2021-03-24

## 2021-03-24 RX ORDER — BETAMETHASONE DIPROPIONATE 0.05 %
OINTMENT (GRAM) TOPICAL ONCE
Status: CANCELLED | OUTPATIENT
Start: 2021-03-24 | End: 2021-03-24

## 2021-03-24 RX ORDER — BACITRACIN, NEOMYCIN, POLYMYXIN B 400; 3.5; 5 [USP'U]/G; MG/G; [USP'U]/G
OINTMENT TOPICAL ONCE
Status: CANCELLED | OUTPATIENT
Start: 2021-03-24 | End: 2021-03-24

## 2021-03-24 RX ORDER — LIDOCAINE 40 MG/G
CREAM TOPICAL ONCE
Status: DISCONTINUED | OUTPATIENT
Start: 2021-03-24 | End: 2021-03-25 | Stop reason: HOSPADM

## 2021-03-24 RX ORDER — LIDOCAINE 40 MG/G
CREAM TOPICAL ONCE
Status: CANCELLED | OUTPATIENT
Start: 2021-03-24 | End: 2021-03-24

## 2021-03-24 RX ORDER — LIDOCAINE 50 MG/G
OINTMENT TOPICAL ONCE
Status: CANCELLED | OUTPATIENT
Start: 2021-03-24 | End: 2021-03-24

## 2021-03-24 RX ORDER — LIDOCAINE HYDROCHLORIDE 20 MG/ML
JELLY TOPICAL ONCE
Status: CANCELLED | OUTPATIENT
Start: 2021-03-24 | End: 2021-03-24

## 2021-03-24 RX ORDER — GENTAMICIN SULFATE 1 MG/G
OINTMENT TOPICAL ONCE
Status: CANCELLED | OUTPATIENT
Start: 2021-03-24 | End: 2021-03-24

## 2021-03-24 NOTE — PROGRESS NOTES
[address] Progress and Procedure Note    Kristian Palacios  AGE: 65 y.o. GENDER: female    : 1950  TODAY'S DATE: 3/24/2021    Chief Complaint   Patient presents with    Wound Check     coccyx        History of Present Illness     Kristian Palacios is a 65 y.o. female who presents today for wound evaluation. History of Wound: pressure wound located on the sacral decubitus ulcer stage 3. Wound Pain:  mild  Severity:  1 / 10   Wound Type:  pressure  Modifying Factors: diabetes, poor glucose control, decreased mobility and weight loss and prior pilonidal cyst excision  Associated Signs/Symptoms:  none    Past Medical History:   Diagnosis Date    Arthritis     back    Cataracts, bilateral     [facility] failure[facility] ([facility])     Diabetes mellitus ([address]     Glaucoma     Hyperlipidemia     Hypertension     Thyroid disease      Past Surgical History:   Procedure Laterality Date    CARPAL TUNNEL RELEASE      bilateral    CHOLECYSTECTOMY      COLONOSCOPY      ELBOW SURGERY      ENDOSCOPY, COLON, DIAGNOSTIC      EYE SURGERY      FOOT SURGERY      SHOULDER SURGERY       Current Outpatient Medications   Medication Sig Dispense Refill    traMADol (ULTRAM) 50 MG tablet Take 50 mg by mouth 3 times daily as needed for Pain.  DULoxetine (CYMBALTA) 60 MG extended release capsule Take 1 capsule by mouth daily 30 capsule 3    sodium chloride (OCEAN, BABY AYR) 0.65 % nasal spray 1 spray by Nasal route every 2 hours as needed for Congestion 1 Bottle 0    mometasone-formoterol (DULERA) 200-5 MCG/ACT inhaler Inhale 2 puffs into the lungs every 12 hours 1 Inhaler 1    spironolactone (ALDACTONE) 25 MG tablet Take 12.5 mg by mouth daily      Travoprost, BAK Free, (TRAVATAN Z) 0.004 % SOLN ophthalmic solution Place 1 drop into both eyes nightly      DICLOFENAC PO Take  by mouth.       acetaminophen (TYLENOL) 500 MG tablet Take 500 mg by mouth every 6 hours as needed for Pain or Fever       aspirin EC 81 MG EC tablet Take 81 mg by mouth daily May restart in AM.      furosemide (LASIX) 40 MG tablet Take 40 mg by mouth daily       levothyroxine (LEVOXYL) 200 MCG tablet Take 200 mcg by mouth Daily       Insulin Detemir (LEVEMIR FLEXPEN SC) Inject 33 Units into the skin nightly       simvastatin (ZOCOR) 40 MG tablet Take 40 mg by mouth nightly.  Insulin Lispro, Human, (HUMALOG SC) Inject 10 Units into the skin 3 times daily (with meals) With sliding      Calcium Carbonate-Vitamin D (CALCIUM + D PO) Take by mouth daily        Current Facility-Administered Medications   Medication Dose Route Frequency Provider Last Rate Last Admin    lidocaine (LMX) 4 % cream   Topical Once Joane Fothergill, MD          Social History:   Social History     Tobacco Use    Smoking status: Never Smoker    Smokeless tobacco: Never Used   Substance Use Topics    Alcohol use: Not Currently    Drug use: Never     Allergies: Other, Ace inhibitors, Metoprolol, Tetrahydrozoline hcl, and Timolol    Procedure: Indications:  Based on my examination of this patient's wound(s)/ulcer(s) today, debridement is required to promote healing and evaluate the wound base. Performed by: Cordell Aguero MD    Consent obtained: Yes    Time out taken: Yes    Pain Control: Anesthetic  Anesthetic: 4% Lidocaine Cream     Debridement: Excisional Debridement    Using curette the wound was sharply debrided    down through and including the removal of epidermis, dermis and subcutaneous tissue.         Devitalized Tissue Debrided: fibrin, biofilm and slough    Pre Debridement Measurements:  Are located in the Wound Documentation Flow Sheet     Wound #: 1     Post  Debridement Measurements:  Wound 10/14/20 Coccyx #1 (Active)   Wound Image   01/27/21 1023   Wound Etiology Pressure Stage  3 03/24/21 0940   Wound Cleansed Cleansed with saline 03/24/21 0947   Dressing/Treatment Collagen;Dry dressing 03/03/21 1106   Wound Length (cm) 2.3 cm 03/24/21 0940   Wound Width (cm) 0.8 cm 03/24/21 0940   Wound Depth (cm) 2.1 cm 03/24/21 0940   Wound Surface Area (cm^2) 1.84 cm^2 03/24/21 0940   Change in Wound Size % (l*w) 62.45 03/24/21 0940   Wound Volume (cm^3) 3.86 cm^3 03/24/21 0940   Wound Healing % 73 03/24/21 0940   Post-Procedure Length (cm) 2.3 cm 03/24/21 0947   Post-Procedure Width (cm) 0.8 cm 03/24/21 0947   Post-Procedure Depth (cm) 2.1 cm 03/24/21 0947   Post-Procedure Surface Area (cm^2) 1.84 cm^2 03/24/21 0947   Post-Procedure Volume (cm^3) 3.86 cm^3 03/24/21 0947   Distance Tunneling (cm) 2.5 cm 03/03/21 0941   Tunneling Position ___ O'Clock 1200 03/03/21 0941   Wound Assessment Bleeding 03/24/21 0947   Drainage Amount Moderate 03/24/21 0947   Drainage Description Osbaldo Reagin 03/24/21 0940   Odor None 03/24/21 0940   Saba-wound Assessment Maceration 03/17/21 0958   Margins Defined edges 03/24/21 0940   Wound Thickness Description not for Pressure Injury Full thickness 03/24/21 0940   Number of days: 161       Percent of Wound(s)/Ulcer(s) Debrided: 100%    Total Surface Area Debrided:  1.84 sq cm     Bleeding:  Minimal    Hemostasis Achieved:  by pressure    Procedural Pain:  1  / 10     Post Procedural Pain:  1 / 10     Response to treatment:  Well tolerated by patient. Assessment:     Wound looks improved. (improved, worse or stable)    Patient tolerated procedure well and was given proper instruction. The nature of the patient's condition was explained in depth. The patient was informed that their compliance to the treatment plan is paramount to successful healing and prevention of further ulceration and/or infection     Plan:     Treatment Plan:       Treatment Note please see attached Discharge Instructions    Written patient dismissal instructions given to patient and signed by patient or POA.          Discharge Instructions       [address]  Telephone: [phone]     FAX [phone]      Discharge Instructions:  Keep weight off wounds and reposition every 2 hours if applicable. Avoid standing for long periods of time.      If wound(s) is on your lower extremity, elevate legs to the level of the heart or above for 30 minutes 4-5 times a day and/or when sitting.      Do not get wounds wet in bath or shower unless otherwise instructed by your physician. If your wound is on you foot or leg, you may purchase a cast bag. Please ask at the pharmacy.     When taking antibiotics take entire prescription as ordered by MD do not stop taking until medicine is all gone. Exercise as tolerated. No Smoking. Smoking prohibits wound healing.     If Vascular testing is ordered, please call [address] [place] [address] [phone]) to schedule.     Vascular tests ordered by Wound Care Physicians may take up to 2 hours to complete. Please keep that in mind when scheduling.      If Vascular testing is scheduled, please bring supplies to replace your dressing after testing is done. The vascular department does not stock supplies.      Wound: Sacrum      With each dressing change, rinse wounds with 0.9% Saline. (May use wound wash or soft contact solution. Both can be purchased at a local drug store). If unable to obtain saline, may use a gentle soap and water.     Dressing care: Triad to irritated area around wound with each dressing change. Loosely pack with Collagen, dry dressing- change Monday, Wednesday, & Friday.     Important dietary reminders:  1. Increase Protein intake for optimal wound healing  2. No added salt to reduce any swelling  3. If diabetic, good glucose control  4.  If you smoke, smoking affects wound healing, we ask that you refrain from smoking.     Follow up with Dr Marylene Levin In 1 week in the wound care center.      Call [phone] for any questions or concerns.      Your  is [facility] [employer]: [facility] [employer] [address] Information: Should you experience any significant changes in your wound(s) or have questions about your wound care, please contact the [facility] 30  [phone] Monday  - Thursday 8:00 am - 4:00 pm and Friday 8:00 am - 1:00pm. If you need help with your wound outside these hours and cannot wait until we are again available, contact your PCP or go to the hospital emergency room.      PLEASE NOTE: IF YOU ARE UNABLE TO [address], CONTINUE TO USE THE SUPPLIES YOU HAVE AVAILABLE UNTIL YOU ARE ABLE TO [address]. IT IS MOST IMPORTANT TO KEEP THE WOUND COVERED AT ALL TIMES       Dakota Springer MD, FACS  3/24/2021  10:12 AM

## 2021-03-31 ENCOUNTER — HOSPITAL ENCOUNTER (OUTPATIENT)
Dept: WOUND CARE | Age: 71
Discharge: HOME | End: 2021-03-31
Payer: MEDICARE

## 2021-03-31 DIAGNOSIS — S31.000A SACRAL WOUND, INITIAL ENCOUNTER: Primary | ICD-10-CM

## 2021-03-31 PROCEDURE — 11042 DBRDMT SUBQ TIS 1ST 20SQCM/<: CPT | Performed by: SURGERY

## 2021-03-31 PROCEDURE — 11042 DBRDMT SUBQ TIS 1ST 20SQCM/<: CPT

## 2021-03-31 RX ORDER — CLOBETASOL PROPIONATE 0.5 MG/G
OINTMENT TOPICAL ONCE
Status: CANCELLED | OUTPATIENT
Start: 2021-03-31 | End: 2021-03-31

## 2021-03-31 RX ORDER — LIDOCAINE 40 MG/G
CREAM TOPICAL ONCE
Status: CANCELLED | OUTPATIENT
Start: 2021-03-31 | End: 2021-03-31

## 2021-03-31 RX ORDER — BETAMETHASONE DIPROPIONATE 0.05 %
OINTMENT (GRAM) TOPICAL ONCE
Status: CANCELLED | OUTPATIENT
Start: 2021-03-31 | End: 2021-03-31

## 2021-03-31 RX ORDER — BACITRACIN, NEOMYCIN, POLYMYXIN B 400; 3.5; 5 [USP'U]/G; MG/G; [USP'U]/G
OINTMENT TOPICAL ONCE
Status: CANCELLED | OUTPATIENT
Start: 2021-03-31 | End: 2021-03-31

## 2021-03-31 RX ORDER — BACITRACIN ZINC AND POLYMYXIN B SULFATE 500; 1000 [USP'U]/G; [USP'U]/G
OINTMENT TOPICAL ONCE
Status: CANCELLED | OUTPATIENT
Start: 2021-03-31 | End: 2021-03-31

## 2021-03-31 RX ORDER — LIDOCAINE 50 MG/G
OINTMENT TOPICAL ONCE
Status: CANCELLED | OUTPATIENT
Start: 2021-03-31 | End: 2021-03-31

## 2021-03-31 RX ORDER — LIDOCAINE 40 MG/G
CREAM TOPICAL ONCE
Status: DISCONTINUED | OUTPATIENT
Start: 2021-03-31 | End: 2021-04-01 | Stop reason: HOSPADM

## 2021-03-31 RX ORDER — LIDOCAINE HYDROCHLORIDE 20 MG/ML
JELLY TOPICAL ONCE
Status: CANCELLED | OUTPATIENT
Start: 2021-03-31 | End: 2021-03-31

## 2021-03-31 RX ORDER — GINSENG 100 MG
CAPSULE ORAL ONCE
Status: CANCELLED | OUTPATIENT
Start: 2021-03-31 | End: 2021-03-31

## 2021-03-31 RX ORDER — GENTAMICIN SULFATE 1 MG/G
OINTMENT TOPICAL ONCE
Status: CANCELLED | OUTPATIENT
Start: 2021-03-31 | End: 2021-03-31

## 2021-03-31 RX ORDER — LIDOCAINE HYDROCHLORIDE 40 MG/ML
SOLUTION TOPICAL ONCE
Status: CANCELLED | OUTPATIENT
Start: 2021-03-31 | End: 2021-03-31

## 2021-03-31 NOTE — PROGRESS NOTES
[address] Progress and Procedure Note    Estela Fairchild  AGE: 65 y.o. GENDER: female    : 1950  TODAY'S DATE: 3/31/2021    Chief Complaint   Patient presents with    Wound Check     COCCYX        History of Present Illness     Estela Fairchild is a 65 y.o. female who presents today for wound evaluation. History of Wound: pressure wound located on the sacral decubitus ulcer stage 3. Wound Pain:  mild  Severity:  2 / 10   Wound Type:  pressure  Modifying Factors:  diabetes, poor glucose control, decreased mobility and weight loss and prior pilonidal cyst excision  Associated Signs/Symptoms:  none    Past Medical History:   Diagnosis Date    Arthritis     back    Cataracts, bilateral     CHF (congestive heart failure) (HCC)     Diabetes mellitus ([address]     Glaucoma     Hyperlipidemia     Hypertension     Thyroid disease      Past Surgical History:   Procedure Laterality Date    CARPAL TUNNEL RELEASE      bilateral    CHOLECYSTECTOMY      COLONOSCOPY      ELBOW SURGERY      ENDOSCOPY, COLON, DIAGNOSTIC      EYE SURGERY      FOOT SURGERY      SHOULDER SURGERY       Current Outpatient Medications   Medication Sig Dispense Refill    traMADol (ULTRAM) 50 MG tablet Take 50 mg by mouth 3 times daily as needed for Pain.  DULoxetine (CYMBALTA) 60 MG extended release capsule Take 1 capsule by mouth daily 30 capsule 3    sodium chloride (OCEAN, BABY AYR) 0.65 % nasal spray 1 spray by Nasal route every 2 hours as needed for Congestion 1 Bottle 0    mometasone-formoterol (DULERA) 200-5 MCG/ACT inhaler Inhale 2 puffs into the lungs every 12 hours 1 Inhaler 1    spironolactone (ALDACTONE) 25 MG tablet Take 12.5 mg by mouth daily      Travoprost, BAK Free, (TRAVATAN Z) 0.004 % SOLN ophthalmic solution Place 1 drop into both eyes nightly      DICLOFENAC PO Take  by mouth.       acetaminophen (TYLENOL) 500 MG tablet Take 500 mg by mouth every 6 hours as needed for Pain or Fever       aspirin EC 81 MG EC tablet Take 81 mg by mouth daily May restart in AM.      furosemide (LASIX) 40 MG tablet Take 40 mg by mouth daily       levothyroxine (LEVOXYL) 200 MCG tablet Take 200 mcg by mouth Daily       Insulin Detemir (LEVEMIR FLEXPEN SC) Inject 33 Units into the skin nightly       simvastatin (ZOCOR) 40 MG tablet Take 40 mg by mouth nightly.  Insulin Lispro, Human, (HUMALOG SC) Inject 10 Units into the skin 3 times daily (with meals) With sliding      Calcium Carbonate-Vitamin D (CALCIUM + D PO) Take by mouth daily        Current Facility-Administered Medications   Medication Dose Route Frequency Provider Last Rate Last Admin    lidocaine (LMX) 4 % cream   Topical Once Zunilda Prescott MD          Social History:   Social History     Tobacco Use    Smoking status: Never Smoker    Smokeless tobacco: Never Used   Substance Use Topics    Alcohol use: Not Currently    Drug use: Never     Allergies: Other, Ace inhibitors, Metoprolol, Tetrahydrozoline hcl, and Timolol    Procedure: Indications:  Based on my examination of this patient's wound(s)/ulcer(s) today, debridement is required to promote healing and evaluate the wound base. Performed by: Gabbie August MD    Consent obtained: Yes    Time out taken: Yes    Pain Control: Anesthetic  Anesthetic: 4% Lidocaine Cream     Debridement: Excisional Debridement    Using curette the wound was sharply debrided    down through and including the removal of epidermis, dermis and subcutaneous tissue.         Devitalized Tissue Debrided: fibrin, biofilm and slough    Pre Debridement Measurements:  Are located in the Wound Documentation Flow Sheet     Wound #: 1     Post  Debridement Measurements:  Wound 10/14/20 Coccyx #1 (Active)   Wound Image   01/27/21 1023   Wound Etiology Pressure Stage  3 03/31/21 0942   Wound Cleansed Cleansed with saline 03/31/21 0942   Dressing/Treatment Collagen;Dry dressing 03/03/21 1106   Wound Length (cm) 2.1 cm 03/31/21 0942   Wound Width (cm) 0.7 cm 03/31/21 0942   Wound Depth (cm) 2 cm 03/31/21 0942   Wound Surface Area (cm^2) 1.47 cm^2 03/31/21 0942   Change in Wound Size % (l*w) 70 03/31/21 0942   Wound Volume (cm^3) 2.94 cm^3 03/31/21 0942   Wound Healing % 79 03/31/21 0942   Post-Procedure Length (cm) 2.1 cm 03/31/21 0953   Post-Procedure Width (cm) 0.7 cm 03/31/21 0953   Post-Procedure Depth (cm) 2 cm 03/31/21 0953   Post-Procedure Surface Area (cm^2) 1.47 cm^2 03/31/21 0953   Post-Procedure Volume (cm^3) 2.94 cm^3 03/31/21 0953   Distance Tunneling (cm) 2.5 cm 03/03/21 0941   Tunneling Position ___ O'Clock 1200 03/03/21 0941   Wound Assessment Bleeding 03/31/21 0953   Drainage Amount Moderate 03/31/21 0953   Drainage Description Brown 03/31/21 0942   Odor None 03/31/21 0942   Saba-wound Assessment Maceration 03/31/21 0942   Margins Defined edges 03/31/21 0942   Wound Thickness Description not for Pressure Injury Full thickness 03/31/21 0942   Number of days: 168       Percent of Wound(s)/Ulcer(s) Debrided: 100%    Total Surface Area Debrided:  1.47 sq cm     Bleeding:  Minimal    Hemostasis Achieved:  by pressure    Procedural Pain:  1 / 10     Post Procedural Pain:  1 / 10     Response to treatment:  Well tolerated by patient. Assessment:     Wound looks improved. (improved, worse or stable)    Patient tolerated procedure well and was given proper instruction. The nature of the patient's condition was explained in depth. The patient was informed that their compliance to the treatment plan is paramount to successful healing and prevention of further ulceration and/or infection     Plan:     Treatment Plan:       Treatment Note please see attached Discharge Instructions    Written patient dismissal instructions given to patient and signed by patient or POA.          Discharge Instructions       [address]  Telephone: [phone]

## 2021-03-31 NOTE — PLAN OF CARE
Discharge instructions given. Patient verbalized understanding. Return to [facility] in 1 week.

## 2021-04-14 ENCOUNTER — HOSPITAL ENCOUNTER (OUTPATIENT)
Dept: WOUND CARE | Age: 71
Discharge: HOME | End: 2021-04-14
Payer: MEDICARE

## 2021-04-14 VITALS — DIASTOLIC BLOOD PRESSURE: 73 MMHG | HEART RATE: 90 BPM | RESPIRATION RATE: 16 BRPM | SYSTOLIC BLOOD PRESSURE: 119 MMHG

## 2021-04-14 DIAGNOSIS — S31.000A SACRAL WOUND, INITIAL ENCOUNTER: Primary | ICD-10-CM

## 2021-04-14 PROCEDURE — 11042 DBRDMT SUBQ TIS 1ST 20SQCM/<: CPT

## 2021-04-14 PROCEDURE — 11042 DBRDMT SUBQ TIS 1ST 20SQCM/<: CPT | Performed by: SURGERY

## 2021-04-14 RX ORDER — LIDOCAINE HYDROCHLORIDE 40 MG/ML
SOLUTION TOPICAL ONCE
Status: CANCELLED | OUTPATIENT
Start: 2021-04-14 | End: 2021-04-14

## 2021-04-14 RX ORDER — LIDOCAINE HYDROCHLORIDE 20 MG/ML
JELLY TOPICAL ONCE
Status: CANCELLED | OUTPATIENT
Start: 2021-04-14 | End: 2021-04-14

## 2021-04-14 RX ORDER — GENTAMICIN SULFATE 1 MG/G
OINTMENT TOPICAL ONCE
Status: CANCELLED | OUTPATIENT
Start: 2021-04-14 | End: 2021-04-14

## 2021-04-14 RX ORDER — CLOBETASOL PROPIONATE 0.5 MG/G
OINTMENT TOPICAL ONCE
Status: CANCELLED | OUTPATIENT
Start: 2021-04-14 | End: 2021-04-14

## 2021-04-14 RX ORDER — LIDOCAINE 50 MG/G
OINTMENT TOPICAL ONCE
Status: CANCELLED | OUTPATIENT
Start: 2021-04-14 | End: 2021-04-14

## 2021-04-14 RX ORDER — LIDOCAINE 40 MG/G
CREAM TOPICAL ONCE
Status: DISCONTINUED | OUTPATIENT
Start: 2021-04-14 | End: 2021-04-15 | Stop reason: HOSPADM

## 2021-04-14 RX ORDER — BACITRACIN ZINC AND POLYMYXIN B SULFATE 500; 1000 [USP'U]/G; [USP'U]/G
OINTMENT TOPICAL ONCE
Status: CANCELLED | OUTPATIENT
Start: 2021-04-14 | End: 2021-04-14

## 2021-04-14 RX ORDER — GINSENG 100 MG
CAPSULE ORAL ONCE
Status: CANCELLED | OUTPATIENT
Start: 2021-04-14 | End: 2021-04-14

## 2021-04-14 RX ORDER — LIDOCAINE 40 MG/G
CREAM TOPICAL ONCE
Status: CANCELLED | OUTPATIENT
Start: 2021-04-14 | End: 2021-04-14

## 2021-04-14 RX ORDER — BACITRACIN, NEOMYCIN, POLYMYXIN B 400; 3.5; 5 [USP'U]/G; MG/G; [USP'U]/G
OINTMENT TOPICAL ONCE
Status: CANCELLED | OUTPATIENT
Start: 2021-04-14 | End: 2021-04-14

## 2021-04-14 RX ORDER — BETAMETHASONE DIPROPIONATE 0.05 %
OINTMENT (GRAM) TOPICAL ONCE
Status: CANCELLED | OUTPATIENT
Start: 2021-04-14 | End: 2021-04-14

## 2021-04-14 NOTE — PLAN OF CARE
Discharge instructions given. Patient verbalized understanding. Return to [facility] in 1 week.

## 2021-04-14 NOTE — PROGRESS NOTES
[address] Progress and Procedure Note    Pam Lee  AGE: 65 y.o. GENDER: female    : 1950  TODAY'S DATE: 2021    Chief Complaint   Patient presents with    Wound Check     Follow up wound coccyx        History of Present Illness     Pam Lee is a 65 y.o. female who presents today for wound evaluation. History of Wound: pressure wound located on the sacral decubitus ulcer stage 3. Wound Pain:  mild  Severity:  2 / 10   Wound Type:  pressure  Modifying Factors:  diabetes, poor glucose control, decreased mobility and weight loss and prior pilonidal cyst excision  Associated Signs/Symptoms:  none    Past Medical History:   Diagnosis Date    Arthritis     back    Cataracts, bilateral     [facility]est[facility])     Diabetes mellitus ([address]     Glaucoma     Hyperlipidemia     Hypertension     Thyroid disease      Past Surgical History:   Procedure Laterality Date    CARPAL TUNNEL RELEASE      bilateral    CHOLECYSTECTOMY      COLONOSCOPY      ELBOW SURGERY      ENDOSCOPY, COLON, DIAGNOSTIC      EYE SURGERY      FOOT SURGERY      SHOULDER SURGERY       Current Outpatient Medications   Medication Sig Dispense Refill    traMADol (ULTRAM) 50 MG tablet Take 50 mg by mouth 3 times daily as needed for Pain.  DULoxetine (CYMBALTA) 60 MG extended release capsule Take 1 capsule by mouth daily 30 capsule 3    sodium chloride (OCEAN, BABY AYR) 0.65 % nasal spray 1 spray by Nasal route every 2 hours as needed for Congestion 1 Bottle 0    mometasone-formoterol (DULERA) 200-5 MCG/ACT inhaler Inhale 2 puffs into the lungs every 12 hours 1 Inhaler 1    spironolactone (ALDACTONE) 25 MG tablet Take 12.5 mg by mouth daily      Travoprost, BAK Free, (TRAVATAN Z) 0.004 % SOLN ophthalmic solution Place 1 drop into both eyes nightly      DICLOFENAC PO Take  by mouth.       acetaminophen (TYLENOL) 500 MG tablet Take 500 mg by mouth every 6 hours as needed for Pain or Fever       aspirin EC 81 MG EC tablet Take 81 mg by mouth daily May restart in AM.      furosemide (LASIX) 40 MG tablet Take 40 mg by mouth daily       levothyroxine (LEVOXYL) 200 MCG tablet Take 200 mcg by mouth Daily       Insulin Detemir (LEVEMIR FLEXPEN SC) Inject 33 Units into the skin nightly       simvastatin (ZOCOR) 40 MG tablet Take 40 mg by mouth nightly.  Insulin Lispro, Human, (HUMALOG SC) Inject 10 Units into the skin 3 times daily (with meals) With sliding      Calcium Carbonate-Vitamin D (CALCIUM + D PO) Take by mouth daily        Current Facility-Administered Medications   Medication Dose Route Frequency Provider Last Rate Last Admin    lidocaine (LMX) 4 % cream   Topical Once Isael Reyes MD          Social History:   Social History     Tobacco Use    Smoking status: Never Smoker    Smokeless tobacco: Never Used   Substance Use Topics    Alcohol use: Not Currently    Drug use: Never     Allergies: Other, Ace inhibitors, Metoprolol, Tetrahydrozoline hcl, and Timolol    Procedure: Indications:  Based on my examination of this patient's wound(s)/ulcer(s) today, debridement is required to promote healing and evaluate the wound base. Performed by: Sharon Bowman MD    Consent obtained: Yes    Time out taken: Yes    Pain Control: Anesthetic  Anesthetic: 4% Lidocaine Cream     Debridement: Excisional Debridement    Using curette the wound was sharply debrided    down through and including the removal of epidermis, dermis and subcutaneous tissue.         Devitalized Tissue Debrided: fibrin, biofilm and slough    Pre Debridement Measurements:  Are located in the Wound Documentation Flow Sheet     Wound #: 1     Post  Debridement Measurements:  Wound 10/14/20 Coccyx #1 (Active)   Wound Image   01/27/21 1023   Wound Etiology Pressure Stage  3 04/14/21 0916   Wound Cleansed Cleansed with saline 04/14/21 0932   Dressing/Treatment Collagen;Dry dressing [place], [address] [place] [address]  Telephone: [phone]     FAX [phone]      Discharge Instructions:  Keep weight off wounds and reposition every 2 hours if applicable. Avoid standing for long periods of time.      If wound(s) is on your lower extremity, elevate legs to the level of the heart or above for 30 minutes 4-5 times a day and/or when sitting.      Do not get wounds wet in bath or shower unless otherwise instructed by your physician. If your wound is on you foot or leg, you may purchase a cast bag. Please ask at the pharmacy.     When taking antibiotics take entire prescription as ordered by MD do not stop taking until medicine is all gone. Exercise as tolerated. No Smoking. Smoking prohibits wound healing.     If Vascular testing is ordered, please call [address] [place] [address] [phone]) to schedule.     Vascular tests ordered by Wound Care Physicians may take up to 2 hours to complete. Please keep that in mind when scheduling.      If Vascular testing is scheduled, please bring supplies to replace your dressing after testing is done. The vascular department does not stock supplies.      Wound: Sacrum      With each dressing change, rinse wounds with 0.9% Saline. (May use wound wash or soft contact solution. Both can be purchased at a local drug store). If unable to obtain saline, may use a gentle soap and water.     Dressing care: Triad to irritated area around wound with each dressing change. Loosely pack with Collagen, dry dressing- change Monday, Wednesday, & Friday.     Important dietary reminders:  1. Increase Protein intake for optimal wound healing  2. No added salt to reduce any swelling  3. If diabetic, good glucose control  4.  If you smoke, smoking affects wound healing, we ask that you refrain from smoking.     Follow up with Dr Rafael Mariscal In 1 week in the wound care center.      Call [phone] for any questions or concerns.      Your  is [address]: [address] [facility] Information: Should you experience any significant changes in your wound(s) or have questions about your wound care, please contact the [address] [phone] Monday  - Thursday 8:00 am - 4:00 pm and Friday 8:00 am - 1:00pm. If you need help with your wound outside these hours and cannot wait until we are again available, contact your PCP or go to the hospital emergency room.      PLEASE NOTE: IF YOU ARE UNABLE TO OBTAIN WOUND SUPPLIES, CONTINUE TO USE THE SUPPLIES YOU HAVE AVAILABLE UNTIL YOU ARE ABLE TO [facility]. IT IS MOST IMPORTANT TO KEEP THE WOUND COVERED AT ALL TIMES       Dakota De MD, FACS  4/14/2021  9:56 AM

## 2021-04-21 ENCOUNTER — HOSPITAL ENCOUNTER (OUTPATIENT)
Dept: WOUND CARE | Age: 71
Discharge: HOME | End: 2021-04-21
Payer: MEDICARE

## 2021-04-21 VITALS — TEMPERATURE: 96.6 F | DIASTOLIC BLOOD PRESSURE: 73 MMHG | SYSTOLIC BLOOD PRESSURE: 129 MMHG | HEART RATE: 91 BPM

## 2021-04-21 DIAGNOSIS — S31.000A SACRAL WOUND, INITIAL ENCOUNTER: Primary | ICD-10-CM

## 2021-04-21 PROCEDURE — 11042 DBRDMT SUBQ TIS 1ST 20SQCM/<: CPT | Performed by: SURGERY

## 2021-04-21 PROCEDURE — 11042 DBRDMT SUBQ TIS 1ST 20SQCM/<: CPT

## 2021-04-21 RX ORDER — BACITRACIN ZINC AND POLYMYXIN B SULFATE 500; 1000 [USP'U]/G; [USP'U]/G
OINTMENT TOPICAL ONCE
Status: CANCELLED | OUTPATIENT
Start: 2021-04-21 | End: 2021-04-21

## 2021-04-21 RX ORDER — LIDOCAINE 40 MG/G
CREAM TOPICAL ONCE
Status: DISCONTINUED | OUTPATIENT
Start: 2021-04-21 | End: 2021-04-22 | Stop reason: HOSPADM

## 2021-04-21 RX ORDER — LIDOCAINE HYDROCHLORIDE 20 MG/ML
JELLY TOPICAL ONCE
Status: CANCELLED | OUTPATIENT
Start: 2021-04-21 | End: 2021-04-21

## 2021-04-21 RX ORDER — BETAMETHASONE DIPROPIONATE 0.05 %
OINTMENT (GRAM) TOPICAL ONCE
Status: CANCELLED | OUTPATIENT
Start: 2021-04-21 | End: 2021-04-21

## 2021-04-21 RX ORDER — LIDOCAINE HYDROCHLORIDE 40 MG/ML
SOLUTION TOPICAL ONCE
Status: CANCELLED | OUTPATIENT
Start: 2021-04-21 | End: 2021-04-21

## 2021-04-21 RX ORDER — CLOBETASOL PROPIONATE 0.5 MG/G
OINTMENT TOPICAL ONCE
Status: CANCELLED | OUTPATIENT
Start: 2021-04-21 | End: 2021-04-21

## 2021-04-21 RX ORDER — LIDOCAINE 40 MG/G
CREAM TOPICAL ONCE
Status: CANCELLED | OUTPATIENT
Start: 2021-04-21 | End: 2021-04-21

## 2021-04-21 RX ORDER — GINSENG 100 MG
CAPSULE ORAL ONCE
Status: CANCELLED | OUTPATIENT
Start: 2021-04-21 | End: 2021-04-21

## 2021-04-21 RX ORDER — BACITRACIN, NEOMYCIN, POLYMYXIN B 400; 3.5; 5 [USP'U]/G; MG/G; [USP'U]/G
OINTMENT TOPICAL ONCE
Status: CANCELLED | OUTPATIENT
Start: 2021-04-21 | End: 2021-04-21

## 2021-04-21 RX ORDER — GENTAMICIN SULFATE 1 MG/G
OINTMENT TOPICAL ONCE
Status: CANCELLED | OUTPATIENT
Start: 2021-04-21 | End: 2021-04-21

## 2021-04-21 RX ORDER — LIDOCAINE 50 MG/G
OINTMENT TOPICAL ONCE
Status: CANCELLED | OUTPATIENT
Start: 2021-04-21 | End: 2021-04-21

## 2021-04-21 ASSESSMENT — PAIN DESCRIPTION - PROGRESSION: CLINICAL_PROGRESSION: NOT CHANGED

## 2021-04-21 ASSESSMENT — PAIN DESCRIPTION - DESCRIPTORS: DESCRIPTORS: ACHING

## 2021-04-21 ASSESSMENT — PAIN DESCRIPTION - FREQUENCY: FREQUENCY: CONTINUOUS

## 2021-04-21 ASSESSMENT — PAIN DESCRIPTION - LOCATION: LOCATION: BUTTOCKS

## 2021-04-21 ASSESSMENT — PAIN DESCRIPTION - ONSET: ONSET: ON-GOING

## 2021-04-21 NOTE — PLAN OF CARE
Discharge instructions given. Patient verbalized understanding. Return to [facility] in 1 week.

## 2021-04-21 NOTE — PROGRESS NOTES
[address] Progress and Procedure Note    Julius Age  AGE: 65 y.o. GENDER: female    : 1950  TODAY'S DATE: 2021    Chief Complaint   Patient presents with    Wound Check     Buttock  F/U        History of Present Illness     Julius Sherman is a 65 y.o. female who presents today for wound evaluation. History of Wound: pressure wound located on the sacral decubitus ulcer stage 3. Wound Pain:  mild  Severity:  2 / 10   Wound Type:  pressure  Modifying Factors:  diabetes, poor glucose control, decreased mobility and weight loss and prior pilonidal cyst excision  Associated Signs/Symptoms:  none    Past Medical History:   Diagnosis Date    Arthritis     back    Cataracts, bilateral     CHF (congestive heart failure) (HCC)     Diabetes mellitus ([address]     Glaucoma     Hyperlipidemia     Hypertension     Thyroid disease      Past Surgical History:   Procedure Laterality Date    CARPAL TUNNEL RELEASE      bilateral    CHOLECYSTECTOMY      COLONOSCOPY      ELBOW SURGERY      ENDOSCOPY, COLON, DIAGNOSTIC      EYE SURGERY      FOOT SURGERY      SHOULDER SURGERY       Current Outpatient Medications   Medication Sig Dispense Refill    traMADol (ULTRAM) 50 MG tablet Take 50 mg by mouth 3 times daily as needed for Pain.  DULoxetine (CYMBALTA) 60 MG extended release capsule Take 1 capsule by mouth daily 30 capsule 3    sodium chloride (OCEAN, BABY AYR) 0.65 % nasal spray 1 spray by Nasal route every 2 hours as needed for Congestion 1 Bottle 0    mometasone-formoterol (DULERA) 200-5 MCG/ACT inhaler Inhale 2 puffs into the lungs every 12 hours 1 Inhaler 1    spironolactone (ALDACTONE) 25 MG tablet Take 12.5 mg by mouth daily      Travoprost, BAK Free, (TRAVATAN Z) 0.004 % SOLN ophthalmic solution Place 1 drop into both eyes nightly      DICLOFENAC PO Take  by mouth.       acetaminophen (TYLENOL) 500 MG tablet Take 500 mg by mouth every 6 hours as needed for Pain or Fever       aspirin EC 81 MG EC tablet Take 81 mg by mouth daily May restart in AM.      furosemide (LASIX) 40 MG tablet Take 40 mg by mouth daily       levothyroxine (LEVOXYL) 200 MCG tablet Take 200 mcg by mouth Daily       Insulin Detemir (LEVEMIR FLEXPEN SC) Inject 33 Units into the skin nightly       simvastatin (ZOCOR) 40 MG tablet Take 40 mg by mouth nightly.  Insulin Lispro, Human, (HUMALOG SC) Inject 10 Units into the skin 3 times daily (with meals) With sliding      Calcium Carbonate-Vitamin D (CALCIUM + D PO) Take by mouth daily        Current Facility-Administered Medications   Medication Dose Route Frequency Provider Last Rate Last Admin    lidocaine (LMX) 4 % cream   Topical Once Josy Abreu MD          Social History:   Social History     Tobacco Use    Smoking status: Never Smoker    Smokeless tobacco: Never Used   Substance Use Topics    Alcohol use: Not Currently    Drug use: Never     Allergies: Other, Ace inhibitors, Metoprolol, Tetrahydrozoline hcl, and Timolol    Procedure: Indications:  Based on my examination of this patient's wound(s)/ulcer(s) today, debridement is required to promote healing and evaluate the wound base. Performed by: Jose Luis Vazquez MD    Consent obtained: Yes    Time out taken: Yes    Pain Control: Anesthetic  Anesthetic: 4% Lidocaine Cream     Debridement: Excisional Debridement    Using curette the wound was sharply debrided    down through and including the removal of epidermis, dermis and subcutaneous tissue.         Devitalized Tissue Debrided: fibrin, biofilm and slough    Pre Debridement Measurements:  Are located in the Wound Documentation Flow Sheet     Wound #: 1     Post  Debridement Measurements:  Wound 10/14/20 Coccyx #1 (Active)   Wound Image   01/27/21 1023   Wound Etiology Pressure Stage  3 04/21/21 0924   Wound Cleansed Cleansed with saline 04/21/21 0937   Dressing/Treatment Collagen;Dry dressing 03/03/21 1106   Wound Length (cm) 1.8 cm 04/21/21 0924   Wound Width (cm) 0.7 cm 04/21/21 0924   Wound Depth (cm) 2 cm 04/21/21 0924   Wound Surface Area (cm^2) 1.26 cm^2 04/21/21 0924   Change in Wound Size % (l*w) 74.29 04/21/21 0924   Wound Volume (cm^3) 2.52 cm^3 04/21/21 0924   Wound Healing % 82 04/21/21 0924   Post-Procedure Length (cm) 1.8 cm 04/21/21 0937   Post-Procedure Width (cm) 0.7 cm 04/21/21 0937   Post-Procedure Depth (cm) 2 cm 04/21/21 0937   Post-Procedure Surface Area (cm^2) 1.26 cm^2 04/21/21 0937   Post-Procedure Volume (cm^3) 2.52 cm^3 04/21/21 0937   Distance Tunneling (cm) 2.5 cm 03/03/21 0941   Tunneling Position ___ O'Clock 1200 03/03/21 0941   Wound Assessment Bleeding 04/21/21 0937   Drainage Amount Moderate 04/21/21 0937   Drainage Description Serosanguinous 04/21/21 0924   Odor None 04/21/21 0924   Saba-wound Assessment Maceration 04/21/21 0924   Margins Attached edges 04/21/21 0924   Wound Thickness Description not for Pressure Injury Full thickness 04/21/21 0924   Number of days: 189       Percent of Wound(s)/Ulcer(s) Debrided: 100%    Total Surface Area Debrided:  1.26 sq cm     Bleeding:  Minimal    Hemostasis Achieved:  by pressure    Procedural Pain:  1  / 10     Post Procedural Pain:  2 / 10     Response to treatment:  Well tolerated by patient. Assessment:     Wound looks improved. (improved, worse or stable)    Patient tolerated procedure well and was given proper instruction. The nature of the patient's condition was explained in depth. The patient was informed that their compliance to the treatment plan is paramount to successful healing and prevention of further ulceration and/or infection     Plan:     Treatment Plan:       Treatment Note please see attached Discharge Instructions    Written patient dismissal instructions given to patient and signed by patient or POA.          Discharge Instructions       [facility] [address]  Telephone: [phone]     FAX [phone]      Discharge Instructions:  Keep weight off wounds and reposition every 2 hours if applicable. Avoid standing for long periods of time.      If wound(s) is on your lower extremity, elevate legs to the level of the heart or above for 30 minutes 4-5 times a day and/or when sitting.      Do not get wounds wet in bath or shower unless otherwise instructed by your physician. If your wound is on you foot or leg, you may purchase a cast bag. Please ask at the pharmacy.     When taking antibiotics take entire prescription as ordered by MD do not stop taking until medicine is all gone. Exercise as tolerated. No Smoking. Smoking prohibits wound healing.     If Vascular testing is ordered, please call [address] [place] [address] [phone]) to schedule.     Vascular tests ordered by Wound Care Physicians may take up to 2 hours to complete. Please keep that in mind when scheduling.      If Vascular testing is scheduled, please bring supplies to replace your dressing after testing is done. The vascular department does not stock supplies.      Wound: Sacrum      With each dressing change, rinse wounds with 0.9% Saline. (May use wound wash or soft contact solution. Both can be purchased at a local drug store). If unable to obtain saline, may use a gentle soap and water.     Dressing care: Triad to irritated area around wound with each dressing change. Loosely pack with Collagen, dry dressing- change Monday, Wednesday, & Friday.     Important dietary reminders:  1. Increase Protein intake for optimal wound healing  2. No added salt to reduce any swelling  3. If diabetic, good glucose control  4.  If you smoke, smoking affects wound healing, we ask that you refrain from smoking.     Follow up with Dr Krys Brown In 1 week in the wound care center.      Call [phone] for any questions or concerns.      Your  is [employer] [facility] [email or web address] Company: [address] Information: Should you experience any significant changes in your wound(s) or have questions about your wound care, please contact the [facility] [address] [phone] Monday  - Thursday 8:00 am - 4:00 pm and Friday 8:00 am - 1:00pm. If you need help with your wound outside these hours and cannot wait until we are again available, contact your PCP or go to the hospital emergency room.      PLEASE NOTE: IF YOU ARE UNABLE TO [address], CONTINUE TO USE THE SUPPLIES YOU HAVE AVAILABLE UNTIL YOU ARE ABLE TO [address]. IT IS MOST IMPORTANT TO KEEP THE WOUND COVERED AT ALL TIMES       Dakota Gonzalez MD, FACS  4/21/2021  9:52 AM

## 2021-04-28 ENCOUNTER — HOSPITAL ENCOUNTER (OUTPATIENT)
Dept: WOUND CARE | Age: 71
Discharge: HOME | End: 2021-04-28
Payer: MEDICARE

## 2021-04-28 VITALS — DIASTOLIC BLOOD PRESSURE: 81 MMHG | TEMPERATURE: 96.8 F | SYSTOLIC BLOOD PRESSURE: 135 MMHG | HEART RATE: 90 BPM

## 2021-04-28 DIAGNOSIS — S31.000A SACRAL WOUND, INITIAL ENCOUNTER: Primary | ICD-10-CM

## 2021-04-28 PROCEDURE — 11042 DBRDMT SUBQ TIS 1ST 20SQCM/<: CPT | Performed by: SURGERY

## 2021-04-28 PROCEDURE — 11042 DBRDMT SUBQ TIS 1ST 20SQCM/<: CPT

## 2021-04-28 RX ORDER — LIDOCAINE 50 MG/G
OINTMENT TOPICAL ONCE
Status: CANCELLED | OUTPATIENT
Start: 2021-04-28 | End: 2021-04-28

## 2021-04-28 RX ORDER — BETAMETHASONE DIPROPIONATE 0.05 %
OINTMENT (GRAM) TOPICAL ONCE
Status: CANCELLED | OUTPATIENT
Start: 2021-04-28 | End: 2021-04-28

## 2021-04-28 RX ORDER — GINSENG 100 MG
CAPSULE ORAL ONCE
Status: CANCELLED | OUTPATIENT
Start: 2021-04-28 | End: 2021-04-28

## 2021-04-28 RX ORDER — CLOBETASOL PROPIONATE 0.5 MG/G
OINTMENT TOPICAL ONCE
Status: CANCELLED | OUTPATIENT
Start: 2021-04-28 | End: 2021-04-28

## 2021-04-28 RX ORDER — LIDOCAINE 40 MG/G
CREAM TOPICAL ONCE
Status: CANCELLED | OUTPATIENT
Start: 2021-04-28 | End: 2021-04-28

## 2021-04-28 RX ORDER — LIDOCAINE 40 MG/G
CREAM TOPICAL ONCE
Status: DISCONTINUED | OUTPATIENT
Start: 2021-04-28 | End: 2021-04-29 | Stop reason: HOSPADM

## 2021-04-28 RX ORDER — LIDOCAINE HYDROCHLORIDE 40 MG/ML
SOLUTION TOPICAL ONCE
Status: CANCELLED | OUTPATIENT
Start: 2021-04-28 | End: 2021-04-28

## 2021-04-28 RX ORDER — BACITRACIN ZINC AND POLYMYXIN B SULFATE 500; 1000 [USP'U]/G; [USP'U]/G
OINTMENT TOPICAL ONCE
Status: CANCELLED | OUTPATIENT
Start: 2021-04-28 | End: 2021-04-28

## 2021-04-28 RX ORDER — GENTAMICIN SULFATE 1 MG/G
OINTMENT TOPICAL ONCE
Status: CANCELLED | OUTPATIENT
Start: 2021-04-28 | End: 2021-04-28

## 2021-04-28 RX ORDER — LIDOCAINE HYDROCHLORIDE 20 MG/ML
JELLY TOPICAL ONCE
Status: CANCELLED | OUTPATIENT
Start: 2021-04-28 | End: 2021-04-28

## 2021-04-28 RX ORDER — BACITRACIN, NEOMYCIN, POLYMYXIN B 400; 3.5; 5 [USP'U]/G; MG/G; [USP'U]/G
OINTMENT TOPICAL ONCE
Status: CANCELLED | OUTPATIENT
Start: 2021-04-28 | End: 2021-04-28

## 2021-04-28 ASSESSMENT — PAIN SCALES - GENERAL: PAINLEVEL_OUTOF10: 3

## 2021-04-28 ASSESSMENT — PAIN DESCRIPTION - DESCRIPTORS: DESCRIPTORS: SHARP

## 2021-04-28 ASSESSMENT — PAIN DESCRIPTION - PROGRESSION: CLINICAL_PROGRESSION: NOT CHANGED

## 2021-04-28 ASSESSMENT — PAIN DESCRIPTION - PAIN TYPE: TYPE: CHRONIC PAIN

## 2021-04-28 ASSESSMENT — PAIN DESCRIPTION - ONSET: ONSET: ON-GOING

## 2021-04-28 NOTE — PLAN OF CARE
Discharge instructions given. Patient verbalized understanding. Return to [facility] in 1 week.

## 2021-04-28 NOTE — PROGRESS NOTES
[address] Progress and Procedure Note    Jose Guadalupe Whitten  AGE: 65 y.o. GENDER: female    : 1950  TODAY'S DATE: 2021    Chief Complaint   Patient presents with    Wound Check     Buttock F/U        History of Present Illness     Jose Guadalupe Whitten is a 65 y.o. female who presents today for wound evaluation. History of Wound: pressure wound located on the sacral decubitus ulcer stage 3. Wound Pain:  mild  Severity:  2 / 10   Wound Type:  pressure  Modifying Factors:  diabetes, poor glucose control, decreased mobility and weight loss and prior pilonidal cyst excision  Associated Signs/Symptoms:  none    Past Medical History:   Diagnosis Date    Arthritis     back    Cataracts, bilateral     CHF (congestive heart failure) (HCC)     Diabetes mellitus ([address]     Glaucoma     Hyperlipidemia     Hypertension     Thyroid disease      Past Surgical History:   Procedure Laterality Date    CARPAL TUNNEL RELEASE      bilateral    CHOLECYSTECTOMY      COLONOSCOPY      ELBOW SURGERY      ENDOSCOPY, COLON, DIAGNOSTIC      EYE SURGERY      FOOT SURGERY      SHOULDER SURGERY       Current Outpatient Medications   Medication Sig Dispense Refill    traMADol (ULTRAM) 50 MG tablet Take 50 mg by mouth 3 times daily as needed for Pain.  DULoxetine (CYMBALTA) 60 MG extended release capsule Take 1 capsule by mouth daily 30 capsule 3    sodium chloride (OCEAN, BABY AYR) 0.65 % nasal spray 1 spray by Nasal route every 2 hours as needed for Congestion 1 Bottle 0    mometasone-formoterol (DULERA) 200-5 MCG/ACT inhaler Inhale 2 puffs into the lungs every 12 hours 1 Inhaler 1    spironolactone (ALDACTONE) 25 MG tablet Take 12.5 mg by mouth daily      Travoprost, BAK Free, (TRAVATAN Z) 0.004 % SOLN ophthalmic solution Place 1 drop into both eyes nightly      DICLOFENAC PO Take  by mouth.       acetaminophen (TYLENOL) 500 MG tablet Take 500 mg by mouth every 6 hours as needed for Pain or Fever       aspirin EC 81 MG EC tablet Take 81 mg by mouth daily May restart in AM.      furosemide (LASIX) 40 MG tablet Take 40 mg by mouth daily       levothyroxine (LEVOXYL) 200 MCG tablet Take 200 mcg by mouth Daily       Insulin Detemir (LEVEMIR FLEXPEN SC) Inject 33 Units into the skin nightly       simvastatin (ZOCOR) 40 MG tablet Take 40 mg by mouth nightly.  Insulin Lispro, Human, (HUMALOG SC) Inject 10 Units into the skin 3 times daily (with meals) With sliding      Calcium Carbonate-Vitamin D (CALCIUM + D PO) Take by mouth daily        Current Facility-Administered Medications   Medication Dose Route Frequency Provider Last Rate Last Admin    lidocaine (LMX) 4 % cream   Topical Once Luan MD Estephania          Social History:   Social History     Tobacco Use    Smoking status: Never Smoker    Smokeless tobacco: Never Used   Substance Use Topics    Alcohol use: Not Currently    Drug use: Never     Allergies: Other, Ace inhibitors, Metoprolol, Tetrahydrozoline hcl, and Timolol    Procedure: Indications:  Based on my examination of this patient's wound(s)/ulcer(s) today, debridement is required to promote healing and evaluate the wound base. Performed by: Dean Reis MD    Consent obtained: Yes    Time out taken: Yes    Pain Control: Anesthetic  Anesthetic: 4% Lidocaine Cream     Debridement: Excisional Debridement    Using curette the wound was sharply debrided    down through and including the removal of epidermis, dermis and subcutaneous tissue.         Devitalized Tissue Debrided: fibrin, biofilm and slough    Pre Debridement Measurements:  Are located in the Wound Documentation Flow Sheet     Wound #: 1     Post  Debridement Measurements:  Wound 10/14/20 Coccyx #1 (Active)   Wound Image   04/28/21 0933   Wound Etiology Pressure Stage  3 04/28/21 0933   Wound Cleansed Cleansed with saline 04/28/21 0943   Dressing/Treatment Collagen;Dry dressing 03/03/21 1108 [id number]  Telephone: [phone]     FAX [phone]      Discharge Instructions:  Keep weight off wounds and reposition every 2 hours if applicable. Avoid standing for long periods of time.      If wound(s) is on your lower extremity, elevate legs to the level of the heart or above for 30 minutes 4-5 times a day and/or when sitting.      Do not get wounds wet in bath or shower unless otherwise instructed by your physician. If your wound is on you foot or leg, you may purchase a cast bag. Please ask at the pharmacy.     When taking antibiotics take entire prescription as ordered by MD do not stop taking until medicine is all gone. Exercise as tolerated. No Smoking. Smoking prohibits wound healing.     If Vascular testing is ordered, please call [address] [place] [address] [phone]) to schedule.     Vascular tests ordered by Wound Care Physicians may take up to 2 hours to complete. Please keep that in mind when scheduling.      If Vascular testing is scheduled, please bring supplies to replace your dressing after testing is done. The vascular department does not stock supplies.      Wound: Sacrum      With each dressing change, rinse wounds with 0.9% Saline. (May use wound wash or soft contact solution. Both can be purchased at a local drug store). If unable to obtain saline, may use a gentle soap and water.     Dressing care: Triad to irritated area around wound with each dressing change. Loosely pack with Collagen, dry dressing- change Monday, Wednesday, & Friday.     Important dietary reminders:  1. Increase Protein intake for optimal wound healing  2. No added salt to reduce any swelling  3. If diabetic, good glucose control  4.  If you smoke, smoking affects wound healing, we ask that you refrain from smoking.     Follow up with Dr Hamlet Rodriguez In 1 week in the wound care center.      Call [phone] for any questions or concerns.      Your  is [employer] [facility] [employer] Company: [address] Information: Should you experience any significant changes in your wound(s) or have questions about your wound care, please contact the [address] [phone] Monday  - Thursday 8:00 am - 4:00 pm and Friday 8:00 am - 1:00pm. If you need help with your wound outside these hours and cannot wait until we are again available, contact your PCP or go to the hospital emergency room.      PLEASE NOTE: IF YOU ARE UNABLE TO [address] , CONTINUE TO USE THE SUPPLIES YOU HAVE AVAILABLE UNTIL YOU ARE ABLE TO [address]. IT IS MOST IMPORTANT TO KEEP THE WOUND COVERED AT ALL TIMES       Dakota Gonzalez MD, FACS  4/28/2021  10:26 AM

## 2021-05-05 ENCOUNTER — HOSPITAL ENCOUNTER (OUTPATIENT)
Dept: WOUND CARE | Age: 71
Discharge: HOME | End: 2021-05-05
Payer: MEDICARE

## 2021-05-05 VITALS
SYSTOLIC BLOOD PRESSURE: 129 MMHG | RESPIRATION RATE: 16 BRPM | DIASTOLIC BLOOD PRESSURE: 79 MMHG | HEART RATE: 89 BPM | TEMPERATURE: 96.6 F

## 2021-05-05 DIAGNOSIS — S31.000A SACRAL WOUND, INITIAL ENCOUNTER: Primary | ICD-10-CM

## 2021-05-05 PROCEDURE — 11042 DBRDMT SUBQ TIS 1ST 20SQCM/<: CPT | Performed by: SURGERY

## 2021-05-05 PROCEDURE — 11042 DBRDMT SUBQ TIS 1ST 20SQCM/<: CPT

## 2021-05-05 RX ORDER — LIDOCAINE HYDROCHLORIDE 40 MG/ML
SOLUTION TOPICAL ONCE
Status: CANCELLED | OUTPATIENT
Start: 2021-05-05 | End: 2021-05-05

## 2021-05-05 RX ORDER — BETAMETHASONE DIPROPIONATE 0.05 %
OINTMENT (GRAM) TOPICAL ONCE
Status: CANCELLED | OUTPATIENT
Start: 2021-05-05 | End: 2021-05-05

## 2021-05-05 RX ORDER — CLOBETASOL PROPIONATE 0.5 MG/G
OINTMENT TOPICAL ONCE
Status: CANCELLED | OUTPATIENT
Start: 2021-05-05 | End: 2021-05-05

## 2021-05-05 RX ORDER — LIDOCAINE HYDROCHLORIDE 20 MG/ML
JELLY TOPICAL ONCE
Status: CANCELLED | OUTPATIENT
Start: 2021-05-05 | End: 2021-05-05

## 2021-05-05 RX ORDER — GENTAMICIN SULFATE 1 MG/G
OINTMENT TOPICAL ONCE
Status: CANCELLED | OUTPATIENT
Start: 2021-05-05 | End: 2021-05-05

## 2021-05-05 RX ORDER — BACITRACIN ZINC AND POLYMYXIN B SULFATE 500; 1000 [USP'U]/G; [USP'U]/G
OINTMENT TOPICAL ONCE
Status: CANCELLED | OUTPATIENT
Start: 2021-05-05 | End: 2021-05-05

## 2021-05-05 RX ORDER — LIDOCAINE 40 MG/G
CREAM TOPICAL ONCE
Status: DISCONTINUED | OUTPATIENT
Start: 2021-05-05 | End: 2021-05-06 | Stop reason: HOSPADM

## 2021-05-05 RX ORDER — BACITRACIN, NEOMYCIN, POLYMYXIN B 400; 3.5; 5 [USP'U]/G; MG/G; [USP'U]/G
OINTMENT TOPICAL ONCE
Status: CANCELLED | OUTPATIENT
Start: 2021-05-05 | End: 2021-05-05

## 2021-05-05 RX ORDER — LIDOCAINE 40 MG/G
CREAM TOPICAL ONCE
Status: CANCELLED | OUTPATIENT
Start: 2021-05-05 | End: 2021-05-05

## 2021-05-05 RX ORDER — LIDOCAINE 50 MG/G
OINTMENT TOPICAL ONCE
Status: CANCELLED | OUTPATIENT
Start: 2021-05-05 | End: 2021-05-05

## 2021-05-05 RX ORDER — GINSENG 100 MG
CAPSULE ORAL ONCE
Status: CANCELLED | OUTPATIENT
Start: 2021-05-05 | End: 2021-05-05

## 2021-05-05 NOTE — PROGRESS NOTES
[address] Progress and Procedure Note    Carol Liu  AGE: 65 y.o. GENDER: female    : 1950  TODAY'S DATE: 2021    No chief complaint on file. History of Present Illness     Carol Liu is a 65 y.o. female who presents today for wound evaluation. History of Wound: pressure wound located on the sacral decubitus ulcer stage 3. Wound Pain:  mild  Severity:  2 / 10   Wound Type:  pressure  Modifying Factors:  diabetes, poor glucose control, decreased mobility and weight loss and prior pilonidal cyst excision  Associated Signs/Symptoms:  none    Past Medical History:   Diagnosis Date    Arthritis     back    Cataracts, bilateral     [facility]est[facility] failure) ([facility])     Diabetes mellitus ([address]     Glaucoma     Hyperlipidemia     Hypertension     Thyroid disease      Past Surgical History:   Procedure Laterality Date    CARPAL TUNNEL RELEASE      bilateral    CHOLECYSTECTOMY      COLONOSCOPY      ELBOW SURGERY      ENDOSCOPY, COLON, DIAGNOSTIC      EYE SURGERY      FOOT SURGERY      SHOULDER SURGERY       Current Outpatient Medications   Medication Sig Dispense Refill    traMADol (ULTRAM) 50 MG tablet Take 50 mg by mouth 3 times daily as needed for Pain.  DULoxetine (CYMBALTA) 60 MG extended release capsule Take 1 capsule by mouth daily 30 capsule 3    sodium chloride (OCEAN, BABY AYR) 0.65 % nasal spray 1 spray by Nasal route every 2 hours as needed for Congestion 1 Bottle 0    mometasone-formoterol (DULERA) 200-5 MCG/ACT inhaler Inhale 2 puffs into the lungs every 12 hours 1 Inhaler 1    spironolactone (ALDACTONE) 25 MG tablet Take 12.5 mg by mouth daily      Travoprost, BAK Free, (TRAVATAN Z) 0.004 % SOLN ophthalmic solution Place 1 drop into both eyes nightly      DICLOFENAC PO Take  by mouth.       acetaminophen (TYLENOL) 500 MG tablet Take 500 mg by mouth every 6 hours as needed for Pain or Fever       aspirin EC 81 MG EC tablet Take 81 mg by mouth daily May restart in AM.      furosemide (LASIX) 40 MG tablet Take 40 mg by mouth daily       levothyroxine (LEVOXYL) 200 MCG tablet Take 200 mcg by mouth Daily       Insulin Detemir (LEVEMIR FLEXPEN SC) Inject 33 Units into the skin nightly       simvastatin (ZOCOR) 40 MG tablet Take 40 mg by mouth nightly.  Insulin Lispro, Human, (HUMALOG SC) Inject 10 Units into the skin 3 times daily (with meals) With sliding      Calcium Carbonate-Vitamin D (CALCIUM + D PO) Take by mouth daily        Current Facility-Administered Medications   Medication Dose Route Frequency Provider Last Rate Last Admin    lidocaine (LMX) 4 % cream   Topical Once Lynwood Nissen, MD          Social History:   Social History     Tobacco Use    Smoking status: Never Smoker    Smokeless tobacco: Never Used   Substance Use Topics    Alcohol use: Not Currently    Drug use: Never     Allergies: Other, Ace inhibitors, Metoprolol, Tetrahydrozoline hcl, and Timolol    Procedure: Indications:  Based on my examination of this patient's wound(s)/ulcer(s) today, debridement is required to promote healing and evaluate the wound base. Performed by: Marilee Rolle MD    Consent obtained: Yes    Time out taken: Yes    Pain Control: Anesthetic  Anesthetic: 4% Lidocaine Cream     Debridement: Excisional Debridement    Using curette the wound was sharply debrided    down through and including the removal of epidermis, dermis and subcutaneous tissue.         Devitalized Tissue Debrided: fibrin, biofilm and slough    Pre Debridement Measurements:  Are located in the Wound Documentation Flow Sheet     Wound #: 1     Post  Debridement Measurements:  Wound 10/14/20 Coccyx #1 (Active)   Wound Image   04/28/21 0933   Wound Etiology Pressure Stage  3 05/05/21 0935   Wound Cleansed Cleansed with saline 05/05/21 0951   Dressing/Treatment Collagen;Dry dressing 03/03/21 1106   Wound Length (cm) 1.5 cm 05/05/21 0935   Wound [id number]      Discharge Instructions:  Keep weight off wounds and reposition every 2 hours if applicable. Avoid standing for long periods of time.      If wound(s) is on your lower extremity, elevate legs to the level of the heart or above for 30 minutes 4-5 times a day and/or when sitting.      Do not get wounds wet in bath or shower unless otherwise instructed by your physician. If your wound is on you foot or leg, you may purchase a cast bag. Please ask at the pharmacy.     When taking antibiotics take entire prescription as ordered by MD do not stop taking until medicine is all gone. Exercise as tolerated. No Smoking. Smoking prohibits wound healing.     If Vascular testing is ordered, please call [address] [place] [address] ([phone]) to schedule.     Vascular tests ordered by Wound Care Physicians may take up to 2 hours to complete. Please keep that in mind when scheduling.      If Vascular testing is scheduled, please bring supplies to replace your dressing after testing is done. The vascular department does not stock supplies.      Wound: Sacrum      With each dressing change, rinse wounds with 0.9% Saline. (May use wound wash or soft contact solution. Both can be purchased at a local drug store). If unable to obtain saline, may use a gentle soap and water.     Dressing care: Triad to irritated area around wound with each dressing change. Loosely pack with Collagen, dry dressing- change Monday, Wednesday, & Friday.     Important dietary reminders:  1. Increase Protein intake for optimal wound healing  2. No added salt to reduce any swelling  3. If diabetic, good glucose control  4.  If you smoke, smoking affects wound healing, we ask that you refrain from smoking.     Follow up with Dr Lazarus Pennant In 1 week in the wound care center.      Call [phone] for any questions or concerns.      Your  is [employer] [place]/Supply Company: [address] Information: Should you experience any significant changes in your wound(s) or have questions about your wound care, please contact the [facility] 30  [phone] Monday  - Thursday 8:00 am - 4:00 pm and Friday 8:00 am - 1:00pm. If you need help with your wound outside these hours and cannot wait until we are again available, contact your PCP or go to the hospital emergency room.      PLEASE NOTE: IF YOU ARE UNABLE TO [address], CONTINUE TO USE THE SUPPLIES YOU HAVE AVAILABLE UNTIL YOU ARE ABLE TO [facility]. IT IS MOST IMPORTANT TO KEEP THE WOUND COVERED AT ALL TIMES       Dakota Whiteside MD, FACS  5/5/2021  11:41 AM

## 2021-05-12 ENCOUNTER — HOSPITAL ENCOUNTER (OUTPATIENT)
Dept: WOUND CARE | Age: 71
Discharge: HOME | End: 2021-05-12
Payer: MEDICARE

## 2021-05-12 VITALS — RESPIRATION RATE: 16 BRPM | SYSTOLIC BLOOD PRESSURE: 127 MMHG | HEART RATE: 92 BPM | DIASTOLIC BLOOD PRESSURE: 70 MMHG

## 2021-05-12 DIAGNOSIS — S31.000A SACRAL WOUND, INITIAL ENCOUNTER: Primary | ICD-10-CM

## 2021-05-12 PROCEDURE — 11042 DBRDMT SUBQ TIS 1ST 20SQCM/<: CPT

## 2021-05-12 PROCEDURE — 11042 DBRDMT SUBQ TIS 1ST 20SQCM/<: CPT | Performed by: SURGERY

## 2021-05-12 RX ORDER — GENTAMICIN SULFATE 1 MG/G
OINTMENT TOPICAL ONCE
Status: CANCELLED | OUTPATIENT
Start: 2021-05-12 | End: 2021-05-12

## 2021-05-12 RX ORDER — LIDOCAINE 40 MG/G
CREAM TOPICAL ONCE
Status: CANCELLED | OUTPATIENT
Start: 2021-05-12 | End: 2021-05-12

## 2021-05-12 RX ORDER — CLOBETASOL PROPIONATE 0.5 MG/G
OINTMENT TOPICAL ONCE
Status: CANCELLED | OUTPATIENT
Start: 2021-05-12 | End: 2021-05-12

## 2021-05-12 RX ORDER — LIDOCAINE HYDROCHLORIDE 20 MG/ML
JELLY TOPICAL ONCE
Status: CANCELLED | OUTPATIENT
Start: 2021-05-12 | End: 2021-05-12

## 2021-05-12 RX ORDER — LIDOCAINE 40 MG/G
CREAM TOPICAL ONCE
Status: DISCONTINUED | OUTPATIENT
Start: 2021-05-12 | End: 2021-05-13 | Stop reason: HOSPADM

## 2021-05-12 RX ORDER — BACITRACIN ZINC AND POLYMYXIN B SULFATE 500; 1000 [USP'U]/G; [USP'U]/G
OINTMENT TOPICAL ONCE
Status: CANCELLED | OUTPATIENT
Start: 2021-05-12 | End: 2021-05-12

## 2021-05-12 RX ORDER — LIDOCAINE 50 MG/G
OINTMENT TOPICAL ONCE
Status: CANCELLED | OUTPATIENT
Start: 2021-05-12 | End: 2021-05-12

## 2021-05-12 RX ORDER — LIDOCAINE HYDROCHLORIDE 40 MG/ML
SOLUTION TOPICAL ONCE
Status: CANCELLED | OUTPATIENT
Start: 2021-05-12 | End: 2021-05-12

## 2021-05-12 RX ORDER — GINSENG 100 MG
CAPSULE ORAL ONCE
Status: CANCELLED | OUTPATIENT
Start: 2021-05-12 | End: 2021-05-12

## 2021-05-12 RX ORDER — BACITRACIN, NEOMYCIN, POLYMYXIN B 400; 3.5; 5 [USP'U]/G; MG/G; [USP'U]/G
OINTMENT TOPICAL ONCE
Status: CANCELLED | OUTPATIENT
Start: 2021-05-12 | End: 2021-05-12

## 2021-05-12 RX ORDER — BETAMETHASONE DIPROPIONATE 0.05 %
OINTMENT (GRAM) TOPICAL ONCE
Status: CANCELLED | OUTPATIENT
Start: 2021-05-12 | End: 2021-05-12

## 2021-05-12 NOTE — PLAN OF CARE
Discharge instructions given. Patient verbalized understanding. [facility].   Called/faxed orders to  [facility]

## 2021-05-12 NOTE — PROGRESS NOTES
[address] Progress and Procedure Note    Scott Vallejo  AGE: 65 y.o. GENDER: female    : 1950  TODAY'S DATE: 2021    No chief complaint on file. History of Present Illness     Scott Vallejo is a 65 y.o. female who presents today for wound evaluation. History of Wound: pressure wound located on the sacral decubitus ulcer stage 3. Wound Pain:  mild  Severity:  2 / 10   Wound Type:  pressure  Modifying Factors:  diabetes, poor glucose control, decreased mobility and weight loss and prior pilonidal cyst excision  Associated Signs/Symptoms:  none    Past Medical History:   Diagnosis Date    Arthritis     back    Cataracts, bilateral     [facility]     Diabetes mellitus ([facility]     Glaucoma     Hyperlipidemia     Hypertension     Thyroid disease      Past Surgical History:   Procedure Laterality Date    CARPAL TUNNEL RELEASE      bilateral    CHOLECYSTECTOMY      COLONOSCOPY      ELBOW SURGERY      ENDOSCOPY, COLON, DIAGNOSTIC      EYE SURGERY      FOOT SURGERY      SHOULDER SURGERY       Current Outpatient Medications   Medication Sig Dispense Refill    traMADol (ULTRAM) 50 MG tablet Take 50 mg by mouth 3 times daily as needed for Pain.  DULoxetine (CYMBALTA) 60 MG extended release capsule Take 1 capsule by mouth daily 30 capsule 3    sodium chloride (OCEAN, BABY AYR) 0.65 % nasal spray 1 spray by Nasal route every 2 hours as needed for Congestion 1 Bottle 0    mometasone-formoterol (DULERA) 200-5 MCG/ACT inhaler Inhale 2 puffs into the lungs every 12 hours 1 Inhaler 1    spironolactone (ALDACTONE) 25 MG tablet Take 12.5 mg by mouth daily      Travoprost, BAK Free, (TRAVATAN Z) 0.004 % SOLN ophthalmic solution Place 1 drop into both eyes nightly      DICLOFENAC PO Take  by mouth.       acetaminophen (TYLENOL) 500 MG tablet Take 500 mg by mouth every 6 hours as needed for Pain or Fever       aspirin EC 81 MG EC tablet Take 81 mg by mouth daily May restart in AM.      furosemide (LASIX) 40 MG tablet Take 40 mg by mouth daily       levothyroxine (LEVOXYL) 200 MCG tablet Take 200 mcg by mouth Daily       Insulin Detemir (LEVEMIR FLEXPEN SC) Inject 33 Units into the skin nightly       simvastatin (ZOCOR) 40 MG tablet Take 40 mg by mouth nightly.  Insulin Lispro, Human, (HUMALOG SC) Inject 10 Units into the skin 3 times daily (with meals) With sliding      Calcium Carbonate-Vitamin D (CALCIUM + D PO) Take by mouth daily        Current Facility-Administered Medications   Medication Dose Route Frequency Provider Last Rate Last Admin    lidocaine (LMX) 4 % cream   Topical Once Pipe Hooker MD          Social History:   Social History     Tobacco Use    Smoking status: Never Smoker    Smokeless tobacco: Never Used   Substance Use Topics    Alcohol use: Not Currently    Drug use: Never     Allergies: Other, Ace inhibitors, Metoprolol, Tetrahydrozoline hcl, and Timolol    Procedure: Indications:  Based on my examination of this patient's wound(s)/ulcer(s) today, debridement is required to promote healing and evaluate the wound base. Performed by: Yoli Bronson MD    Consent obtained: Yes    Time out taken: Yes    Pain Control: Anesthetic  Anesthetic: 4% Lidocaine Cream     Debridement: Excisional Debridement    Using curette the wound was sharply debrided    down through and including the removal of epidermis, dermis and subcutaneous tissue.         Devitalized Tissue Debrided: fibrin, biofilm and slough    Pre Debridement Measurements:  Are located in the Wound Documentation Flow Sheet     Wound #: 1     Post  Debridement Measurements:  Wound 10/14/20 Coccyx #1 (Active)   Wound Image   04/28/21 0933   Wound Etiology Pressure Stage  3 05/12/21 0926   Wound Cleansed Cleansed with saline 05/12/21 0934   Dressing/Treatment Collagen;Dry dressing 03/03/21 1106   Wound Length (cm) 1 cm 05/12/21 0926   Wound Width (cm) 0.3 cm 05/12/21 0926   Wound Depth (cm) 1.8 cm 05/12/21 0926   Wound Surface Area (cm^2) 0.3 cm^2 05/12/21 0926   Change in Wound Size % (l*w) 93.88 05/12/21 0926   Wound Volume (cm^3) 0.54 cm^3 05/12/21 0926   Wound Healing % 96 05/12/21 0926   Post-Procedure Length (cm) 1 cm 05/12/21 0934   Post-Procedure Width (cm) 0.3 cm 05/12/21 0934   Post-Procedure Depth (cm) 1.8 cm 05/12/21 0934   Post-Procedure Surface Area (cm^2) 0.3 cm^2 05/12/21 0934   Post-Procedure Volume (cm^3) 0.54 cm^3 05/12/21 0934   Distance Tunneling (cm) 2.5 cm 03/03/21 0941   Tunneling Position ___ O'Clock 1200 03/03/21 0941   Wound Assessment Bleeding 05/12/21 0934   Drainage Amount Moderate 05/12/21 0934   Drainage Description Thick;Brown;Serosanguinous 05/12/21 0926   Odor Mild 05/12/21 0926   Saba-wound Assessment Maceration 05/12/21 0926   Margins Attached edges 05/12/21 0926   Wound Thickness Description not for Pressure Injury Full thickness 05/12/21 0926   Number of days: 210       Percent of Wound(s)/Ulcer(s) Debrided: 100%    Total Surface Area Debrided:  0.54 sq cm     Bleeding:  Minimal    Hemostasis Achieved:  by pressure    Procedural Pain:  1  / 10     Post Procedural Pain:  2 / 10     Response to treatment:  Well tolerated by patient. Assessment:     Wound looks improved. (improved, worse or stable)    Patient tolerated procedure well and was given proper instruction. The nature of the patient's condition was explained in depth. The patient was informed that their compliance to the treatment plan is paramount to successful healing and prevention of further ulceration and/or infection     Plan:     Treatment Plan:       Treatment Note please see attached Discharge Instructions    Written patient dismissal instructions given to patient and signed by patient or POA.          Discharge Instructions       [address]  Telephone: [phone]     FAX [phone]      Discharge Instructions:  Keep weight off wounds and reposition every 2 hours if applicable. Avoid standing for long periods of time.      If wound(s) is on your lower extremity, elevate legs to the level of the heart or above for 30 minutes 4-5 times a day and/or when sitting.      Do not get wounds wet in bath or shower unless otherwise instructed by your physician. If your wound is on you foot or leg, you may purchase a cast bag. Please ask at the pharmacy.     When taking antibiotics take entire prescription as ordered by MD do not stop taking until medicine is all gone. Exercise as tolerated. No Smoking. Smoking prohibits wound healing.     If Vascular testing is ordered, please call [address] [place] [address] ([phone]) to schedule.     Vascular tests ordered by Wound Care Physicians may take up to 2 hours to complete. Please keep that in mind when scheduling.      If Vascular testing is scheduled, please bring supplies to replace your dressing after testing is done. The vascular department does not stock supplies.      Wound: Sacrum      With each dressing change, rinse wounds with 0.9% Saline. (May use wound wash or soft contact solution. Both can be purchased at a local drug store). If unable to obtain saline, may use a gentle soap and water.     Dressing care: Triad to irritated area around wound with each dressing change when needed. Loosely pack with Hydroferra Blue Rope- you need to cut off small piece & wet with saline & squeeze excess saline out before packing (please give the rest to patient), dry dressing- change Monday, Wednesday, & Friday.     Important dietary reminders:  1. Increase Protein intake for optimal wound healing  2. No added salt to reduce any swelling  3. If diabetic, good glucose control  4. If you smoke, smoking affects wound healing, we ask that you refrain from smoking.     Follow up with Dr Nieves In 1 week in the wound care center.      Call [phone] for any questions or concerns.    Your  is [address] Agency/Supply Company: [address] Information: Should you experience any significant changes in your wound(s) or have questions about your wound care, please contact the [facility] 30  [phone] Monday  - Thursday 8:00 am - 4:00 pm and Friday 8:00 am - 1:00pm. If you need help with your wound outside these hours and cannot wait until we are again available, contact your PCP or go to the hospital emergency room.      PLEASE NOTE: IF YOU ARE UNABLE TO OBTAIN WOUND SUPPLIES, CONTINUE TO USE THE SUPPLIES YOU HAVE AVAILABLE UNTIL YOU ARE ABLE TO [address]. IT IS MOST IMPORTANT TO KEEP THE WOUND COVERED AT ALL TIMES       Dakota Rodriguez MD, FACS  5/12/2021  12:09 PM

## 2021-05-12 NOTE — PROGRESS NOTES
[facility]  [address] [facility], [address] [place] [address] Road  Telephone: [phone]        [facility] Fax # [phone]    Discharge Instructions       [facility]  [address] [facility], [address] [place] [address] Road  Telephone: [phone]     FAX [phone]      Discharge Instructions:  Keep weight off wounds and reposition every 2 hours if applicable. Avoid standing for long periods of time.      If wound(s) is on your lower extremity, elevate legs to the level of the heart or above for 30 minutes 4-5 times a day and/or when sitting.      Do not get wounds wet in bath or shower unless otherwise instructed by your physician. If your wound is on you foot or leg, you may purchase a cast bag. Please ask at the pharmacy.     When taking antibiotics take entire prescription as ordered by MD do not stop taking until medicine is all gone. Exercise as tolerated. No Smoking. Smoking prohibits wound healing.     If Vascular testing is ordered, please call [address] [place] [address] ([phone]) to schedule.     Vascular tests ordered by Wound Care Physicians may take up to 2 hours to complete. Please keep that in mind when scheduling.      If Vascular testing is scheduled, please bring supplies to replace your dressing after testing is done. The vascular department does not stock supplies.      Wound: Sacrum      With each dressing change, rinse wounds with 0.9% Saline. (May use wound wash or soft contact solution. Both can be purchased at a local drug store). If unable to obtain saline, may use a gentle soap and water.     Dressing care: Triad to irritated area around wound with each dressing change when needed.  Loosely pack with Hydroferra Blue Rope- you need to cut off small piece & wet with saline & squeeze excess saline out before packing (please give the rest to patient), dry dressing- change Monday, Wednesday, & Friday.     Important dietary reminders:  1. Increase Protein intake for optimal wound healing  2. No added salt to reduce any swelling  3. If diabetic, good glucose control  4. If you smoke, smoking affects wound healing, we ask that you refrain from smoking.     Follow up with Dr Carri Kent In 1 week in the wound care center.      Call [phone] for any questions or concerns.      Your  is [address]: [address] Information: Should you experience any significant changes in your wound(s) or have questions about your wound care, please contact the [facility] 30 za [phone] Monday  - Thursday 8:00 am - 4:00 pm and Friday 8:00 am - 1:00pm. If you need help with your wound outside these hours and cannot wait until we are again available, contact your PCP or go to the hospital emergency room.      PLEASE NOTE: IF YOU ARE UNABLE TO OBTAIN WOUND SUPPLIES, CONTINUE TO USE THE SUPPLIES YOU HAVE AVAILABLE UNTIL YOU ARE ABLE TO [facility]. IT IS MOST IMPORTANT TO KEEP THE WOUND COVERED AT ALL TIMES        Skilled nurse to evaluate and treat for wound care. Change dressing as ordered  once a day on Monday, Wednesday and Friday using clean technique. Patient/Family/caregiver may change dressings as needed as instructed when Home Care unavailable.     WOUNDS REQUIRING DRESSING Changes:     Wound 10/14/20 Coccyx #1 (Active)   Wound Image   04/28/21 0933   Wound Etiology Pressure Stage  3 05/12/21 0926   Wound Cleansed Cleansed with saline 05/12/21 0934   Dressing/Treatment Collagen;Dry dressing 03/03/21 1106   Wound Length (cm) 1 cm 05/12/21 0926   Wound Width (cm) 0.3 cm 05/12/21 0926   Wound Depth (cm) 1.8 cm 05/12/21 0926   Wound Surface Area (cm^2) 0.3 cm^2 05/12/21 0926   Change in Wound Size % (l*w) 93.88 05/12/21 0926   Wound Volume (cm^3) 0.54 cm^3 05/12/21 0926   Wound Healing % 96 05/12/21 0926   Post-Procedure Length (cm) 1 cm 05/12/21 0934   Post-Procedure Width (cm) 0.3 cm 05/12/21 0934

## 2021-05-26 ENCOUNTER — HOSPITAL ENCOUNTER (OUTPATIENT)
Dept: WOUND CARE | Age: 71
Discharge: HOME | End: 2021-05-26
Payer: MEDICARE

## 2021-05-26 VITALS — HEART RATE: 85 BPM | DIASTOLIC BLOOD PRESSURE: 83 MMHG | RESPIRATION RATE: 16 BRPM | SYSTOLIC BLOOD PRESSURE: 136 MMHG

## 2021-05-26 DIAGNOSIS — S31.000A SACRAL WOUND, INITIAL ENCOUNTER: Primary | ICD-10-CM

## 2021-05-26 PROCEDURE — 11042 DBRDMT SUBQ TIS 1ST 20SQCM/<: CPT

## 2021-05-26 PROCEDURE — 11042 DBRDMT SUBQ TIS 1ST 20SQCM/<: CPT | Performed by: SURGERY

## 2021-05-26 RX ORDER — GINSENG 100 MG
CAPSULE ORAL ONCE
Status: CANCELLED | OUTPATIENT
Start: 2021-05-26 | End: 2021-05-26

## 2021-05-26 RX ORDER — BACITRACIN ZINC AND POLYMYXIN B SULFATE 500; 1000 [USP'U]/G; [USP'U]/G
OINTMENT TOPICAL ONCE
Status: CANCELLED | OUTPATIENT
Start: 2021-05-26 | End: 2021-05-26

## 2021-05-26 RX ORDER — LIDOCAINE HYDROCHLORIDE 20 MG/ML
JELLY TOPICAL ONCE
Status: CANCELLED | OUTPATIENT
Start: 2021-05-26 | End: 2021-05-26

## 2021-05-26 RX ORDER — LIDOCAINE HYDROCHLORIDE 40 MG/ML
SOLUTION TOPICAL ONCE
Status: CANCELLED | OUTPATIENT
Start: 2021-05-26 | End: 2021-05-26

## 2021-05-26 RX ORDER — BETAMETHASONE DIPROPIONATE 0.05 %
OINTMENT (GRAM) TOPICAL ONCE
Status: CANCELLED | OUTPATIENT
Start: 2021-05-26 | End: 2021-05-26

## 2021-05-26 RX ORDER — BACITRACIN, NEOMYCIN, POLYMYXIN B 400; 3.5; 5 [USP'U]/G; MG/G; [USP'U]/G
OINTMENT TOPICAL ONCE
Status: CANCELLED | OUTPATIENT
Start: 2021-05-26 | End: 2021-05-26

## 2021-05-26 RX ORDER — LIDOCAINE 40 MG/G
CREAM TOPICAL ONCE
Status: DISCONTINUED | OUTPATIENT
Start: 2021-05-26 | End: 2021-05-27 | Stop reason: HOSPADM

## 2021-05-26 RX ORDER — GENTAMICIN SULFATE 1 MG/G
OINTMENT TOPICAL ONCE
Status: CANCELLED | OUTPATIENT
Start: 2021-05-26 | End: 2021-05-26

## 2021-05-26 RX ORDER — CLOBETASOL PROPIONATE 0.5 MG/G
OINTMENT TOPICAL ONCE
Status: CANCELLED | OUTPATIENT
Start: 2021-05-26 | End: 2021-05-26

## 2021-05-26 RX ORDER — LIDOCAINE 40 MG/G
CREAM TOPICAL ONCE
Status: CANCELLED | OUTPATIENT
Start: 2021-05-26 | End: 2021-05-26

## 2021-05-26 RX ORDER — LIDOCAINE 50 MG/G
OINTMENT TOPICAL ONCE
Status: CANCELLED | OUTPATIENT
Start: 2021-05-26 | End: 2021-05-26

## 2021-05-26 NOTE — PLAN OF CARE
Discharge instructions given. Patient verbalized understanding. Return to [facility] in 1 week.

## 2021-05-26 NOTE — PROGRESS NOTES
[address] Progress and Procedure Note    Idris Davis  AGE: 65 y.o. GENDER: female    : 1950  TODAY'S DATE: 2021    Chief Complaint   Patient presents with    Wound Check     buttocks        History of Present Illness     Idris Davis is a 65 y.o. female who presents today for wound evaluation. History of Wound: pressure wound located on the sacral decubitus ulcer stage 3. Wound Pain:  mild  Severity:  2 / 10   Wound Type:  pressure  Modifying Factors:  diabetes, poor glucose control, decreased mobility and weight loss and prior pilonidal cyst excision  Associated Signs/Symptoms:  none    Past Medical History:   Diagnosis Date    Arthritis     back    Cataracts, bilateral     [facility]congestive heart failure) ([facility])     Diabetes mellitus ([address]     Glaucoma     Hyperlipidemia     Hypertension     Thyroid disease      Past Surgical History:   Procedure Laterality Date    CARPAL TUNNEL RELEASE      bilateral    CHOLECYSTECTOMY      COLONOSCOPY      ELBOW SURGERY      ENDOSCOPY, COLON, DIAGNOSTIC      EYE SURGERY      FOOT SURGERY      SHOULDER SURGERY       Current Outpatient Medications   Medication Sig Dispense Refill    traMADol (ULTRAM) 50 MG tablet Take 50 mg by mouth 3 times daily as needed for Pain.  DULoxetine (CYMBALTA) 60 MG extended release capsule Take 1 capsule by mouth daily 30 capsule 3    sodium chloride (OCEAN, BABY AYR) 0.65 % nasal spray 1 spray by Nasal route every 2 hours as needed for Congestion 1 Bottle 0    mometasone-formoterol (DULERA) 200-5 MCG/ACT inhaler Inhale 2 puffs into the lungs every 12 hours 1 Inhaler 1    spironolactone (ALDACTONE) 25 MG tablet Take 12.5 mg by mouth daily      Travoprost, BAK Free, (TRAVATAN Z) 0.004 % SOLN ophthalmic solution Place 1 drop into both eyes nightly      DICLOFENAC PO Take  by mouth.       acetaminophen (TYLENOL) 500 MG tablet Take 500 mg by mouth every 6 hours as needed for Pain or Fever       aspirin EC 81 MG EC tablet Take 81 mg by mouth daily May restart in AM.      furosemide (LASIX) 40 MG tablet Take 40 mg by mouth daily       levothyroxine (LEVOXYL) 200 MCG tablet Take 200 mcg by mouth Daily       Insulin Detemir (LEVEMIR FLEXPEN SC) Inject 33 Units into the skin nightly       simvastatin (ZOCOR) 40 MG tablet Take 40 mg by mouth nightly.  Insulin Lispro, Human, (HUMALOG SC) Inject 10 Units into the skin 3 times daily (with meals) With sliding      Calcium Carbonate-Vitamin D (CALCIUM + D PO) Take by mouth daily        Current Facility-Administered Medications   Medication Dose Route Frequency Provider Last Rate Last Admin    lidocaine (LMX) 4 % cream   Topical Once Ada Kim MD          Social History:   Social History     Tobacco Use    Smoking status: Never Smoker    Smokeless tobacco: Never Used   Vaping Use    Vaping Use: Never used   Substance Use Topics    Alcohol use: Not Currently    Drug use: Never     Allergies: Other, Ace inhibitors, Metoprolol, Tetrahydrozoline hcl, and Timolol    Procedure: Indications:  Based on my examination of this patient's wound(s)/ulcer(s) today, debridement is required to promote healing and evaluate the wound base. Performed by: Mary Schneider MD    Consent obtained: Yes    Time out taken: Yes    Pain Control: Anesthetic  Anesthetic: 4% Lidocaine Cream     Debridement: Excisional Debridement    Using curette the wound was sharply debrided    down through and including the removal of epidermis, dermis and subcutaneous tissue.         Devitalized Tissue Debrided: fibrin, biofilm and slough    Pre Debridement Measurements:  Are located in the Wound Documentation Flow Sheet     Wound #: 1     Post  Debridement Measurements:  Wound 10/14/20 Coccyx #1 (Active)   Wound Image   04/28/21 0933   Wound Etiology Pressure Stage  3 05/26/21 0908   Wound Cleansed Cleansed with saline 05/26/21 0931   Dressing/Treatment Collagen;Dry dressing 03/03/21 1106   Wound Length (cm) 1 cm 05/26/21 0908   Wound Width (cm) 0.3 cm 05/26/21 0908   Wound Depth (cm) 1.8 cm 05/26/21 0908   Wound Surface Area (cm^2) 0.3 cm^2 05/26/21 0908   Change in Wound Size % (l*w) 93.88 05/26/21 0908   Wound Volume (cm^3) 0.54 cm^3 05/26/21 0908   Wound Healing % 96 05/26/21 0908   Post-Procedure Length (cm) 1 cm 05/26/21 0931   Post-Procedure Width (cm) 0.3 cm 05/26/21 0931   Post-Procedure Depth (cm) 1.8 cm 05/26/21 0931   Post-Procedure Surface Area (cm^2) 0.3 cm^2 05/26/21 0931   Post-Procedure Volume (cm^3) 0.54 cm^3 05/26/21 0931   Distance Tunneling (cm) 2.5 cm 03/03/21 0941   Tunneling Position ___ O'Clock 1200 03/03/21 0941   Wound Assessment Bleeding 05/26/21 0931   Drainage Amount Moderate 05/26/21 0931   Drainage Description Serosanguinous 05/26/21 0908   Odor Mild 05/26/21 0908   Saba-wound Assessment Maceration 05/26/21 0908   Margins Attached edges 05/26/21 0908   Wound Thickness Description not for Pressure Injury Full thickness 05/26/21 0908   Number of days: 224       Percent of Wound(s)/Ulcer(s) Debrided: 100%    Total Surface Area Debrided:  0.3 sq cm     Bleeding:  Minimal    Hemostasis Achieved:  by pressure    Procedural Pain:  2  / 10     Post Procedural Pain:  2 / 10     Response to treatment:  Well tolerated by patient. Assessment:     Wound looks stable. (improved, worse or stable)    Patient tolerated procedure well and was given proper instruction. The nature of the patient's condition was explained in depth. The patient was informed that their compliance to the treatment plan is paramount to successful healing and prevention of further ulceration and/or infection     Plan:     Treatment Plan:       Treatment Note please see attached Discharge Instructions    Written patient dismissal instructions given to patient and signed by patient or POA.          Discharge [address] [facility], [address] [place] [address]  Telephone: [phone]     FAX [phone]      Discharge Instructions:  Keep weight off wounds and reposition every 2 hours if applicable. Avoid standing for long periods of time.      If wound(s) is on your lower extremity, elevate legs to the level of the heart or above for 30 minutes 4-5 times a day and/or when sitting.      Do not get wounds wet in bath or shower unless otherwise instructed by your physician. If your wound is on you foot or leg, you may purchase a cast bag. Please ask at the pharmacy.     When taking antibiotics take entire prescription as ordered by MD do not stop taking until medicine is all gone. Exercise as tolerated. No Smoking. Smoking prohibits wound healing.     If Vascular testing is ordered, please call [address] [place] [address] [phone]) to schedule.     Vascular tests ordered by Wound Care Physicians may take up to 2 hours to complete. Please keep that in mind when scheduling.      If Vascular testing is scheduled, please bring supplies to replace your dressing after testing is done. The vascular department does not stock supplies.      Wound: Sacrum      With each dressing change, rinse wounds with 0.9% Saline. (May use wound wash or soft contact solution. Both can be purchased at a local drug store). If unable to obtain saline, may use a gentle soap and water.     Dressing care: Triad to irritated area around wound with each dressing change when needed. Loosely pack with Hydroferra Blue Rope (please give the rest to patient)- you need to cut off small piece & wet with saline & squeeze excess saline out before packing (please give the rest to patient), dry dressing- change Monday, Wednesday, & Friday.     Important dietary reminders:  1. Increase Protein intake for optimal wound healing  2. No added salt to reduce any swelling  3. If diabetic, good glucose control  4.  If you smoke, smoking affects wound healing, we ask that you refrain from smoking.     Follow up with Dr Rj Doyle In 1 week in the wound care center.      Call [phone] for any questions or concerns.      Your  is [address] Agency/[employer]: Cornelio Santos Information: Should you experience any significant changes in your wound(s) or have questions about your wound care, please contact the [facility] [address] [phone] Monday  - Thursday 8:00 am - 4:00 pm and Friday 8:00 am - 1:00pm. If you need help with your wound outside these hours and cannot wait until we are again available, contact your PCP or go to the hospital emergency room.      PLEASE NOTE: IF YOU ARE UNABLE TO OBTAIN WOUND SUPPLIES, CONTINUE TO USE THE SUPPLIES YOU HAVE AVAILABLE UNTIL YOU ARE ABLE TO [address]. IT IS MOST IMPORTANT TO KEEP THE WOUND COVERED AT ALL TIMES       Dakota Doyle MD, FACS  5/26/2021  1:15 PM

## 2021-06-02 ENCOUNTER — HOSPITAL ENCOUNTER (OUTPATIENT)
Dept: WOUND CARE | Age: 71
Discharge: HOME | End: 2021-06-02
Payer: MEDICARE

## 2021-06-02 VITALS — SYSTOLIC BLOOD PRESSURE: 119 MMHG | TEMPERATURE: 96.8 F | DIASTOLIC BLOOD PRESSURE: 73 MMHG | HEART RATE: 88 BPM

## 2021-06-02 DIAGNOSIS — S31.000A SACRAL WOUND, INITIAL ENCOUNTER: Primary | ICD-10-CM

## 2021-06-02 PROCEDURE — 11042 DBRDMT SUBQ TIS 1ST 20SQCM/<: CPT

## 2021-06-02 PROCEDURE — 11042 DBRDMT SUBQ TIS 1ST 20SQCM/<: CPT | Performed by: SURGERY

## 2021-06-02 RX ORDER — LIDOCAINE HYDROCHLORIDE 40 MG/ML
SOLUTION TOPICAL ONCE
Status: DISCONTINUED | OUTPATIENT
Start: 2021-06-02 | End: 2021-06-03 | Stop reason: HOSPADM

## 2021-06-02 RX ORDER — LIDOCAINE 40 MG/G
CREAM TOPICAL ONCE
Status: CANCELLED | OUTPATIENT
Start: 2021-06-02 | End: 2021-06-02

## 2021-06-02 RX ORDER — CLOBETASOL PROPIONATE 0.5 MG/G
OINTMENT TOPICAL ONCE
Status: CANCELLED | OUTPATIENT
Start: 2021-06-02 | End: 2021-06-02

## 2021-06-02 RX ORDER — GENTAMICIN SULFATE 1 MG/G
OINTMENT TOPICAL ONCE
Status: CANCELLED | OUTPATIENT
Start: 2021-06-02 | End: 2021-06-02

## 2021-06-02 RX ORDER — LIDOCAINE HYDROCHLORIDE 20 MG/ML
JELLY TOPICAL ONCE
Status: CANCELLED | OUTPATIENT
Start: 2021-06-02 | End: 2021-06-02

## 2021-06-02 RX ORDER — BACITRACIN ZINC AND POLYMYXIN B SULFATE 500; 1000 [USP'U]/G; [USP'U]/G
OINTMENT TOPICAL ONCE
Status: CANCELLED | OUTPATIENT
Start: 2021-06-02 | End: 2021-06-02

## 2021-06-02 RX ORDER — BACITRACIN, NEOMYCIN, POLYMYXIN B 400; 3.5; 5 [USP'U]/G; MG/G; [USP'U]/G
OINTMENT TOPICAL ONCE
Status: CANCELLED | OUTPATIENT
Start: 2021-06-02 | End: 2021-06-02

## 2021-06-02 RX ORDER — LIDOCAINE HYDROCHLORIDE 40 MG/ML
SOLUTION TOPICAL ONCE
Status: CANCELLED | OUTPATIENT
Start: 2021-06-02 | End: 2021-06-02

## 2021-06-02 RX ORDER — GINSENG 100 MG
CAPSULE ORAL ONCE
Status: CANCELLED | OUTPATIENT
Start: 2021-06-02 | End: 2021-06-02

## 2021-06-02 RX ORDER — BETAMETHASONE DIPROPIONATE 0.05 %
OINTMENT (GRAM) TOPICAL ONCE
Status: CANCELLED | OUTPATIENT
Start: 2021-06-02 | End: 2021-06-02

## 2021-06-02 RX ORDER — LIDOCAINE 50 MG/G
OINTMENT TOPICAL ONCE
Status: CANCELLED | OUTPATIENT
Start: 2021-06-02 | End: 2021-06-02

## 2021-06-02 ASSESSMENT — PAIN SCALES - GENERAL: PAINLEVEL_OUTOF10: 5

## 2021-06-02 ASSESSMENT — PAIN DESCRIPTION - PAIN TYPE: TYPE: CHRONIC PAIN

## 2021-06-02 ASSESSMENT — PAIN DESCRIPTION - LOCATION: LOCATION: BUTTOCKS

## 2021-06-02 ASSESSMENT — PAIN DESCRIPTION - ONSET: ONSET: ON-GOING

## 2021-06-02 ASSESSMENT — PAIN DESCRIPTION - FREQUENCY: FREQUENCY: CONTINUOUS

## 2021-06-02 ASSESSMENT — PAIN DESCRIPTION - PROGRESSION: CLINICAL_PROGRESSION: NOT CHANGED

## 2021-06-02 ASSESSMENT — PAIN DESCRIPTION - DESCRIPTORS: DESCRIPTORS: ACHING

## 2021-06-02 NOTE — PROGRESS NOTES
[address] Progress and Procedure Note    Erlin Aguirre  AGE: 65 y.o. GENDER: female    : 1950  TODAY'S DATE: 2021    Chief Complaint   Patient presents with    Wound Check     buttock  F/U        History of Present Illness     Erlin Aguirre is a 65 y.o. female who presents today for wound evaluation. History of Wound: pressure wound located on the sacral decubitus ulcer stage 3. Wound Pain:  mild  Severity:  2 / 10   Wound Type:  pressure  Modifying Factors:  diabetes, poor glucose control, decreased mobility and weight loss and prior pilonidal cyst excision  Associated Signs/Symptoms:  none    Past Medical History:   Diagnosis Date    Arthritis     back    Cataracts, bilateral     CHF (congestive heart failure) (HCC)     Diabetes mellitus ([address]     Glaucoma     Hyperlipidemia     Hypertension     Thyroid disease      Past Surgical History:   Procedure Laterality Date    CARPAL TUNNEL RELEASE      bilateral    CHOLECYSTECTOMY      COLONOSCOPY      ELBOW SURGERY      ENDOSCOPY, COLON, DIAGNOSTIC      EYE SURGERY      FOOT SURGERY      SHOULDER SURGERY       Current Outpatient Medications   Medication Sig Dispense Refill    traMADol (ULTRAM) 50 MG tablet Take 50 mg by mouth 3 times daily as needed for Pain.  DULoxetine (CYMBALTA) 60 MG extended release capsule Take 1 capsule by mouth daily 30 capsule 3    sodium chloride (OCEAN, BABY AYR) 0.65 % nasal spray 1 spray by Nasal route every 2 hours as needed for Congestion 1 Bottle 0    mometasone-formoterol (DULERA) 200-5 MCG/ACT inhaler Inhale 2 puffs into the lungs every 12 hours 1 Inhaler 1    spironolactone (ALDACTONE) 25 MG tablet Take 12.5 mg by mouth daily      Travoprost, BAK Free, (TRAVATAN Z) 0.004 % SOLN ophthalmic solution Place 1 drop into both eyes nightly      DICLOFENAC PO Take  by mouth.       acetaminophen (TYLENOL) 500 MG tablet Take 500 mg by mouth every 6 hours as needed for Pain or Fever       aspirin EC 81 MG EC tablet Take 81 mg by mouth daily May restart in AM.      furosemide (LASIX) 40 MG tablet Take 40 mg by mouth daily       levothyroxine (LEVOXYL) 200 MCG tablet Take 200 mcg by mouth Daily       Insulin Detemir (LEVEMIR FLEXPEN SC) Inject 33 Units into the skin nightly       simvastatin (ZOCOR) 40 MG tablet Take 40 mg by mouth nightly.  Insulin Lispro, Human, (HUMALOG SC) Inject 10 Units into the skin 3 times daily (with meals) With sliding      Calcium Carbonate-Vitamin D (CALCIUM + D PO) Take by mouth daily        Current Facility-Administered Medications   Medication Dose Route Frequency Provider Last Rate Last Admin    lidocaine (XYLOCAINE) 4 % external solution   Topical Once Ernesto Fink MD          Social History:   Social History     Tobacco Use    Smoking status: Never Smoker    Smokeless tobacco: Never Used   Vaping Use    Vaping Use: Never used   Substance Use Topics    Alcohol use: Not Currently    Drug use: Never     Allergies: Other, Ace inhibitors, Metoprolol, Tetrahydrozoline hcl, and Timolol    Procedure: Indications:  Based on my examination of this patient's wound(s)/ulcer(s) today, debridement is required to promote healing and evaluate the wound base. Performed by: Guillermo Talbot MD    Consent obtained: Yes    Time out taken: Yes    Pain Control: Anesthetic  Anesthetic: 4% Lidocaine Cream     Debridement: Excisional Debridement    Using curette the wound was sharply debrided    down through and including the removal of epidermis, dermis and subcutaneous tissue.         Devitalized Tissue Debrided: fibrin, biofilm and slough    Pre Debridement Measurements:  Are located in the Wound Documentation Flow Sheet     Wound #: 1     Post  Debridement Measurements:  Wound 10/14/20 Coccyx #1 (Active)   Wound Image   04/28/21 0933   Wound Etiology Pressure Stage  3 06/02/21 0807   Wound Cleansed Cleansed with saline 06/02/21 0837 Dressing/Treatment Collagen;Dry dressing 03/03/21 1106   Wound Length (cm) 1 cm 06/02/21 0807   Wound Width (cm) 0.3 cm 06/02/21 0807   Wound Depth (cm) 1.7 cm 06/02/21 0807   Wound Surface Area (cm^2) 0.3 cm^2 06/02/21 0807   Change in Wound Size % (l*w) 93.88 06/02/21 0807   Wound Volume (cm^3) 0.51 cm^3 06/02/21 0807   Wound Healing % 96 06/02/21 0807   Post-Procedure Length (cm) 1 cm 06/02/21 0831   Post-Procedure Width (cm) 0.3 cm 06/02/21 0831   Post-Procedure Depth (cm) 1.7 cm 06/02/21 0831   Post-Procedure Surface Area (cm^2) 0.3 cm^2 06/02/21 0831   Post-Procedure Volume (cm^3) 0.51 cm^3 06/02/21 0831   Distance Tunneling (cm) 1.8 cm 06/02/21 0807   Tunneling Position ___ O'Clock 12:00 06/02/21 0807   Wound Assessment Bleeding 06/02/21 0831   Drainage Amount Moderate 06/02/21 0831   Drainage Description Serosanguinous 06/02/21 0807   Odor None 06/02/21 0807   Saba-wound Assessment Maceration 06/02/21 0807   Margins Attached edges 06/02/21 0807   Wound Thickness Description not for Pressure Injury Full thickness 06/02/21 0807   Number of days: 231       Percent of Wound(s)/Ulcer(s) Debrided: 100%    Total Surface Area Debrided:  0.3 sq cm     Bleeding:  Minimal    Hemostasis Achieved:  by pressure    Procedural Pain:  2  / 10     Post Procedural Pain:  2 / 10     Response to treatment:  Well tolerated by patient. Assessment:     Wound looks stable. (improved, worse or stable)    Patient tolerated procedure well and was given proper instruction. The nature of the patient's condition was explained in depth. The patient was informed that their compliance to the treatment plan is paramount to successful healing and prevention of further ulceration and/or infection     Plan:     Treatment Plan:       Treatment Note please see attached Discharge Instructions    Written patient dismissal instructions given to patient and signed by patient or POA.          Discharge [address] [facility] [place], [address]  Telephone: [phone]     FAX [phone]      Discharge Instructions:  Keep weight off wounds and reposition every 2 hours if applicable. Avoid standing for long periods of time.       If wound(s) is on your lower extremity, elevate legs to the level of the heart or above for 30 minutes 4-5 times a day and/or when sitting.      Do not get wounds wet in bath or shower unless otherwise instructed by your physician. If your wound is on you foot or leg, you may purchase a cast bag. Please ask at the pharmacy.     When taking antibiotics take entire prescription as ordered by MD do not stop taking until medicine is all gone. Exercise as tolerated. No Smoking. Smoking prohibits wound healing.     If Vascular testing is ordered, please call [address] [place] [address] [phone]) to schedule.     Vascular tests ordered by Wound Care Physicians may take up to 2 hours to complete. Please keep that in mind when scheduling.      If Vascular testing is scheduled, please bring supplies to replace your dressing after testing is done. The vascular department does not stock supplies.      Wound: Sacrum      With each dressing change, rinse wounds with 0.9% Saline. (May use wound wash or soft contact solution. Both can be purchased at a local drug store). If unable to obtain saline, may use a gentle soap and water.     Dressing care: Triad to irritated area around wound with each dressing change when needed. Loosely pack with Hydroferra Blue Rope (please give the rest to patient)- you need to cut off small piece & wet with saline & squeeze excess saline out before packing (please give the rest to patient), dry dressing- change Monday, Wednesday, & Friday.     Important dietary reminders:  1. Increase Protein intake for optimal wound healing  2. No added salt to reduce any swelling  3. If diabetic, good glucose control  4.  If you smoke, smoking affects wound healing, we ask that you refrain from smoking.     Follow up with Dr Robyn Gallo In 1 week in the wound care center.      Call [phone] for any questions or concerns.      Your  is [address] Agency[employer]: Cornelio McdowellMacie Information: Should you experience any significant changes in your wound(s) or have questions about your wound care, please contact the [facility] [address] [phone] Monday  - Thursday 8:00 am - 4:00 pm and Friday 8:00 am - 1:00pm. If you need help with your wound outside these hours and cannot wait until we are again available, contact your PCP or go to the hospital emergency room.      PLEASE NOTE: IF YOU ARE UNABLE TO OBTAIN WOUND SUPPLIES, CONTINUE TO USE THE SUPPLIES YOU HAVE AVAILABLE UNTIL YOU ARE ABLE TO [address]. IT IS MOST IMPORTANT TO KEEP THE WOUND COVERED AT ALL TIMES       Dakota Gallo MD, FACS  6/2/2021  2:57 PM

## 2021-06-02 NOTE — PLAN OF CARE
Discharge instructions given. Patient verbalized understanding. Return to [facility] in 1 week.   MRI pelvis

## 2021-06-09 ENCOUNTER — HOSPITAL ENCOUNTER (OUTPATIENT)
Dept: WOUND CARE | Age: 71
Discharge: HOME | End: 2021-06-09
Payer: MEDICARE

## 2021-06-09 VITALS — DIASTOLIC BLOOD PRESSURE: 71 MMHG | SYSTOLIC BLOOD PRESSURE: 141 MMHG | TEMPERATURE: 96.3 F | HEART RATE: 87 BPM

## 2021-06-09 DIAGNOSIS — S31.000A SACRAL WOUND, INITIAL ENCOUNTER: Primary | ICD-10-CM

## 2021-06-09 PROCEDURE — 11042 DBRDMT SUBQ TIS 1ST 20SQCM/<: CPT | Performed by: SURGERY

## 2021-06-09 PROCEDURE — 11042 DBRDMT SUBQ TIS 1ST 20SQCM/<: CPT

## 2021-06-09 RX ORDER — GINSENG 100 MG
CAPSULE ORAL ONCE
Status: CANCELLED | OUTPATIENT
Start: 2021-06-09 | End: 2021-06-09

## 2021-06-09 RX ORDER — BACITRACIN, NEOMYCIN, POLYMYXIN B 400; 3.5; 5 [USP'U]/G; MG/G; [USP'U]/G
OINTMENT TOPICAL ONCE
Status: CANCELLED | OUTPATIENT
Start: 2021-06-09 | End: 2021-06-09

## 2021-06-09 RX ORDER — LIDOCAINE HYDROCHLORIDE 40 MG/ML
SOLUTION TOPICAL ONCE
Status: CANCELLED | OUTPATIENT
Start: 2021-06-09 | End: 2021-06-09

## 2021-06-09 RX ORDER — BACITRACIN ZINC AND POLYMYXIN B SULFATE 500; 1000 [USP'U]/G; [USP'U]/G
OINTMENT TOPICAL ONCE
Status: CANCELLED | OUTPATIENT
Start: 2021-06-09 | End: 2021-06-09

## 2021-06-09 RX ORDER — CLOBETASOL PROPIONATE 0.5 MG/G
OINTMENT TOPICAL ONCE
Status: CANCELLED | OUTPATIENT
Start: 2021-06-09 | End: 2021-06-09

## 2021-06-09 RX ORDER — LIDOCAINE HYDROCHLORIDE 20 MG/ML
JELLY TOPICAL ONCE
Status: CANCELLED | OUTPATIENT
Start: 2021-06-09 | End: 2021-06-09

## 2021-06-09 RX ORDER — LIDOCAINE 50 MG/G
OINTMENT TOPICAL ONCE
Status: CANCELLED | OUTPATIENT
Start: 2021-06-09 | End: 2021-06-09

## 2021-06-09 RX ORDER — GENTAMICIN SULFATE 1 MG/G
OINTMENT TOPICAL ONCE
Status: CANCELLED | OUTPATIENT
Start: 2021-06-09 | End: 2021-06-09

## 2021-06-09 RX ORDER — LIDOCAINE 40 MG/G
CREAM TOPICAL ONCE
Status: CANCELLED | OUTPATIENT
Start: 2021-06-09 | End: 2021-06-09

## 2021-06-09 RX ORDER — BETAMETHASONE DIPROPIONATE 0.05 %
OINTMENT (GRAM) TOPICAL ONCE
Status: CANCELLED | OUTPATIENT
Start: 2021-06-09 | End: 2021-06-09

## 2021-06-09 RX ORDER — LIDOCAINE 40 MG/G
CREAM TOPICAL ONCE
Status: DISCONTINUED | OUTPATIENT
Start: 2021-06-09 | End: 2021-06-10 | Stop reason: HOSPADM

## 2021-06-09 ASSESSMENT — PAIN DESCRIPTION - ONSET: ONSET: ON-GOING

## 2021-06-09 ASSESSMENT — PAIN DESCRIPTION - PROGRESSION: CLINICAL_PROGRESSION: NOT CHANGED

## 2021-06-09 ASSESSMENT — PAIN SCALES - GENERAL: PAINLEVEL_OUTOF10: 4

## 2021-06-09 ASSESSMENT — PAIN DESCRIPTION - LOCATION: LOCATION: BUTTOCKS

## 2021-06-09 ASSESSMENT — PAIN DESCRIPTION - DESCRIPTORS: DESCRIPTORS: ACHING

## 2021-06-09 ASSESSMENT — PAIN DESCRIPTION - FREQUENCY: FREQUENCY: CONTINUOUS

## 2021-06-09 ASSESSMENT — PAIN DESCRIPTION - PAIN TYPE: TYPE: CHRONIC PAIN

## 2021-06-09 NOTE — PROGRESS NOTES
for Pain or Fever       aspirin EC 81 MG EC tablet Take 81 mg by mouth daily May restart in AM.      furosemide (LASIX) 40 MG tablet Take 40 mg by mouth daily       levothyroxine (LEVOXYL) 200 MCG tablet Take 200 mcg by mouth Daily       Insulin Detemir (LEVEMIR FLEXPEN SC) Inject 33 Units into the skin nightly       simvastatin (ZOCOR) 40 MG tablet Take 40 mg by mouth nightly.  Insulin Lispro, Human, (HUMALOG SC) Inject 10 Units into the skin 3 times daily (with meals) With sliding      Calcium Carbonate-Vitamin D (CALCIUM + D PO) Take by mouth daily        Current Facility-Administered Medications   Medication Dose Route Frequency Provider Last Rate Last Admin    lidocaine (LMX) 4 % cream   Topical Once Apolonia Madden MD          Social History:   Social History     Tobacco Use    Smoking status: Never Smoker    Smokeless tobacco: Never Used   Vaping Use    Vaping Use: Never used   Substance Use Topics    Alcohol use: Not Currently    Drug use: Never     Allergies: Other, Ace inhibitors, Metoprolol, Tetrahydrozoline hcl, and Timolol    Procedure: Indications:  Based on my examination of this patient's wound(s)/ulcer(s) today, debridement is required to promote healing and evaluate the wound base. Performed by: Jhonathan Joe MD    Consent obtained: Yes    Time out taken: Yes    Pain Control: Anesthetic  Anesthetic: 4% Lidocaine Cream     Debridement: Excisional Debridement    Using curette the wound was sharply debrided    down through and including the removal of epidermis, dermis and subcutaneous tissue.         Devitalized Tissue Debrided: fibrin, biofilm and slough    Pre Debridement Measurements:  Are located in the Wound Documentation Flow Sheet     Wound #: 1     Post  Debridement Measurements:  Wound 10/14/20 Coccyx #1 (Active)   Wound Image   04/28/21 0933   Wound Etiology Pressure Stage  3 06/09/21 0816   Wound Cleansed Cleansed with saline 06/09/21 0834   Dressing/Treatment Collagen;Dry dressing 03/03/21 1106   Wound Length (cm) 0.8 cm 06/09/21 0816   Wound Width (cm) 0.3 cm 06/09/21 0816   Wound Depth (cm) 1.7 cm 06/09/21 0816   Wound Surface Area (cm^2) 0.24 cm^2 06/09/21 0816   Change in Wound Size % (l*w) 95.1 06/09/21 0816   Wound Volume (cm^3) 0.41 cm^3 06/09/21 0816   Wound Healing % 97 06/09/21 0816   Post-Procedure Length (cm) 0.8 cm 06/09/21 0834   Post-Procedure Width (cm) 0.3 cm 06/09/21 0834   Post-Procedure Depth (cm) 1.7 cm 06/09/21 0834   Post-Procedure Surface Area (cm^2) 0.24 cm^2 06/09/21 0834   Post-Procedure Volume (cm^3) 0.41 cm^3 06/09/21 0834   Distance Tunneling (cm) 1.8 cm 06/02/21 0807   Tunneling Position ___ O'Clock 12:00 06/02/21 0807   Wound Assessment Bleeding 06/09/21 0834   Drainage Amount Moderate 06/09/21 0834   Drainage Description Serosanguinous 06/09/21 0816   Odor None 06/09/21 0816   Saba-wound Assessment Maceration 06/09/21 0816   Margins Attached edges 06/09/21 0816   Wound Thickness Description not for Pressure Injury Full thickness 06/09/21 0816   Number of days: 238       Percent of Wound(s)/Ulcer(s) Debrided: 100%    Total Surface Area Debrided:  0.24 sq cm     Bleeding:  Minimal    Hemostasis Achieved:  by pressure    Procedural Pain:  2  / 10     Post Procedural Pain:  2 / 10     Response to treatment:  Well tolerated by patient. Assessment:     Wound looks improved. (improved, worse or stable)    Patient tolerated procedure well and was given proper instruction. The nature of the patient's condition was explained in depth. The patient was informed that their compliance to the treatment plan is paramount to successful healing and prevention of further ulceration and/or infection     Plan:     Treatment Plan:       Treatment Note please see attached Discharge Instructions    Written patient dismissal instructions given to patient and signed by patient or POA.          [facility] [address] [facility] [facility], [address]  Telephone: [phone]     FAX [phone]      Discharge Instructions:  Keep weight off wounds and reposition every 2 hours if applicable. Avoid standing for long periods of time.       If wound(s) is on your lower extremity, elevate legs to the level of the heart or above for 30 minutes 4-5 times a day and/or when sitting.      Do not get wounds wet in bath or shower unless otherwise instructed by your physician. If your wound is on you foot or leg, you may purchase a cast bag. Please ask at the pharmacy.     When taking antibiotics take entire prescription as ordered by MD do not stop taking until medicine is all gone. Exercise as tolerated. No Smoking. Smoking prohibits wound healing.     If Vascular testing is ordered, please call [address] [place] [address] [phone]) to schedule.     Vascular tests ordered by Wound Care Physicians may take up to 2 hours to complete. Please keep that in mind when scheduling.      If Vascular testing is scheduled, please bring supplies to replace your dressing after testing is done. The vascular department does not stock supplies.      Wound: Sacrum      With each dressing change, rinse wounds with 0.9% Saline. (May use wound wash or soft contact solution. Both can be purchased at a local drug store). If unable to obtain saline, may use a gentle soap and water.     Dressing care: Triad, dry dressing- change Monday, Wednesday, & Friday.     Important dietary reminders:  1. Increase Protein intake for optimal wound healing  2. No added salt to reduce any swelling  3. If diabetic, good glucose control  4.  If you smoke, smoking affects wound healing, we ask that you refrain from smoking.     Follow up with Dr Ivelisse Balbuena In 1 week in the wound care center.      Call [phone] for any questions or concerns.      Your  is [facility] [employer]: [address] Information: Should you experience any significant changes in your wound(s) or have questions about your wound care, please contact the [facility] 30  [phone] Monday  - Thursday 8:00 am - 4:00 pm and Friday 8:00 am - 1:00pm. If you need help with your wound outside these hours and cannot wait until we are again available, contact your PCP or go to the hospital emergency room.      PLEASE NOTE: IF YOU ARE UNABLE TO [address], CONTINUE TO USE THE SUPPLIES YOU HAVE AVAILABLE UNTIL YOU ARE ABLE TO [address] [facility]. IT IS MOST IMPORTANT TO KEEP THE WOUND COVERED AT ALL TIMES       Dakota Reynolds MD, FACS  6/9/2021  2:47 PM

## 2021-06-09 NOTE — PROGRESS NOTES
wound care center.      Call [phone] for any questions or concerns.      Your  is [facility] [employer]: [employer] Information: Should you experience any significant changes in your wound(s) or have questions about your wound care, please contact the [facility] [address]  [phone] Monday  - Thursday 8:00 am - 4:00 pm and Friday 8:00 am - 1:00pm. If you need help with your wound outside these hours and cannot wait until we are again available, contact your PCP or go to the hospital emergency room.      PLEASE NOTE: IF YOU ARE UNABLE TO OBTAIN WOUND SUPPLIES, CONTINUE TO USE THE SUPPLIES YOU HAVE AVAILABLE UNTIL YOU ARE ABLE TO [facility]. IT IS MOST IMPORTANT TO KEEP THE WOUND COVERED AT ALL TIMES        Skilled nurse to evaluate and treat for wound care. Change dressing as ordered  once a day on Monday, Wednesday and Friday using clean technique. Patient/Family/caregiver may change dressings as needed as instructed when Home Care unavailable.     WOUNDS REQUIRING DRESSING Changes:     Wound 10/14/20 Coccyx #1 (Active)   Wound Image   04/28/21 0933   Wound Etiology Pressure Stage  3 06/09/21 0816   Wound Cleansed Cleansed with saline 06/09/21 0834   Dressing/Treatment Collagen;Dry dressing 03/03/21 1106   Wound Length (cm) 0.8 cm 06/09/21 0816   Wound Width (cm) 0.3 cm 06/09/21 0816   Wound Depth (cm) 1.7 cm 06/09/21 0816   Wound Surface Area (cm^2) 0.24 cm^2 06/09/21 0816   Change in Wound Size % (l*w) 95.1 06/09/21 0816   Wound Volume (cm^3) 0.41 cm^3 06/09/21 0816   Wound Healing % 97 06/09/21 0816   Post-Procedure Length (cm) 0.8 cm 06/09/21 0834   Post-Procedure Width (cm) 0.3 cm 06/09/21 0834   Post-Procedure Depth (cm) 1.7 cm 06/09/21 0834   Post-Procedure Surface Area (cm^2) 0.24 cm^2 06/09/21 0834   Post-Procedure Volume (cm^3) 0.41 cm^3 06/09/21 0834   Distance Tunneling (cm) 1.8 cm 06/02/21 8169   Tunneling Position ___ O'Clock 12:00 06/02/21 0807   Wound Assessment Bleeding 06/09/21 0834   Drainage Amount Moderate 06/09/21 0834   Drainage Description Serosanguinous 06/09/21 0816   Odor None 06/09/21 0816   Saba-wound Assessment Maceration 06/09/21 0816   Margins Attached edges 06/09/21 0816   Wound Thickness Description not for Pressure Injury Full thickness 06/09/21 0816   Number of days: 237          Patient seen and treated on 6/9/2021    By Kane Adan MD NPI: [id number]  (provider/NPI)

## 2021-06-14 ENCOUNTER — HOSPITAL ENCOUNTER (OUTPATIENT)
Dept: MRI IMAGING | Age: 71
Discharge: HOME | End: 2021-06-14
Payer: MEDICARE

## 2021-06-14 DIAGNOSIS — S31.000A SACRAL WOUND, INITIAL ENCOUNTER: ICD-10-CM

## 2021-06-14 PROCEDURE — 72195 MRI PELVIS W/O DYE: CPT

## 2021-06-16 ENCOUNTER — HOSPITAL ENCOUNTER (OUTPATIENT)
Dept: WOUND CARE | Age: 71
Discharge: HOME | End: 2021-06-16
Payer: MEDICARE

## 2021-06-16 VITALS — SYSTOLIC BLOOD PRESSURE: 133 MMHG | HEART RATE: 84 BPM | DIASTOLIC BLOOD PRESSURE: 73 MMHG | RESPIRATION RATE: 16 BRPM

## 2021-06-16 DIAGNOSIS — S31.000A SACRAL WOUND, INITIAL ENCOUNTER: Primary | ICD-10-CM

## 2021-06-16 PROCEDURE — 11042 DBRDMT SUBQ TIS 1ST 20SQCM/<: CPT

## 2021-06-16 PROCEDURE — 11042 DBRDMT SUBQ TIS 1ST 20SQCM/<: CPT | Performed by: SURGERY

## 2021-06-16 RX ORDER — LIDOCAINE HYDROCHLORIDE 40 MG/ML
SOLUTION TOPICAL ONCE
Status: CANCELLED | OUTPATIENT
Start: 2021-06-16 | End: 2021-06-16

## 2021-06-16 RX ORDER — GINSENG 100 MG
CAPSULE ORAL ONCE
Status: CANCELLED | OUTPATIENT
Start: 2021-06-16 | End: 2021-06-16

## 2021-06-16 RX ORDER — LIDOCAINE 40 MG/G
CREAM TOPICAL ONCE
Status: CANCELLED | OUTPATIENT
Start: 2021-06-16 | End: 2021-06-16

## 2021-06-16 RX ORDER — LIDOCAINE 50 MG/G
OINTMENT TOPICAL ONCE
Status: CANCELLED | OUTPATIENT
Start: 2021-06-16 | End: 2021-06-16

## 2021-06-16 RX ORDER — GENTAMICIN SULFATE 1 MG/G
OINTMENT TOPICAL ONCE
Status: CANCELLED | OUTPATIENT
Start: 2021-06-16 | End: 2021-06-16

## 2021-06-16 RX ORDER — BETAMETHASONE DIPROPIONATE 0.05 %
OINTMENT (GRAM) TOPICAL ONCE
Status: CANCELLED | OUTPATIENT
Start: 2021-06-16 | End: 2021-06-16

## 2021-06-16 RX ORDER — CLOBETASOL PROPIONATE 0.5 MG/G
OINTMENT TOPICAL ONCE
Status: CANCELLED | OUTPATIENT
Start: 2021-06-16 | End: 2021-06-16

## 2021-06-16 RX ORDER — LIDOCAINE 40 MG/G
CREAM TOPICAL ONCE
Status: DISCONTINUED | OUTPATIENT
Start: 2021-06-16 | End: 2021-06-17 | Stop reason: HOSPADM

## 2021-06-16 RX ORDER — LIDOCAINE HYDROCHLORIDE 20 MG/ML
JELLY TOPICAL ONCE
Status: CANCELLED | OUTPATIENT
Start: 2021-06-16 | End: 2021-06-16

## 2021-06-16 RX ORDER — BACITRACIN, NEOMYCIN, POLYMYXIN B 400; 3.5; 5 [USP'U]/G; MG/G; [USP'U]/G
OINTMENT TOPICAL ONCE
Status: CANCELLED | OUTPATIENT
Start: 2021-06-16 | End: 2021-06-16

## 2021-06-16 RX ORDER — BACITRACIN ZINC AND POLYMYXIN B SULFATE 500; 1000 [USP'U]/G; [USP'U]/G
OINTMENT TOPICAL ONCE
Status: CANCELLED | OUTPATIENT
Start: 2021-06-16 | End: 2021-06-16

## 2021-06-16 NOTE — PROGRESS NOTES
[address] Progress and Procedure Note    Jose Guadalupe Whitten  AGE: 65 y.o. GENDER: female    : 1950  TODAY'S DATE: 2021    Chief Complaint   Patient presents with    Wound Check     Pressure wounds coccyx        History of Present Illness     Jose Guadalupe Whitten is a 65 y.o. female who presents today for wound evaluation. History of Wound: pressure wound located on the  sacral decubitus ulcer stage 3. Wound Pain:  mild  Severity:  2 / 10   Wound Type:  pressure  Modifying Factors:  diabetes, poor glucose control, decreased mobility and weight loss and prior pilonidal cyst excision  Associated Signs/Symptoms:  none    Past Medical History:   Diagnosis Date    Arthritis     back    Cataracts, bilateral     CHF (congestive heart failure) (HCC)     Diabetes mellitus ([address]     Glaucoma     Hyperlipidemia     Hypertension     Thyroid disease      Past Surgical History:   Procedure Laterality Date    CARPAL TUNNEL RELEASE      bilateral    CHOLECYSTECTOMY      COLONOSCOPY      ELBOW SURGERY      ENDOSCOPY, COLON, DIAGNOSTIC      EYE SURGERY      FOOT SURGERY      SHOULDER SURGERY       Current Outpatient Medications   Medication Sig Dispense Refill    traMADol (ULTRAM) 50 MG tablet Take 50 mg by mouth 3 times daily as needed for Pain.  DULoxetine (CYMBALTA) 60 MG extended release capsule Take 1 capsule by mouth daily 30 capsule 3    sodium chloride (OCEAN, BABY AYR) 0.65 % nasal spray 1 spray by Nasal route every 2 hours as needed for Congestion 1 Bottle 0    mometasone-formoterol (DULERA) 200-5 MCG/ACT inhaler Inhale 2 puffs into the lungs every 12 hours 1 Inhaler 1    spironolactone (ALDACTONE) 25 MG tablet Take 12.5 mg by mouth daily      Travoprost, BAK Free, (TRAVATAN Z) 0.004 % SOLN ophthalmic solution Place 1 drop into both eyes nightly      DICLOFENAC PO Take  by mouth.       acetaminophen (TYLENOL) 500 MG tablet Take 500 mg by mouth every 6 hours as needed for Pain or Fever       aspirin EC 81 MG EC tablet Take 81 mg by mouth daily May restart in AM.      furosemide (LASIX) 40 MG tablet Take 40 mg by mouth daily       levothyroxine (LEVOXYL) 200 MCG tablet Take 200 mcg by mouth Daily       Insulin Detemir (LEVEMIR FLEXPEN SC) Inject 33 Units into the skin nightly       simvastatin (ZOCOR) 40 MG tablet Take 40 mg by mouth nightly.  Insulin Lispro, Human, (HUMALOG SC) Inject 10 Units into the skin 3 times daily (with meals) With sliding      Calcium Carbonate-Vitamin D (CALCIUM + D PO) Take by mouth daily        Current Facility-Administered Medications   Medication Dose Route Frequency Provider Last Rate Last Admin    lidocaine (LMX) 4 % cream   Topical Once Jjal MD Cristofer          Social History:   Social History     Tobacco Use    Smoking status: Never Smoker    Smokeless tobacco: Never Used   Vaping Use    Vaping Use: Never used   Substance Use Topics    Alcohol use: Not Currently    Drug use: Never     Allergies: Other, Ace inhibitors, Metoprolol, Tetrahydrozoline hcl, and Timolol    Procedure: Indications:  Based on my examination of this patient's wound(s)/ulcer(s) today, debridement is required to promote healing and evaluate the wound base. Performed by: Koko Smith MD    Consent obtained: Yes    Time out taken: Yes    Pain Control: Anesthetic  Anesthetic: 4% Lidocaine Cream     Debridement: Excisional Debridement    Using curette the wound was sharply debrided    down through and including the removal of epidermis, dermis and subcutaneous tissue.         Devitalized Tissue Debrided: fibrin, biofilm and slough    Pre Debridement Measurements:  Are located in the Wound Documentation Flow Sheet     Wound #: 1     Post  Debridement Measurements:  Wound 10/14/20 Coccyx #1 (Active)   Wound Image   06/16/21 0816   Wound Etiology Pressure Stage  3 06/16/21 0816   Wound Cleansed Cleansed with saline 06/16/21 0807 Dressing/Treatment Collagen;Dry dressing 03/03/21 1106   Wound Length (cm) 0.5 cm 06/16/21 0816   Wound Width (cm) 0.2 cm 06/16/21 0816   Wound Depth (cm) 1.5 cm 06/16/21 0816   Wound Surface Area (cm^2) 0.1 cm^2 06/16/21 0816   Change in Wound Size % (l*w) 97.96 06/16/21 0816   Wound Volume (cm^3) 0.15 cm^3 06/16/21 0816   Wound Healing % 99 06/16/21 0816   Post-Procedure Length (cm) 0.5 cm 06/16/21 0825   Post-Procedure Width (cm) 0.2 cm 06/16/21 0825   Post-Procedure Depth (cm) 1.5 cm 06/16/21 0825   Post-Procedure Surface Area (cm^2) 0.1 cm^2 06/16/21 0825   Post-Procedure Volume (cm^3) 0.15 cm^3 06/16/21 0825   Distance Tunneling (cm) 1.8 cm 06/02/21 0807   Tunneling Position ___ O'Clock 12:00 06/02/21 0807   Wound Assessment Bleeding 06/16/21 0825   Drainage Amount Moderate 06/16/21 0825   Drainage Description Serosanguinous 06/16/21 0816   Odor None 06/16/21 0816   Saba-wound Assessment Intact 06/16/21 0816   Margins Defined edges 06/16/21 0816   Wound Thickness Description not for Pressure Injury Full thickness 06/09/21 0816   Number of days: 244       Percent of Wound(s)/Ulcer(s) Debrided: 100%    Total Surface Area Debrided:  0.1 sq cm     Bleeding:  Minimal    Hemostasis Achieved:  by pressure    Procedural Pain:  2  / 10     Post Procedural Pain:  3 / 10     Response to treatment:  Well tolerated by patient. Assessment:     Wound looks improved. (improved, worse or stable)    Patient tolerated procedure well and was given proper instruction. The nature of the patient's condition was explained in depth. The patient was informed that their compliance to the treatment plan is paramount to successful healing and prevention of further ulceration and/or infection     Plan:     Treatment Plan:       Treatment Note please see attached Discharge Instructions    Written patient dismissal instructions given to patient and signed by patient or POA.          Discharge [address] experience any significant changes in your wound(s) or have questions about your wound care, please contact the [facility] [address] [phone] Monday  - Thursday 8:00 am - 4:00 pm and Friday 8:00 am - 1:00pm. If you need help with your wound outside these hours and cannot wait until we are again available, contact your PCP or go to the hospital emergency room.      PLEASE NOTE: IF YOU ARE UNABLE TO [address] , CONTINUE TO USE THE SUPPLIES YOU HAVE AVAILABLE UNTIL YOU ARE ABLE TO [facility]. IT IS MOST IMPORTANT TO KEEP THE WOUND COVERED AT ALL TIMES       Douglas B. Lazarus Pennant MD, FACS  6/16/2021  8:33 AM

## 2021-06-23 ENCOUNTER — HOSPITAL ENCOUNTER (OUTPATIENT)
Dept: WOUND CARE | Age: 71
Discharge: HOME | End: 2021-06-23
Payer: MEDICARE

## 2021-06-23 VITALS — HEART RATE: 80 BPM | RESPIRATION RATE: 16 BRPM | DIASTOLIC BLOOD PRESSURE: 78 MMHG | SYSTOLIC BLOOD PRESSURE: 137 MMHG

## 2021-06-23 DIAGNOSIS — S31.000A SACRAL WOUND, INITIAL ENCOUNTER: Primary | ICD-10-CM

## 2021-06-23 PROCEDURE — 11042 DBRDMT SUBQ TIS 1ST 20SQCM/<: CPT | Performed by: SURGERY

## 2021-06-23 PROCEDURE — 11042 DBRDMT SUBQ TIS 1ST 20SQCM/<: CPT

## 2021-06-23 RX ORDER — LIDOCAINE 40 MG/G
CREAM TOPICAL ONCE
Status: CANCELLED | OUTPATIENT
Start: 2021-06-23 | End: 2021-06-23

## 2021-06-23 RX ORDER — BACITRACIN, NEOMYCIN, POLYMYXIN B 400; 3.5; 5 [USP'U]/G; MG/G; [USP'U]/G
OINTMENT TOPICAL ONCE
Status: CANCELLED | OUTPATIENT
Start: 2021-06-23 | End: 2021-06-23

## 2021-06-23 RX ORDER — LIDOCAINE HYDROCHLORIDE 20 MG/ML
JELLY TOPICAL ONCE
Status: CANCELLED | OUTPATIENT
Start: 2021-06-23 | End: 2021-06-23

## 2021-06-23 RX ORDER — GENTAMICIN SULFATE 1 MG/G
OINTMENT TOPICAL ONCE
Status: CANCELLED | OUTPATIENT
Start: 2021-06-23 | End: 2021-06-23

## 2021-06-23 RX ORDER — LIDOCAINE 50 MG/G
OINTMENT TOPICAL ONCE
Status: CANCELLED | OUTPATIENT
Start: 2021-06-23 | End: 2021-06-23

## 2021-06-23 RX ORDER — CLOBETASOL PROPIONATE 0.5 MG/G
OINTMENT TOPICAL ONCE
Status: CANCELLED | OUTPATIENT
Start: 2021-06-23 | End: 2021-06-23

## 2021-06-23 RX ORDER — BETAMETHASONE DIPROPIONATE 0.05 %
OINTMENT (GRAM) TOPICAL ONCE
Status: CANCELLED | OUTPATIENT
Start: 2021-06-23 | End: 2021-06-23

## 2021-06-23 RX ORDER — BACITRACIN ZINC AND POLYMYXIN B SULFATE 500; 1000 [USP'U]/G; [USP'U]/G
OINTMENT TOPICAL ONCE
Status: CANCELLED | OUTPATIENT
Start: 2021-06-23 | End: 2021-06-23

## 2021-06-23 RX ORDER — GINSENG 100 MG
CAPSULE ORAL ONCE
Status: CANCELLED | OUTPATIENT
Start: 2021-06-23 | End: 2021-06-23

## 2021-06-23 RX ORDER — LIDOCAINE 40 MG/G
CREAM TOPICAL ONCE
Status: DISCONTINUED | OUTPATIENT
Start: 2021-06-23 | End: 2021-06-24 | Stop reason: HOSPADM

## 2021-06-23 RX ORDER — LIDOCAINE HYDROCHLORIDE 40 MG/ML
SOLUTION TOPICAL ONCE
Status: CANCELLED | OUTPATIENT
Start: 2021-06-23 | End: 2021-06-23

## 2021-06-23 ASSESSMENT — PAIN DESCRIPTION - DESCRIPTORS: DESCRIPTORS: ACHING

## 2021-06-23 ASSESSMENT — PAIN DESCRIPTION - LOCATION: LOCATION: BUTTOCKS

## 2021-06-23 ASSESSMENT — PAIN SCALES - GENERAL: PAINLEVEL_OUTOF10: 4

## 2021-06-23 ASSESSMENT — PAIN DESCRIPTION - ONSET: ONSET: ON-GOING

## 2021-06-23 ASSESSMENT — PAIN DESCRIPTION - PAIN TYPE: TYPE: CHRONIC PAIN

## 2021-06-23 ASSESSMENT — PAIN DESCRIPTION - PROGRESSION: CLINICAL_PROGRESSION: NOT CHANGED

## 2021-06-23 ASSESSMENT — PAIN DESCRIPTION - FREQUENCY: FREQUENCY: CONTINUOUS

## 2021-06-23 NOTE — PLAN OF CARE
Discharge instructions given. Patient verbalized understanding. Return to [facility] in 1 week.

## 2021-06-23 NOTE — PROGRESS NOTES
for Pain or Fever       aspirin EC 81 MG EC tablet Take 81 mg by mouth daily May restart in AM.      furosemide (LASIX) 40 MG tablet Take 40 mg by mouth daily       levothyroxine (LEVOXYL) 200 MCG tablet Take 200 mcg by mouth Daily       Insulin Detemir (LEVEMIR FLEXPEN SC) Inject 33 Units into the skin nightly       simvastatin (ZOCOR) 40 MG tablet Take 40 mg by mouth nightly.  Insulin Lispro, Human, (HUMALOG SC) Inject 10 Units into the skin 3 times daily (with meals) With sliding      Calcium Carbonate-Vitamin D (CALCIUM + D PO) Take by mouth daily        Current Facility-Administered Medications   Medication Dose Route Frequency Provider Last Rate Last Admin    lidocaine (LMX) 4 % cream   Topical Once Josy Abreu MD          Social History:   Social History     Tobacco Use    Smoking status: Never Smoker    Smokeless tobacco: Never Used   Vaping Use    Vaping Use: Never used   Substance Use Topics    Alcohol use: Not Currently    Drug use: Never     Allergies: Other, Ace inhibitors, Metoprolol, Tetrahydrozoline hcl, and Timolol    Procedure: Indications:  Based on my examination of this patient's wound(s)/ulcer(s) today, debridement is required to promote healing and evaluate the wound base. Performed by: Jose Luis Vazquez MD    Consent obtained: Yes    Time out taken: Yes    Pain Control: Anesthetic  Anesthetic: 4% Lidocaine Cream     Debridement: Excisional Debridement    Using curette the wound was sharply debrided    down through and including the removal of epidermis, dermis and subcutaneous tissue.         Devitalized Tissue Debrided: fibrin, biofilm and slough    Pre Debridement Measurements:  Are located in the Wound Documentation Flow Sheet     Wound #: 1     Post  Debridement Measurements:  Wound 10/14/20 Coccyx #1 (Active)   Wound Image   06/16/21 0816   Wound Etiology Pressure Stage  3 06/23/21 0823   Wound Cleansed Cleansed with saline 06/23/21 0838   Dressing/Treatment Collagen;Dry dressing 03/03/21 1106   Wound Length (cm) 0.5 cm 06/23/21 0823   Wound Width (cm) 0.3 cm 06/23/21 0823   Wound Depth (cm) 1.4 cm 06/23/21 0823   Wound Surface Area (cm^2) 0.15 cm^2 06/23/21 0823   Change in Wound Size % (l*w) 96.94 06/23/21 0823   Wound Volume (cm^3) 0.21 cm^3 06/23/21 0823   Wound Healing % 99 06/23/21 0823   Post-Procedure Length (cm) 0.5 cm 06/23/21 0838   Post-Procedure Width (cm) 0.3 cm 06/23/21 0838   Post-Procedure Depth (cm) 1.4 cm 06/23/21 0838   Post-Procedure Surface Area (cm^2) 0.15 cm^2 06/23/21 0838   Post-Procedure Volume (cm^3) 0.21 cm^3 06/23/21 0838   Distance Tunneling (cm) 1.8 cm 06/02/21 0807   Tunneling Position ___ O'Clock 12:00 06/02/21 0807   Wound Assessment Bleeding 06/23/21 0838   Drainage Amount Moderate 06/23/21 0838   Drainage Description Serosanguinous 06/23/21 0823   Odor None 06/23/21 0823   Saba-wound Assessment Intact 06/23/21 0823   Margins Defined edges 06/23/21 0823   Wound Thickness Description not for Pressure Injury Full thickness 06/23/21 0823   Number of days: 251       Percent of Wound(s)/Ulcer(s) Debrided: 100%    Total Surface Area Debrided:  0.15 sq cm     Bleeding:  Minimal    Hemostasis Achieved:  by pressure    Procedural Pain:  1  / 10     Post Procedural Pain:  1 / 10     Response to treatment:  Well tolerated by patient. Assessment:     Wound looks improved. (improved, worse or stable)    Patient tolerated procedure well and was given proper instruction. The nature of the patient's condition was explained in depth. The patient was informed that their compliance to the treatment plan is paramount to successful healing and prevention of further ulceration and/or infection     Plan:     Treatment Plan:       Treatment Note please see attached Discharge Instructions    Written patient dismissal instructions given to patient and signed by patient or POA.          Discharge Instructions       [facility] changes in your wound(s) or have questions about your wound care, please contact the [facility] [address] IF [phone] Monday  - Thursday 8:00 am - 4:00 pm and Friday 8:00 am - 1:00pm. If you need help with your wound outside these hours and cannot wait until we are again available, contact your PCP or go to the hospital emergency room.      PLEASE NOTE: IF YOU ARE UNABLE TO [address] , CONTINUE TO USE THE SUPPLIES YOU HAVE AVAILABLE UNTIL YOU ARE ABLE TO [facility]. IT IS MOST IMPORTANT TO KEEP THE WOUND COVERED AT ALL TIMES       Dakota Balbuena MD, FACS  6/23/2021  9:08 AM

## 2021-06-30 ENCOUNTER — HOSPITAL ENCOUNTER (OUTPATIENT)
Dept: WOUND CARE | Age: 71
Discharge: HOME | End: 2021-06-30
Payer: MEDICARE

## 2021-06-30 VITALS — HEART RATE: 82 BPM | RESPIRATION RATE: 16 BRPM | DIASTOLIC BLOOD PRESSURE: 71 MMHG | SYSTOLIC BLOOD PRESSURE: 117 MMHG

## 2021-06-30 DIAGNOSIS — S31.000A SACRAL WOUND, INITIAL ENCOUNTER: Primary | ICD-10-CM

## 2021-06-30 PROCEDURE — 11042 DBRDMT SUBQ TIS 1ST 20SQCM/<: CPT | Performed by: SURGERY

## 2021-06-30 PROCEDURE — 11042 DBRDMT SUBQ TIS 1ST 20SQCM/<: CPT

## 2021-06-30 RX ORDER — GINSENG 100 MG
CAPSULE ORAL ONCE
Status: CANCELLED | OUTPATIENT
Start: 2021-06-30 | End: 2021-06-30

## 2021-06-30 RX ORDER — LIDOCAINE HYDROCHLORIDE 40 MG/ML
SOLUTION TOPICAL ONCE
Status: CANCELLED | OUTPATIENT
Start: 2021-06-30 | End: 2021-06-30

## 2021-06-30 RX ORDER — CLOBETASOL PROPIONATE 0.5 MG/G
OINTMENT TOPICAL ONCE
Status: CANCELLED | OUTPATIENT
Start: 2021-06-30 | End: 2021-06-30

## 2021-06-30 RX ORDER — BETAMETHASONE DIPROPIONATE 0.05 %
OINTMENT (GRAM) TOPICAL ONCE
Status: CANCELLED | OUTPATIENT
Start: 2021-06-30 | End: 2021-06-30

## 2021-06-30 RX ORDER — LIDOCAINE 50 MG/G
OINTMENT TOPICAL ONCE
Status: CANCELLED | OUTPATIENT
Start: 2021-06-30 | End: 2021-06-30

## 2021-06-30 RX ORDER — BACITRACIN, NEOMYCIN, POLYMYXIN B 400; 3.5; 5 [USP'U]/G; MG/G; [USP'U]/G
OINTMENT TOPICAL ONCE
Status: CANCELLED | OUTPATIENT
Start: 2021-06-30 | End: 2021-06-30

## 2021-06-30 RX ORDER — GENTAMICIN SULFATE 1 MG/G
OINTMENT TOPICAL ONCE
Status: CANCELLED | OUTPATIENT
Start: 2021-06-30 | End: 2021-06-30

## 2021-06-30 RX ORDER — LIDOCAINE HYDROCHLORIDE 20 MG/ML
JELLY TOPICAL ONCE
Status: CANCELLED | OUTPATIENT
Start: 2021-06-30 | End: 2021-06-30

## 2021-06-30 RX ORDER — LIDOCAINE 40 MG/G
CREAM TOPICAL ONCE
Status: DISCONTINUED | OUTPATIENT
Start: 2021-06-30 | End: 2021-07-01 | Stop reason: HOSPADM

## 2021-06-30 RX ORDER — BACITRACIN ZINC AND POLYMYXIN B SULFATE 500; 1000 [USP'U]/G; [USP'U]/G
OINTMENT TOPICAL ONCE
Status: CANCELLED | OUTPATIENT
Start: 2021-06-30 | End: 2021-06-30

## 2021-06-30 RX ORDER — LIDOCAINE 40 MG/G
CREAM TOPICAL ONCE
Status: CANCELLED | OUTPATIENT
Start: 2021-06-30 | End: 2021-06-30

## 2021-06-30 NOTE — PROGRESS NOTES
[place] [address] [facility], [address] [place] [address] Road  Telephone: [phone]        [facility] AT [facility] Fax # [phone]      Discharge Instructions       [place] [address] [facility], [address] [place] [address] Road  Telephone: [phone]     FAX [phone]      Discharge Instructions:  Keep weight off wounds and reposition every 2 hours if applicable. Avoid standing for long periods of time.       If wound(s) is on your lower extremity, elevate legs to the level of the heart or above for 30 minutes 4-5 times a day and/or when sitting.      Do not get wounds wet in bath or shower unless otherwise instructed by your physician. If your wound is on you foot or leg, you may purchase a cast bag. Please ask at the pharmacy.     When taking antibiotics take entire prescription as ordered by MD do not stop taking until medicine is all gone. Exercise as tolerated. No Smoking. Smoking prohibits wound healing.     If Vascular testing is ordered, please call [address] [place] [address] [phone]) to schedule.     Vascular tests ordered by Wound Care Physicians may take up to 2 hours to complete. Please keep that in mind when scheduling.      If Vascular testing is scheduled, please bring supplies to replace your dressing after testing is done. The vascular department does not stock supplies.      Wound: Sacrum      With each dressing change, rinse wounds with 0.9% Saline. (May use wound wash or soft contact solution. Both can be purchased at a local drug store). If unable to obtain saline, may use a gentle soap and water.     Dressing care: Triad, dry dressing- change Monday, Wednesday, & Friday.     Important dietary reminders:  1. Increase Protein intake for optimal wound healing  2. No added salt to reduce any swelling  3. If diabetic, good glucose control  4.  If you smoke, smoking affects wound healing, we ask that you refrain from smoking.     Follow up with Dr Deneen Telles In 2 weeks in the wound care center.      Call [phone] for any questions or concerns.      Your  [facility] [employer] Company: Cornelio Santos Information: Should you experience any significant changes in your wound(s) or have questions about your wound care, please contact the [address]  [phone] Monday  - Thursday 8:00 am - 4:00 pm and Friday 8:00 am - 1:00pm. If you need help with your wound outside these hours and cannot wait until we are again available, contact your PCP or go to the hospital emergency room.      PLEASE NOTE: IF YOU ARE UNABLE TO OBTAIN WOUND SUPPLIES, CONTINUE TO USE THE SUPPLIES YOU HAVE AVAILABLE UNTIL YOU ARE ABLE TO [facility]. IT IS MOST IMPORTANT TO KEEP THE WOUND COVERED AT ALL TIMES        Skilled nurse to evaluate and treat for wound care. Change dressing as ordered  once a day on Monday, Wednesday and Friday using clean technique. Patient/Family/caregiver may change dressings as needed as instructed when Home Care unavailable.     WOUNDS REQUIRING DRESSING Changes:     Wound 10/14/20 Coccyx #1 (Active)   Wound Image   06/16/21 0816   Wound Etiology Pressure Stage  3 06/30/21 0815   Wound Cleansed Cleansed with saline 06/30/21 0823   Dressing/Treatment Collagen;Dry dressing 03/03/21 1106   Wound Length (cm) 0.5 cm 06/30/21 0815   Wound Width (cm) 0.2 cm 06/30/21 0815   Wound Depth (cm) 0.1 cm 06/30/21 0815   Wound Surface Area (cm^2) 0.1 cm^2 06/30/21 0815   Change in Wound Size % (l*w) 97.96 06/30/21 0815   Wound Volume (cm^3) 0.01 cm^3 06/30/21 0815   Wound Healing % 100 06/30/21 0815   Post-Procedure Length (cm) 0.5 cm 06/30/21 0823   Post-Procedure Width (cm) 0.2 cm 06/30/21 0823   Post-Procedure Depth (cm) 0.1 cm 06/30/21 0823   Post-Procedure Surface Area (cm^2) 0.1 cm^2 06/30/21 0823   Post-Procedure Volume (cm^3) 0.01 cm^3 06/30/21 0823   Distance Tunneling (cm) 1.8 cm 06/02/21 0717   Tunneling Position ___ O'Clock 12:00 06/02/21 0807   Wound Assessment Bleeding 06/30/21 0823   Drainage Amount Small 06/30/21 0823   Drainage Description Serosanguinous 06/30/21 0815   Odor None 06/30/21 0815   Saba-wound Assessment Intact 06/30/21 0815   Margins Defined edges 06/23/21 0823   Wound Thickness Description not for Pressure Injury Full thickness 06/23/21 9127   Number of days: 259          Patient seen and treated on 6/30/2021    By Belle Garg MD NPI: [id number]  (provider/NPI)

## 2021-06-30 NOTE — PLAN OF CARE
Discharge instructions given. Patient verbalized understanding. Return to [facility] in 2 weeks.   Called/faxed orders to  [facility]

## 2021-06-30 NOTE — PROGRESS NOTES
[address] Progress and Procedure Note    Venita Estevez  AGE: 65 y.o. GENDER: female    : 1950  TODAY'S DATE: 2021    Chief Complaint   Patient presents with    Wound Check     follow up wound        History of Present Illness     Venita Estevez is a 65 y.o. female who presents today for wound evaluation. History of Wound: pressure wound located on the sacral decubitus ulcer stage 3. Wound Pain:  mild  Severity:  2 / 10   Wound Type:  pressure  Modifying Factors:  diabetes, poor glucose control, decreased mobility and weight loss and prior pilonidal cyst excision  Associated Signs/Symptoms:  none    Past Medical History:   Diagnosis Date    Arthritis     back    Cataracts, bilateral     CHF (congestive heart failure) (HCC)     Diabetes mellitus ([address]     Glaucoma     Hyperlipidemia     Hypertension     Thyroid disease      Past Surgical History:   Procedure Laterality Date    CARPAL TUNNEL RELEASE      bilateral    CHOLECYSTECTOMY      COLONOSCOPY      ELBOW SURGERY      ENDOSCOPY, COLON, DIAGNOSTIC      EYE SURGERY      FOOT SURGERY      SHOULDER SURGERY       Current Outpatient Medications   Medication Sig Dispense Refill    traMADol (ULTRAM) 50 MG tablet Take 50 mg by mouth 3 times daily as needed for Pain.  DULoxetine (CYMBALTA) 60 MG extended release capsule Take 1 capsule by mouth daily 30 capsule 3    sodium chloride (OCEAN, BABY AYR) 0.65 % nasal spray 1 spray by Nasal route every 2 hours as needed for Congestion 1 Bottle 0    mometasone-formoterol (DULERA) 200-5 MCG/ACT inhaler Inhale 2 puffs into the lungs every 12 hours 1 Inhaler 1    spironolactone (ALDACTONE) 25 MG tablet Take 12.5 mg by mouth daily      Travoprost, BAK Free, (TRAVATAN Z) 0.004 % SOLN ophthalmic solution Place 1 drop into both eyes nightly      DICLOFENAC PO Take  by mouth.       acetaminophen (TYLENOL) 500 MG tablet Take 500 mg by mouth every 6 hours as needed for Pain or Fever       aspirin EC 81 MG EC tablet Take 81 mg by mouth daily May restart in AM.      furosemide (LASIX) 40 MG tablet Take 40 mg by mouth daily       levothyroxine (LEVOXYL) 200 MCG tablet Take 200 mcg by mouth Daily       Insulin Detemir (LEVEMIR FLEXPEN SC) Inject 33 Units into the skin nightly       simvastatin (ZOCOR) 40 MG tablet Take 40 mg by mouth nightly.  Insulin Lispro, Human, (HUMALOG SC) Inject 10 Units into the skin 3 times daily (with meals) With sliding      Calcium Carbonate-Vitamin D (CALCIUM + D PO) Take by mouth daily        Current Facility-Administered Medications   Medication Dose Route Frequency Provider Last Rate Last Admin    lidocaine (LMX) 4 % cream   Topical Once Alla Recinos MD          Social History:   Social History     Tobacco Use    Smoking status: Never Smoker    Smokeless tobacco: Never Used   Vaping Use    Vaping Use: Never used   Substance Use Topics    Alcohol use: Not Currently    Drug use: Never     Allergies: Other, Ace inhibitors, Metoprolol, Tetrahydrozoline hcl, and Timolol    Procedure: Indications:  Based on my examination of this patient's wound(s)/ulcer(s) today, debridement is required to promote healing and evaluate the wound base. Performed by: Phoebe Hernandez MD    Consent obtained: Yes    Time out taken: Yes    Pain Control: Anesthetic  Anesthetic: 4% Lidocaine Cream     Debridement: Excisional Debridement    Using curette the wound was sharply debrided    down through and including the removal of epidermis, dermis and subcutaneous tissue.         Devitalized Tissue Debrided: fibrin, biofilm and slough    Pre Debridement Measurements:  Are located in the Wound Documentation Flow Sheet     Wound #: 1     Post  Debridement Measurements:  Wound 10/14/20 Coccyx #1 (Active)   Wound Image   06/16/21 0816   Wound Etiology Pressure Stage  3 06/30/21 0815   Wound Cleansed Cleansed with saline 06/30/21 [address] [facility] [facility], [address]  Telephone: [phone]     FAX [phone]      Discharge Instructions:  Keep weight off wounds and reposition every 2 hours if applicable. Avoid standing for long periods of time.       If wound(s) is on your lower extremity, elevate legs to the level of the heart or above for 30 minutes 4-5 times a day and/or when sitting.      Do not get wounds wet in bath or shower unless otherwise instructed by your physician. If your wound is on you foot or leg, you may purchase a cast bag. Please ask at the pharmacy.     When taking antibiotics take entire prescription as ordered by MD do not stop taking until medicine is all gone. Exercise as tolerated. No Smoking. Smoking prohibits wound healing.     If Vascular testing is ordered, please call [address] [place] [address] [phone]) to schedule.     Vascular tests ordered by Wound Care Physicians may take up to 2 hours to complete. Please keep that in mind when scheduling.      If Vascular testing is scheduled, please bring supplies to replace your dressing after testing is done. The vascular department does not stock supplies.      Wound: Sacrum      With each dressing change, rinse wounds with 0.9% Saline. (May use wound wash or soft contact solution. Both can be purchased at a local drug store). If unable to obtain saline, may use a gentle soap and water.     Dressing care: Triad, dry dressing- change Monday, Wednesday, & Friday.     Important dietary reminders:  1. Increase Protein intake for optimal wound healing  2. No added salt to reduce any swelling  3. If diabetic, good glucose control  4.  If you smoke, smoking affects wound healing, we ask that you refrain from smoking.     Follow up with Dr Greg Dozier In 2 weeks in the wound care center.      Call [phone] for any questions or concerns.      Your  is [facility] [employer]: [employer] Information: Should you experience any significant changes in your wound(s) or have questions about your wound care, please contact the [facility] [address] GD [phone] Monday  - Thursday 8:00 am - 4:00 pm and Friday 8:00 am - 1:00pm. If you need help with your wound outside these hours and cannot wait until we are again available, contact your PCP or go to the hospital emergency room.      PLEASE NOTE: IF YOU ARE UNABLE TO [address] , CONTINUE TO USE THE SUPPLIES YOU HAVE AVAILABLE UNTIL YOU ARE ABLE TO [address]. IT IS MOST IMPORTANT TO KEEP THE WOUND COVERED AT ALL TIMES       Dakota Ayala MD, FACS  6/30/2021  5:56 PM

## 2021-07-21 ENCOUNTER — HOSPITAL ENCOUNTER (OUTPATIENT)
Dept: WOUND CARE | Age: 71
Discharge: HOME | End: 2021-07-21
Payer: MEDICARE

## 2021-07-21 VITALS — RESPIRATION RATE: 16 BRPM | DIASTOLIC BLOOD PRESSURE: 74 MMHG | SYSTOLIC BLOOD PRESSURE: 128 MMHG | HEART RATE: 69 BPM

## 2021-07-21 DIAGNOSIS — S31.000A SACRAL WOUND, INITIAL ENCOUNTER: Primary | ICD-10-CM

## 2021-07-21 PROCEDURE — 11042 DBRDMT SUBQ TIS 1ST 20SQCM/<: CPT

## 2021-07-21 PROCEDURE — 11042 DBRDMT SUBQ TIS 1ST 20SQCM/<: CPT | Performed by: SURGERY

## 2021-07-21 RX ORDER — BACITRACIN, NEOMYCIN, POLYMYXIN B 400; 3.5; 5 [USP'U]/G; MG/G; [USP'U]/G
OINTMENT TOPICAL ONCE
Status: CANCELLED | OUTPATIENT
Start: 2021-07-21 | End: 2021-07-21

## 2021-07-21 RX ORDER — GENTAMICIN SULFATE 1 MG/G
OINTMENT TOPICAL ONCE
Status: CANCELLED | OUTPATIENT
Start: 2021-07-21 | End: 2021-07-21

## 2021-07-21 RX ORDER — LIDOCAINE HYDROCHLORIDE 20 MG/ML
JELLY TOPICAL ONCE
Status: CANCELLED | OUTPATIENT
Start: 2021-07-21 | End: 2021-07-21

## 2021-07-21 RX ORDER — LIDOCAINE HYDROCHLORIDE 40 MG/ML
SOLUTION TOPICAL ONCE
Status: CANCELLED | OUTPATIENT
Start: 2021-07-21 | End: 2021-07-21

## 2021-07-21 RX ORDER — BACITRACIN ZINC AND POLYMYXIN B SULFATE 500; 1000 [USP'U]/G; [USP'U]/G
OINTMENT TOPICAL ONCE
Status: CANCELLED | OUTPATIENT
Start: 2021-07-21 | End: 2021-07-21

## 2021-07-21 RX ORDER — LIDOCAINE 40 MG/G
CREAM TOPICAL ONCE
Status: CANCELLED | OUTPATIENT
Start: 2021-07-21 | End: 2021-07-21

## 2021-07-21 RX ORDER — LIDOCAINE 40 MG/G
CREAM TOPICAL ONCE
Status: DISCONTINUED | OUTPATIENT
Start: 2021-07-21 | End: 2021-07-22 | Stop reason: HOSPADM

## 2021-07-21 RX ORDER — BETAMETHASONE DIPROPIONATE 0.05 %
OINTMENT (GRAM) TOPICAL ONCE
Status: CANCELLED | OUTPATIENT
Start: 2021-07-21 | End: 2021-07-21

## 2021-07-21 RX ORDER — LIDOCAINE 50 MG/G
OINTMENT TOPICAL ONCE
Status: CANCELLED | OUTPATIENT
Start: 2021-07-21 | End: 2021-07-21

## 2021-07-21 RX ORDER — CLOBETASOL PROPIONATE 0.5 MG/G
OINTMENT TOPICAL ONCE
Status: CANCELLED | OUTPATIENT
Start: 2021-07-21 | End: 2021-07-21

## 2021-07-21 RX ORDER — GINSENG 100 MG
CAPSULE ORAL ONCE
Status: CANCELLED | OUTPATIENT
Start: 2021-07-21 | End: 2021-07-21

## 2021-07-21 NOTE — PLAN OF CARE
Discharge instructions given. Patient verbalized understanding. Return to [address] [place] [address],[address] in 2 weeks.

## 2021-07-21 NOTE — PROGRESS NOTES
[address] Progress and Procedure Note    Rubio Haro  AGE: 65 y.o. GENDER: female    : 1950  TODAY'S DATE: 2021    Chief Complaint   Patient presents with    Wound Check     Sacral wound follow up        History of Present Illness     Rubio Haro is a 65 y.o. female who presents today for wound evaluation. History of Wound: pressure wound located on the sacral decubitus ulcer stage 3. Wound Pain:  mild  Severity:  2 / 10   Wound Type:  pressure  Modifying Factors:  diabetes, poor glucose control, decreased mobility and weight loss and prior pilonidal cyst excision  Associated Signs/Symptoms:  none    Past Medical History:   Diagnosis Date    Arthritis     back    Cataracts, bilateral     [facility]congestive heart failure) ([facility])     Diabetes mellitus ([address]     Glaucoma     Hyperlipidemia     Hypertension     Thyroid disease      Past Surgical History:   Procedure Laterality Date    CARPAL TUNNEL RELEASE      bilateral    CHOLECYSTECTOMY      COLONOSCOPY      ELBOW SURGERY      ENDOSCOPY, COLON, DIAGNOSTIC      EYE SURGERY      FOOT SURGERY      SHOULDER SURGERY       Current Outpatient Medications   Medication Sig Dispense Refill    traMADol (ULTRAM) 50 MG tablet Take 50 mg by mouth 3 times daily as needed for Pain.  DULoxetine (CYMBALTA) 60 MG extended release capsule Take 1 capsule by mouth daily 30 capsule 3    sodium chloride (OCEAN, BABY AYR) 0.65 % nasal spray 1 spray by Nasal route every 2 hours as needed for Congestion 1 Bottle 0    mometasone-formoterol (DULERA) 200-5 MCG/ACT inhaler Inhale 2 puffs into the lungs every 12 hours 1 Inhaler 1    spironolactone (ALDACTONE) 25 MG tablet Take 12.5 mg by mouth daily      Travoprost, BAK Free, (TRAVATAN Z) 0.004 % SOLN ophthalmic solution Place 1 drop into both eyes nightly      DICLOFENAC PO Take  by mouth.       acetaminophen (TYLENOL) 500 MG tablet Take 500 mg by mouth every 6 hours as needed for Pain or Fever       aspirin EC 81 MG EC tablet Take 81 mg by mouth daily May restart in AM.      furosemide (LASIX) 40 MG tablet Take 40 mg by mouth daily       levothyroxine (LEVOXYL) 200 MCG tablet Take 200 mcg by mouth Daily       Insulin Detemir (LEVEMIR FLEXPEN SC) Inject 33 Units into the skin nightly       simvastatin (ZOCOR) 40 MG tablet Take 40 mg by mouth nightly.  Insulin Lispro, Human, (HUMALOG SC) Inject 10 Units into the skin 3 times daily (with meals) With sliding      Calcium Carbonate-Vitamin D (CALCIUM + D PO) Take by mouth daily        Current Facility-Administered Medications   Medication Dose Route Frequency Provider Last Rate Last Admin    lidocaine (LMX) 4 % cream   Topical Once Xavier Andrews MD          Social History:   Social History     Tobacco Use    Smoking status: Never Smoker    Smokeless tobacco: Never Used   Vaping Use    Vaping Use: Never used   Substance Use Topics    Alcohol use: Not Currently    Drug use: Never     Allergies: Other, Ace inhibitors, Metoprolol, Tetrahydrozoline hcl, and Timolol    Procedure: Indications:  Based on my examination of this patient's wound(s)/ulcer(s) today, debridement is required to promote healing and evaluate the wound base. Performed by: Antonieta Us MD    Consent obtained: Yes    Time out taken: Yes    Pain Control: Anesthetic  Anesthetic: 4% Lidocaine Cream     Debridement: Excisional Debridement    Using curette the wound was sharply debrided    down through and including the removal of epidermis, dermis and subcutaneous tissue.         Devitalized Tissue Debrided: fibrin, biofilm and slough    Pre Debridement Measurements:  Are located in the Wound Documentation Flow Sheet     Wound #: 1     Post  Debridement Measurements:  Wound 10/14/20 Coccyx #1 (Active)   Wound Image   06/16/21 0816   Wound Etiology Pressure Stage  3 07/21/21 0813   Wound Cleansed Cleansed with saline 07/21/21 0862 Dressing/Treatment Collagen;Dry dressing 03/03/21 1106   Wound Length (cm) 0.8 cm 07/21/21 0813   Wound Width (cm) 0.3 cm 07/21/21 0813   Wound Depth (cm) 0.3 cm 07/21/21 0813   Wound Surface Area (cm^2) 0.24 cm^2 07/21/21 0813   Change in Wound Size % (l*w) 95.1 07/21/21 0813   Wound Volume (cm^3) 0.072 cm^3 07/21/21 0813   Wound Healing % 99 07/21/21 0813   Post-Procedure Length (cm) 0.8 cm 07/21/21 0828   Post-Procedure Width (cm) 0.3 cm 07/21/21 0828   Post-Procedure Depth (cm) 0.3 cm 07/21/21 0828   Post-Procedure Surface Area (cm^2) 0.24 cm^2 07/21/21 0828   Post-Procedure Volume (cm^3) 0.072 cm^3 07/21/21 0828   Distance Tunneling (cm) 1.8 cm 06/02/21 0807   Tunneling Position ___ O'Clock 12:00 06/02/21 0807   Wound Assessment Bleeding 07/21/21 0828   Drainage Amount Moderate 07/21/21 0828   Drainage Description Serosanguinous 07/21/21 0813   Odor None 07/21/21 0813   Saba-wound Assessment Maceration 07/21/21 0813   Margins Attached edges; Defined edges 07/21/21 0813   Wound Thickness Description not for Pressure Injury Full thickness 06/23/21 0823   Number of days: 280       Percent of Wound(s)/Ulcer(s) Debrided: 100%    Total Surface Area Debrided:  0.24 sq cm     Bleeding:  Minimal    Hemostasis Achieved:  by pressure    Procedural Pain:  2  / 10     Post Procedural Pain:  2 / 10     Response to treatment:  Well tolerated by patient. Assessment:     Wound looks improved. (improved, worse or stable)    Patient tolerated procedure well and was given proper instruction. The nature of the patient's condition was explained in depth. The patient was informed that their compliance to the treatment plan is paramount to successful healing and prevention of further ulceration and/or infection     Plan:     Treatment Plan:       Treatment Note please see attached Discharge Instructions    Written patient dismissal instructions given to patient and signed by patient or POA.          Discharge Instructions [facility], [address] [place] [address] Road  Telephone: [phone]     FAX [phone]      Discharge Instructions:  Keep weight off wounds and reposition every 2 hours if applicable. Avoid standing for long periods of time.       If wound(s) is on your lower extremity, elevate legs to the level of the heart or above for 30 minutes 4-5 times a day and/or when sitting.      Do not get wounds wet in bath or shower unless otherwise instructed by your physician. If your wound is on you foot or leg, you may purchase a cast bag. Please ask at the pharmacy.     When taking antibiotics take entire prescription as ordered by MD do not stop taking until medicine is all gone. Exercise as tolerated. No Smoking. Smoking prohibits wound healing.     If Vascular testing is ordered, please call [address] [place] [address] [phone]) to schedule.     Vascular tests ordered by Wound Care Physicians may take up to 2 hours to complete. Please keep that in mind when scheduling.      If Vascular testing is scheduled, please bring supplies to replace your dressing after testing is done. The vascular department does not stock supplies.      Wound: Sacrum      With each dressing change, rinse wounds with 0.9% Saline. (May use wound wash or soft contact solution. Both can be purchased at a local drug store). If unable to obtain saline, may use a gentle soap and water.     Dressing care: Triad, dry dressing- change Monday, Wednesday, & Friday.     Important dietary reminders:  1. Increase Protein intake for optimal wound healing  2. No added salt to reduce any swelling  3. If diabetic, good glucose control  4.  If you smoke, smoking affects wound healing, we ask that you refrain from smoking.     Follow up with Dr Junior Miguel In 2 weeks in the wound care center.      Call [phone] for any questions or concerns.      Your  is [facility] [employer]: [employer] [address] Information: Should you experience any significant changes in your wound(s) or have questions about your wound care, please contact the [facility] 30  [phone] Monday  - Thursday 8:00 am - 4:00 pm and Friday 8:00 am - 1:00pm. If you need help with your wound outside these hours and cannot wait until we are again available, contact your PCP or go to the hospital emergency room.      PLEASE NOTE: IF YOU ARE UNABLE TO [address], CONTINUE TO USE THE SUPPLIES YOU HAVE AVAILABLE UNTIL YOU ARE ABLE TO [address]. IT IS MOST IMPORTANT TO KEEP THE WOUND COVERED AT ALL TIMES       Dakota Bailey MD, FACS  7/21/2021  10:48 AM

## 2021-08-04 ENCOUNTER — HOSPITAL ENCOUNTER (OUTPATIENT)
Dept: WOUND CARE | Age: 71
Discharge: HOME | End: 2021-08-04
Payer: MEDICARE

## 2021-08-04 VITALS — HEART RATE: 86 BPM | RESPIRATION RATE: 16 BRPM | DIASTOLIC BLOOD PRESSURE: 73 MMHG | SYSTOLIC BLOOD PRESSURE: 128 MMHG

## 2021-08-04 DIAGNOSIS — S31.000A SACRAL WOUND, INITIAL ENCOUNTER: Primary | ICD-10-CM

## 2021-08-04 PROCEDURE — 11042 DBRDMT SUBQ TIS 1ST 20SQCM/<: CPT | Performed by: SURGERY

## 2021-08-04 PROCEDURE — 11042 DBRDMT SUBQ TIS 1ST 20SQCM/<: CPT

## 2021-08-04 RX ORDER — LIDOCAINE HYDROCHLORIDE 20 MG/ML
JELLY TOPICAL ONCE
Status: CANCELLED | OUTPATIENT
Start: 2021-08-04 | End: 2021-08-04

## 2021-08-04 RX ORDER — LIDOCAINE 40 MG/G
CREAM TOPICAL ONCE
Status: CANCELLED | OUTPATIENT
Start: 2021-08-04 | End: 2021-08-04

## 2021-08-04 RX ORDER — LIDOCAINE HYDROCHLORIDE 40 MG/ML
SOLUTION TOPICAL ONCE
Status: CANCELLED | OUTPATIENT
Start: 2021-08-04 | End: 2021-08-04

## 2021-08-04 RX ORDER — BETAMETHASONE DIPROPIONATE 0.05 %
OINTMENT (GRAM) TOPICAL ONCE
Status: CANCELLED | OUTPATIENT
Start: 2021-08-04 | End: 2021-08-04

## 2021-08-04 RX ORDER — LIDOCAINE 40 MG/G
CREAM TOPICAL ONCE
Status: DISCONTINUED | OUTPATIENT
Start: 2021-08-04 | End: 2021-08-05 | Stop reason: HOSPADM

## 2021-08-04 RX ORDER — BACITRACIN ZINC AND POLYMYXIN B SULFATE 500; 1000 [USP'U]/G; [USP'U]/G
OINTMENT TOPICAL ONCE
Status: CANCELLED | OUTPATIENT
Start: 2021-08-04 | End: 2021-08-04

## 2021-08-04 RX ORDER — LIDOCAINE 50 MG/G
OINTMENT TOPICAL ONCE
Status: CANCELLED | OUTPATIENT
Start: 2021-08-04 | End: 2021-08-04

## 2021-08-04 RX ORDER — BACITRACIN, NEOMYCIN, POLYMYXIN B 400; 3.5; 5 [USP'U]/G; MG/G; [USP'U]/G
OINTMENT TOPICAL ONCE
Status: CANCELLED | OUTPATIENT
Start: 2021-08-04 | End: 2021-08-04

## 2021-08-04 RX ORDER — GINSENG 100 MG
CAPSULE ORAL ONCE
Status: CANCELLED | OUTPATIENT
Start: 2021-08-04 | End: 2021-08-04

## 2021-08-04 RX ORDER — GENTAMICIN SULFATE 1 MG/G
OINTMENT TOPICAL ONCE
Status: CANCELLED | OUTPATIENT
Start: 2021-08-04 | End: 2021-08-04

## 2021-08-04 RX ORDER — CLOBETASOL PROPIONATE 0.5 MG/G
OINTMENT TOPICAL ONCE
Status: CANCELLED | OUTPATIENT
Start: 2021-08-04 | End: 2021-08-04

## 2021-08-04 NOTE — PLAN OF CARE
Discharge instructions given. Patient verbalized understanding. Return to [facility] in 2 weeks.

## 2021-08-04 NOTE — PROGRESS NOTES
as needed for Pain or Fever       aspirin EC 81 MG EC tablet Take 81 mg by mouth daily May restart in AM.      furosemide (LASIX) 40 MG tablet Take 40 mg by mouth daily       levothyroxine (LEVOXYL) 200 MCG tablet Take 200 mcg by mouth Daily       Insulin Detemir (LEVEMIR FLEXPEN SC) Inject 33 Units into the skin nightly       simvastatin (ZOCOR) 40 MG tablet Take 40 mg by mouth nightly.  Insulin Lispro, Human, (HUMALOG SC) Inject 10 Units into the skin 3 times daily (with meals) With sliding      Calcium Carbonate-Vitamin D (CALCIUM + D PO) Take by mouth daily        Current Facility-Administered Medications   Medication Dose Route Frequency Provider Last Rate Last Admin    lidocaine (LMX) 4 % cream   Topical Once Ladan Grubbs MD          Social History:   Social History     Tobacco Use    Smoking status: Never Smoker    Smokeless tobacco: Never Used   Vaping Use    Vaping Use: Never used   Substance Use Topics    Alcohol use: Not Currently    Drug use: Never     Allergies: Other, Ace inhibitors, Metoprolol, Tetrahydrozoline hcl, and Timolol    Procedure: Indications:  Based on my examination of this patient's wound(s)/ulcer(s) today, debridement is required to promote healing and evaluate the wound base. Performed by: Chandler Cisse MD    Consent obtained: Yes    Time out taken: Yes    Pain Control: Anesthetic  Anesthetic: 4% Lidocaine Cream     Debridement: Excisional Debridement    Using curette the wound was sharply debrided    down through and including the removal of epidermis, dermis and subcutaneous tissue.         Devitalized Tissue Debrided: fibrin, biofilm and slough    Pre Debridement Measurements:  Are located in the Wound Documentation Flow Sheet     Wound #: 1     Post  Debridement Measurements:  Wound 10/14/20 Coccyx #1 (Active)   Wound Image   06/16/21 0816   Wound Etiology Pressure Stage  3 08/04/21 0841   Wound Cleansed Cleansed with saline 08/04/21 0884 Dressing/Treatment Collagen;Dry dressing 03/03/21 1106   Wound Length (cm) 0.8 cm 08/04/21 0841   Wound Width (cm) 0.2 cm 08/04/21 0841   Wound Depth (cm) 0.2 cm 08/04/21 0841   Wound Surface Area (cm^2) 0.16 cm^2 08/04/21 0841   Change in Wound Size % (l*w) 96.73 08/04/21 0841   Wound Volume (cm^3) 0.032 cm^3 08/04/21 0841   Wound Healing % 100 08/04/21 0841   Post-Procedure Length (cm) 0.8 cm 08/04/21 0847   Post-Procedure Width (cm) 0.2 cm 08/04/21 0847   Post-Procedure Depth (cm) 0.2 cm 08/04/21 0847   Post-Procedure Surface Area (cm^2) 0.16 cm^2 08/04/21 0847   Post-Procedure Volume (cm^3) 0.032 cm^3 08/04/21 0847   Distance Tunneling (cm) 1.8 cm 06/02/21 0807   Tunneling Position ___ O'Clock 12:00 06/02/21 0807   Wound Assessment Bleeding 08/04/21 0847   Drainage Amount Moderate 08/04/21 0847   Drainage Description Serosanguinous 08/04/21 0841   Odor None 08/04/21 0841   Saba-wound Assessment Intact 08/04/21 0841   Margins Attached edges; Defined edges 08/04/21 0841   Wound Thickness Description not for Pressure Injury Full thickness 06/23/21 0823   Number of days: 294       Percent of Wound(s)/Ulcer(s) Debrided: 100%    Total Surface Area Debrided:  0.16 sq cm     Bleeding:  Minimal    Hemostasis Achieved:  by pressure    Procedural Pain:  2  / 10     Post Procedural Pain:  2 / 10     Response to treatment:  Well tolerated by patient. Assessment:     Wound looks improved. (improved, worse or stable)    Patient tolerated procedure well and was given proper instruction. The nature of the patient's condition was explained in depth. The patient was informed that their compliance to the treatment plan is paramount to successful healing and prevention of further ulceration and/or infection     Plan:     Treatment Plan:       Treatment Note please see attached Discharge Instructions    Written patient dismissal instructions given to patient and signed by patient or POA.          Discharge Instructions [facility], [address] [place] [address] Road  Telephone: [phone]     FAX [phone]      Discharge Instructions:  Keep weight off wounds and reposition every 2 hours if applicable. Avoid standing for long periods of time.       If wound(s) is on your lower extremity, elevate legs to the level of the heart or above for 30 minutes 4-5 times a day and/or when sitting.      Do not get wounds wet in bath or shower unless otherwise instructed by your physician. If your wound is on you foot or leg, you may purchase a cast bag. Please ask at the pharmacy.     When taking antibiotics take entire prescription as ordered by MD do not stop taking until medicine is all gone. Exercise as tolerated. No Smoking. Smoking prohibits wound healing.     If Vascular testing is ordered, please call [address] [place] [address] [phone] to schedule.     Vascular tests ordered by Wound Care Physicians may take up to 2 hours to complete. Please keep that in mind when scheduling.      If Vascular testing is scheduled, please bring supplies to replace your dressing after testing is done. The vascular department does not stock supplies.      Wound: Sacrum      With each dressing change, rinse wounds with 0.9% Saline. (May use wound wash or soft contact solution. Both can be purchased at a local drug store). If unable to obtain saline, may use a gentle soap and water.     Dressing care: Triad, dry dressing- change Monday, Wednesday, & Friday. Keep pressure off of your wound as much as possible. Reposition frequently.     Important dietary reminders:  1. Increase Protein intake for optimal wound healing  2. No added salt to reduce any swelling  3. If diabetic, good glucose control  4.  If you smoke, smoking affects wound healing, we ask that you refrain from smoking.     Follow up with Dr Josy Burgess In 2 weeks in the wound care center.      Call [phone] for any questions or concerns.      Your  is [facility] Agency/Supply Company: [address] Information: Should you experience any significant changes in your wound(s) or have questions about your wound care, please contact the [address] [phone] Monday  - Thursday 8:00 am - 4:00 pm and Friday 8:00 am - 1:00pm. If you need help with your wound outside these hours and cannot wait until we are again available, contact your PCP or go to the hospital emergency room.      PLEASE NOTE: IF YOU ARE UNABLE TO OBTAIN WOUND SUPPLIES, CONTINUE TO USE THE SUPPLIES YOU HAVE AVAILABLE UNTIL YOU ARE ABLE TO [address]. IT IS MOST IMPORTANT TO KEEP THE WOUND COVERED AT ALL TIMES       Dakota Jara MD, FACS  8/4/2021  10:18 AM

## 2021-08-18 ENCOUNTER — HOSPITAL ENCOUNTER (OUTPATIENT)
Dept: WOUND CARE | Age: 71
Discharge: HOME | End: 2021-08-18
Payer: MEDICARE

## 2021-08-18 VITALS
HEART RATE: 79 BPM | SYSTOLIC BLOOD PRESSURE: 119 MMHG | RESPIRATION RATE: 16 BRPM | TEMPERATURE: 97.5 F | DIASTOLIC BLOOD PRESSURE: 71 MMHG

## 2021-08-18 DIAGNOSIS — S31.000A SACRAL WOUND, INITIAL ENCOUNTER: Primary | ICD-10-CM

## 2021-08-18 PROCEDURE — 99212 OFFICE O/P EST SF 10 MIN: CPT | Performed by: SURGERY

## 2021-08-18 PROCEDURE — 99212 OFFICE O/P EST SF 10 MIN: CPT

## 2021-08-18 RX ORDER — GINSENG 100 MG
CAPSULE ORAL ONCE
Status: CANCELLED | OUTPATIENT
Start: 2021-08-18 | End: 2021-08-18

## 2021-08-18 RX ORDER — LIDOCAINE HYDROCHLORIDE 20 MG/ML
JELLY TOPICAL ONCE
Status: CANCELLED | OUTPATIENT
Start: 2021-08-18 | End: 2021-08-18

## 2021-08-18 RX ORDER — BETAMETHASONE DIPROPIONATE 0.05 %
OINTMENT (GRAM) TOPICAL ONCE
Status: CANCELLED | OUTPATIENT
Start: 2021-08-18 | End: 2021-08-18

## 2021-08-18 RX ORDER — LIDOCAINE 40 MG/G
CREAM TOPICAL ONCE
Status: CANCELLED | OUTPATIENT
Start: 2021-08-18 | End: 2021-08-18

## 2021-08-18 RX ORDER — LIDOCAINE HYDROCHLORIDE 40 MG/ML
SOLUTION TOPICAL ONCE
Status: CANCELLED | OUTPATIENT
Start: 2021-08-18 | End: 2021-08-18

## 2021-08-18 RX ORDER — CLOBETASOL PROPIONATE 0.5 MG/G
OINTMENT TOPICAL ONCE
Status: CANCELLED | OUTPATIENT
Start: 2021-08-18 | End: 2021-08-18

## 2021-08-18 RX ORDER — LIDOCAINE 50 MG/G
OINTMENT TOPICAL ONCE
Status: CANCELLED | OUTPATIENT
Start: 2021-08-18 | End: 2021-08-18

## 2021-08-18 RX ORDER — BACITRACIN, NEOMYCIN, POLYMYXIN B 400; 3.5; 5 [USP'U]/G; MG/G; [USP'U]/G
OINTMENT TOPICAL ONCE
Status: CANCELLED | OUTPATIENT
Start: 2021-08-18 | End: 2021-08-18

## 2021-08-18 RX ORDER — BACITRACIN ZINC AND POLYMYXIN B SULFATE 500; 1000 [USP'U]/G; [USP'U]/G
OINTMENT TOPICAL ONCE
Status: CANCELLED | OUTPATIENT
Start: 2021-08-18 | End: 2021-08-18

## 2021-08-18 RX ORDER — GENTAMICIN SULFATE 1 MG/G
OINTMENT TOPICAL ONCE
Status: CANCELLED | OUTPATIENT
Start: 2021-08-18 | End: 2021-08-18

## 2021-08-18 RX ORDER — LIDOCAINE 40 MG/G
CREAM TOPICAL ONCE
Status: DISCONTINUED | OUTPATIENT
Start: 2021-08-18 | End: 2021-08-19 | Stop reason: HOSPADM

## 2021-08-18 NOTE — PLAN OF CARE
Discharge instructions given. Patient verbalized understanding.   Return to [address] [place] [address],[address] as needed  Wound healed

## 2021-09-01 ENCOUNTER — HOSPITAL ENCOUNTER (OUTPATIENT)
Dept: WOUND CARE | Age: 71
Discharge: HOME | End: 2021-09-01
Payer: MEDICARE

## 2021-09-01 VITALS
RESPIRATION RATE: 16 BRPM | DIASTOLIC BLOOD PRESSURE: 73 MMHG | HEART RATE: 82 BPM | SYSTOLIC BLOOD PRESSURE: 118 MMHG | TEMPERATURE: 97.4 F

## 2021-09-01 DIAGNOSIS — S31.000A SACRAL WOUND, INITIAL ENCOUNTER: Primary | ICD-10-CM

## 2021-09-01 PROCEDURE — 11042 DBRDMT SUBQ TIS 1ST 20SQCM/<: CPT

## 2021-09-01 PROCEDURE — 11042 DBRDMT SUBQ TIS 1ST 20SQCM/<: CPT | Performed by: SURGERY

## 2021-09-01 RX ORDER — GENTAMICIN SULFATE 1 MG/G
OINTMENT TOPICAL ONCE
Status: CANCELLED | OUTPATIENT
Start: 2021-09-01 | End: 2021-09-01

## 2021-09-01 RX ORDER — LIDOCAINE HYDROCHLORIDE 40 MG/ML
SOLUTION TOPICAL ONCE
Status: CANCELLED | OUTPATIENT
Start: 2021-09-01 | End: 2021-09-01

## 2021-09-01 RX ORDER — CLOBETASOL PROPIONATE 0.5 MG/G
OINTMENT TOPICAL ONCE
Status: CANCELLED | OUTPATIENT
Start: 2021-09-01 | End: 2021-09-01

## 2021-09-01 RX ORDER — GINSENG 100 MG
CAPSULE ORAL ONCE
Status: CANCELLED | OUTPATIENT
Start: 2021-09-01 | End: 2021-09-01

## 2021-09-01 RX ORDER — BACITRACIN, NEOMYCIN, POLYMYXIN B 400; 3.5; 5 [USP'U]/G; MG/G; [USP'U]/G
OINTMENT TOPICAL ONCE
Status: CANCELLED | OUTPATIENT
Start: 2021-09-01 | End: 2021-09-01

## 2021-09-01 RX ORDER — BACITRACIN ZINC AND POLYMYXIN B SULFATE 500; 1000 [USP'U]/G; [USP'U]/G
OINTMENT TOPICAL ONCE
Status: CANCELLED | OUTPATIENT
Start: 2021-09-01 | End: 2021-09-01

## 2021-09-01 RX ORDER — LIDOCAINE 40 MG/G
CREAM TOPICAL ONCE
Status: DISCONTINUED | OUTPATIENT
Start: 2021-09-01 | End: 2021-09-02 | Stop reason: HOSPADM

## 2021-09-01 RX ORDER — LIDOCAINE 50 MG/G
OINTMENT TOPICAL ONCE
Status: CANCELLED | OUTPATIENT
Start: 2021-09-01 | End: 2021-09-01

## 2021-09-01 RX ORDER — LIDOCAINE 40 MG/G
CREAM TOPICAL ONCE
Status: CANCELLED | OUTPATIENT
Start: 2021-09-01 | End: 2021-09-01

## 2021-09-01 RX ORDER — LIDOCAINE HYDROCHLORIDE 20 MG/ML
JELLY TOPICAL ONCE
Status: CANCELLED | OUTPATIENT
Start: 2021-09-01 | End: 2021-09-01

## 2021-09-01 RX ORDER — BETAMETHASONE DIPROPIONATE 0.05 %
OINTMENT (GRAM) TOPICAL ONCE
Status: CANCELLED | OUTPATIENT
Start: 2021-09-01 | End: 2021-09-01

## 2021-09-01 NOTE — PLAN OF CARE
Discharge instructions given. Patient verbalized understanding. Return to [address] [place] [address],[address] in 2 weeks.   Called/faxed orders to [facility]

## 2021-09-01 NOTE — PROGRESS NOTES
[facility]  [address] [facility], [address]  Telephone: [phone]        [facility] AT [facility] Fax # [phone]      Discharge Instructions       [facility]  [address] [facility], [address]  Telephone: [phone]     FAX [phone]      Discharge Instructions:  Keep weight off wounds and reposition every 2 hours if applicable. Avoid standing for long periods of time.       If wound(s) is on your lower extremity, elevate legs to the level of the heart or above for 30 minutes 4-5 times a day and/or when sitting.      Do not get wounds wet in bath or shower unless otherwise instructed by your physician. If your wound is on you foot or leg, you may purchase a cast bag. Please ask at the pharmacy.     When taking antibiotics take entire prescription as ordered by MD do not stop taking until medicine is all gone. Exercise as tolerated. No Smoking. Smoking prohibits wound healing.     If Vascular testing is ordered, please call [address] [place] [address] [phone] to schedule.     Vascular tests ordered by Wound Care Physicians may take up to 2 hours to complete. Please keep that in mind when scheduling.      If Vascular testing is scheduled, please bring supplies to replace your dressing after testing is done. The vascular department does not stock supplies.      Wound: Coccyx      With each dressing change, rinse wounds with 0.9% Saline. (May use wound wash or soft contact solution. Both can be purchased at a local drug store). If unable to obtain saline, may use a gentle soap and water.     Dressing care: Triad, dry dressing- change Monday, Wednesday, & Friday. Turn & reposition every 1-2 hours and as needed for pressure relief and comfort. Keep pressure off of your wound as much as possible. Eat plenty of protein.     Important dietary reminders:  1. Increase Protein intake for optimal wound healing  2. No added salt to reduce any swelling  3.  If diabetic, good glucose control  4. If you smoke, smoking affects wound healing, we ask that you refrain from smoking.     Follow up with Dr Silver Ferguson In 2 weeks in the wound care center.      Call [phone] for any questions or concerns.      Your  is Alex Simon [address]: [address] Information: Should you experience any significant changes in your wound(s) or have questions about your wound care, please contact the [facility] [address] [phone] Monday  - Thursday 8:00 am - 4:00 pm and Friday 8:00 am - 1:00pm. If you need help with your wound outside these hours and cannot wait until we are again available, contact your PCP or go to the hospital emergency room.      PLEASE NOTE: IF YOU ARE UNABLE TO OBTAIN WOUND SUPPLIES, CONTINUE TO USE THE SUPPLIES YOU HAVE AVAILABLE UNTIL YOU ARE ABLE TO [facility]. IT IS MOST IMPORTANT TO KEEP THE WOUND COVERED AT ALL TIMES        Skilled nurse to evaluate and treat for wound care. Change dressing as ordered  once a day on Monday, Wednesday and Friday using clean technique. Patient/Family/caregiver may change dressings as needed as instructed when Home Care unavailable.     WOUNDS REQUIRING DRESSING Changes:     Wound 09/01/21 Coccyx #1 (Active)   Wound Image   09/01/21 0918   Wound Etiology Pressure Stage  3 09/01/21 0918   Wound Cleansed Cleansed with saline 09/01/21 0936   Wound Length (cm) 1.2 cm 09/01/21 0918   Wound Width (cm) 0.2 cm 09/01/21 0918   Wound Depth (cm) 0.3 cm 09/01/21 0918   Wound Surface Area (cm^2) 0.24 cm^2 09/01/21 0918   Wound Volume (cm^3) 0.072 cm^3 09/01/21 0918   Post-Procedure Length (cm) 1.2 cm 09/01/21 0936   Post-Procedure Width (cm) 0.3 cm 09/01/21 0936   Post-Procedure Depth (cm) 0.3 cm 09/01/21 0936   Post-Procedure Surface Area (cm^2) 0.36 cm^2 09/01/21 0936   Post-Procedure Volume (cm^3) 0.108 cm^3 09/01/21 0936   Wound Assessment Bleeding 09/01/21 0936   Drainage Amount Moderate 09/01/21 0936   Drainage Description Serosanguinous 09/01/21 0918   Odor None 09/01/21 0918   Saba-wound Assessment Intact 09/01/21 0918   Margins Undefined edges 09/01/21 0918   Number of days: 0          Patient seen and treated on 9/1/2021    By Nii Crowe MD NPI: [id number] (provider/NPI)

## 2021-09-01 NOTE — PROGRESS NOTES
[address] Progress and Procedure Note    Lyn Chavez  AGE: 65 y.o. GENDER: female    : 1950  TODAY'S DATE: 2021    Chief Complaint   Patient presents with    Wound Check     coccyx        History of Present Illness     Lyn Chavez is a 65 y.o. female who presents today for wound evaluation. History of Wound: pressure wound located on the sacrum. sacral decubitus ulcer stage 3  Wound Pain:  mild  Severity:  2 / 10   Wound Type:  pressure  Modifying Factors:  diabetes, poor glucose control, decreased mobility and malnutrition  Associated Signs/Symptoms:  pain    Past Medical History:   Diagnosis Date    Arthritis     back    Cataracts, bilateral     [facility]     Diabetes mellitus [facility]     Glaucoma     Hyperlipidemia     Hypertension     Thyroid disease      Past Surgical History:   Procedure Laterality Date    CARPAL TUNNEL RELEASE      bilateral    CHOLECYSTECTOMY      COLONOSCOPY      ELBOW SURGERY      ENDOSCOPY, COLON, DIAGNOSTIC      EYE SURGERY      FOOT SURGERY      SHOULDER SURGERY       Current Outpatient Medications   Medication Sig Dispense Refill    traMADol (ULTRAM) 50 MG tablet Take 50 mg by mouth 3 times daily as needed for Pain.  DULoxetine (CYMBALTA) 60 MG extended release capsule Take 1 capsule by mouth daily 30 capsule 3    sodium chloride (OCEAN, BABY AYR) 0.65 % nasal spray 1 spray by Nasal route every 2 hours as needed for Congestion 1 Bottle 0    mometasone-formoterol (DULERA) 200-5 MCG/ACT inhaler Inhale 2 puffs into the lungs every 12 hours 1 Inhaler 1    spironolactone (ALDACTONE) 25 MG tablet Take 12.5 mg by mouth daily      Travoprost, BAK Free, (TRAVATAN Z) 0.004 % SOLN ophthalmic solution Place 1 drop into both eyes nightly      DICLOFENAC PO Take  by mouth.       acetaminophen (TYLENOL) 500 MG tablet Take 500 mg by mouth every 6 hours as needed for Pain or Fever       aspirin EC 81 MG EC tablet Take 81 mg by mouth daily May restart in AM.      furosemide (LASIX) 40 MG tablet Take 40 mg by mouth daily       levothyroxine (LEVOXYL) 200 MCG tablet Take 200 mcg by mouth Daily       Insulin Detemir (LEVEMIR FLEXPEN SC) Inject 33 Units into the skin nightly       simvastatin (ZOCOR) 40 MG tablet Take 40 mg by mouth nightly.  Insulin Lispro, Human, (HUMALOG SC) Inject 10 Units into the skin 3 times daily (with meals) With sliding      Calcium Carbonate-Vitamin D (CALCIUM + D PO) Take by mouth daily        No current facility-administered medications for this encounter. Social History:   Social History     Tobacco Use    Smoking status: Never Smoker    Smokeless tobacco: Never Used   Vaping Use    Vaping Use: Never used   Substance Use Topics    Alcohol use: Not Currently    Drug use: Never     Allergies: Other, Ace inhibitors, Metoprolol, Tetrahydrozoline hcl, and Timolol    Procedure: Indications:  Based on my examination of this patient's wound(s)/ulcer(s) today, debridement is required to promote healing and evaluate the wound base. Performed by: Danny Blum MD    Consent obtained: Yes    Time out taken: Yes    Pain Control: Anesthetic  Anesthetic: 4% Lidocaine Cream     Debridement: Excisional Debridement    Using curette the wound was sharply debrided    down through and including the removal of epidermis, dermis and subcutaneous tissue.         Devitalized Tissue Debrided: fibrin, biofilm and slough    Pre Debridement Measurements:  Are located in the Wound Documentation Flow Sheet     Wound #: 1     Post  Debridement Measurements:  Wound 09/01/21 Coccyx #1 (Active)   Wound Image   09/01/21 0918   Wound Etiology Pressure Stage  3 09/01/21 0918   Wound Cleansed Cleansed with saline 09/01/21 0936   Wound Length (cm) 1.2 cm 09/01/21 0918   Wound Width (cm) 0.2 cm 09/01/21 0918   Wound Depth (cm) 0.3 cm 09/01/21 0918   Wound Surface Area (cm^2) 0.24 cm^2 09/01/21 0918   Wound Volume (cm^3) 0.072 cm^3 09/01/21 0918   Post-Procedure Length (cm) 1.2 cm 09/01/21 0936   Post-Procedure Width (cm) 0.3 cm 09/01/21 0936   Post-Procedure Depth (cm) 0.3 cm 09/01/21 0936   Post-Procedure Surface Area (cm^2) 0.36 cm^2 09/01/21 0936   Post-Procedure Volume (cm^3) 0.108 cm^3 09/01/21 0936   Wound Assessment Bleeding 09/01/21 0936   Drainage Amount Moderate 09/01/21 0936   Drainage Description Serosanguinous 09/01/21 0918   Odor None 09/01/21 0918   Saba-wound Assessment Intact 09/01/21 0918   Margins Undefined edges 09/01/21 0918   Number of days: 0       Percent of Wound(s)/Ulcer(s) Debrided: 100%    Total Surface Area Debrided:  0.36 sq cm     Bleeding:  Minimal    Hemostasis Achieved:  by pressure    Procedural Pain:  2  / 10     Post Procedural Pain:  2 / 10     Response to treatment:  Well tolerated by patient. Assessment:     Wound looks worse, recurred. (improved, worse or stable)    Patient tolerated procedure well and was given proper instruction. The nature of the patient's condition was explained in depth. The patient was informed that their compliance to the treatment plan is paramount to successful healing and prevention of further ulceration and/or infection     Plan:     Treatment Plan:       Treatment Note please see attached Discharge Instructions    Written patient dismissal instructions given to patient and signed by patient or POA. Discharge [address]  Telephone: [phone]     FAX [phone]      Discharge Instructions:  Keep weight off wounds and reposition every 2 hours if applicable.   Avoid standing for long periods of time.       If wound(s) is on your lower extremity, elevate legs to the level of the heart or above for 30 minutes 4-5 times a day and/or when sitting.      Do not get wounds wet in bath or shower unless otherwise instructed by your physician. If your wound is on you foot or leg, you may purchase a cast bag. Please ask at the pharmacy.     When taking antibiotics take entire prescription as ordered by MD do not stop taking until medicine is all gone. Exercise as tolerated. No Smoking. Smoking prohibits wound healing.     If Vascular testing is ordered, please call [address] [place] [address] [phone]) to schedule.     Vascular tests ordered by Wound Care Physicians may take up to 2 hours to complete. Please keep that in mind when scheduling.      If Vascular testing is scheduled, please bring supplies to replace your dressing after testing is done. The vascular department does not stock supplies.      Wound: Coccyx      With each dressing change, rinse wounds with 0.9% Saline. (May use wound wash or soft contact solution. Both can be purchased at a local drug store). If unable to obtain saline, may use a gentle soap and water.     Dressing care: Triad, dry dressing- change Monday, Wednesday, & Friday. Turn & reposition every 1-2 hours and as needed for pressure relief and comfort. Keep pressure off of your wound as much as possible. Eat plenty of protein.     Important dietary reminders:  1. Increase Protein intake for optimal wound healing  2. No added salt to reduce any swelling  3. If diabetic, good glucose control  4.  If you smoke, smoking affects wound healing, we ask that you refrain from smoking.     Follow up with Dr Grant Bhatia In 2 weeks in the wound care center.      Call [phone] for any questions or concerns.      Your  is [employer] Agency[employer]: [employer] [address] Information: Should you experience any significant changes in your wound(s) or have questions about your wound care, please contact the [facility] [phone] Monday  - Thursday 8:00 am - 4:00 pm and Friday 8:00 am - 1:00pm. If you need help with your wound outside these hours and cannot wait until we are again available, contact your PCP or go to the hospital emergency room.      PLEASE NOTE: IF YOU ARE UNABLE TO OBTAIN WOUND SUPPLIES, CONTINUE TO USE THE SUPPLIES YOU HAVE AVAILABLE UNTIL YOU ARE ABLE TO REACH US. IT IS MOST IMPORTANT TO KEEP THE WOUND COVERED AT ALL TIMES       Dakota Christensen MD, FACS  9/1/2021  9:54 AM

## 2021-09-15 ENCOUNTER — HOSPITAL ENCOUNTER (OUTPATIENT)
Dept: WOUND CARE | Age: 71
Discharge: HOME | End: 2021-09-15
Payer: MEDICARE

## 2021-09-15 VITALS — DIASTOLIC BLOOD PRESSURE: 78 MMHG | RESPIRATION RATE: 16 BRPM | SYSTOLIC BLOOD PRESSURE: 121 MMHG | HEART RATE: 96 BPM

## 2021-09-15 DIAGNOSIS — S31.000A SACRAL WOUND, INITIAL ENCOUNTER: Primary | ICD-10-CM

## 2021-09-15 PROCEDURE — 99212 OFFICE O/P EST SF 10 MIN: CPT | Performed by: SURGERY

## 2021-09-15 PROCEDURE — 99212 OFFICE O/P EST SF 10 MIN: CPT

## 2021-09-15 RX ORDER — BACITRACIN, NEOMYCIN, POLYMYXIN B 400; 3.5; 5 [USP'U]/G; MG/G; [USP'U]/G
OINTMENT TOPICAL ONCE
Status: CANCELLED | OUTPATIENT
Start: 2021-09-15 | End: 2021-09-15

## 2021-09-15 RX ORDER — LIDOCAINE HYDROCHLORIDE 20 MG/ML
JELLY TOPICAL ONCE
Status: CANCELLED | OUTPATIENT
Start: 2021-09-15 | End: 2021-09-15

## 2021-09-15 RX ORDER — GENTAMICIN SULFATE 1 MG/G
OINTMENT TOPICAL ONCE
Status: CANCELLED | OUTPATIENT
Start: 2021-09-15 | End: 2021-09-15

## 2021-09-15 RX ORDER — LIDOCAINE 40 MG/G
CREAM TOPICAL ONCE
Status: CANCELLED | OUTPATIENT
Start: 2021-09-15 | End: 2021-09-15

## 2021-09-15 RX ORDER — BETAMETHASONE DIPROPIONATE 0.05 %
OINTMENT (GRAM) TOPICAL ONCE
Status: CANCELLED | OUTPATIENT
Start: 2021-09-15 | End: 2021-09-15

## 2021-09-15 RX ORDER — CEPHALEXIN 500 MG/1
500 CAPSULE ORAL 2 TIMES DAILY
COMMUNITY
Start: 2021-09-11 | End: 2021-09-21

## 2021-09-15 RX ORDER — LIDOCAINE 40 MG/G
CREAM TOPICAL ONCE
Status: DISCONTINUED | OUTPATIENT
Start: 2021-09-15 | End: 2021-09-16 | Stop reason: HOSPADM

## 2021-09-15 RX ORDER — CLOBETASOL PROPIONATE 0.5 MG/G
OINTMENT TOPICAL ONCE
Status: CANCELLED | OUTPATIENT
Start: 2021-09-15 | End: 2021-09-15

## 2021-09-15 RX ORDER — LIDOCAINE HYDROCHLORIDE 40 MG/ML
SOLUTION TOPICAL ONCE
Status: CANCELLED | OUTPATIENT
Start: 2021-09-15 | End: 2021-09-15

## 2021-09-15 RX ORDER — BACITRACIN ZINC AND POLYMYXIN B SULFATE 500; 1000 [USP'U]/G; [USP'U]/G
OINTMENT TOPICAL ONCE
Status: CANCELLED | OUTPATIENT
Start: 2021-09-15 | End: 2021-09-15

## 2021-09-15 RX ORDER — LIDOCAINE 50 MG/G
OINTMENT TOPICAL ONCE
Status: CANCELLED | OUTPATIENT
Start: 2021-09-15 | End: 2021-09-15

## 2021-09-15 RX ORDER — GINSENG 100 MG
CAPSULE ORAL ONCE
Status: CANCELLED | OUTPATIENT
Start: 2021-09-15 | End: 2021-09-15

## 2021-09-15 NOTE — PROGRESS NOTES
[address] Progress Note    Giovanni Perkins  AGE: 65 y.o. GENDER: female    : 1950  TODAY'S DATE: 9/15/2021    Subjective:     Chief Complaint   Patient presents with    Wound Check     Coccyx wound         History of Present Illness     Giovanni Perkins presents today for wound evaluation. History of Wound: 65year old female with healed sacral decubitus ulcer stage 3    Wound Type:  pressure  Wound Location: sacrum  Wound Pain:  none  Severity:  1 / 10   Timing: resolved  Modifying Factors:  diabetes, poor glucose control, decreased mobility and malnutrition  Associated Signs/Symptoms:  none  Other significant symptoms or pertinent wound history: as above. Past Medical History:   Diagnosis Date    Arthritis     back    Cataracts, bilateral     CHF (congestive heart failure) (HCC)     Diabetes mellitus ([address]     Glaucoma     Hyperlipidemia     Hypertension     Thyroid disease        Past Surgical History:   Procedure Laterality Date    CARPAL TUNNEL RELEASE      bilateral    CHOLECYSTECTOMY      COLONOSCOPY      ELBOW SURGERY      ENDOSCOPY, COLON, DIAGNOSTIC      EYE SURGERY      FOOT SURGERY      SHOULDER SURGERY         Current Outpatient Medications   Medication Sig Dispense Refill    cephALEXin (KEFLEX) 500 MG capsule Take 500 mg by mouth 2 times daily      traMADol (ULTRAM) 50 MG tablet Take 50 mg by mouth 3 times daily as needed for Pain.       DULoxetine (CYMBALTA) 60 MG extended release capsule Take 1 capsule by mouth daily 30 capsule 3    sodium chloride (OCEAN, BABY AYR) 0.65 % nasal spray 1 spray by Nasal route every 2 hours as needed for Congestion 1 Bottle 0    mometasone-formoterol (DULERA) 200-5 MCG/ACT inhaler Inhale 2 puffs into the lungs every 12 hours 1 Inhaler 1    spironolactone (ALDACTONE) 25 MG tablet Take 12.5 mg by mouth daily      Travoprost, BAK Free, (TRAVATAN Z) 0.004 % SOLN ophthalmic solution Place 1 drop into both eyes nightly      DICLOFENAC PO Take  by mouth.  acetaminophen (TYLENOL) 500 MG tablet Take 500 mg by mouth every 6 hours as needed for Pain or Fever       aspirin EC 81 MG EC tablet Take 81 mg by mouth daily May restart in AM.      furosemide (LASIX) 40 MG tablet Take 40 mg by mouth daily       levothyroxine (LEVOXYL) 200 MCG tablet Take 200 mcg by mouth Daily       Insulin Detemir (LEVEMIR FLEXPEN SC) Inject 33 Units into the skin nightly       simvastatin (ZOCOR) 40 MG tablet Take 40 mg by mouth nightly.  Insulin Lispro, Human, (HUMALOG SC) Inject 10 Units into the skin 3 times daily (with meals) With sliding      Calcium Carbonate-Vitamin D (CALCIUM + D PO) Take by mouth daily        Current Facility-Administered Medications   Medication Dose Route Frequency Provider Last Rate Last Admin    lidocaine (LMX) 4 % cream   Topical Once Ab David MD            Allergies   Allergen Reactions    Other Other (See Comments)     Preservatives in eyedrops-blurred vision    Ace Inhibitors Other (See Comments)    Metoprolol Other (See Comments)    Tetrahydrozoline Hcl Other (See Comments)     Blurry vision    Timolol Other (See Comments)     Blurry vision DO NOT USE             REVIEW OF SYSTEMS    Pertinent items from the review of systems are discussed in the HPI; the remainder of the ROS was reviewed and is negative.     Objective:      /78   Pulse 96   Resp 16     PHYSICAL EXAM    General Appearance: alert and oriented to person, place and time, well developed and well- nourished, in no acute distress  Skin: warm and dry, no rash or erythema  Head: normocephalic and atraumatic  Sacral wound has healed      Assessment:     Patient Active Problem List   Diagnosis    WALLER (dyspnea on exertion)    Abnormal CT of the chest    SIRS (systemic inflammatory response syndrome) (HCC)    Sepsis ([facility]    Sacral wound, initial encounter              Plan:     The nature of the patient's condition was explained in depth. The patient was informed that their compliance to the treatment plan is paramount to successful healing and prevention of further ulceration and/or infection     Treatment Plan:       Treatment Note please see attached Discharge Instructions    Written patient dismissal instructions given to patient and signed by patient or POA. Discharge Instructions       Congratulations! You have completed your treatment program!    To prevent Pressure Ulcers from recurring again:    · Inspect areas prone to pressure such as heels, elbows, back of head, shoulders, buttocks, hips, and tailbone at least daily for redness or wounds. · If any redness develops, keep pressure of that area and call your physician immediately. Do not massage reddened areas! · Moisturize your skin. · If there are areas where skin is overly moist, protect skin with a skin-sealant or ointment as prescribed by your physician. · Use absorbent pads or undergarments and clean skin as soon as it becomes soiled if incontinent of urine or stool. · Use positioning techniques if mobility is limited to minimize pressure on any 1 area. · While in bed, change body position at least every 2 hours. · While up in chair, shift body side to side at least every 15-30 minutes. Continue to use pressure relief cushions/matress as directed. · Do not use donut-shaped cushions on seats. · Reduce friction to skin by lifting rather than dragging body during position changes. · Make sure to eat protein rich foods and drink plenty of fluids unless your physician tells you otherwise. · Call the wound clinic if your wound reopens, if you develop new wounds, or if you have any other questions about follow up care [phone]          Dakota Henry MD, FACS  9/15/2021  10:15 AM

## 2021-09-15 NOTE — PLAN OF CARE
Discharge instructions given. Patient verbalized understanding.   Return to [facility] as needed  Wound healed

## 2022-06-16 ENCOUNTER — HOSPITAL ENCOUNTER (OUTPATIENT)
Dept: WOUND CARE | Age: 72
Discharge: HOME | End: 2022-06-16
Payer: MEDICARE

## 2022-06-16 VITALS — RESPIRATION RATE: 16 BRPM | SYSTOLIC BLOOD PRESSURE: 136 MMHG | HEART RATE: 114 BPM | DIASTOLIC BLOOD PRESSURE: 84 MMHG

## 2022-06-16 DIAGNOSIS — L89.43 PRESSURE INJURY OF CONTIGUOUS REGION INVOLVING BACK AND BUTTOCK, STAGE 3, UNSPECIFIED LATERALITY (HCC): ICD-10-CM

## 2022-06-16 DIAGNOSIS — S31.000A SACRAL WOUND, INITIAL ENCOUNTER: Primary | ICD-10-CM

## 2022-06-16 PROCEDURE — 11046 DBRDMT MUSC&/FSCA EA ADDL: CPT

## 2022-06-16 PROCEDURE — 11043 DBRDMT MUSC&/FSCA 1ST 20/<: CPT | Performed by: NURSE PRACTITIONER

## 2022-06-16 PROCEDURE — 11046 DBRDMT MUSC&/FSCA EA ADDL: CPT | Performed by: NURSE PRACTITIONER

## 2022-06-16 PROCEDURE — 11043 DBRDMT MUSC&/FSCA 1ST 20/<: CPT

## 2022-06-16 PROCEDURE — 99213 OFFICE O/P EST LOW 20 MIN: CPT

## 2022-06-16 RX ORDER — LIDOCAINE HYDROCHLORIDE 20 MG/ML
JELLY TOPICAL ONCE
Status: CANCELLED | OUTPATIENT
Start: 2022-06-16 | End: 2022-06-16

## 2022-06-16 RX ORDER — LIDOCAINE 40 MG/G
CREAM TOPICAL ONCE
Status: DISCONTINUED | OUTPATIENT
Start: 2022-06-16 | End: 2022-06-17 | Stop reason: HOSPADM

## 2022-06-16 RX ORDER — GENTAMICIN SULFATE 1 MG/G
OINTMENT TOPICAL ONCE
Status: CANCELLED | OUTPATIENT
Start: 2022-06-16 | End: 2022-06-16

## 2022-06-16 RX ORDER — BACITRACIN ZINC AND POLYMYXIN B SULFATE 500; 1000 [USP'U]/G; [USP'U]/G
OINTMENT TOPICAL ONCE
Status: CANCELLED | OUTPATIENT
Start: 2022-06-16 | End: 2022-06-16

## 2022-06-16 RX ORDER — LIDOCAINE 40 MG/G
CREAM TOPICAL ONCE
Status: CANCELLED | OUTPATIENT
Start: 2022-06-16 | End: 2022-06-16

## 2022-06-16 RX ORDER — CLOBETASOL PROPIONATE 0.5 MG/G
OINTMENT TOPICAL ONCE
Status: CANCELLED | OUTPATIENT
Start: 2022-06-16 | End: 2022-06-16

## 2022-06-16 RX ORDER — BACITRACIN, NEOMYCIN, POLYMYXIN B 400; 3.5; 5 [USP'U]/G; MG/G; [USP'U]/G
OINTMENT TOPICAL ONCE
Status: CANCELLED | OUTPATIENT
Start: 2022-06-16 | End: 2022-06-16

## 2022-06-16 RX ORDER — LIDOCAINE HYDROCHLORIDE 40 MG/ML
SOLUTION TOPICAL ONCE
Status: CANCELLED | OUTPATIENT
Start: 2022-06-16 | End: 2022-06-16

## 2022-06-16 RX ORDER — GINSENG 100 MG
CAPSULE ORAL ONCE
Status: CANCELLED | OUTPATIENT
Start: 2022-06-16 | End: 2022-06-16

## 2022-06-16 RX ORDER — BETAMETHASONE DIPROPIONATE 0.05 %
OINTMENT (GRAM) TOPICAL ONCE
Status: CANCELLED | OUTPATIENT
Start: 2022-06-16 | End: 2022-06-16

## 2022-06-16 RX ORDER — LIDOCAINE 50 MG/G
OINTMENT TOPICAL ONCE
Status: CANCELLED | OUTPATIENT
Start: 2022-06-16 | End: 2022-06-16

## 2022-06-16 ASSESSMENT — PAIN SCALES - GENERAL: PAINLEVEL_OUTOF10: 7

## 2022-06-16 ASSESSMENT — PAIN DESCRIPTION - LOCATION: LOCATION: COCCYX

## 2022-06-16 NOTE — PLAN OF CARE
Discharge instructions given. Patient verbalized understanding. Return to [facility] in 1 week(s).   Called/faxed orders to [facility], [facility]

## 2022-06-16 NOTE — PROGRESS NOTES
[address] [facility],[address] Floor:      [address] f: [phone] f: [phone] p: [phone] [email or web address]     Ordering Center: [address]  [place] [address]  [phone]  Dept: [phone]   Fax# [phone]    Patient Information:      [place]   [address] [place]  [address] [place] [address]   [phone]   : 1950  AGE: 66 y.o. GENDER: female   TODAYS DATE:  2022    Insurance:      PRIMARY INSURANCE:  Plan: Romayne Collin COMPLETE  Coverage: [facility] MEDICARE  Effective Date: 2015  Group Number: [unfilled]  Subscriber Number: [id number] - (Medicare Managed)    Payor/Plan Subscr  Sex Relation Sub. Ins. ID Effective Group Num   1. [address] 1950 Female Self [id number] 1/1/15 [id number]                                   [address]   2.  [place]* EVELYNE CADE S 1950 Female Self [id number] 20                                    [address]         Patient Wound Information:     Additional ICD-10 Codes: S31.000A    Patient Active Problem List   Diagnosis Code    WALLER (dyspnea on exertion) R06.00    Abnormal CT of the chest R93.89    SIRS (systemic inflammatory response syndrome) ([facility] R65.10    Sepsis ([facility] A41.9    Sacral wound, initial encounter S31.000A       WOUNDS REQUIRING DRESSING SUPPLIES:     Wound 22 Coccyx Mid #1 (Active)   Wound Image   22 1515   Wound Etiology Other 22 1515   Wound Cleansed Cleansed with saline 22 1515   Wound Length (cm) 6.6 cm 22 1515   Wound Width (cm) 3 cm 22 1515   Wound Depth (cm) 3.2 cm 22 1515   Wound Surface Area (cm^2) 19.8 cm^2 22 1515   Wound Volume (cm^3) 63.36 cm^3 22 1515   Post-Procedure Length (cm) 6.7 cm 22 1600   Post-Procedure Width (cm) 3.1 cm 22 1600   Post-Procedure Depth (cm) 3.3 cm 22 1600   Post-Procedure Surface Area

## 2022-06-16 NOTE — PROGRESS NOTES
Face to Face Encounter    Other Priti [address]  [address] [place] [address] Missy  Phone: [phone]  Fax# [phone]    Patient Information:      Calderon Rowan  [address] [facility],[address]   [phone]   : 1950  AGE: 66 y.o. GENDER: female   TODAYS DATE:  2022    Insurance:      PRIMARY INSURANCE:  Plan: Martha Sanjiv COMPLETE  Coverage: [facility] MEDICARE  Effective Date: 2015  [id number] - (Medicare Managed)      SECONDARY INSURANCE:  Plan[place]OND[place]  Coverage[place]  Effective Date: 2020  [unfilled]  [unfilled]   [unfilled]     Vitals:    22 1513   BP: 136/84   Pulse: (!) 114   Resp: 16     Wt Readings from Last 1 Encounters:   21 223 lb (101.2 kg)       Current Outpatient Medications   Medication Sig Dispense Refill    traMADol (ULTRAM) 50 MG tablet Take 50 mg by mouth 3 times daily as needed for Pain.  DULoxetine (CYMBALTA) 60 MG extended release capsule Take 1 capsule by mouth daily 30 capsule 3    sodium chloride (OCEAN, BABY AYR) 0.65 % nasal spray 1 spray by Nasal route every 2 hours as needed for Congestion 1 Bottle 0    mometasone-formoterol (DULERA) 200-5 MCG/ACT inhaler Inhale 2 puffs into the lungs every 12 hours 1 Inhaler 1    spironolactone (ALDACTONE) 25 MG tablet Take 12.5 mg by mouth daily      Travoprost, BAK Free, (TRAVATAN Z) 0.004 % SOLN ophthalmic solution Place 1 drop into both eyes nightly      DICLOFENAC PO Take  by mouth.       acetaminophen (TYLENOL) 500 MG tablet Take 500 mg by mouth every 6 hours as needed for Pain or Fever       aspirin EC 81 MG EC tablet Take 81 mg by mouth daily May restart in AM.      furosemide (LASIX) 40 MG tablet Take 40 mg by mouth daily       levothyroxine (LEVOXYL) 200 MCG tablet Take 200 mcg by mouth Daily       Insulin Detemir (LEVEMIR FLEXPEN SC) Inject 33 Units into the skin nightly       simvastatin (ZOCOR) 40 MG tablet Take 40 mg by mouth nightly.  Insulin Lispro, Human, (HUMALOG SC) Inject 10 Units into the skin 3 times daily (with meals) With sliding      Calcium Carbonate-Vitamin D (CALCIUM + D PO) Take by mouth daily        Current Facility-Administered Medications   Medication Dose Route Frequency Provider Last Rate Last Admin    lidocaine (LMX) 4 % cream   Topical Once Vincent Counter, MD           Wound 06/16/22 Coccyx Mid #1 (Active)   Wound Image   06/16/22 1515   Wound Etiology Other 06/16/22 1515   Wound Cleansed Cleansed with saline 06/16/22 1515   Wound Length (cm) 6.6 cm 06/16/22 1515   Wound Width (cm) 3 cm 06/16/22 1515   Wound Depth (cm) 3.2 cm 06/16/22 1515   Wound Surface Area (cm^2) 19.8 cm^2 06/16/22 1515   Wound Volume (cm^3) 63.36 cm^3 06/16/22 1515   Post-Procedure Length (cm) 6.7 cm 06/16/22 1600   Post-Procedure Width (cm) 3.1 cm 06/16/22 1600   Post-Procedure Depth (cm) 3.3 cm 06/16/22 1600   Post-Procedure Surface Area (cm^2) 20.77 cm^2 06/16/22 1600   Post-Procedure Volume (cm^3) 68.541 cm^3 06/16/22 1600   Wound Assessment Bleeding 06/16/22 1600   Drainage Amount Moderate 06/16/22 1600   Drainage Description Serosanguinous 06/16/22 1600   Odor None 06/16/22 1515   Saba-wound Assessment Excoriated 06/16/22 1515   Margins Attached edges; Defined edges 06/16/22 1515   Number of days: 0       Face to Face Encounter     I certify that this patient, Calderon Lewis is under my care and I am a physician. I had a face to face encounter on 6/16/2022. I certify that my clinical findings support the patient is homebound requiring considerable effort to leave home for short periods of time for medical reasons with the use of an assistive device and/or the assistance of another person. Skilled care for: Wound care and dressing changes. If services other than wound care are needed you will need to contact the patients PCP for orders.      Diagnosis Codes: S31.000A    Discharge [address] [place] [address], [address] [place] [address]  Telephone: [phone]     FAX [phone]      Discharge Instructions:  Keep weight off wounds and reposition every 2 hours if applicable. Avoid standing for long periods of time.       If wound(s) is on your lower extremity, elevate legs to the level of the heart or above for 30 minutes 4-5 times a day and/or when sitting.      Do not get wounds wet in bath or shower unless otherwise instructed by your physician. If your wound is on you foot or leg, you may purchase a cast bag. Please ask at the pharmacy.     When taking antibiotics take entire prescription as ordered by MD do not stop taking until medicine is all gone. Exercise as tolerated. No Smoking. Smoking prohibits wound healing.     If Vascular testing is ordered, please call [address] [place] [address] [phone]) to schedule.     Vascular tests ordered by Wound Care Physicians may take up to 2 hours to complete. Please keep that in mind when scheduling.      If Vascular testing is scheduled, please bring supplies to replace your dressing after testing is done. The vascular department does not stock supplies.      Wound: Coccyx      With each dressing change, rinse wounds with 0.9% Saline. (May use wound wash or soft contact solution. Both can be purchased at a local drug store). If unable to obtain saline, may use a gentle soap and water.     Dressing care: Aquacel Silver Rope, Abd Pad- change Daily. Turn & reposition every 1-2 hours and as needed for pressure relief and comfort. Keep pressure off of your wound as much as possible. Eat plenty of protein.     Important dietary reminders:  1. Increase Protein intake for optimal wound healing  2. No added salt to reduce any swelling  3. If diabetic, good glucose control  4.  If you smoke, smoking affects wound healing, we ask that you refrain from smoking.     Follow up with Dr Deneen Telles In 1 weeks in the wound care center.      Call [phone] for any questions or concerns.      Your  is [address] Information: Should you experience any significant changes in your wound(s) or have questions about your wound care, please contact the [address] [phone] Monday  - Thursday 8:00 am - 4:00 pm and Friday 8:00 am - 1:00pm. If you need help with your wound outside these hours and cannot wait until we are again available, contact your PCP or go to the hospital emergency room.      PLEASE NOTE: IF YOU ARE UNABLE TO OBTAIN WOUND SUPPLIES, CONTINUE TO USE THE SUPPLIES YOU HAVE AVAILABLE UNTIL YOU ARE ABLE TO [facility]. IT IS MOST IMPORTANT TO KEEP THE WOUND COVERED AT ALL TIMES         Restore      Close      Cancel                  Skilled nurse to evaluate and treat for wound care. Change dressing as ordered  once a day on Daily using clean technique. Patient/Family/caregiver may change dressings as needed as instructed when Home Care unavailable.      PROVIDER'S NAME/NPI  Ana Reardon  [id number]    I give permission to coordinate the care for this patient

## 2022-06-17 PROBLEM — L89.43 PRESSURE INJURY OF CONTIGUOUS REGION INVOLVING BACK AND BUTTOCK, STAGE 3 (HCC): Status: ACTIVE | Noted: 2022-06-17

## 2022-06-23 ENCOUNTER — HOSPITAL ENCOUNTER (OUTPATIENT)
Dept: WOUND CARE | Age: 72
Discharge: HOME | End: 2022-06-23
Payer: MEDICARE

## 2022-06-23 VITALS
HEART RATE: 88 BPM | DIASTOLIC BLOOD PRESSURE: 78 MMHG | SYSTOLIC BLOOD PRESSURE: 121 MMHG | TEMPERATURE: 96.9 F | RESPIRATION RATE: 16 BRPM

## 2022-06-23 DIAGNOSIS — L89.43 PRESSURE INJURY OF CONTIGUOUS REGION INVOLVING BACK AND BUTTOCK, STAGE 3, UNSPECIFIED LATERALITY (HCC): Primary | ICD-10-CM

## 2022-06-23 DIAGNOSIS — Z74.09 POOR MOBILITY: ICD-10-CM

## 2022-06-23 DIAGNOSIS — S31.000A SACRAL WOUND, INITIAL ENCOUNTER: ICD-10-CM

## 2022-06-23 PROCEDURE — 11043 DBRDMT MUSC&/FSCA 1ST 20/<: CPT

## 2022-06-23 RX ORDER — GINSENG 100 MG
CAPSULE ORAL ONCE
Status: CANCELLED | OUTPATIENT
Start: 2022-06-23 | End: 2022-06-23

## 2022-06-23 RX ORDER — BACITRACIN, NEOMYCIN, POLYMYXIN B 400; 3.5; 5 [USP'U]/G; MG/G; [USP'U]/G
OINTMENT TOPICAL ONCE
Status: CANCELLED | OUTPATIENT
Start: 2022-06-23 | End: 2022-06-23

## 2022-06-23 RX ORDER — LIDOCAINE 40 MG/G
CREAM TOPICAL ONCE
Status: DISCONTINUED | OUTPATIENT
Start: 2022-06-23 | End: 2022-06-24 | Stop reason: HOSPADM

## 2022-06-23 RX ORDER — BETAMETHASONE DIPROPIONATE 0.05 %
OINTMENT (GRAM) TOPICAL ONCE
Status: CANCELLED | OUTPATIENT
Start: 2022-06-23 | End: 2022-06-23

## 2022-06-23 RX ORDER — LIDOCAINE HYDROCHLORIDE 20 MG/ML
JELLY TOPICAL ONCE
Status: CANCELLED | OUTPATIENT
Start: 2022-06-23 | End: 2022-06-23

## 2022-06-23 RX ORDER — LIDOCAINE 50 MG/G
OINTMENT TOPICAL ONCE
Status: CANCELLED | OUTPATIENT
Start: 2022-06-23 | End: 2022-06-23

## 2022-06-23 RX ORDER — BACITRACIN ZINC AND POLYMYXIN B SULFATE 500; 1000 [USP'U]/G; [USP'U]/G
OINTMENT TOPICAL ONCE
Status: CANCELLED | OUTPATIENT
Start: 2022-06-23 | End: 2022-06-23

## 2022-06-23 RX ORDER — CLOBETASOL PROPIONATE 0.5 MG/G
OINTMENT TOPICAL ONCE
Status: CANCELLED | OUTPATIENT
Start: 2022-06-23 | End: 2022-06-23

## 2022-06-23 RX ORDER — LIDOCAINE 40 MG/G
CREAM TOPICAL ONCE
Status: CANCELLED | OUTPATIENT
Start: 2022-06-23 | End: 2022-06-23

## 2022-06-23 RX ORDER — LIDOCAINE HYDROCHLORIDE 40 MG/ML
SOLUTION TOPICAL ONCE
Status: CANCELLED | OUTPATIENT
Start: 2022-06-23 | End: 2022-06-23

## 2022-06-23 RX ORDER — GENTAMICIN SULFATE 1 MG/G
OINTMENT TOPICAL ONCE
Status: CANCELLED | OUTPATIENT
Start: 2022-06-23 | End: 2022-06-23

## 2022-06-23 NOTE — PROGRESS NOTES
[facility]  [address] [facility], [address]  Telephone: [phone]        [facility] ([place] Fax # [phone]        Discharge Instructions         [facility]  [address] [facility], [address]  Telephone: [phone]     FAX [phone]      Discharge Instructions:  Keep weight off wounds and reposition every 2 hours if applicable. Avoid standing for long periods of time.       If wound(s) is on your lower extremity, elevate legs to the level of the heart or above for 30 minutes 4-5 times a day and/or when sitting.      Do not get wounds wet in bath or shower unless otherwise instructed by your physician. If your wound is on you foot or leg, you may purchase a cast bag. Please ask at the pharmacy.     When taking antibiotics take entire prescription as ordered by MD do not stop taking until medicine is all gone. Exercise as tolerated. No Smoking. Smoking prohibits wound healing.     If Vascular testing is ordered, please call [address] [place] [address] [phone]) to schedule.     Vascular tests ordered by Wound Care Physicians may take up to 2 hours to complete. Please keep that in mind when scheduling.      If Vascular testing is scheduled, please bring supplies to replace your dressing after testing is done. The vascular department does not stock supplies.      Wound: Coccyx      With each dressing change, rinse wounds with 0.9% Saline. (May use wound wash or soft contact solution. Both can be purchased at a local drug store). If unable to obtain saline, may use a gentle soap and water.     Dressing care: Aquacel Silver Rope, Abd Pad- change Daily. Turn & reposition every 1-2 hours and as needed for pressure relief and comfort. Keep pressure off of your wound as much as possible. Eat plenty of protein.     Important dietary reminders:  1. Increase Protein intake for optimal wound healing  2. No added salt to reduce any swelling  3.  If diabetic, good glucose control  4. If you smoke, smoking affects wound healing, we ask that you refrain from smoking.     Follow up with Dr Karina Porter In 1 weeks in the wound care center.      Call [phone] for any questions or concerns.      Your  is [address]:  Home care [address] -   phone #: [phone]            [address] [place] [address] Information: Should you experience any significant changes in your wound(s) or have questions about your wound care, please contact the [facility] [address] [phone] Monday  - Thursday 8:00 am - 4:00 pm and Friday 8:00 am - 1:00pm. If you need help with your wound outside these hours and cannot wait until we are again available, contact your PCP or go to the hospital emergency room.      PLEASE NOTE: IF YOU ARE UNABLE TO OBTAIN WOUND SUPPLIES, CONTINUE TO USE THE SUPPLIES YOU HAVE AVAILABLE UNTIL YOU ARE ABLE TO [address] [facility]. IT IS MOST IMPORTANT TO KEEP THE WOUND COVERED AT ALL TIMES         Restore      Close      Cancel                    Skilled nurse to evaluate and treat for wound care. Change dressing as ordered  once a day on Monday, Wednesday and Friday using clean technique. Patient/Family/caregiver may change dressings as needed as instructed when Home Care unavailable.     WOUNDS REQUIRING DRESSING Changes:     Wound 06/16/22 Coccyx Mid #1 (Active)   Wound Image   06/16/22 1515   Wound Etiology Pressure Stage 3 06/23/22 1408   Wound Cleansed Cleansed with saline 06/23/22 1408   Wound Length (cm) 7.4 cm 06/23/22 1408   Wound Width (cm) 2.3 cm 06/23/22 1408   Wound Depth (cm) 3 cm 06/23/22 1408   Wound Surface Area (cm^2) 17.02 cm^2 06/23/22 1408   Change in Wound Size % (l*w) 14.04 06/23/22 1408   Wound Volume (cm^3) 51.06 cm^3 06/23/22 1408   Wound Healing % 19 06/23/22 1408   Post-Procedure Length (cm) 7.5 cm 06/23/22 1420   Post-Procedure Width (cm) 2.4 cm 06/23/22 1420   Post-Procedure Depth (cm) 3.1 cm 06/23/22 1420   Post-Procedure Surface Area (cm^2) 18 cm^2 06/23/22 1420   Post-Procedure Volume (cm^3) 55.8 cm^3 06/23/22 1420   Wound Assessment Bleeding 06/23/22 1420   Drainage Amount Moderate 06/23/22 1420   Drainage Description Serosanguinous 06/23/22 1420   Odor None 06/23/22 1408   Saba-wound Assessment Excoriated 06/23/22 1408   Margins Attached edges; Defined edges 06/23/22 1408   Number of days: 6          Patient seen and treated on 6/23/2022    By Jessica Vick  [id number]   (provider/NPI)

## 2022-06-23 NOTE — PLAN OF CARE
Discharge instructions given. Patient verbalized understanding. Return to [address] [place] [address],[address] in 1 week(s).   Called/faxed orders to [place]

## 2022-06-24 PROBLEM — Z74.09 POOR MOBILITY: Status: ACTIVE | Noted: 2022-06-24

## 2022-06-24 NOTE — PROGRESS NOTES
Hayley Stanley  Progress Note and Procedure Note      Vergil Sensing  AGE: 66 y.o. GENDER: female  : 1950  TODAY'S DATE:  2022    Subjective:     Chief Complaint   Patient presents with    Wound Check     Follow up pressure wound         HISTORY of PRESENT ILLNESS HPI     67 year old female [address] today for wound evaluation. Pt was seen in [address] [place] [address],[address] and discharged after wound closed in 2021. Seen by Dr. Chana Echeverria. History of Wound:  \"not exactly sure may have been months but put off coming  in to have wound seen\". Knew it was there due to drainage on pad. with healed sacral decubitus ulcer stage 3. Pt reports has a Roho cushion at home, and admits to prolonged sitting and sleeping in recliner even \"I have been talked to about this by Dr. Chana Echeverria in the past  Wound Type:  pressure  Wound Location: coccyx/sacrum  Wound Pain:  small amount  Severity:  1 / 10   Timing: resolved  Modifying Factors:  diabetes, poor glucose control, decreased mobility and malnutrition  Associated Signs/Symptoms: bloody drainage, moderate to small amount. 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  Thyroid disease        PAST SURGICAL HISTORY    Past Surgical History:   Procedure Laterality Date    CARPAL TUNNEL RELEASE      bilateral    CHOLECYSTECTOMY      COLONOSCOPY      ELBOW SURGERY      ENDOSCOPY, COLON, DIAGNOSTIC      EYE SURGERY      FOOT SURGERY      SHOULDER SURGERY         FAMILY HISTORY    History reviewed.  No pertinent family history. SOCIAL HISTORY    Social History     Tobacco Use    Smoking status: Never Smoker    Smokeless tobacco: Never Used   Vaping Use    Vaping Use: Never used   Substance Use Topics    Alcohol use: Not Currently    Drug use: Never       ALLERGIES    Allergies   Allergen Reactions    Other Other (See Comments)     Preservatives in eyedrops-blurred vision    Ace Inhibitors Other (See Comments)    Metoprolol Other (See Comments)    Tetrahydrozoline Hcl Other (See Comments)     Blurry vision    Timolol Other (See Comments)     Blurry vision DO NOT USE       MEDICATIONS    Current Outpatient Medications on File Prior to Encounter   Medication Sig Dispense Refill    traMADol (ULTRAM) 50 MG tablet Take 50 mg by mouth 3 times daily as needed for Pain.  DULoxetine (CYMBALTA) 60 MG extended release capsule Take 1 capsule by mouth daily 30 capsule 3    sodium chloride (OCEAN, BABY AYR) 0.65 % nasal spray 1 spray by Nasal route every 2 hours as needed for Congestion 1 Bottle 0    mometasone-formoterol (DULERA) 200-5 MCG/ACT inhaler Inhale 2 puffs into the lungs every 12 hours 1 Inhaler 1    spironolactone (ALDACTONE) 25 MG tablet Take 12.5 mg by mouth daily      Travoprost, BAK Free, (TRAVATAN Z) 0.004 % SOLN ophthalmic solution Place 1 drop into both eyes nightly      DICLOFENAC PO Take  by mouth.  acetaminophen (TYLENOL) 500 MG tablet Take 500 mg by mouth every 6 hours as needed for Pain or Fever       aspirin EC 81 MG EC tablet Take 81 mg by mouth daily May restart in AM.      furosemide (LASIX) 40 MG tablet Take 40 mg by mouth daily       levothyroxine (LEVOXYL) 200 MCG tablet Take 200 mcg by mouth Daily       Insulin Detemir (LEVEMIR FLEXPEN SC) Inject 33 Units into the skin nightly       simvastatin (ZOCOR) 40 MG tablet Take 40 mg by mouth nightly.         Insulin Lispro, Human, (HUMALOG SC) Inject 10 Units into the skin 3 times daily (with meals) With sliding      Calcium Carbonate-Vitamin D (CALCIUM + D PO) Take by mouth daily        No current facility-administered medications on file prior to encounter. REVIEW OF SYSTEMS    Pertinent items are noted in HPI. Objective:      /78   Pulse 88   Temp 96.9 °F (36.1 °C) (Infrared)   Resp 16     PHYSICAL EXAM    General Appearance: alert and oriented to person, place and time, well-developed and well-nourished, in no acute distress, obese and pale  Skin: warm and dry  Head: normocephalic and atraumatic  Eyes: pupils equal, round, and reactive to light  Pulmonary/Chest:normal air movement, no respiratory distress  Cardiovascular: normal rate and regular rhythm  Wound:   this week and slightly smaller. Toward the back of wound has a skin flap covering a deeper area, can palpate tissue covered bone, area is painful to pt. 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         Devitalized Tissue Debrided:  fibrin, biofilm and slough    Pre Debridement Measurements:  Are located in the Wound Documentation Flow Sheet    Wound #: 1     Post  Debridement Measurements:  Wound 06/16/22 Coccyx Mid #1 (Active)   Wound Image   06/16/22 1515   Wound Etiology Pressure Stage 3 06/23/22 1408   Wound Cleansed Cleansed with saline 06/23/22 1408   Dressing/Treatment Alginate with Ag;ABD 06/23/22 1420   Wound Length (cm) 7.4 cm 06/23/22 1408   Wound Width (cm) 2.3 cm 06/23/22 1408   Wound Depth (cm) 3 cm 06/23/22 1408   Wound Surface Area (cm^2) 17.02 cm^2 06/23/22 1408   Change in Wound Size % (l*w) 14.04 06/23/22 1408   Wound Volume (cm^3) 51.06 cm^3 06/23/22 1408   Wound Healing % 19 06/23/22 1408   Post-Procedure Length (cm) 7.5 cm 06/23/22 1420   Post-Procedure Width (cm) 2.4 cm 06/23/22 1420   Post-Procedure Depth (cm) 3.1 cm 06/23/22 1420   Post-Procedure Surface Area (cm^2) 18 cm^2 06/23/22 1420   Post-Procedure Volume (cm^3) 55.8 cm^3 06/23/22 1420   Wound Assessment Bleeding 06/23/22 1420   Drainage Amount Moderate 06/23/22 1420   Drainage Description Serosanguinous 06/23/22 1420   Odor None 06/23/22 1408   Saba-wound Assessment Excoriated 06/23/22 1408   Margins Attached edges; Defined edges 06/23/22 1408   Number of days: 7           Total Surface Area Debrided:  18 sq cm     Percentage of wound debrided 100%    Bleeding:  Estimated amount of blood loss is 2 ml. Hemostasis Achieved:  by pressure and by silver nitrate stick    Procedural Pain:  1  / 10     Post Procedural Pain:  0 / 10     Response to treatment:  Well tolerated by patient. Plan:     The nature of the patient's condition was explained in depth. The patient was informed that their compliance to the treatment plan is paramount to successful healing and prevention of further ulceration and/or infection     Discharge Treatment Dressing care: Aquacel Silver Rope, Abd Pad- change Daily. Turn & reposition every 1-2 hours and as needed for pressure relief and comfort. Keep pressure off of your wound as much as possible.  Eat plenty of protein.         Written Patient Discharge Instructions Given            Electronically signed by CARLA Rouse CNP on 6/24/2022 at 10:52 AM

## 2022-06-29 ENCOUNTER — HOSPITAL ENCOUNTER (OUTPATIENT)
Dept: WOUND CARE | Age: 72
Discharge: HOME | End: 2022-06-29
Payer: MEDICARE

## 2022-06-29 VITALS — DIASTOLIC BLOOD PRESSURE: 58 MMHG | SYSTOLIC BLOOD PRESSURE: 104 MMHG | RESPIRATION RATE: 15 BRPM | HEART RATE: 90 BPM

## 2022-06-29 DIAGNOSIS — S31.000A SACRAL WOUND, INITIAL ENCOUNTER: ICD-10-CM

## 2022-06-29 DIAGNOSIS — Z74.09 POOR MOBILITY: Primary | ICD-10-CM

## 2022-06-29 DIAGNOSIS — L89.43 PRESSURE INJURY OF CONTIGUOUS REGION INVOLVING BACK AND BUTTOCK, STAGE 3, UNSPECIFIED LATERALITY (HCC): ICD-10-CM

## 2022-06-29 PROCEDURE — 11042 DBRDMT SUBQ TIS 1ST 20SQCM/<: CPT | Performed by: SURGERY

## 2022-06-29 PROCEDURE — 11042 DBRDMT SUBQ TIS 1ST 20SQCM/<: CPT

## 2022-06-29 RX ORDER — LIDOCAINE 40 MG/G
CREAM TOPICAL ONCE
Status: DISCONTINUED | OUTPATIENT
Start: 2022-06-29 | End: 2022-06-30 | Stop reason: HOSPADM

## 2022-06-29 RX ORDER — BACITRACIN ZINC AND POLYMYXIN B SULFATE 500; 1000 [USP'U]/G; [USP'U]/G
OINTMENT TOPICAL ONCE
Status: CANCELLED | OUTPATIENT
Start: 2022-06-29 | End: 2022-06-29

## 2022-06-29 RX ORDER — BACITRACIN, NEOMYCIN, POLYMYXIN B 400; 3.5; 5 [USP'U]/G; MG/G; [USP'U]/G
OINTMENT TOPICAL ONCE
Status: CANCELLED | OUTPATIENT
Start: 2022-06-29 | End: 2022-06-29

## 2022-06-29 RX ORDER — LIDOCAINE 50 MG/G
OINTMENT TOPICAL ONCE
Status: CANCELLED | OUTPATIENT
Start: 2022-06-29 | End: 2022-06-29

## 2022-06-29 RX ORDER — BETAMETHASONE DIPROPIONATE 0.05 %
OINTMENT (GRAM) TOPICAL ONCE
Status: CANCELLED | OUTPATIENT
Start: 2022-06-29 | End: 2022-06-29

## 2022-06-29 RX ORDER — LIDOCAINE HYDROCHLORIDE 20 MG/ML
JELLY TOPICAL ONCE
Status: CANCELLED | OUTPATIENT
Start: 2022-06-29 | End: 2022-06-29

## 2022-06-29 RX ORDER — GINSENG 100 MG
CAPSULE ORAL ONCE
Status: CANCELLED | OUTPATIENT
Start: 2022-06-29 | End: 2022-06-29

## 2022-06-29 RX ORDER — LIDOCAINE HYDROCHLORIDE 40 MG/ML
SOLUTION TOPICAL ONCE
Status: CANCELLED | OUTPATIENT
Start: 2022-06-29 | End: 2022-06-29

## 2022-06-29 RX ORDER — LIDOCAINE 40 MG/G
CREAM TOPICAL ONCE
Status: CANCELLED | OUTPATIENT
Start: 2022-06-29 | End: 2022-06-29

## 2022-06-29 RX ORDER — CLOBETASOL PROPIONATE 0.5 MG/G
OINTMENT TOPICAL ONCE
Status: CANCELLED | OUTPATIENT
Start: 2022-06-29 | End: 2022-06-29

## 2022-06-29 RX ORDER — GENTAMICIN SULFATE 1 MG/G
OINTMENT TOPICAL ONCE
Status: CANCELLED | OUTPATIENT
Start: 2022-06-29 | End: 2022-06-29

## 2022-06-29 ASSESSMENT — PAIN DESCRIPTION - LOCATION: LOCATION: COCCYX

## 2022-06-29 ASSESSMENT — PAIN DESCRIPTION - DESCRIPTORS: DESCRIPTORS: ACHING;BURNING

## 2022-06-29 ASSESSMENT — PAIN SCALES - GENERAL: PAINLEVEL_OUTOF10: 5

## 2022-06-29 ASSESSMENT — PAIN DESCRIPTION - PAIN TYPE: TYPE: ACUTE PAIN

## 2022-06-29 NOTE — PROGRESS NOTES
[address] Progress and Procedure Note    Aspen Soler  AGE: 66 y.o. GENDER: female    : 1950  TODAY'S DATE: 2022    Chief Complaint   Patient presents with    Wound Check     Follow up coccyx wound        History of Present Illness     Aspen Soler is a 66 y.o. female who presents today for wound evaluation. History of Wound: pressure wound located on the sacrum. sacral decubitus ulcer stage 3  Wound Pain:  mild  Severity:  2 / 10   Wound Type:  pressure  Modifying Factors:  diabetes, poor glucose control, chronic pressure, decreased mobility and malnutrition  Associated Signs/Symptoms:  pain    Past Medical History:   Diagnosis Date    Arthritis     back    Cataracts, bilateral     CHF (congestive heart failure) ([address]     Diabetes mellitus ([address]     Glaucoma     Hyperlipidemia     Hypertension     Poor mobility 2022    Pressure 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, stage 3 ([address] 2022    Similar wound 2 years ago.  Thyroid disease      Past Surgical History:   Procedure Laterality Date    CARPAL TUNNEL RELEASE      bilateral    CHOLECYSTECTOMY      COLONOSCOPY      ELBOW SURGERY      ENDOSCOPY, COLON, DIAGNOSTIC      EYE SURGERY      FOOT SURGERY      SHOULDER SURGERY       Current Outpatient Medications   Medication Sig Dispense Refill    traMADol (ULTRAM) 50 MG tablet Take 50 mg by mouth 3 times daily as needed for Pain.       DULoxetine (CYMBALTA) 60 MG extended release capsule Take 1 capsule by mouth daily 30 capsule 3    sodium chloride (OCEAN, BABY AYR) 0.65 % nasal spray 1 spray by Nasal route every 2 hours as needed for Congestion 1 Bottle 0    mometasone-formoterol (DULERA) 200-5 MCG/ACT inhaler Inhale 2 puffs into the lungs every 12 hours 1 Inhaler 1    spironolactone (ALDACTONE) 25 MG tablet Take 12.5 mg by mouth daily      Travoprost, BAK Free, (TRAVATAN Z) 0.004 % SOLN ophthalmic solution Place 1 drop into both eyes nightly      DICLOFENAC PO Take  by mouth.  acetaminophen (TYLENOL) 500 MG tablet Take 500 mg by mouth every 6 hours as needed for Pain or Fever       aspirin EC 81 MG EC tablet Take 81 mg by mouth daily May restart in AM.      furosemide (LASIX) 40 MG tablet Take 40 mg by mouth daily       levothyroxine (LEVOXYL) 200 MCG tablet Take 200 mcg by mouth Daily       Insulin Detemir (LEVEMIR FLEXPEN SC) Inject 33 Units into the skin nightly       simvastatin (ZOCOR) 40 MG tablet Take 40 mg by mouth nightly.  Insulin Lispro, Human, (HUMALOG SC) Inject 10 Units into the skin 3 times daily (with meals) With sliding      Calcium Carbonate-Vitamin D (CALCIUM + D PO) Take by mouth daily        Current Facility-Administered Medications   Medication Dose Route Frequency Provider Last Rate Last Admin    lidocaine (LMX) 4 % cream   Topical Once Carlos Guzman MD          Social History:   Social History     Tobacco Use    Smoking status: Never Smoker    Smokeless tobacco: Never Used   Vaping Use    Vaping Use: Never used   Substance Use Topics    Alcohol use: Not Currently    Drug use: Never     Allergies: Other, Ace inhibitors, Metoprolol, Tetrahydrozoline hcl, and Timolol    Procedure: Indications:  Based on my examination of this patient's wound(s)/ulcer(s) today, debridement is required to promote healing and evaluate the wound base. Performed by: Rafa Foreman MD    Consent obtained: Yes    Time out taken: Yes    Pain Control: Anesthetic  Anesthetic: 4% Lidocaine Cream     Debridement: Excisional Debridement    Using curette the wound was sharply debrided    down through and including the removal of epidermis, dermis and subcutaneous tissue.         Devitalized Tissue Debrided: fibrin, biofilm and slough    Pre Debridement Measurements:  Are located in the Wound Documentation Flow Sheet     Wound #: 1     Post  Debridement Measurements:  Wound 06/16/22 Coccyx Mid #1 (Active)   Wound Image   06/16/22 1515   Wound Etiology Pressure Stage 3 06/29/22 0941   Wound Cleansed Cleansed with saline 06/29/22 1007   Dressing/Treatment Alginate with Ag;ABD 06/23/22 1420   Wound Length (cm) 7.4 cm 06/29/22 0941   Wound Width (cm) 1.8 cm 06/29/22 0941   Wound Depth (cm) 2.5 cm 06/29/22 0941   Wound Surface Area (cm^2) 13.32 cm^2 06/29/22 0941   Change in Wound Size % (l*w) 32.73 06/29/22 0941   Wound Volume (cm^3) 33.3 cm^3 06/29/22 0941   Wound Healing % 47 06/29/22 0941   Post-Procedure Length (cm) 7.4 cm 06/29/22 1007   Post-Procedure Width (cm) 1.8 cm 06/29/22 1007   Post-Procedure Depth (cm) 2.5 cm 06/29/22 1007   Post-Procedure Surface Area (cm^2) 13.32 cm^2 06/29/22 1007   Post-Procedure Volume (cm^3) 33.3 cm^3 06/29/22 1007   Wound Assessment Bleeding 06/29/22 1007   Drainage Amount Moderate 06/29/22 1007   Drainage Description Thick;Purulent 06/29/22 0941   Odor None 06/29/22 0941   Saba-wound Assessment Intact 06/29/22 0941   Margins Attached edges; Defined edges 06/29/22 0941   Number of days: 13       Percent of Wound(s)/Ulcer(s) Debrided: 100%    Total Surface Area Debrided:  13.32 sq cm     Bleeding:  Minimal    Hemostasis Achieved:  by pressure    Procedural Pain:  2  / 10     Post Procedural Pain:  2 / 10     Response to treatment:  Well tolerated by patient. Assessment:     Wound looks worse. (improved, worse or stable)    Patient tolerated procedure well and was given proper instruction. The nature of the patient's condition was explained in depth. The patient was informed that their compliance to the treatment plan is paramount to successful healing and prevention of further ulceration and/or infection     Plan:     Treatment Plan:       Treatment Note please see attached Discharge Instructions    Written patient dismissal instructions given to patient and signed by patient or POA.          Discharge [address] [facility], [address] [place] [address]  Telephone: [phone]    Discharge Instructions    Important reminders:    **If you have any signs and symptoms of illness (Cough, fever, congestion, nausea, vomiting, diarrhea, etc.) please call the wound care center prior to your appointment. 1. Increase Protein intake for optimal wound healing  2. No added salt to reduce any swelling  3. If diabetic, maintain good glucose control  4. If you smoke, smoking prohibits wound healing, we ask that you refrain from smoking. 5. When taking antibiotics take the entire prescription as ordered. Do not stop taking until medication is all gone unless otherwise instructed. 6. Exercise as tolerated. 7. Keep weight off wounds and reposition every 2 hours if applicable. 8. If wound(s) is on your lower extremity, elevate legs to the level of the heart or above for 30 minutes 4-5 times a day and/or when sitting. Avoid standing for long periods of time. 9. Do not get wounds wet in bath or shower unless otherwise instructed by your physician. If your wound is on your foot or leg, you may purchase a cast bag. Please ask at the pharmacy. If Vascular testing is ordered, please call [address] [place] [address] ([phone]) to schedule. Vascular tests ordered by Wound Care Physicians may take up to 2 hours to complete. Please keep that in mind when scheduling. If Vascular testing is scheduled, please bring supplies to replace your dressing after testing is done. The vascular department does not stock supplies. Wound: Coccyx     With each dressing change, rinse wounds with 0.9% Saline. (May use wound wash or soft contact solution. Both can be purchased at a local drug store). If unable to obtain saline, may use a gentle soap and water. Dressing care: Aquacel Silver Rope, Abd Pad- change Daily. Turn & reposition every 1-2 hours and as needed for pressure relief and comfort. Keep pressure off of your wound as much as possible. Eat plenty of protein    Home Care Agency/Facility: [facility]    Your wound-care supplies will be provided by:   Please note, depending on your insurance coverage, you may have out-of-pocket expenses for these supplies. Someone from the company should call you to confirm your order and discuss those potential costs before they ship your products -- please anticipate that call. If your out-of-pocket cost could be substantial, Many companies have financial hardship programs for patients who qualify, so please ask about that if you might need a hand. If you have any questions about your supplies or your potential out-of-pocket costs, or if you need to place an order for a refill of supplies (typically monthly), please call the company directly. Your  is Nisha Casey    Follow up with Dr Lori Hansen In 2 week(s) in the wound care center. Wound Care Center Information: Should you experience any significant changes in your wound(s) or have questions about your wound care, please contact the [facility] at [phone] Monday  - Thursday 8:00 am - 4:00 pm and Friday 8:00 am - 1:00pm. If you need help with your wound outside these hours and cannot wait until we are again available, contact your PCP or go to the hospital emergency room. PLEASE NOTE: IF YOU ARE UNABLE TO OBTAIN WOUND SUPPLIES, CONTINUE TO USE THE SUPPLIES YOU HAVE AVAILABLE UNTIL YOU ARE ABLE TO REACH US. IT IS MOST IMPORTANT TO KEEP THE WOUND COVERED AT ALL TIMES. Patient Experience    Thank you for trusting us with your care. You may receive a survey from a company called [employer] asking for your feedback. We would appreciate it if you took a few minutes to share your experience. Your input is very valuable to us. [address]  Lori Hansen MD, FACS  6/29/2022  6:56 PM

## 2022-06-29 NOTE — PLAN OF CARE
Discharge instructions given. Patient verbalized understanding. Return to [address] [place] [address],[address] in 2 week(s).

## 2022-07-13 ENCOUNTER — HOSPITAL ENCOUNTER (OUTPATIENT)
Dept: WOUND CARE | Age: 72
Discharge: HOME | End: 2022-07-13
Payer: MEDICARE

## 2022-07-13 VITALS — SYSTOLIC BLOOD PRESSURE: 123 MMHG | RESPIRATION RATE: 16 BRPM | DIASTOLIC BLOOD PRESSURE: 66 MMHG | HEART RATE: 85 BPM

## 2022-07-13 DIAGNOSIS — Z74.09 POOR MOBILITY: Primary | ICD-10-CM

## 2022-07-13 DIAGNOSIS — S31.000A SACRAL WOUND, INITIAL ENCOUNTER: ICD-10-CM

## 2022-07-13 DIAGNOSIS — L89.43 PRESSURE INJURY OF CONTIGUOUS REGION INVOLVING BACK AND BUTTOCK, STAGE 3, UNSPECIFIED LATERALITY (HCC): ICD-10-CM

## 2022-07-13 PROCEDURE — 11042 DBRDMT SUBQ TIS 1ST 20SQCM/<: CPT

## 2022-07-13 PROCEDURE — 11042 DBRDMT SUBQ TIS 1ST 20SQCM/<: CPT | Performed by: SURGERY

## 2022-07-13 RX ORDER — LIDOCAINE 40 MG/G
CREAM TOPICAL ONCE
Status: DISCONTINUED | OUTPATIENT
Start: 2022-07-13 | End: 2022-07-14 | Stop reason: HOSPADM

## 2022-07-13 RX ORDER — BETAMETHASONE DIPROPIONATE 0.05 %
OINTMENT (GRAM) TOPICAL ONCE
Status: CANCELLED | OUTPATIENT
Start: 2022-07-13 | End: 2022-07-13

## 2022-07-13 RX ORDER — CLOBETASOL PROPIONATE 0.5 MG/G
OINTMENT TOPICAL ONCE
Status: CANCELLED | OUTPATIENT
Start: 2022-07-13 | End: 2022-07-13

## 2022-07-13 RX ORDER — LIDOCAINE 50 MG/G
OINTMENT TOPICAL ONCE
Status: CANCELLED | OUTPATIENT
Start: 2022-07-13 | End: 2022-07-13

## 2022-07-13 RX ORDER — BACITRACIN, NEOMYCIN, POLYMYXIN B 400; 3.5; 5 [USP'U]/G; MG/G; [USP'U]/G
OINTMENT TOPICAL ONCE
Status: CANCELLED | OUTPATIENT
Start: 2022-07-13 | End: 2022-07-13

## 2022-07-13 RX ORDER — BACITRACIN ZINC AND POLYMYXIN B SULFATE 500; 1000 [USP'U]/G; [USP'U]/G
OINTMENT TOPICAL ONCE
Status: CANCELLED | OUTPATIENT
Start: 2022-07-13 | End: 2022-07-13

## 2022-07-13 RX ORDER — LIDOCAINE HYDROCHLORIDE 40 MG/ML
SOLUTION TOPICAL ONCE
Status: CANCELLED | OUTPATIENT
Start: 2022-07-13 | End: 2022-07-13

## 2022-07-13 RX ORDER — LIDOCAINE HYDROCHLORIDE 20 MG/ML
JELLY TOPICAL ONCE
Status: CANCELLED | OUTPATIENT
Start: 2022-07-13 | End: 2022-07-13

## 2022-07-13 RX ORDER — GINSENG 100 MG
CAPSULE ORAL ONCE
Status: CANCELLED | OUTPATIENT
Start: 2022-07-13 | End: 2022-07-13

## 2022-07-13 RX ORDER — GENTAMICIN SULFATE 1 MG/G
OINTMENT TOPICAL ONCE
Status: CANCELLED | OUTPATIENT
Start: 2022-07-13 | End: 2022-07-13

## 2022-07-13 RX ORDER — LIDOCAINE 40 MG/G
CREAM TOPICAL ONCE
Status: CANCELLED | OUTPATIENT
Start: 2022-07-13 | End: 2022-07-13

## 2022-07-13 NOTE — PROGRESS NOTES
[address] Progress and Procedure Note    Rosette Haque  AGE: 66 y.o. GENDER: female    : 1950  TODAY'S DATE: 2022    Chief Complaint   Patient presents with    Wound Check     Foow up coccyx wound        History of Present Illness     Rosette Haque is a 66 y.o. female who presents today for wound evaluation. History of Wound: pressure wound located on the sacrum. sacral decubitus ulcer stage 3  Wound Pain:  mild  Severity:  2 / 10   Wound Type:  pressure  Modifying Factors:  diabetes, poor glucose control, chronic pressure, decreased mobility and malnutrition  Associated Signs/Symptoms:  pain    Past Medical History:   Diagnosis Date    Arthritis     back    Cataracts, bilateral     CHF (congestive heart failure) ([address]     Diabetes mellitus ([address]     Glaucoma     Hyperlipidemia     Hypertension     Poor mobility 2022    Pressure 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, stage 3 ([address] 2022    Similar wound 2 years ago.  Thyroid disease      Past Surgical History:   Procedure Laterality Date    CARPAL TUNNEL RELEASE      bilateral    CHOLECYSTECTOMY      COLONOSCOPY      ELBOW SURGERY      ENDOSCOPY, COLON, DIAGNOSTIC      EYE SURGERY      FOOT SURGERY      SHOULDER SURGERY       Current Outpatient Medications   Medication Sig Dispense Refill    traMADol (ULTRAM) 50 MG tablet Take 50 mg by mouth 3 times daily as needed for Pain.       DULoxetine (CYMBALTA) 60 MG extended release capsule Take 1 capsule by mouth daily 30 capsule 3    sodium chloride (OCEAN, BABY AYR) 0.65 % nasal spray 1 spray by Nasal route every 2 hours as needed for Congestion 1 Bottle 0    mometasone-formoterol (DULERA) 200-5 MCG/ACT inhaler Inhale 2 puffs into the lungs every 12 hours 1 Inhaler 1    spironolactone (ALDACTONE) 25 MG tablet Take 12.5 mg by mouth daily      Travoprost, BAK Free, (TRAVATAN Z) 0.004 % SOLN ophthalmic solution Place 1 drop into both eyes nightly      DICLOFENAC PO Take  by mouth.  acetaminophen (TYLENOL) 500 MG tablet Take 500 mg by mouth every 6 hours as needed for Pain or Fever       aspirin EC 81 MG EC tablet Take 81 mg by mouth daily May restart in AM.      furosemide (LASIX) 40 MG tablet Take 40 mg by mouth daily       levothyroxine (LEVOXYL) 200 MCG tablet Take 200 mcg by mouth Daily       Insulin Detemir (LEVEMIR FLEXPEN SC) Inject 33 Units into the skin nightly       simvastatin (ZOCOR) 40 MG tablet Take 40 mg by mouth nightly.  Insulin Lispro, Human, (HUMALOG SC) Inject 10 Units into the skin 3 times daily (with meals) With sliding      Calcium Carbonate-Vitamin D (CALCIUM + D PO) Take by mouth daily        Current Facility-Administered Medications   Medication Dose Route Frequency Provider Last Rate Last Admin    lidocaine (LMX) 4 % cream   Topical Once Hi Gabriel MD          Social History:   Social History     Tobacco Use    Smoking status: Never Smoker    Smokeless tobacco: Never Used   Vaping Use    Vaping Use: Never used   Substance Use Topics    Alcohol use: Not Currently    Drug use: Never     Allergies: Other, Ace inhibitors, Metoprolol, Tetrahydrozoline hcl, and Timolol    Procedure: Indications:  Based on my examination of this patient's wound(s)/ulcer(s) today, debridement is required to promote healing and evaluate the wound base. Performed by: Donnell Eduardo MD    Consent obtained: Yes    Time out taken: Yes    Pain Control: Anesthetic  Anesthetic: 4% Lidocaine Cream     Debridement: Excisional Debridement    Using curette the wound was sharply debrided    down through and including the removal of epidermis, dermis and subcutaneous tissue.         Devitalized Tissue Debrided: fibrin, biofilm and slough    Pre Debridement Measurements:  Are located in the Wound Documentation Flow Sheet     Wound #: 1     Post  Debridement Measurements:  Wound 06/16/22 Coccyx Mid #1 [id number]  Telephone: [phone]     Discharge Instructions     Important reminders:     **If you have any signs and symptoms of illness (Cough, fever, congestion, nausea, vomiting, diarrhea, etc.) please call the wound care center prior to your appointment.     1. Increase Protein intake for optimal wound healing  2. No added salt to reduce any swelling  3. If diabetic, maintain good glucose control  4. If you smoke, smoking prohibits wound healing, we ask that you refrain from smoking. 5. When taking antibiotics take the entire prescription as ordered. Do not stop taking until medication is all gone unless otherwise instructed. 6. Exercise as tolerated. 7. Keep weight off wounds and reposition every 2 hours if applicable. 8. If wound(s) is on your lower extremity, elevate legs to the level of the heart or above for 30 minutes 4-5 times a day and/or when sitting. Avoid standing for long periods of time. 9. Do not get wounds wet in bath or shower unless otherwise instructed by your physician. If your wound is on your foot or leg, you may purchase a cast bag. Please ask at the pharmacy.        If Vascular testing is ordered, please call [address] [place] [address] [phone]) to schedule.     Vascular tests ordered by Wound Care Physicians may take up to 2 hours to complete. Please keep that in mind when scheduling.      If Vascular testing is scheduled, please bring supplies to replace your dressing after testing is done. The vascular department does not stock supplies.      Wound: Coccyx      With each dressing change, rinse wounds with 0.9% Saline. (May use wound wash or soft contact solution. Both can be purchased at a local drug store). If unable to obtain saline, may use a gentle soap and water.     Dressing care: Aquacel Silver Rope, Abd Pad- change Daily. Turn & reposition every 1-2 hours and as needed for pressure relief and comfort. Keep pressure off of your wound as much as possible.  Eat plenty of protein     Home Care Agency/Facility: [facility]     Your wound-care supplies will be provided by:   Please note, depending on your insurance coverage, you may have out-of-pocket expenses for these supplies. Someone from the company should call you to confirm your order and discuss those potential costs before they ship your products -- please anticipate that call. If your out-of-pocket cost could be substantial, Many companies have financial hardship programs for patients who qualify, so please ask about that if you might need a hand. If you have any questions about your supplies or your potential out-of-pocket costs, or if you need to place an order for a refill of supplies (typically monthly), please call the company directly.      Your  is Quoc Mahmood     Follow up with Dr Lily Lara In 2 week(s) in the wound care center.   [facility] Information: Should you experience any significant changes in your wound(s) or have questions about your wound care, please contact the [employer] [employer] at [phone] Monday  - Thursday 8:00 am - 4:00 pm and Friday 8:00 am - 1:00pm. If you need help with your wound outside these hours and cannot wait until we are again available, contact your PCP or go to the hospital emergency room.      PLEASE NOTE: IF YOU ARE UNABLE TO OBTAIN WOUND SUPPLIES, CONTINUE TO USE THE SUPPLIES YOU HAVE AVAILABLE UNTIL YOU ARE ABLE TO [address]. IT IS MOST IMPORTANT TO KEEP THE WOUND COVERED AT ALL TIMES.     Patient Experience     Thank you for trusting us with your care.  You may receive a survey from a company called [employer] asking for your feedback. Kay Johnson would appreciate it if you took a few minutes to share your experience.  Your input is very valuable to us. Dejan [address]  Lily Lara MD, FACS  7/13/2022  4:06 PM

## 2022-07-13 NOTE — PLAN OF CARE
Discharge instructions given. Patient verbalized understanding. Return to [address] [place] [address],[address] in 2 week(s).

## 2022-07-13 NOTE — DISCHARGE INSTRUCTIONS
[address], [address] [place] [address]  Telephone: [phone]     Discharge Instructions     Important reminders:     **If you have any signs and symptoms of illness (Cough, fever, congestion, nausea, vomiting, diarrhea, etc.) please call the wound care center prior to your appointment.     1. Increase Protein intake for optimal wound healing  2. No added salt to reduce any swelling  3. If diabetic, maintain good glucose control  4. If you smoke, smoking prohibits wound healing, we ask that you refrain from smoking. 5. When taking antibiotics take the entire prescription as ordered. Do not stop taking until medication is all gone unless otherwise instructed. 6. Exercise as tolerated. 7. Keep weight off wounds and reposition every 2 hours if applicable. 8. If wound(s) is on your lower extremity, elevate legs to the level of the heart or above for 30 minutes 4-5 times a day and/or when sitting. Avoid standing for long periods of time. 9. Do not get wounds wet in bath or shower unless otherwise instructed by your physician. If your wound is on your foot or leg, you may purchase a cast bag. Please ask at the pharmacy.        If Vascular testing is ordered, please call [address] [place] [address] [phone]) to schedule.     Vascular tests ordered by Wound Care Physicians may take up to 2 hours to complete. Please keep that in mind when scheduling.      If Vascular testing is scheduled, please bring supplies to replace your dressing after testing is done. The vascular department does not stock supplies.      Wound: Coccyx      With each dressing change, rinse wounds with 0.9% Saline. (May use wound wash or soft contact solution. Both can be purchased at a local drug store). If unable to obtain saline, may use a gentle soap and water.     Dressing care: Aquacel Silver Rope, Abd Pad- change Daily. Turn & reposition every 1-2 hours and as needed for pressure relief and comfort. Keep pressure off of your wound as much as possible. Eat plenty of protein     Home Care Agency/Facility: [facility]     Your wound-care supplies will be provided by:   Please note, depending on your insurance coverage, you may have out-of-pocket expenses for these supplies. Someone from the company should call you to confirm your order and discuss those potential costs before they ship your products -- please anticipate that call. If your out-of-pocket cost could be substantial, Many companies have financial hardship programs for patients who qualify, so please ask about that if you might need a hand. If you have any questions about your supplies or your potential out-of-pocket costs, or if you need to place an order for a refill of supplies (typically monthly), please call the company directly.      Your  is Julian Baron     Follow up with Dr Hoang Rueda In 2 week(s) in the wound care center.   [facility] Information: Should you experience any significant changes in your wound(s) or have questions about your wound care, please contact the [employer] at [phone] Monday  - Thursday 8:00 am - 4:00 pm and Friday 8:00 am - 1:00pm. If you need help with your wound outside these hours and cannot wait until we are again available, contact your PCP or go to the hospital emergency room.      PLEASE NOTE: IF YOU ARE UNABLE TO OBTAIN WOUND SUPPLIES, CONTINUE TO USE THE SUPPLIES YOU HAVE AVAILABLE UNTIL YOU ARE ABLE TO [address]. IT IS MOST IMPORTANT TO KEEP THE WOUND COVERED AT ALL TIMES.     Patient Experience     Thank you for trusting us with your care.  You may receive a survey from a company called [employer] asking for your feedback. Edgar Akers would appreciate it if you took a few minutes to share your experience.  Your input is very valuable to us.

## 2022-07-21 ENCOUNTER — HOSPITAL ENCOUNTER (INPATIENT)
Age: 72
LOS: 1 days | Discharge: HOME | DRG: 166 | End: 2022-07-22
Attending: EMERGENCY MEDICINE | Admitting: INTERNAL MEDICINE
Payer: MEDICARE

## 2022-07-21 ENCOUNTER — APPOINTMENT (OUTPATIENT)
Dept: GENERAL RADIOLOGY | Age: 72
DRG: 166 | End: 2022-07-21
Payer: MEDICARE

## 2022-07-21 DIAGNOSIS — J96.01 ACUTE RESPIRATORY FAILURE WITH HYPOXIA (HCC): ICD-10-CM

## 2022-07-21 DIAGNOSIS — I95.1 ORTHOSTATIC HYPOTENSION: ICD-10-CM

## 2022-07-21 DIAGNOSIS — U07.1 COVID: Primary | ICD-10-CM

## 2022-07-21 PROBLEM — J12.82 PNEUMONIA DUE TO COVID-19 VIRUS: Status: ACTIVE | Noted: 2022-07-21

## 2022-07-21 LAB
A/G RATIO: 1.9 (ref 1.1–2.2)
ALBUMIN SERPL-MCNC: 3.9 G/DL (ref 3.4–5)
ALP BLD-CCNC: 57 U/L (ref 40–129)
ALT SERPL-CCNC: 9 U/L (ref 10–40)
ANION GAP SERPL CALCULATED.3IONS-SCNC: 9 MMOL/L (ref 3–16)
AST SERPL-CCNC: 16 U/L (ref 15–37)
BASOPHILS ABSOLUTE: 0 K/UL (ref 0–0.2)
BASOPHILS RELATIVE PERCENT: 0.4 %
BILIRUB SERPL-MCNC: 1 MG/DL (ref 0–1)
BUN BLDV-MCNC: 17 MG/DL (ref 7–20)
C-REACTIVE PROTEIN: 14.5 MG/L (ref 0–5.1)
CALCIUM SERPL-MCNC: 9.2 MG/DL (ref 8.3–10.6)
CHLORIDE BLD-SCNC: 100 MMOL/L (ref 99–110)
CO2: 27 MMOL/L (ref 21–32)
CREAT SERPL-MCNC: 0.9 MG/DL (ref 0.6–1.2)
EOSINOPHILS ABSOLUTE: 0 K/UL (ref 0–0.6)
EOSINOPHILS RELATIVE PERCENT: 0 %
GFR AFRICAN AMERICAN: >60
GFR NON-AFRICAN AMERICAN: >60
GLUCOSE BLD-MCNC: 107 MG/DL (ref 70–99)
GLUCOSE BLD-MCNC: 172 MG/DL (ref 70–99)
HCT VFR BLD CALC: 40.8 % (ref 36–48)
HEMOGLOBIN: 13.2 G/DL (ref 12–16)
LYMPHOCYTES ABSOLUTE: 0.3 K/UL (ref 1–5.1)
LYMPHOCYTES RELATIVE PERCENT: 6.3 %
MCH RBC QN AUTO: 28.7 PG (ref 26–34)
MCHC RBC AUTO-ENTMCNC: 32.2 G/DL (ref 31–36)
MCV RBC AUTO: 89 FL (ref 80–100)
MONOCYTES ABSOLUTE: 0.8 K/UL (ref 0–1.3)
MONOCYTES RELATIVE PERCENT: 16.1 %
NEUTROPHILS ABSOLUTE: 3.8 K/UL (ref 1.7–7.7)
NEUTROPHILS RELATIVE PERCENT: 77.2 %
PDW BLD-RTO: 15.3 % (ref 12.4–15.4)
PERFORMED ON: ABNORMAL
PLATELET # BLD: 129 K/UL (ref 135–450)
PMV BLD AUTO: 8.2 FL (ref 5–10.5)
POTASSIUM REFLEX MAGNESIUM: 4.3 MMOL/L (ref 3.5–5.1)
PRO-BNP: 360 PG/ML (ref 0–124)
RAPID INFLUENZA  B AGN: NEGATIVE
RAPID INFLUENZA A AGN: NEGATIVE
RBC # BLD: 4.59 M/UL (ref 4–5.2)
SARS-COV-2, NAAT: DETECTED
SODIUM BLD-SCNC: 136 MMOL/L (ref 136–145)
TOTAL PROTEIN: 6 G/DL (ref 6.4–8.2)
TROPONIN: <0.01 NG/ML
WBC # BLD: 4.9 K/UL (ref 4–11)

## 2022-07-21 PROCEDURE — 93005 ELECTROCARDIOGRAM TRACING: CPT | Performed by: EMERGENCY MEDICINE

## 2022-07-21 PROCEDURE — 2580000003 HC RX 258: Performed by: EMERGENCY MEDICINE

## 2022-07-21 PROCEDURE — 82728 ASSAY OF FERRITIN: CPT

## 2022-07-21 PROCEDURE — 6360000002 HC RX W HCPCS: Performed by: INTERNAL MEDICINE

## 2022-07-21 PROCEDURE — 86140 C-REACTIVE PROTEIN: CPT

## 2022-07-21 PROCEDURE — 84484 ASSAY OF TROPONIN QUANT: CPT

## 2022-07-21 PROCEDURE — 2700000000 HC OXYGEN THERAPY PER DAY

## 2022-07-21 PROCEDURE — 71045 X-RAY EXAM CHEST 1 VIEW: CPT

## 2022-07-21 PROCEDURE — 99285 EMERGENCY DEPT VISIT HI MDM: CPT

## 2022-07-21 PROCEDURE — 6370000000 HC RX 637 (ALT 250 FOR IP): Performed by: INTERNAL MEDICINE

## 2022-07-21 PROCEDURE — 1200000000 HC SEMI PRIVATE

## 2022-07-21 PROCEDURE — 2580000003 HC RX 258: Performed by: INTERNAL MEDICINE

## 2022-07-21 PROCEDURE — 87804 INFLUENZA ASSAY W/OPTIC: CPT

## 2022-07-21 PROCEDURE — 87635 SARS-COV-2 COVID-19 AMP PRB: CPT

## 2022-07-21 PROCEDURE — 83880 ASSAY OF NATRIURETIC PEPTIDE: CPT

## 2022-07-21 PROCEDURE — 94760 N-INVAS EAR/PLS OXIMETRY 1: CPT

## 2022-07-21 PROCEDURE — 85025 COMPLETE CBC W/AUTO DIFF WBC: CPT

## 2022-07-21 PROCEDURE — 80053 COMPREHEN METABOLIC PANEL: CPT

## 2022-07-21 RX ORDER — DEXTROSE MONOHYDRATE 100 MG/ML
INJECTION, SOLUTION INTRAVENOUS CONTINUOUS PRN
Status: DISCONTINUED | OUTPATIENT
Start: 2022-07-21 | End: 2022-07-22 | Stop reason: HOSPADM

## 2022-07-21 RX ORDER — ATORVASTATIN CALCIUM 80 MG/1
40 TABLET, FILM COATED ORAL DAILY
Status: DISCONTINUED | OUTPATIENT
Start: 2022-07-22 | End: 2022-07-22 | Stop reason: HOSPADM

## 2022-07-21 RX ORDER — DEXAMETHASONE SODIUM PHOSPHATE 10 MG/ML
6 INJECTION, SOLUTION INTRAMUSCULAR; INTRAVENOUS EVERY 24 HOURS
Status: DISCONTINUED | OUTPATIENT
Start: 2022-07-21 | End: 2022-07-22 | Stop reason: HOSPADM

## 2022-07-21 RX ORDER — LEVOTHYROXINE SODIUM 0.1 MG/1
200 TABLET ORAL DAILY
Status: DISCONTINUED | OUTPATIENT
Start: 2022-07-22 | End: 2022-07-22 | Stop reason: HOSPADM

## 2022-07-21 RX ORDER — ACETAMINOPHEN 650 MG/1
650 SUPPOSITORY RECTAL EVERY 6 HOURS PRN
Status: DISCONTINUED | OUTPATIENT
Start: 2022-07-21 | End: 2022-07-22 | Stop reason: HOSPADM

## 2022-07-21 RX ORDER — SODIUM CHLORIDE 0.9 % (FLUSH) 0.9 %
5-40 SYRINGE (ML) INJECTION EVERY 12 HOURS SCHEDULED
Status: DISCONTINUED | OUTPATIENT
Start: 2022-07-21 | End: 2022-07-22 | Stop reason: HOSPADM

## 2022-07-21 RX ORDER — LANOLIN ALCOHOL/MO/W.PET/CERES
1 CREAM (GRAM) TOPICAL DAILY
Status: DISCONTINUED | OUTPATIENT
Start: 2022-07-21 | End: 2022-07-21 | Stop reason: RX

## 2022-07-21 RX ORDER — DULOXETIN HYDROCHLORIDE 60 MG/1
60 CAPSULE, DELAYED RELEASE ORAL DAILY
Status: DISCONTINUED | OUTPATIENT
Start: 2022-07-21 | End: 2022-07-21

## 2022-07-21 RX ORDER — DEXAMETHASONE SODIUM PHOSPHATE 10 MG/ML
6 INJECTION, SOLUTION INTRAMUSCULAR; INTRAVENOUS ONCE
Status: DISCONTINUED | OUTPATIENT
Start: 2022-07-21 | End: 2022-07-21

## 2022-07-21 RX ORDER — PANTOPRAZOLE SODIUM 40 MG/1
40 TABLET, DELAYED RELEASE ORAL
Status: DISCONTINUED | OUTPATIENT
Start: 2022-07-22 | End: 2022-07-22 | Stop reason: HOSPADM

## 2022-07-21 RX ORDER — INSULIN GLARGINE 100 [IU]/ML
10 INJECTION, SOLUTION SUBCUTANEOUS NIGHTLY
Status: DISCONTINUED | OUTPATIENT
Start: 2022-07-21 | End: 2022-07-22

## 2022-07-21 RX ORDER — ASPIRIN 81 MG/1
81 TABLET ORAL DAILY
Status: DISCONTINUED | OUTPATIENT
Start: 2022-07-21 | End: 2022-07-21

## 2022-07-21 RX ORDER — ONDANSETRON 4 MG/1
4 TABLET, ORALLY DISINTEGRATING ORAL EVERY 8 HOURS PRN
Status: DISCONTINUED | OUTPATIENT
Start: 2022-07-21 | End: 2022-07-22 | Stop reason: HOSPADM

## 2022-07-21 RX ORDER — POLYETHYLENE GLYCOL 3350 17 G/17G
17 POWDER, FOR SOLUTION ORAL DAILY PRN
Status: DISCONTINUED | OUTPATIENT
Start: 2022-07-21 | End: 2022-07-22 | Stop reason: HOSPADM

## 2022-07-21 RX ORDER — SODIUM CHLORIDE, SODIUM LACTATE, POTASSIUM CHLORIDE, CALCIUM CHLORIDE 600; 310; 30; 20 MG/100ML; MG/100ML; MG/100ML; MG/100ML
1000 INJECTION, SOLUTION INTRAVENOUS ONCE
Status: COMPLETED | OUTPATIENT
Start: 2022-07-21 | End: 2022-07-22

## 2022-07-21 RX ORDER — SODIUM CHLORIDE 9 MG/ML
INJECTION, SOLUTION INTRAVENOUS PRN
Status: DISCONTINUED | OUTPATIENT
Start: 2022-07-21 | End: 2022-07-22 | Stop reason: HOSPADM

## 2022-07-21 RX ORDER — DULOXETIN HYDROCHLORIDE 60 MG/1
60 CAPSULE, DELAYED RELEASE ORAL DAILY
Status: DISCONTINUED | OUTPATIENT
Start: 2022-07-22 | End: 2022-07-22 | Stop reason: HOSPADM

## 2022-07-21 RX ORDER — ACETAMINOPHEN 325 MG/1
650 TABLET ORAL EVERY 6 HOURS PRN
Status: DISCONTINUED | OUTPATIENT
Start: 2022-07-21 | End: 2022-07-22 | Stop reason: HOSPADM

## 2022-07-21 RX ORDER — TRAMADOL HYDROCHLORIDE 50 MG/1
50 TABLET ORAL 3 TIMES DAILY PRN
Status: DISCONTINUED | OUTPATIENT
Start: 2022-07-21 | End: 2022-07-22 | Stop reason: HOSPADM

## 2022-07-21 RX ORDER — ONDANSETRON 2 MG/ML
4 INJECTION INTRAMUSCULAR; INTRAVENOUS EVERY 6 HOURS PRN
Status: DISCONTINUED | OUTPATIENT
Start: 2022-07-21 | End: 2022-07-22 | Stop reason: HOSPADM

## 2022-07-21 RX ORDER — SODIUM CHLORIDE 0.9 % (FLUSH) 0.9 %
5-40 SYRINGE (ML) INJECTION PRN
Status: DISCONTINUED | OUTPATIENT
Start: 2022-07-21 | End: 2022-07-22 | Stop reason: HOSPADM

## 2022-07-21 RX ORDER — ENOXAPARIN SODIUM 100 MG/ML
40 INJECTION SUBCUTANEOUS 2 TIMES DAILY
Status: DISCONTINUED | OUTPATIENT
Start: 2022-07-21 | End: 2022-07-22 | Stop reason: HOSPADM

## 2022-07-21 RX ORDER — INSULIN LISPRO 100 [IU]/ML
0-4 INJECTION, SOLUTION INTRAVENOUS; SUBCUTANEOUS NIGHTLY
Status: DISCONTINUED | OUTPATIENT
Start: 2022-07-21 | End: 2022-07-22

## 2022-07-21 RX ORDER — LEVOTHYROXINE SODIUM 0.1 MG/1
200 TABLET ORAL DAILY
Status: DISCONTINUED | OUTPATIENT
Start: 2022-07-21 | End: 2022-07-21

## 2022-07-21 RX ORDER — GUAIFENESIN/DEXTROMETHORPHAN 100-10MG/5
10 SYRUP ORAL EVERY 4 HOURS PRN
Status: DISCONTINUED | OUTPATIENT
Start: 2022-07-21 | End: 2022-07-22 | Stop reason: HOSPADM

## 2022-07-21 RX ORDER — ASPIRIN 81 MG/1
81 TABLET ORAL DAILY
Status: DISCONTINUED | OUTPATIENT
Start: 2022-07-22 | End: 2022-07-22 | Stop reason: HOSPADM

## 2022-07-21 RX ORDER — ALBUTEROL SULFATE 90 UG/1
2 AEROSOL, METERED RESPIRATORY (INHALATION) 4 TIMES DAILY
Status: DISCONTINUED | OUTPATIENT
Start: 2022-07-21 | End: 2022-07-21

## 2022-07-21 RX ORDER — SPIRONOLACTONE 25 MG/1
12.5 TABLET ORAL DAILY
Status: DISCONTINUED | OUTPATIENT
Start: 2022-07-21 | End: 2022-07-21

## 2022-07-21 RX ORDER — LATANOPROST 50 UG/ML
1 SOLUTION/ DROPS OPHTHALMIC NIGHTLY
Status: DISCONTINUED | OUTPATIENT
Start: 2022-07-21 | End: 2022-07-21

## 2022-07-21 RX ORDER — ATORVASTATIN CALCIUM 80 MG/1
40 TABLET, FILM COATED ORAL DAILY
Status: DISCONTINUED | OUTPATIENT
Start: 2022-07-21 | End: 2022-07-21

## 2022-07-21 RX ORDER — INSULIN LISPRO 100 [IU]/ML
0-4 INJECTION, SOLUTION INTRAVENOUS; SUBCUTANEOUS
Status: DISCONTINUED | OUTPATIENT
Start: 2022-07-21 | End: 2022-07-22

## 2022-07-21 RX ADMIN — DEXAMETHASONE SODIUM PHOSPHATE 6 MG: 10 INJECTION, SOLUTION INTRAMUSCULAR; INTRAVENOUS at 22:06

## 2022-07-21 RX ADMIN — INSULIN GLARGINE 10 UNITS: 100 INJECTION, SOLUTION SUBCUTANEOUS at 22:05

## 2022-07-21 RX ADMIN — ENOXAPARIN SODIUM 40 MG: 100 INJECTION SUBCUTANEOUS at 22:05

## 2022-07-21 RX ADMIN — SODIUM CHLORIDE, PRESERVATIVE FREE 10 ML: 5 INJECTION INTRAVENOUS at 22:06

## 2022-07-21 RX ADMIN — SODIUM CHLORIDE, POTASSIUM CHLORIDE, SODIUM LACTATE AND CALCIUM CHLORIDE 1000 ML: 600; 310; 30; 20 INJECTION, SOLUTION INTRAVENOUS at 16:43

## 2022-07-21 ASSESSMENT — PAIN - FUNCTIONAL ASSESSMENT: PAIN_FUNCTIONAL_ASSESSMENT: NONE - DENIES PAIN

## 2022-07-21 NOTE — ED PROVIDER NOTES
[facility] Emergency Department    Jing Robbins MD, am the primary clinician of record. CHIEF COMPLAINT  Chief Complaint   Patient presents with    Hypotension     Pt brought in by EMS. Pt states home nurse came over to check BP and when she went from sitting to standing her BP dropped from 130s to [address] is a 66 y.o. female  who presents to the ED complaining of night of body aches malaise and fatigue. She also had a cough started last night with no sputum. She says she has felt generally unwell but is not really sure why. Subsequently, she called her insurance company this morning who sent somebody to her home and advised she come to the ER, according to EMS for orthostatic hypotension. EMS reports blood pressure was normal on their evaluation. The patient has a stable chronic sacral pressure wound and denies any acute complaints related to that. She also has a history of diabetes CHF hypertension and hyperlipidemia. She also has generalized arthritis. She has not had a fever. Denies any vomiting or diarrhea. No other complaints, modifying factors or associated symptoms. I have reviewed the following from the nursing documentation. Past Medical History:   Diagnosis Date    Arthritis     back    Cataracts, bilateral     CHF (congestive heart failure) (HCC)     Diabetes mellitus ([address]     Glaucoma     Hyperlipidemia     Hypertension     Poor mobility 6/24/2022    Pressure 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, stage 3 ([address] 6/17/2022    Similar wound 2 years ago. Thyroid disease      Past Surgical History:   Procedure Laterality Date    CARPAL TUNNEL RELEASE      bilateral    CHOLECYSTECTOMY      COLONOSCOPY      ELBOW SURGERY      ENDOSCOPY, COLON, DIAGNOSTIC      EYE SURGERY      FOOT SURGERY      SHOULDER SURGERY       History reviewed. No pertinent family history.   Social History Socioeconomic History    Marital status: Single     Spouse name: Not on file    Number of children: Not on file    Years of education: Not on file    Highest education level: Not on file   Occupational History    Not on file   Tobacco Use    Smoking status: Never    Smokeless tobacco: Never   Vaping Use    Vaping Use: Never used   Substance and Sexual Activity    Alcohol use: Not Currently    Drug use: Never    Sexual activity: Never   Other Topics Concern    Not on file   Social History Narrative    Not on file     Social Determinants of Health     Financial Resource Strain: Not on file   Food Insecurity: Not on file   Transportation Needs: Not on file   Physical Activity: Not on file   Stress: Not on file   Social Connections: Not on file   Intimate Partner Violence: Not on file   Housing Stability: Not on file     Current Facility-Administered Medications   Medication Dose Route Frequency Provider Last Rate Last Admin    dexamethasone (PF) (DECADRON) injection 6 mg  6 mg IntraVENous Once Pura Lopez MD         Current Outpatient Medications   Medication Sig Dispense Refill    traMADol (ULTRAM) 50 MG tablet Take 50 mg by mouth 3 times daily as needed for Pain. DULoxetine (CYMBALTA) 60 MG extended release capsule Take 1 capsule by mouth daily 30 capsule 3    sodium chloride (OCEAN, BABY AYR) 0.65 % nasal spray 1 spray by Nasal route every 2 hours as needed for Congestion 1 Bottle 0    mometasone-formoterol (DULERA) 200-5 MCG/ACT inhaler Inhale 2 puffs into the lungs every 12 hours 1 Inhaler 1    spironolactone (ALDACTONE) 25 MG tablet Take 12.5 mg by mouth daily      Travoprost, BAK Free, (TRAVATAN Z) 0.004 % SOLN ophthalmic solution Place 1 drop into both eyes nightly      DICLOFENAC PO Take  by mouth.       acetaminophen (TYLENOL) 500 MG tablet Take 500 mg by mouth every 6 hours as needed for Pain or Fever       aspirin EC 81 MG EC tablet Take 81 mg by mouth daily May restart in AM. Rate 98 BPM    P-R Interval 164 ms    QRS Duration 72 ms    Q-T Interval 326 ms    QTc Calculation (Bazett) 416 ms    P Axis 107 degrees    R Axis 33 degrees    T Axis 51 degrees    Diagnosis Normal sinus rhythm        The 12 lead EKG was interpreted by me as follows:  Rate: normal with a rate of 98  Rhythm: sinus  Axis: normal  Intervals: normal SD, narrow QRS, normal QTc  ST segments: no ST elevations or depressions  T waves: no abnormal inversions  Non-specific T wave changes: not present  Prior EKG comparison: EKG dated 9/30/20 is significantly different due to prior EKG with tachycardia and nonspecific changes    RADIOLOGY    XR CHEST PORTABLE    Result Date: 7/21/2022  EXAMINATION: ONE XRAY VIEW OF THE CHEST 7/21/2022 3:55 pm COMPARISON: 09/30/2020 HISTORY: ORDERING SYSTEM PROVIDED HISTORY: cough TECHNOLOGIST PROVIDED HISTORY: Reason for exam:->cough Reason for Exam: cough FINDINGS: The lungs are without acute focal process. There is no effusion or pneumothorax. The cardiomediastinal silhouette is stable. The osseous structures are stable. No acute process. Bibasilar hypoaeration        ED COURSE/MDM  Patient seen and evaluated. Old records reviewed. Labs and imaging reviewed and results discussed with patient. The patient's ED workup was notable for diagnosis of COVID-19 from today's testing. She was hypoxic on reassessment on room air and stable on nasal cannula oxygen. As such she was given Decadron. At this point there is no indication for antibiotics, she is not febrile or septic but due to her hypoxia she will be admitted to the hospital.  Additionally she is notably having orthostatic hypotension that improves when not orthostatic and is minimally symptomatic despite that. As such she was also given some IV fluids and oral hydration was administered. Droplet plus precautions initiated and the patient will be admitted. Is this patient to be included in the SEP-1 Core Measure?   No Exclusion criteria - the patient is NOT to be included for SEP-1 Core Measure due to:  Viral etiology found or highly suspected (including COVID-19) without concomitant bacterial infection      During the patient's ED course, the patient was given:  Medications   dexamethasone (PF) (DECADRON) injection 6 mg (has no administration in time range)   lactated ringers infusion 1,000 mL (1,000 mLs IntraVENous New Bag 7/21/22 1643)        CLINICAL IMPRESSION  1. COVID    2. Acute respiratory failure with hypoxia (HCC)    3. Orthostatic hypotension        Blood pressure 122/60, pulse 85, temperature 98.4 °F (36.9 °C), resp. rate 22, height 5' 5\" (1.651 m), weight 216 lb (98 kg), SpO2 96 %. Farida Nieto was admitted in fair condition. The plan is to admit to the hospital at this time under the hospitalist service. Hospitalist accepted the patient and will take over the patient's care. The total critical care time I independently spent while evaluating and treating this patient was 35 minutes. This excludes time spent doing separately billable procedures. This includes time at the bedside, data interpretation, medication management, obtaining critical history from collateral sources if the patient is unable to provide it directly, and physician consultation. Specifics of interventions taken and potentially life-threatening diagnostic considerations are listed above in the medical decision making. If this was a shared visit with an VENKATESH, the time in this attestation is non-concurrent critical care time out of the total shared critical care time provided by the VENKATESH and myself. DISCLAIMER: This chart was created using Dragon dictation software. Efforts were made by me to ensure accuracy, however some errors may be present due to limitations of this technology and occasionally words are not transcribed correctly.         Nahomi Gutierrez MD  07/21/22 9151

## 2022-07-21 NOTE — ED NOTES
Pt report given to 5T RN, states no questions or concerns at this time.       [facility], RN  07/21/22 1943

## 2022-07-21 NOTE — H&P
bilateral, CHF (congestive heart failure) ([facility] [address] Diabetes mellitus ([facility] [address] Glaucoma, Hyperlipidemia, Hypertension, Poor mobility, Pressure 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, stage 3 ([address] and Thyroid disease. Past Surgical History:   has a past surgical history that includes eye surgery; Foot surgery; Carpal tunnel release; Elbow surgery; shoulder surgery; Cholecystectomy; Colonoscopy; and Endoscopy, colon, diagnostic. Medications:  No current facility-administered medications on file prior to encounter. Current Outpatient Medications on File Prior to Encounter   Medication Sig Dispense Refill    traMADol (ULTRAM) 50 MG tablet Take 50 mg by mouth 3 times daily as needed for Pain. DULoxetine (CYMBALTA) 60 MG extended release capsule Take 1 capsule by mouth daily 30 capsule 3    sodium chloride (OCEAN, BABY AYR) 0.65 % nasal spray 1 spray by Nasal route every 2 hours as needed for Congestion 1 Bottle 0    mometasone-formoterol (DULERA) 200-5 MCG/ACT inhaler Inhale 2 puffs into the lungs every 12 hours 1 Inhaler 1    spironolactone (ALDACTONE) 25 MG tablet Take 12.5 mg by mouth daily      Travoprost, BAK Free, (TRAVATAN Z) 0.004 % SOLN ophthalmic solution Place 1 drop into both eyes nightly      DICLOFENAC PO Take  by mouth. acetaminophen (TYLENOL) 500 MG tablet Take 500 mg by mouth every 6 hours as needed for Pain or Fever       aspirin EC 81 MG EC tablet Take 81 mg by mouth daily May restart in AM.      furosemide (LASIX) 40 MG tablet Take 40 mg by mouth daily       levothyroxine (LEVOXYL) 200 MCG tablet Take 200 mcg by mouth Daily       Insulin Detemir (LEVEMIR FLEXPEN SC) Inject 33 Units into the skin nightly       simvastatin (ZOCOR) 40 MG tablet Take 40 mg by mouth nightly.         Insulin Lispro, Human, (HUMALOG SC) Inject 10 Units into the skin 3 times daily (with meals) With sliding      Calcium Carbonate-Vitamin D (CALCIUM + D PO) Take by mouth daily Allergies: Allergies   Allergen Reactions    Other Other (See Comments)     Preservatives in eyedrops-blurred vision    Ace Inhibitors Other (See Comments)    Metoprolol Other (See Comments)    Tetrahydrozoline Hcl Other (See Comments)     Blurry vision    Timolol Other (See Comments)     Blurry vision DO NOT USE        Social History:  Patient Lives at home   reports that she has never smoked. She has never used smokeless tobacco. She reports that she does not currently use alcohol. She reports that she does not use drugs. Family History:  family history includes No Known Problems in her father and mother. Physical Exam:  /60   Pulse 85   Temp 98.4 °F (36.9 °C)   Resp 22   Ht 5' 5\" (1.651 m)   Wt 216 lb (98 kg)   SpO2 96%   BMI 35.94 kg/m²     General appearance:  Appears comfortable. Well nourished  Eyes: Sclera clear, pupils equal  ENT: Moist mucus membranes, no thrush. Trachea midline. Cardiovascular: Regular rhythm, normal S1, S2. No murmur, gallop, rub. No edema in lower extremities  Respiratory: Clear to auscultation bilaterally, no wheeze, good inspiratory effort  Gastrointestinal: Abdomen soft, non-tender, not distended, normal bowel sounds  Musculoskeletal: No cyanosis in digits, neck supple  Neurology: Cranial nerves grossly intact. Alert and oriented in time, place and person. No speech or motor deficits  Psychiatry: Appropriate affect.  Not agitated  Skin: Warm, dry, normal turgor, no rash  Brisk capillary refill, peripheral pulses palpable   Labs:  CBC:   Lab Results   Component Value Date/Time    WBC 4.9 07/21/2022 04:11 PM    RBC 4.59 07/21/2022 04:11 PM    HGB 13.2 07/21/2022 04:11 PM    HCT 40.8 07/21/2022 04:11 PM    MCV 89.0 07/21/2022 04:11 PM    MCH 28.7 07/21/2022 04:11 PM    MCHC 32.2 07/21/2022 04:11 PM    RDW 15.3 07/21/2022 04:11 PM     07/21/2022 04:11 PM    MPV 8.2 07/21/2022 04:11 PM     BMP:    Lab Results   Component Value Date/Time     07/21/2022 04:11 PM    K 4.3 07/21/2022 04:11 PM     07/21/2022 04:11 PM    CO2 27 07/21/2022 04:11 PM    BUN 17 07/21/2022 04:11 PM    CREATININE 0.9 07/21/2022 04:11 PM    CALCIUM 9.2 07/21/2022 04:11 PM    GFRAA >60 07/21/2022 04:11 PM    LABGLOM >60 07/21/2022 04:11 PM    GLUCOSE 107 07/21/2022 04:11 PM     XR CHEST PORTABLE   Final Result   No acute process. Bibasilar hypoaeration           Chest Xray: As above  EKG: Normal sinus rhythm  I visualized CXR images and EKG strips. Problem List  Principal Problem:    Pneumonia due to COVID-19 virus  Resolved Problems:    * No resolved hospital problems. *        Assessment/Plan:     COVID-19 PNA with hypoxia:  Follow-up inflammatory markers decide on further treatment. Started on Decadron. Supplemental oxygen and wean off as tolerated. Stage III sacral decubitus ulcer:   Present on admission. Followed by surgery at the wound clinic.  s/p excisional debridement 7/13/2022. Continue wound care with Aquacel Silver Rope, ABD Pad Daily. Wound care consult. Chronic back pain: Continue tramadol. Hypothyroidism: Follow-up TSH. Continue levothyroxine. T2 DM on long-term insulin:  Follow-up A1c. Monitor fingersticks on Lantus with CDI. Hyperlipidemia: Continue statin. HTN: Hold Aldactone and Lasix for now. DVT prophylaxis: Lovenox  Code status: Full code  Diet: Regular diet  IV access: Peripheral  Cline Catheter: None    Admit as inpatient. I anticipate hospitalization spanning more than two midnights for investigation and treatment of the above medically necessary diagnoses. Please note that some part of this chart was generated using Dragon dictation software. Although every effort was made to ensure the accuracy of this automated transcription, some errors in transcription may have occurred inadvertently. If you may need any clarification, please do not hesitate to contact me through [facility].        Primus Goltz, MD 7/21/2022 6:52 PM

## 2022-07-21 NOTE — ED NOTES
Pt transported to floor via wheelchair by PCA. Pt transported on 2 LPM via NC.       [address], RN  07/21/22 1949

## 2022-07-21 NOTE — ACP (ADVANCE CARE PLANNING)
ADVANCED CARE PLANNING    Name:Alysha Renteria       :  1950              MRN:  [id number]      Purpose of Encounter: Advanced care planning. Parties in attendance: :Jo Garcia MD  Decisional Capacity:Yes    Diagnosis: Principal Problem:    Pneumonia due to COVID-19 virus  Resolved Problems:    * No resolved hospital problems. *    Patients Medical Story: Irving Ornelas is a 66 y.o. female with history of DM, chronic sacral pressure ulcer followed at the wound clinic, HTN, HLD, chronic HFpEF admitted with COVID-19 infection with PNA. Goals of Care Determinations: Patient wishes to focus on all life-sustaining treatment,  Plan: Will notify Jania Bay MD of change in care plan. Will look at further interventions as needed. Code Status: At this time patient wishes to be Full Code  Time Spent with Patient: 16 minutes      Electronically signed by Jo Sanders MD on 2022 at 6:52 PM  Thank you Jania Bay MD for the opportunity to be involved in this patient's care.

## 2022-07-22 VITALS
HEIGHT: 65 IN | SYSTOLIC BLOOD PRESSURE: 98 MMHG | OXYGEN SATURATION: 94 % | WEIGHT: 221 LBS | TEMPERATURE: 98.3 F | RESPIRATION RATE: 16 BRPM | DIASTOLIC BLOOD PRESSURE: 56 MMHG | HEART RATE: 87 BPM | BODY MASS INDEX: 36.82 KG/M2

## 2022-07-22 PROBLEM — Z79.4 TYPE 2 DIABETES MELLITUS WITH HYPERGLYCEMIA, WITH LONG-TERM CURRENT USE OF INSULIN (HCC): Status: ACTIVE | Noted: 2022-07-22

## 2022-07-22 PROBLEM — E11.65 TYPE 2 DIABETES MELLITUS WITH HYPERGLYCEMIA, WITH LONG-TERM CURRENT USE OF INSULIN (HCC): Status: ACTIVE | Noted: 2022-07-22

## 2022-07-22 PROBLEM — U07.1 COVID: Status: ACTIVE | Noted: 2022-07-22

## 2022-07-22 PROBLEM — I95.9 HYPOTENSION: Status: ACTIVE | Noted: 2022-07-22

## 2022-07-22 PROBLEM — R09.02 HYPOXIA: Status: ACTIVE | Noted: 2022-07-22

## 2022-07-22 PROBLEM — U07.1 PNEUMONIA DUE TO COVID-19 VIRUS: Status: ACTIVE | Noted: 2022-07-22

## 2022-07-22 PROBLEM — L98.429 STAGE 3 SKIN ULCER OF SACRAL REGION (HCC): Status: ACTIVE | Noted: 2022-07-22

## 2022-07-22 PROBLEM — J12.82 PNEUMONIA DUE TO COVID-19 VIRUS: Status: ACTIVE | Noted: 2022-07-22

## 2022-07-22 LAB
A/G RATIO: 1.6 (ref 1.1–2.2)
ALBUMIN SERPL-MCNC: 3.7 G/DL (ref 3.4–5)
ALP BLD-CCNC: 57 U/L (ref 40–129)
ALT SERPL-CCNC: 12 U/L (ref 10–40)
ANION GAP SERPL CALCULATED.3IONS-SCNC: 9 MMOL/L (ref 3–16)
AST SERPL-CCNC: 17 U/L (ref 15–37)
BASOPHILS ABSOLUTE: 0 K/UL (ref 0–0.2)
BASOPHILS RELATIVE PERCENT: 0.1 %
BILIRUB SERPL-MCNC: 0.8 MG/DL (ref 0–1)
BUN BLDV-MCNC: 16 MG/DL (ref 7–20)
C-REACTIVE PROTEIN: 13.5 MG/L (ref 0–5.1)
CALCIUM SERPL-MCNC: 9 MG/DL (ref 8.3–10.6)
CHLORIDE BLD-SCNC: 98 MMOL/L (ref 99–110)
CO2: 26 MMOL/L (ref 21–32)
CREAT SERPL-MCNC: 0.7 MG/DL (ref 0.6–1.2)
EKG ATRIAL RATE: 98 BPM
EKG DIAGNOSIS: NORMAL
EKG P AXIS: 107 DEGREES
EKG P-R INTERVAL: 164 MS
EKG Q-T INTERVAL: 326 MS
EKG QRS DURATION: 72 MS
EKG QTC CALCULATION (BAZETT): 416 MS
EKG R AXIS: 33 DEGREES
EKG T AXIS: 51 DEGREES
EKG VENTRICULAR RATE: 98 BPM
EOSINOPHILS ABSOLUTE: 0 K/UL (ref 0–0.6)
EOSINOPHILS RELATIVE PERCENT: 0 %
ESTIMATED AVERAGE GLUCOSE: 137 MG/DL
FERRITIN: 77.6 NG/ML (ref 15–150)
GFR AFRICAN AMERICAN: >60
GFR NON-AFRICAN AMERICAN: >60
GLUCOSE BLD-MCNC: 151 MG/DL (ref 70–99)
GLUCOSE BLD-MCNC: 201 MG/DL (ref 70–99)
GLUCOSE BLD-MCNC: 277 MG/DL (ref 70–99)
GLUCOSE BLD-MCNC: 288 MG/DL (ref 70–99)
HBA1C MFR BLD: 6.4 %
HCT VFR BLD CALC: 40 % (ref 36–48)
HEMOGLOBIN: 12.9 G/DL (ref 12–16)
LYMPHOCYTES ABSOLUTE: 0.3 K/UL (ref 1–5.1)
LYMPHOCYTES RELATIVE PERCENT: 7.3 %
MAGNESIUM: 1.9 MG/DL (ref 1.8–2.4)
MCH RBC QN AUTO: 29.1 PG (ref 26–34)
MCHC RBC AUTO-ENTMCNC: 32.4 G/DL (ref 31–36)
MCV RBC AUTO: 89.9 FL (ref 80–100)
MONOCYTES ABSOLUTE: 0.1 K/UL (ref 0–1.3)
MONOCYTES RELATIVE PERCENT: 2.8 %
NEUTROPHILS ABSOLUTE: 3.8 K/UL (ref 1.7–7.7)
NEUTROPHILS RELATIVE PERCENT: 89.8 %
PDW BLD-RTO: 14.9 % (ref 12.4–15.4)
PERFORMED ON: ABNORMAL
PHOSPHORUS: 4 MG/DL (ref 2.5–4.9)
PLATELET # BLD: 113 K/UL (ref 135–450)
PMV BLD AUTO: 8.5 FL (ref 5–10.5)
POTASSIUM SERPL-SCNC: 4.6 MMOL/L (ref 3.5–5.1)
RBC # BLD: 4.45 M/UL (ref 4–5.2)
SODIUM BLD-SCNC: 133 MMOL/L (ref 136–145)
T4 FREE: 1.7 NG/DL (ref 0.9–1.8)
TOTAL PROTEIN: 6 G/DL (ref 6.4–8.2)
TSH REFLEX FT4: 0.22 UIU/ML (ref 0.27–4.2)
VITAMIN D 25-HYDROXY: 43.8 NG/ML
WBC # BLD: 4.2 K/UL (ref 4–11)

## 2022-07-22 PROCEDURE — 11042 DBRDMT SUBQ TIS 1ST 20SQCM/<: CPT | Performed by: SURGERY

## 2022-07-22 PROCEDURE — 86140 C-REACTIVE PROTEIN: CPT

## 2022-07-22 PROCEDURE — 6360000002 HC RX W HCPCS: Performed by: INTERNAL MEDICINE

## 2022-07-22 PROCEDURE — 83036 HEMOGLOBIN GLYCOSYLATED A1C: CPT

## 2022-07-22 PROCEDURE — 2580000003 HC RX 258: Performed by: INTERNAL MEDICINE

## 2022-07-22 PROCEDURE — 6370000000 HC RX 637 (ALT 250 FOR IP): Performed by: INTERNAL MEDICINE

## 2022-07-22 PROCEDURE — 97530 THERAPEUTIC ACTIVITIES: CPT

## 2022-07-22 PROCEDURE — 97116 GAIT TRAINING THERAPY: CPT

## 2022-07-22 PROCEDURE — 82306 VITAMIN D 25 HYDROXY: CPT

## 2022-07-22 PROCEDURE — 2500000003 HC RX 250 WO HCPCS: Performed by: INTERNAL MEDICINE

## 2022-07-22 PROCEDURE — 93010 ELECTROCARDIOGRAM REPORT: CPT | Performed by: INTERNAL MEDICINE

## 2022-07-22 PROCEDURE — 84439 ASSAY OF FREE THYROXINE: CPT

## 2022-07-22 PROCEDURE — 87449 NOS EACH ORGANISM AG IA: CPT

## 2022-07-22 PROCEDURE — 80053 COMPREHEN METABOLIC PANEL: CPT

## 2022-07-22 PROCEDURE — 6370000000 HC RX 637 (ALT 250 FOR IP): Performed by: NURSE PRACTITIONER

## 2022-07-22 PROCEDURE — 36415 COLL VENOUS BLD VENIPUNCTURE: CPT

## 2022-07-22 PROCEDURE — 97535 SELF CARE MNGMENT TRAINING: CPT

## 2022-07-22 PROCEDURE — APPNB30 APP NON BILLABLE TIME 0-30 MINS: Performed by: NURSE PRACTITIONER

## 2022-07-22 PROCEDURE — 97161 PT EVAL LOW COMPLEX 20 MIN: CPT

## 2022-07-22 PROCEDURE — 85025 COMPLETE CBC W/AUTO DIFF WBC: CPT

## 2022-07-22 PROCEDURE — 0JB70ZZ EXCISION OF BACK SUBCUTANEOUS TISSUE AND FASCIA, OPEN APPROACH: ICD-10-PCS | Performed by: SURGERY

## 2022-07-22 PROCEDURE — 84443 ASSAY THYROID STIM HORMONE: CPT

## 2022-07-22 PROCEDURE — 84100 ASSAY OF PHOSPHORUS: CPT

## 2022-07-22 PROCEDURE — 83735 ASSAY OF MAGNESIUM: CPT

## 2022-07-22 PROCEDURE — 97166 OT EVAL MOD COMPLEX 45 MIN: CPT

## 2022-07-22 RX ORDER — INSULIN LISPRO 100 [IU]/ML
10 INJECTION, SOLUTION INTRAVENOUS; SUBCUTANEOUS
Status: DISCONTINUED | OUTPATIENT
Start: 2022-07-22 | End: 2022-07-22 | Stop reason: HOSPADM

## 2022-07-22 RX ORDER — INSULIN LISPRO 100 [IU]/ML
0-4 INJECTION, SOLUTION INTRAVENOUS; SUBCUTANEOUS NIGHTLY
Status: DISCONTINUED | OUTPATIENT
Start: 2022-07-22 | End: 2022-07-22 | Stop reason: HOSPADM

## 2022-07-22 RX ORDER — INSULIN LISPRO 100 [IU]/ML
0-8 INJECTION, SOLUTION INTRAVENOUS; SUBCUTANEOUS
Status: DISCONTINUED | OUTPATIENT
Start: 2022-07-22 | End: 2022-07-22 | Stop reason: HOSPADM

## 2022-07-22 RX ORDER — LIDOCAINE HYDROCHLORIDE 40 MG/ML
SOLUTION TOPICAL ONCE
Status: COMPLETED | OUTPATIENT
Start: 2022-07-22 | End: 2022-07-22

## 2022-07-22 RX ORDER — DEXAMETHASONE 4 MG/1
4 TABLET ORAL
Qty: 5 TABLET | Refills: 0 | Status: SHIPPED | OUTPATIENT
Start: 2022-07-22 | End: 2022-07-27

## 2022-07-22 RX ORDER — INSULIN GLARGINE 100 [IU]/ML
30 INJECTION, SOLUTION SUBCUTANEOUS NIGHTLY
Status: DISCONTINUED | OUTPATIENT
Start: 2022-07-22 | End: 2022-07-22 | Stop reason: HOSPADM

## 2022-07-22 RX ADMIN — ENOXAPARIN SODIUM 40 MG: 100 INJECTION SUBCUTANEOUS at 08:55

## 2022-07-22 RX ADMIN — PANTOPRAZOLE SODIUM 40 MG: 40 TABLET, DELAYED RELEASE ORAL at 05:20

## 2022-07-22 RX ADMIN — LEVOTHYROXINE SODIUM 200 MCG: 0.1 TABLET ORAL at 05:20

## 2022-07-22 RX ADMIN — INSULIN LISPRO 2 UNITS: 100 INJECTION, SOLUTION INTRAVENOUS; SUBCUTANEOUS at 11:59

## 2022-07-22 RX ADMIN — INSULIN LISPRO 10 UNITS: 100 INJECTION, SOLUTION INTRAVENOUS; SUBCUTANEOUS at 11:59

## 2022-07-22 RX ADMIN — SODIUM CHLORIDE, PRESERVATIVE FREE 10 ML: 5 INJECTION INTRAVENOUS at 08:55

## 2022-07-22 RX ADMIN — DULOXETINE HYDROCHLORIDE 60 MG: 60 CAPSULE, DELAYED RELEASE ORAL at 08:55

## 2022-07-22 RX ADMIN — INSULIN LISPRO 4 UNITS: 100 INJECTION, SOLUTION INTRAVENOUS; SUBCUTANEOUS at 08:56

## 2022-07-22 RX ADMIN — ATORVASTATIN CALCIUM 40 MG: 80 TABLET, FILM COATED ORAL at 08:55

## 2022-07-22 RX ADMIN — INSULIN LISPRO 10 UNITS: 100 INJECTION, SOLUTION INTRAVENOUS; SUBCUTANEOUS at 08:56

## 2022-07-22 RX ADMIN — LIDOCAINE HYDROCHLORIDE: 40 SOLUTION ORAL at 09:06

## 2022-07-22 RX ADMIN — ASPIRIN 81 MG: 81 TABLET, COATED ORAL at 08:55

## 2022-07-22 RX ADMIN — MICONAZOLE NITRATE: 20 POWDER TOPICAL at 13:34

## 2022-07-22 RX ADMIN — ACETAMINOPHEN 650 MG: 325 TABLET ORAL at 05:20

## 2022-07-22 ASSESSMENT — PAIN SCALES - GENERAL
PAINLEVEL_OUTOF10: 0

## 2022-07-22 NOTE — PROGRESS NOTES
[facility] [address] Department   Phone: [phone]    Physical Therapy    [x] Initial Evaluation            [] Daily Treatment Note         [] Discharge Summary      Patient: Wagner Mauricio   : 1950   MRN: [id number]   Date of Service:  2022  Admitting Diagnosis: Ronel  Current Admission Summary: Wagner Mauricio is a 66 y.o. female  who presents to the ED complaining of night of body aches malaise and fatigue. She also had a cough started last night with no sputum. She says she has felt generally unwell but is not really sure why. Subsequently, she called her insurance company this morning who sent somebody to her home and advised she come to the ER, according to EMS for orthostatic hypotension. EMS reports blood pressure was normal on their evaluation. The patient has a stable chronic sacral pressure wound and denies any acute complaints related to that. She also has a history of diabetes CHF hypertension and hyperlipidemia. She also has generalized arthritis. She has not had a fever. Denies any vomiting or diarrhea. 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 Past Surgical History:  has a past surgical history that includes eye surgery; Foot surgery; Carpal tunnel release; Elbow surgery; shoulder surgery; Cholecystectomy; Colonoscopy; and Endoscopy, colon, diagnostic. Discharge Recommendations: Wagner Mauricio scored a 18/24 on the AM-PAC short mobility form. At this time, no further PT is recommended upon discharge due to pt being close to baseline. Recommend patient returns to prior setting with prior services.       DME Required For Discharge: no DME required at very pleasant and agreeable to OT eval.Pt reprts 5/10 pain in buttocks. RN notified. Pt has Lidocaine patch. Pt reports she has been lightheaded for a while when she gets up and walks at home. Pain: 5/10. Location: buttocks  (chronic)  Pain Interventions: pain medication in place prior to arrival and RN notified  Functional Mobility  Bed Mobility  Bed mobility not completed on this date. Comments:  Transfers  Sit to stand transfer: stand by assistance  Stand to sit transfer: stand by assistance  Comments:  Ambulation  Assistive Device: no device  Assistance: stand by assistance, contact guard assistance  Distance: 10 ft, 40 ft  Gait Mechanics: decreased carolann and step lengths   Comments: Pt reports that at home she feels \"loopy\" sometimes when ambulating and holds onto furniture; she felt that way this morning with nursing, but did not feel that way during therapy evaluation.    Balance  Static Sitting Balance: good: independent with functional balance in unsupported position  Dynamic Sitting Balance: fair (+): maintains balance at SBA/supervision without use of UE support  Static Standing Balance: fair (+): maintains balance at SBA/supervision without use of UE support  Dynamic Standing Balance: fair (+): maintains balance at SBA/supervision without use of UE support  Comments: Donned socks EOB with supervision; performed ADLs seated and standing at EOB; bleeding and reddened skin noted on abdominal skin folds-RN made aware    Other Therapeutic Interventions    Functional Outcomes  AM-PAC Inpatient Mobility Raw Score : 18              Cognition  WFL  Orientation:    alert and oriented x 4  Command Following:   [facility]    Education  Barriers To Learning: visual  Patient Education: patient educated on PT role and benefits, plan of care, general safety  Learning Assessment:  patient verbalizes understanding, would benefit from continued reinforcement    Assessment  Activity Tolerance: Seated BP: 96/66, SpO2=93% on RA, Standing BP: 128/73 (MAP 92), Standing at end of session: 106/75, SpO2=92-93%; nursing made aware  Impairments Requiring Therapeutic Intervention: decreased functional mobility, decreased sensation, decreased balance  Prognosis: good  Clinical Assessment:  Pt is limited by the above deficits and would benefit from skilled PT services to maximize pt functional mobility and safety prior to discharge.     Safety Interventions: patient left in chair, chair alarm in place, call light within reach, gait belt, patient at risk for falls, and nurse notified    Plan  Frequency: 3-5 x/per week  Current Treatment Recommendations: balance training, functional mobility training, transfer training, gait training, and endurance training    Goals  Patient Goals: return home    Short Term Goals:  Time Frame: Discharge   Patient will complete bed mobility at modified independent   Patient will complete transfers at [facility]   Patient will ambulate 100 ft with use of LRAD at [facility]    Therapy Session Time      Individual Group Co-treatment   Time In     1056   Time Out     1223   Minutes     87     Timed Code Treatment Minutes:  72 Minutes  Total Treatment Minutes:  87       Electronically Signed By: Elkin Tavares PT

## 2022-07-22 NOTE — PROGRESS NOTES
Patient discharged home. Packet gone over. Pt. Will go get meds from [place]. Knows to not take Lasix or Aldactone. Neighbor came to .

## 2022-07-22 NOTE — PROGRESS NOTES
[facility] [facility]   Phone: [phone]    Occupational Therapy    [x] Initial Evaluation            [] Daily Treatment Note         [] Discharge Summary      Patient: Ariana Nation   : 1950   MRN: [id number]   Date of Service:  2022    Admitting Diagnosis:  Ronel  Current Admission Summary: Ariana Nation is a 66 y.o. female  who presents to the ED complaining of night of body aches malaise and fatigue. She also had a cough started last night with no sputum. She says she has felt generally unwell but is not really sure why. Subsequently, she called her insurance company this morning who sent somebody to her home and advised she come to the ER, according to EMS for orthostatic hypotension. EMS reports blood pressure was normal on their evaluation. The patient has a stable chronic sacral pressure wound and denies any acute complaints related to that. She also has a history of diabetes CHF hypertension and hyperlipidemia. She also has generalized arthritis. She has not had a fever. Denies any vomiting or diarrhea. 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 Past Surgical History:  has a past surgical history that includes eye surgery; Foot surgery; Carpal tunnel release; Elbow surgery; shoulder surgery; Cholecystectomy; Colonoscopy; and Endoscopy, colon, diagnostic. Discharge Recommendations: Ariana Nation scored a 20/24 on the AM-PAC ADL Inpatient form. At this time, no further OT is recommended upon discharge due to pt is expected. Recommend patient returns to prior setting with prior services.       If patient discharges prior to next session this note will serve as a discharge summary. Please see below for the latest assessment towards goals. DME Required For Discharge: no DME required at discharge    Precautions/Restrictions: high fall risk, Isolation precautions, . Comment: Droplet + precautions; sacral open wound  Weight Bearing Restrictions: no restrictions  [] Right Upper Extremity  [] Left Upper Extremity [] Right Lower Extremity  [] Left Lower Extremity     Required Braces/Orthotics: no braces required   [] Right  [] Left  Positional Restrictions:no positional restrictions    Pre-Admission Information   Lives With: alone, . Comment: dog     Type of Home: house  Home Layout: one level, laundry in basement, . Comment: stair lift to basement  Home Access: ramped entry  Bathroom Layout: tub/shower unit  Toilet Height: elevated height  Bathroom Equipment: grab bars in shower, shower chair, hand held shower head  Home Equipment: rolling walker, rollator - 4 wheeled walker, single point cane, manual wheelchair, lift chair  Transfer Assistance: Independent without use of device  Ambulation Assistance:Independent without use of device  ADL Assistance: independent with all ADL's  IADL Assistance: independent with homemaking tasks  Active :        [] Yes  [x] No ([facility] takes pt to dr's appts or insurance company provides)  Hand Dominance: [] Left  [] Right  Current Employment: retired. 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 Regularly goes to wound clinic  Examination   Vision:   Vision Gross Assessment: Impaired, Vision Corrective Device: wears glasses at all times, and Visual History: cataracts, other - Pt was born with cataracts, multiple eye surgeries; was previously blind. R eye is better, but unable to read board or see TV. Uses special sunglasses outside & magnifying glasses.   Hearing:   WFL  Perception:   WFL  Sensation:   reports numbness and tingling in (R) LE , (L) LE  Proprioception:    WFL  Tone:   Normotonic  Coordination Testing:   WFL    ROM:   (B) UE AROM WFL  Strength:   4/5 , 4+/5 elbows, 4+/5 R shoulder    Decision Making: low complexity  Clinical Presentation: stable      Subjective  General: Pt seated in recliner on arrival, very pleasant and agreeable to OT eval.Pt reports 5/10 pain in buttocks. RN notified. Pt has Lidocaine patch. Pt reports she has been lightheaded for a while when she gets up and walks at home. Pain: 5/10. Location: buttocks  (chronic since 2015)  Pain Interventions: pain medication in place prior to arrival and RN notified        Activities of Daily Living  Basic Activities of Daily Living  Grooming: setup assistance  Grooming Comments: wash face seated on EOB  Upper Extremity Bathing: setup assistance supervision. Equipment: none  Lower Extremity Bathing: stand by assistance . Comment: ant & posterior eduarda hygiene in stance. Equipment: none  Upper Extremity Dressing: minimal assistance . Comment: gown. Equipment: none  Lower Extremity Dressing: setup assistance stand by assistance . Comment: S don/doff socks seated on EOB; SBA clothing mgt in stance. Equipment: none  Comment: Noted redness and bleeding under R abdomen above fold. RN notified. Instrumental Activities of Daily Living  No IADL completed on this date. Functional Mobility  Bed Mobility  Supine to Sit: supervision  Scooting: supervision  Comments:HOB elevated  Transfers  Sit to stand transfer:stand by assistance  Stand to sit transfer: stand by assistance  Stand step transfer: stand by assistance, contact guard assistance  Comments: No AD  Functional Mobility:  Sitting Balance: supervision. Sitting Balance Comment: seated on EOB, dynamic  Standing Balance: stand by assistance, contact guard assistance. Standing Balance Comment: eduarda hygiene, clothing mgt, functional mobility om room  Functional Mobility: no device.   stand by assistance, contact guard assistance. Activity: hygiene, clothing mgt & functional mobility around room  Comment: Stood 3 min for functional mobility in room, stood ~5-7 min for eduarda hygiene, clothing mgt, functional mobility in room        Functional Outcomes  AM-PAC Inpatient Daily Activity Raw Score: 20    Cognition  WFL  Orientation:    alert and oriented x 4  Command Following:   [facility]     Education  Barriers To Learning: visual  Patient Education: patient educated on OT role and benefits, plan of care, discharge recommendations  Learning Assessment:  patient verbalizes and demonstrates understanding    Assessment  Activity Tolerance: Seated BP: 96/66, SpO2=93% on RA, Standing BP: 128/73 (MAP 92), Standing at end of session: 106/75, SpO2=92-93%; nursing made aware  Impairments Requiring Therapeutic Intervention: decreased functional mobility, decreased ADL status, decreased endurance  Prognosis: good  Clinical Assessment: Pt is below her baseline level of occupational function, based on the above deficits associated with COVID. Pt able to tolerate ambulating in room w/SBA/CGA and SBA for transfers. SBA for standing ADLs. Pt would benefit from continued skilled acute OT services to address these deficits an return her back to her PLOF. Safety Interventions: patient left in chair, chair alarm in place, call light within reach, gait belt, and nurse notified    Plan  Frequency: 3-5 x/per week  Current Treatment Recommendations: functional mobility training, transfer training, endurance training, ADL/self-care training, and IADL training    Goals  Patient Goals: To return home   Short Term Goals:  Time Frame: Discharge  Patient will complete upper body ADL at modified independent   Patient will complete lower body ADL at modified independent   Patient will complete toileting at modified independent   Patient will complete grooming at modified independent, .   Comment in stance at sink   Patient will complete functional transfers at modified independent   Patient will complete functional mobility at modified independent     Therapy Session Time     Individual Group Co-treatment   Time In    1056   Time Out    1223   Minutes    87        Timed Code Treatment Minutes:   72 min  Total Treatment Minutes:  87 min       Electronically Signed By: ZAYNAB Gutierrez., OTR/L, [id number]

## 2022-07-22 NOTE — CARE COORDINATION
Discharge Planning Assessment    RN discharge planner met with patient to discuss reason for admission, current living situation, and potential needs at the time of discharge    Demographics/Insurance verified Yes [employer] [facility] Medicare & Medicaid)    Current type of dwelling: single story home    Patient from ECF/ confirmed with: n/a    Living arrangements: w/dog    Level of function/Support: family/friends    PCP: Omid Cary    Last Visit to PCP: about 1 month ago    DME: cane, wheelchair, walker, shower chair, chair lift    Active with any community resources/agencies/skilled home care: [facility], [facility], [facility]    Medication compliance issues: none    Financial issues that could impact healthcare: none    Tentative discharge plan:  Discussed and provided facilities of choice if transition to a skilled nursing facility is required at the time of discharge  Patient stated she is legally blind & intends to go home at discharge. She stated a nurse from Dr. Pratt Goes office comes to her home about her wound and she has assistance with cleaning & shopping. She noted that she will need transport to get home and is able to use a [employer] or [employer]. Discussed with patient and/or family that on the day of discharge home tentative time of discharge will be between 10 AM and noon. Transportation at the time of discharge: patient can use a [employer] or [employer]. She has a ramp to get in to her home, no steps.

## 2022-07-22 NOTE — CONSULTS
Bedside Debridement Procedure Note    Procedure: Sharp excisional debridement of epidermis, dermis, and subcutaneous tissue on sacral decubitus ulcer stage 3    Anesthesia: topical lidocaine    Procedure Details   Using curette the wound was sharply debrided    down through and including the removal of epidermis, dermis, and subcutaneous tissue. Devitalized Tissue Debrided: fibrin, biofilm, and slough    Pre Debridement Measurements: 7x2cm    Post  Debridement Measurements: same    Total Surface Area Debrided:  14 sq cm     Bleeding:  Minimal    Hemostasis Achieved:  by pressure    Procedural Pain:  2  / 10     Post Procedural Pain:  2 / 10     Response to treatment:  Well tolerated by patient. Post-procedure dressing/plan:    Continue local wound care per wound clinic instructions  Pressure relieving maneuvers  No signs of infection  Can discharge from surgical standpoint and follow electively in clinic as scheduled    Dakota Mccarthy MD, FACS  7/22/2022  6:44 PM

## 2022-07-22 NOTE — PLAN OF CARE
Problem: Safety - Adult  Goal: Free from fall injury  7/22/2022 0944 by Quoc Bowen RN  Outcome: Progressing  7/22/2022 0230 by Ward Frazier RN  Outcome: Progressing  7/22/2022 0230 by Ward Frazier RN  Outcome: Progressing     Problem: ABCDS Injury Assessment  Goal: Absence of physical injury  7/22/2022 0944 by Quoc Bowen RN  Outcome: Progressing  7/22/2022 0230 by Ward Frazier RN  Outcome: Progressing  7/22/2022 0230 by Ward Frazier RN  Outcome: Progressing     Problem: Skin/Tissue Integrity  Goal: Absence of new skin breakdown  Description: 1. Monitor for areas of redness and/or skin breakdown  2. Assess vascular access sites hourly  3. Every 4-6 hours minimum:  Change oxygen saturation probe site  4. Every 4-6 hours:  If on nasal continuous positive airway pressure, respiratory therapy assess nares and determine need for appliance change or resting period. 7/22/2022 0944 by Quoc Bowen RN  Outcome: Progressing  7/22/2022 0230 by Ward Frazier RN  Outcome: Progressing  7/22/2022 0230 by Ward Frazier RN  Outcome: Progressing     Problem: Confusion  Goal: Confusion, delirium, dementia, or psychosis is improved or at baseline  Description: INTERVENTIONS:  1. Assess for possible contributors to thought disturbance, including medications, impaired vision or hearing, underlying metabolic abnormalities, dehydration, psychiatric diagnoses, and notify attending LIP  2. [place] high risk fall precautions, as indicated  3. Provide frequent short contacts to provide reality reorientation, refocusing and direction  4. Decrease environmental stimuli, including noise as appropriate  5. Monitor and intervene to maintain adequate nutrition, hydration, elimination, sleep and activity  6. If unable to ensure safety without constant attention obtain sitter and review sitter guidelines with assigned personnel  7.  Initiate Psychosocial CNS and Spiritual Care consult, as indicated  7/22/2022 0032 by Carito Suazo RN  Outcome: Progressing  Flowsheets (Taken 7/22/2022 2862)  Effect of thought disturbance (confusion, delirium, dementia, or psychosis) are managed with adequate functional status: Assess for contributors to thought disturbance, including medications, impaired vision or hearing, underlying metabolic abnormalities, dehydration, psychiatric diagnoses, notify LIP  7/22/2022 0230 by Basil Hilario RN  Outcome: Progressing  7/22/2022 0230 by Basil Hilario RN  Outcome: Progressing     Problem: Pain  Goal: Verbalizes/displays adequate comfort level or baseline comfort level  Outcome: Progressing  Flowsheets (Taken 7/22/2022 0847)  Verbalizes/displays adequate comfort level or baseline comfort level: Encourage patient to monitor pain and request assistance     Pt. Resting in chair at this time. VSS. Denies pain. Took meds well. Got her on 1 L N/C. Has Lidocaine on coccyx wound for surgery to do debridement. Pt. Aware of this. Will monitor. Call light in reach.

## 2022-07-22 NOTE — PROGRESS NOTES
07/22/22 1715   Resting (Room Air)   SpO2 93   HR 84   During Walk (Room Air)   SpO2 93   HR 89   Walk/Assistance Device Ambulation   Rate of Dyspnea 0   During Walk (On O2)   Need Additional O2 Flow Rate Rows No   After Walk   SpO2 92   HR 84   Does the Patient Qualify for Home O2 No   Does the Patient Need Portable Oxygen Tanks No

## 2022-07-22 NOTE — PLAN OF CARE
Problem: Safety - Adult  Goal: Free from fall injury  7/22/2022 0230 by Agustín Goins RN  Outcome: Progressing  7/22/2022 0230 by Agustín Goins RN  Outcome: Progressing     Problem: ABCDS Injury Assessment  Goal: Absence of physical injury  7/22/2022 0230 by Agustín Goins RN  Outcome: Progressing  7/22/2022 0230 by Agustín Goins RN  Outcome: Progressing     Problem: Skin/Tissue Integrity  Goal: Absence of new skin breakdown  Description: 1. Monitor for areas of redness and/or skin breakdown  2. Assess vascular access sites hourly  3. Every 4-6 hours minimum:  Change oxygen saturation probe site  4. Every 4-6 hours:  If on nasal continuous positive airway pressure, respiratory therapy assess nares and determine need for appliance change or resting period. 7/22/2022 0230 by Agustín Goins RN  Outcome: Progressing  7/22/2022 0230 by Agustín Goins RN  Outcome: Progressing     Problem: Confusion  Goal: Confusion, delirium, dementia, or psychosis is improved or at baseline  Description: INTERVENTIONS:  1. Assess for possible contributors to thought disturbance, including medications, impaired vision or hearing, underlying metabolic abnormalities, dehydration, psychiatric diagnoses, and notify attending LIP  2. [place] high risk fall precautions, as indicated  3. Provide frequent short contacts to provide reality reorientation, refocusing and direction  4. Decrease environmental stimuli, including noise as appropriate  5. Monitor and intervene to maintain adequate nutrition, hydration, elimination, sleep and activity  6. If unable to ensure safety without constant attention obtain sitter and review sitter guidelines with assigned personnel  7. Initiate Psychosocial CNS and Spiritual Care consult, as indicated  7/22/2022 0230 by Agustín Goins RN  Outcome: Progressing  7/22/2022 0230 by Agustín Goisn RN  Outcome: Progressing    Patient alert and oriented - cognitive barrier/aphasia/forgetful.  Patient

## 2022-07-22 NOTE — DISCHARGE INSTR - COC
Continuity of Care Form    Patient Name: Renetta Harper   :  1950  MRN:  [id number]    Admit date:  2022  Discharge date:  22    Code Status Order: Full Code   Advance Directives:     Admitting Physician:  Jefferson Oswald MD  PCP: Josefina Villalta MD    Discharging Nurse: Karan Haines [address] Unit/Room#: 4AW-2574/4214-30  Discharging Unit Phone Number: [phone]    Emergency Contact:   Extended Emergency Contact Information  Primary Emergency Contact: [address] Phone: [phone]  Mobile Phone: [phone]  Relation: Other  Secondary Emergency Contact: [address] Phone: [phone]  Mobile Phone: [phone]  Relation: Niece/Nephew    Past Surgical History:  Past Surgical History:   Procedure Laterality Date    CARPAL TUNNEL RELEASE      bilateral    CHOLECYSTECTOMY      COLONOSCOPY      ELBOW SURGERY      ENDOSCOPY, COLON, DIAGNOSTIC      EYE SURGERY      FOOT SURGERY      SHOULDER SURGERY         Immunization History:   Immunization History   Administered Date(s) Administered    Influenza, Quadv, IM, PF (6 mo and older Fluzone, Flulaval, Fluarix, and 3 yrs and older Afluria) 10/02/2020    PPD Test 2019       Active Problems:  Patient Active Problem List   Diagnosis Code    WALLER (dyspnea on exertion) R06.00    Abnormal CT of the chest R93.89    SIRS (systemic inflammatory response syndrome) ([facility]) R65.10    Sepsis ([facility] [address] A41.9    Sacral wound, initial encounter S31.000A    Pressure 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, stage 3 ([facility]) L89.43    Poor mobility Z74.09    Stage 3 skin ulcer of sacral region [facility]) L98.429    Hypotension I95.9    Type 2 diabetes mellitus with hyperglycemia, with long-term current use of insulin ([facility] [address] E11.65, Z79.4    Pneumonia due to COVID-19 virus U07.1, J12.82    Hypoxia R09.02       Isolation/Infection:   Isolation            Droplet Plus  Droplet Plus          Patient Infection Status       Infection Onset Added Last Indicated Last Indicated By Review Planned Expiration Resolved Resolved By    COVID-19 07/21/22 07/21/22 07/21/22 COVID-19, Rapid 07/31/22 08/04/22      Resolved    COVID-19 (Rule Out) 07/21/22 07/21/22 07/21/22 COVID-19, Rapid (Ordered)   07/21/22 Rule-Out Test Resulted    COVID-19 (Rule Out) 09/30/20 09/30/20 09/30/20 COVID-19 (Ordered)   10/01/20 Rule-Out Test Resulted    C-diff Rule Out  12/30/19 12/30/19 Clostridium difficile toxin/antigen (Ordered)   12/30/19 Rule-Out Test Resulted            Nurse Assessment:  Last Vital Signs: BP (!) 98/56   Pulse 87   Temp 98.3 °F (36.8 °C) (Oral)   Resp 16   Ht 5' 5\" (1.651 m)   Wt 221 lb (100.2 kg)   SpO2 94%   BMI 36.78 kg/m²     Last documented pain score (0-10 scale): Pain Level: 0  Last Weight:   Wt Readings from Last 1 Encounters:   07/21/22 221 lb (100.2 kg)     Mental Status:  oriented and alert    IV Access:  - None    Nursing Mobility/ADLs:  Walking   Independent  Transfer  Independent  Bathing  Independent  Dressing  Independent  Toileting  Independent  Feeding  Independent  Med [address]  Independent  Med Delivery   whole    Wound Care Documentation and Therapy:  Wound 06/16/22 Coccyx Mid #1 (Active)   Wound Etiology Pressure Stage 3 07/22/22 0847   Dressing Status New dressing applied 07/22/22 0847   Wound Cleansed Cleansed with saline 07/22/22 0847   Dressing/Treatment Moist to dry 07/22/22 0847   Wound Length (cm) 7 cm 07/13/22 0913   Wound Width (cm) 2 cm 07/13/22 0913   Wound Depth (cm) 3 cm 07/13/22 0913   Wound Surface Area (cm^2) 14 cm^2 07/13/22 0913   Change in Wound Size % (l*w) 29.29 07/13/22 0913   Wound Volume (cm^3) 42 cm^3 07/13/22 0913   Wound Healing % 34 07/13/22 0913   Post-Procedure Length (cm) 7 cm 07/13/22 0934   Post-Procedure Width (cm) 2 cm 07/13/22 0934   Post-Procedure Depth (cm) 3 cm 07/13/22 0934   Post-Procedure Surface Area (cm^2) 14 cm^2 07/13/22 0934   Post-Procedure Volume (cm^3) 42 cm^3 07/13/22 0934   Wound

## 2022-07-22 NOTE — PROGRESS NOTES
Nutrition Note    RECOMMENDATIONS  PO Diet: Continue current diet   ONS: Offer Ensure High Protein BID  Nutrition Support: None  Nursing: Obtain actual weight. Current weight is stated. NUTRITION ASSESSMENT   Pt with increased nutrient needs related to stage III pressure ulcer to coccyx. Note % x 1 meal consumed so far during admission. No actual weight hx available for review in EMR. Pt did not report any issues with appetite/PO intake or weight loss prior to admission. Will offer Ensure High Protein BID to promote wound healing. Nutrition Related Findings: Na+ 133. Non-pitting BLE edema. LBM 7/21. Wounds: Stage III, Pressure Injury  Nutrition Education:  Education not indicated   Nutrition Goals: PO intake 50% or greater     MALNUTRITION ASSESSMENT   Malnutrition Status: No malnutrition    NUTRITION DIAGNOSIS   Increased nutrient needs related to increase demand for energy/nutrients as evidenced by wounds    CURRENT NUTRITION THERAPIES  ADULT DIET; Regular; 4 carb choices (60 gm/meal); Low Sodium (2 gm)     PO Intake: %   PO Supplement Intake:None Ordered    ANTHROPOMETRICS  Current Height: 5' 5\" (165.1 cm)  Current Weight: 221 lb (100.2 kg)    Ideal Body Weight (IBW): 125 lbs  (57 kg)        BMI: 36.8    The patient will be monitored per nutrition standards of care. Consult dietitian if additional nutrition interventions are needed prior to RD reassessment.      Sanjay Schilling, [address],     Contact: [phone]

## 2022-07-22 NOTE — DISCHARGE SUMMARY
admission. Followed by surgery at the wound clinic.  s/p excisional debridement 7/13/2022. Continue local wound care and outpatient follow-up with Dr. Trent Capellan. Chronic back pain: Continued tramadol. Hypothyroidism: Continued levothyroxine. T2 DM on long-term insulin with hyperglycemia:  A1c pending. Resumed home doses of insulin on discharge. Hyperlipidemia: Continued statin. Hypotension: Discontinued Aldactone and Lasix. Consults. IP CONSULT TO HOSPITALIST  IP CONSULT TO GENERAL SURGERY  IP CONSULT TO DIETITIAN    Physical examination on discharge day. BP (!) 98/56   Pulse 87   Temp 98.3 °F (36.8 °C) (Oral)   Resp 16   Ht 5' 5\" (1.651 m)   Wt 221 lb (100.2 kg)   SpO2 94%   BMI 36.78 kg/m²   General appearance. Alert. Looks comfortable. HEENT. Sclera clear. Moist mucus membranes. Cardiovascular. Regular rate and rhythm, normal S1, S2. No murmur. Respiratory. Not using accessory muscles. Decreased air entry at the bases  Gastrointestinal. Abdomen soft, non-tender, not distended, normal bowel sounds  Neurology. Facial symmetry. No speech deficits. Moving all extremities equally. Extremities. No edema in lower extremities. Skin. Warm, dry, normal turgor. Stage III sacral ulcer        Condition at time of discharge: Stable    Medication instructions provided to patient at discharge. Medication List        START taking these medications      dexamethasone 4 MG tablet  Commonly known as: Decadron  Take 1 tablet by mouth daily (with breakfast) for 5 days     miconazole 2 % powder  Commonly known as: MICOTIN  Apply topically 2 times daily to abdominal folds.             CONTINUE taking these medications      acetaminophen 500 MG tablet  Commonly known as: TYLENOL     aspirin EC 81 MG EC tablet     CALCIUM + D PO     DICLOFENAC PO     DULoxetine 60 MG extended release capsule  Commonly known as: CYMBALTA     HUMALOG SC     LEVEMIR FLEXPEN SC     levothyroxine 200 MCG tablet  Commonly known as: SYNTHROID     simvastatin 40 MG tablet  Commonly known as: ZOCOR     traMADol 50 MG tablet  Commonly known as: ULTRAM     Travoprost (OMAR Free) 0.004 % Soln ophthalmic solution  Commonly known as: TRAVATAN Z            STOP taking these medications      furosemide 40 MG tablet  Commonly known as: LASIX     mometasone-formoterol 200-5 MCG/ACT inhaler  Commonly known as: Dulera     sodium chloride 0.65 % nasal spray  Commonly known as: OCEAN, BABY AYR     spironolactone 25 MG tablet  Commonly known as: ALDACTONE               Where to Get Your Medications        These medications were sent to [facility] [id number] [facility], [address] [place] [address], [address] [place] [address] Street      Phone: [phone]   dexamethasone 4 MG tablet  miconazole 2 % powder         Discharge recommendations given to patient. Follow Up. With PCP in 1 week   Disposition. [facility] with [facility]  Activity. activity as tolerated  Diet: ADULT DIET; Regular; 4 carb choices (60 gm/meal); Low Sodium (2 gm)      Spent 35 minutes in discharge process.     Signed:  Danna Paz MD     7/22/2022 4:50 PM

## 2022-07-22 NOTE — CARE COORDINATION
Patient as wound consult for pre-existing wound to coccyx. Patient sees Dr Sarah Eugene in the wound clinic, her last appointment was 7/13. Per chart review dressing treatment included: Aquacel Silver Rope, Abd Pad- change Daily. Turn & reposition every 1-2 hours and as needed for pressure relief and comfort. Keep pressure off of your wound as much as possible. Spoke to nurse Zaki, Dr Sarah Eugene plans to debride wound later today. Will defer to general surgery for dressing change orders.   VERONIQUE Burnham, GARRETTN, RN, [facility], [address] [facility]  [phone]

## 2022-07-22 NOTE — PROGRESS NOTES
Hospitalist Progress Note      PCP: Mindy Stoll MD    Date of Admission: 7/21/2022    Chief Complaint: Low blood pressure    Hospital Course:  Mert Oswald is a 66 y.o. female with history of DM, chronic sacral pressure ulcer followed at the wound clinic, HTN, HLD, chronic HFpEF presented with c/o generalized body aches, malaise and fatigue. She denied any fever, chills, nausea or vomiting, diarrhea, urinary symptoms but endorsed a nonproductive cough with no shortness of breath or chest pain. In the ED she tested positive for COVID. Influenza A and B were negative. Oxygen saturation was noted to be 89% on room air and 96% on 2 L nasal cannula. X-ray showed bibasilar hypoaeration but no acute process. Subjective: Patient seen and examined. No interval events. Denies any complaints. No chest pain, SOB. Malaise and generalized weakness improved. Low-grade fevers noted.     Medications:  Reviewed    Infusion Medications    dextrose      sodium chloride       Scheduled Medications    insulin lispro  10 Units SubCUTAneous TID WC    insulin glargine  30 Units SubCUTAneous Nightly    insulin lispro  0-8 Units SubCUTAneous TID WC    insulin lispro  0-4 Units SubCUTAneous Nightly    miconazole   Topical BID    aspirin EC  81 mg Oral Daily    atorvastatin  40 mg Oral Daily    DULoxetine  60 mg Oral Daily    levothyroxine  200 mcg Oral Daily    sodium chloride flush  5-40 mL IntraVENous 2 times per day    enoxaparin  40 mg SubCUTAneous BID    dexamethasone  6 mg IntraVENous Q24H    pantoprazole  40 mg Oral QAM AC     PRN Meds: sodium chloride, traMADol, dextrose bolus **OR** dextrose bolus, glucagon (rDNA), dextrose, sodium chloride flush, sodium chloride, ondansetron **OR** ondansetron, polyethylene glycol, acetaminophen **OR** acetaminophen, guaiFENesin-dextromethorphan      Intake/Output Summary (Last 24 hours) at 7/22/2022 1246  Last data filed at 7/22/2022 1039  Gross per 24 hour   Intake 250 ml   Output 350 ml   Net -100 ml       Physical Exam Performed:    /73   Pulse 91   Temp 98.3 °F (36.8 °C) (Oral)   Resp 16   Ht 5' 5\" (1.651 m)   Wt 221 lb (100.2 kg)   SpO2 93%   BMI 36.78 kg/m²     General appearance: No apparent distress, appears stated age and cooperative. HEENT: Pupils equal, round, and reactive to light. Conjunctivae clear. Neck: Supple, with full range of motion. No jugular venous distention. Trachea midline. Respiratory:  Normal respiratory effort. Clear to auscultation, bilaterally without Rales/Wheezes/Rhonchi. Cardiovascular: Regular rate and rhythm with normal S1/S2 without murmurs, rubs or gallops. Abdomen: Soft, non-tender, non-distended with normal bowel sounds. Musculoskeletal: No clubbing, cyanosis or edema bilaterally. Full range of motion without deformity. Skin: Skin color, texture, turgor normal.  Stage 3 sacral ulcer. Neurologic:  Neurovascularly intact without any focal sensory/motor deficits. Cranial nerves: II-XII intact, grossly non-focal.  Psychiatric: Alert and oriented, thought content appropriate, normal insight  Capillary Refill: Brisk,3 seconds, normal   Peripheral Pulses: +2 palpable, equal bilaterally       Labs:   Recent Labs     07/21/22 1611 07/22/22  0607   WBC 4.9 4.2   HGB 13.2 12.9   HCT 40.8 40.0   * 113*     Recent Labs     07/21/22  1611 07/22/22  0607    133*   K 4.3 4.6    98*   CO2 27 26   BUN 17 16   CREATININE 0.9 0.7   CALCIUM 9.2 9.0   PHOS  --  4.0     Recent Labs     07/21/22  1611 07/22/22  0607   AST 16 17   ALT 9* 12   BILITOT 1.0 0.8   ALKPHOS 57 57     No results for input(s): INR in the last 72 hours.   Recent Labs     07/21/22 1611   TROPONINI <0.01       Urinalysis:      Lab Results   Component Value Date/Time    NITRU Negative 09/30/2020 07:34 PM    WBCUA 6 09/30/2020 07:34 PM    BACTERIA 1+ 09/30/2020 07:34 PM    RBCUA None seen 09/30/2020 07:34 PM    BLOODU Negative 09/30/2020 07:34 PM SPECGRAV 1.025 09/30/2020 07:34 PM    GLUCOSEU Negative 09/30/2020 07:34 PM       Radiology:  XR CHEST PORTABLE   Final Result   No acute process. Bibasilar hypoaeration                 Assessment/Plan:    Active Hospital Problems    Diagnosis     COVID [U07.1]      Priority: Medium       COVID-19 PNA with hypoxia:  Continue Decadron. Supplemental oxygen and wean off as tolerated. Stage III sacral decubitus ulcer:  Present on admission. Followed by surgery at the wound clinic.  s/p excisional debridement 7/13/2022. Continue local wound care. Wound care consult appreciated. Chronic back pain: Continue tramadol. Hypothyroidism: Follow-up TSH. Continue levothyroxine. T2 DM on long-term insulin with hyperglycemia:  Follow-up A1c. Monitor fingersticks basal/prandial insulin with CDI. Hyperlipidemia: Continue statin. HTN:   BP borderline low. Hold Aldactone and Lasix for now. DVT Prophylaxis: Lovenox  Diet: ADULT DIET; Regular; 4 carb choices (60 gm/meal); Low Sodium (2 gm)  Code Status: Full Code    PT/OT Eval Status: Pending    Dispo -once medically stable. Anticipate DC in the a.m.     Ena Primrose, MD

## 2022-07-23 LAB — L. PNEUMOPHILA SEROGP 1 UR AG: NORMAL

## 2022-07-25 ENCOUNTER — CARE COORDINATION (OUTPATIENT)
Dept: CASE MANAGEMENT | Age: 72
End: 2022-07-25

## 2022-07-25 DIAGNOSIS — U07.1 COVID: Primary | ICD-10-CM

## 2022-07-25 PROCEDURE — 1111F DSCHRG MED/CURRENT MED MERGE: CPT | Performed by: INTERNAL MEDICINE

## 2022-07-25 NOTE — CARE COORDINATION
understanding of administration of home medications. Advised obtaining a 90-day supply of all daily and as-needed medications. Was patient discharged with a pulse oximeter? no    LPN CC provided contact information. Plan for follow-up call in 5-7 days based on severity of symptoms and risk factors. Plan for next call: symptom management-Cough  follow up appointment-F/u with PCP? This [facility] spoke with pt and pt stated that she is doing well. Patient denied any worsening symptoms. Denied fever, chills, N/V and any difficulty breathing at this time. Pt does still have a persistent cough. Cough is mildly productive with light yellow sputum. Medication review performed and patient verbalized understanding and is taking all medications as prescribed. Denied chest pain and SOB. Denied difficulty with urination, BMs or appetite. Pt will f/u with PCP after ten day quarantine. Denied any needs and concerns at this time. Pt has [facility] through [address] and nurse will be out today. Advised pt to immediately report any worsening symptoms to the PCP. Patient verbalized understanding and agreed.   Calista Lopez LPN, [facility]  PH: [phone]        Care Transitions 24 Hour Call    Schedule Follow Up Appointment with PCP: Completed  Do you have a copy of your discharge instructions?: Yes  Do you have all of your prescriptions and are they filled?: Yes  Have you been contacted by a [facility] Pharmacist?: No  Have you scheduled your follow up appointment?: Yes  How are you going to get to your appointment?: Car - family or friend to transport  Do you have support at home?: Alone  Do you feel like you have everything you need to keep you well at home?: Yes  Are you an active caregiver in your home?: No  Care Transitions Interventions   Home Care Waiver: Completed  Other Services: Completed      DME Assistance: Completed            Follow Up  Future Appointments   Date Time Provider Jatin Hughes   8/10/2022  9:00 AM [facility]   8/24/2022  9:00 AM Vu Camarillo MD [address], [place]

## 2022-07-26 ENCOUNTER — CARE COORDINATION (OUTPATIENT)
Dept: CASE MANAGEMENT | Age: 72
End: 2022-07-26

## 2022-07-27 ENCOUNTER — HOSPITAL ENCOUNTER (OUTPATIENT)
Dept: WOUND CARE | Age: 72
Discharge: HOME | End: 2022-07-27

## 2022-08-03 ENCOUNTER — CARE COORDINATION (OUTPATIENT)
Dept: CASE MANAGEMENT | Age: 72
End: 2022-08-03

## 2022-08-10 ENCOUNTER — HOSPITAL ENCOUNTER (OUTPATIENT)
Dept: WOUND CARE | Age: 72
Discharge: HOME | End: 2022-08-10
Payer: MEDICARE

## 2022-08-10 VITALS
DIASTOLIC BLOOD PRESSURE: 75 MMHG | TEMPERATURE: 96.8 F | HEART RATE: 92 BPM | RESPIRATION RATE: 16 BRPM | SYSTOLIC BLOOD PRESSURE: 116 MMHG

## 2022-08-10 DIAGNOSIS — L89.43 PRESSURE INJURY OF CONTIGUOUS REGION INVOLVING BACK AND BUTTOCK, STAGE 3, UNSPECIFIED LATERALITY (HCC): ICD-10-CM

## 2022-08-10 DIAGNOSIS — S31.000A SACRAL WOUND, INITIAL ENCOUNTER: ICD-10-CM

## 2022-08-10 DIAGNOSIS — Z74.09 POOR MOBILITY: Primary | ICD-10-CM

## 2022-08-10 PROCEDURE — 11042 DBRDMT SUBQ TIS 1ST 20SQCM/<: CPT

## 2022-08-10 PROCEDURE — 11042 DBRDMT SUBQ TIS 1ST 20SQCM/<: CPT | Performed by: SURGERY

## 2022-08-10 RX ORDER — LIDOCAINE 40 MG/G
CREAM TOPICAL ONCE
Status: DISCONTINUED | OUTPATIENT
Start: 2022-08-10 | End: 2022-08-11 | Stop reason: HOSPADM

## 2022-08-10 RX ORDER — GINSENG 100 MG
CAPSULE ORAL ONCE
Status: CANCELLED | OUTPATIENT
Start: 2022-08-10 | End: 2022-08-10

## 2022-08-10 RX ORDER — LIDOCAINE 40 MG/G
CREAM TOPICAL ONCE
Status: CANCELLED | OUTPATIENT
Start: 2022-08-10 | End: 2022-08-10

## 2022-08-10 RX ORDER — LIDOCAINE HYDROCHLORIDE 40 MG/ML
SOLUTION TOPICAL ONCE
Status: CANCELLED | OUTPATIENT
Start: 2022-08-10 | End: 2022-08-10

## 2022-08-10 RX ORDER — LIDOCAINE HYDROCHLORIDE 20 MG/ML
JELLY TOPICAL ONCE
Status: CANCELLED | OUTPATIENT
Start: 2022-08-10 | End: 2022-08-10

## 2022-08-10 RX ORDER — CLOBETASOL PROPIONATE 0.5 MG/G
OINTMENT TOPICAL ONCE
Status: CANCELLED | OUTPATIENT
Start: 2022-08-10 | End: 2022-08-10

## 2022-08-10 RX ORDER — BACITRACIN ZINC AND POLYMYXIN B SULFATE 500; 1000 [USP'U]/G; [USP'U]/G
OINTMENT TOPICAL ONCE
Status: CANCELLED | OUTPATIENT
Start: 2022-08-10 | End: 2022-08-10

## 2022-08-10 RX ORDER — BETAMETHASONE DIPROPIONATE 0.05 %
OINTMENT (GRAM) TOPICAL ONCE
Status: CANCELLED | OUTPATIENT
Start: 2022-08-10 | End: 2022-08-10

## 2022-08-10 RX ORDER — LIDOCAINE 50 MG/G
OINTMENT TOPICAL ONCE
Status: CANCELLED | OUTPATIENT
Start: 2022-08-10 | End: 2022-08-10

## 2022-08-10 RX ORDER — BACITRACIN, NEOMYCIN, POLYMYXIN B 400; 3.5; 5 [USP'U]/G; MG/G; [USP'U]/G
OINTMENT TOPICAL ONCE
Status: CANCELLED | OUTPATIENT
Start: 2022-08-10 | End: 2022-08-10

## 2022-08-10 RX ORDER — GENTAMICIN SULFATE 1 MG/G
OINTMENT TOPICAL ONCE
Status: CANCELLED | OUTPATIENT
Start: 2022-08-10 | End: 2022-08-10

## 2022-08-10 ASSESSMENT — PAIN DESCRIPTION - FREQUENCY: FREQUENCY: INTERMITTENT

## 2022-08-10 ASSESSMENT — PAIN - FUNCTIONAL ASSESSMENT: PAIN_FUNCTIONAL_ASSESSMENT: ACTIVITIES ARE NOT PREVENTED

## 2022-08-10 ASSESSMENT — PAIN SCALES - GENERAL: PAINLEVEL_OUTOF10: 5

## 2022-08-10 ASSESSMENT — PAIN DESCRIPTION - LOCATION: LOCATION: COCCYX

## 2022-08-10 ASSESSMENT — PAIN DESCRIPTION - ONSET: ONSET: ON-GOING

## 2022-08-10 ASSESSMENT — PAIN DESCRIPTION - DESCRIPTORS: DESCRIPTORS: ACHING

## 2022-08-10 ASSESSMENT — PAIN DESCRIPTION - PAIN TYPE: TYPE: CHRONIC PAIN

## 2022-08-10 NOTE — PROGRESS NOTES
obtain saline, may use a gentle soap and water. Dressing care: Collagen, Aquacel Silver Rope, Abd Pad- change Daily. Turn & reposition every 1-2 hours and as needed for pressure relief and comfort. Keep pressure off of your wound as much as possible. Eat plenty of protein     Home Care Agency/Facility: [facility]     Your wound-care supplies will be provided by:  Please note, depending on your insurance coverage, you may have out-of-pocket expenses for these supplies. Someone from the company should call you to confirm your order and discuss those potential costs before they ship your products -- please anticipate that call. If your out-of-pocket cost could be substantial, Many companies have financial hardship programs for patients who qualify, so please ask about that if you might need a hand. If you have any questions about your supplies or your potential out-of-pocket costs, or if you need to place an order for a refill of supplies (typically monthly), please call the company directly. Your  is Jacqueline Barragan     Follow up with Dr Keyla Dean In 2 week(s) in the wound care center. Wound Care Center Information: Should you experience any significant changes in your wound(s) or have questions about your wound care, please [facility] at [phone] Monday  - Thursday 8:00 am - 4:00 pm and Friday 8:00 am - 1:00pm. If you need help with your wound outside these hours and cannot wait until we are again available, contact your PCP or go to the hospital emergency room. PLEASE NOTE: IF YOU ARE UNABLE TO OBTAIN WOUND SUPPLIES, CONTINUE TO USE THE SUPPLIES YOU HAVE AVAILABLE UNTIL YOU ARE ABLE TO REACH US. IT IS MOST IMPORTANT TO KEEP THE WOUND COVERED AT ALL TIMES. Patient Experience     Thank you for trusting us with your care. You may receive a survey from a company called [employer] asking for your feedback.   We would appreciate it if you took a few minutes to share your experience. Your input is very valuable to us. Skilled nurse to evaluate and treat for wound care. Change dressing as ordered  once a day on Monday, Tuesday, Wednesday, Thursday, Friday, Saturday, and Sunday using clean technique. Patient/Family/caregiver may change dressings as needed as instructed when Home Care unavailable.     WOUNDS REQUIRING DRESSING Changes:     Wound 06/16/22 Coccyx Mid #1 (Active)   Wound Image   06/16/22 1515   Wound Etiology Pressure Stage 3 08/10/22 0932   Dressing Status New dressing applied 07/22/22 0847   Wound Cleansed Cleansed with saline 08/10/22 0950   Dressing/Treatment Moist to dry 07/22/22 0847   Wound Length (cm) 7 cm 08/10/22 0932   Wound Width (cm) 2 cm 08/10/22 0932   Wound Depth (cm) 2.9 cm 08/10/22 0932   Wound Surface Area (cm^2) 14 cm^2 08/10/22 0932   Change in Wound Size % (l*w) 29.29 08/10/22 0932   Wound Volume (cm^3) 40.6 cm^3 08/10/22 0932   Wound Healing % 36 08/10/22 0932   Post-Procedure Length (cm) 7 cm 08/10/22 0950   Post-Procedure Width (cm) 2 cm 08/10/22 0950   Post-Procedure Depth (cm) 2.9 cm 08/10/22 0950   Post-Procedure Surface Area (cm^2) 14 cm^2 08/10/22 0950   Post-Procedure Volume (cm^3) 40.6 cm^3 08/10/22 0950   Wound Assessment Bleeding 08/10/22 0950   Drainage Amount Moderate 08/10/22 0950   Drainage Description Serosanguinous 08/10/22 0932   Odor Mild 08/10/22 0932   Saba-wound Assessment Intact 08/10/22 0932   Margins Defined edges 08/10/22 0932   Number of days: 47          Patient seen and treated on 8/10/2022    By Wendy Ratliff MD NPI: [id number]  (provider/NPI)

## 2022-08-10 NOTE — PROGRESS NOTES
[address] Progress and Procedure Note    Mikey Abad  AGE: 66 y.o. GENDER: female    : 1950  TODAY'S DATE: 8/10/2022    Chief Complaint   Patient presents with    Wound Check     Coccyx        History of Present Illness     Mikey Abad is a 66 y.o. female who presents today for wound evaluation. History of Wound: pressure wound located on the  sacrum . sacral decubitus ulcer stage 3  Wound Pain:  mild  Severity:  2 / 10   Wound Type:  pressure  Modifying Factors:  diabetes, poor glucose control, chronic pressure, decreased mobility, and malnutrition  Associated Signs/Symptoms:  pain    Past Medical History:   Diagnosis Date    Arthritis     back    Cataracts, bilateral     CHF (congestive heart failure) (HCC)     Diabetes mellitus ([address]     Glaucoma     Hyperlipidemia     Hypertension     Poor mobility 2022    Pressure 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, stage 3 ([facility] 2022    Similar wound 2 years ago. Thyroid disease      Past Surgical History:   Procedure Laterality Date    CARPAL TUNNEL RELEASE      bilateral    CHOLECYSTECTOMY      COLONOSCOPY      ELBOW SURGERY      ENDOSCOPY, COLON, DIAGNOSTIC      EYE SURGERY      FOOT SURGERY      SHOULDER SURGERY       Current Outpatient Medications   Medication Sig Dispense Refill    miconazole (MICOTIN) 2 % powder Apply topically 2 times daily to abdominal folds. 45 g 1    traMADol (ULTRAM) 50 MG tablet Take 50 mg by mouth 3 times daily as needed for Pain.       DULoxetine (CYMBALTA) 60 MG extended release capsule Take 1 capsule by mouth daily 30 capsule 3    Travoprost, BAK Free, (TRAVATAN Z) 0.004 % SOLN ophthalmic solution Place 1 drop into both eyes nightly      DICLOFENAC PO Take 100 mg by mouth 2 times daily      acetaminophen (TYLENOL) 500 MG tablet Take 500 mg by mouth every 6 hours as needed for Pain or Fever       aspirin EC 81 MG EC tablet Take 81 mg by mouth daily May restart Protein intake for optimal wound healing  2. No added salt to reduce any swelling  3. If diabetic, maintain good glucose control  4. If you smoke, smoking prohibits wound healing, we ask that you refrain from smoking. 5. When taking antibiotics take the entire prescription as ordered. Do not stop taking until medication is all gone unless otherwise instructed. 6. Exercise as tolerated. 7. Keep weight off wounds and reposition every 2 hours if applicable. 8. If wound(s) is on your lower extremity, elevate legs to the level of the heart or above for 30 minutes 4-5 times a day and/or when sitting. Avoid standing for long periods of time. 9. Do not get wounds wet in bath or shower unless otherwise instructed by your physician. If your wound is on your foot or leg, you may purchase a cast bag. Please ask at the pharmacy. If Vascular testing is ordered, please call [address] [place] [address] [phone]) to schedule. Vascular tests ordered by Wound Care Physicians may take up to 2 hours to complete. Please keep that in mind when scheduling. If Vascular testing is scheduled, please bring supplies to replace your dressing after testing is done. The vascular department does not stock supplies. Wound: Coccyx     With each dressing change, rinse wounds with 0.9% Saline. (May use wound wash or soft contact solution. Both can be purchased at a local drug store). If unable to obtain saline, may use a gentle soap and water. Dressing care: Collagen, Aquacel Silver Rope, Abd Pad- change Daily. Turn & reposition every 1-2 hours and as needed for pressure relief and comfort. Keep pressure off of your wound as much as possible. Eat plenty of protein     Home Care Agency/Facility: [facility]     Your wound-care supplies will be provided by:  Please note, depending on your insurance coverage, you may have out-of-pocket expenses for these supplies.  Someone from the company should call you to confirm your order and discuss those potential costs before they ship your products -- please anticipate that call. If your out-of-pocket cost could be substantial, Many companies have financial hardship programs for patients who qualify, so please ask about that if you might need a hand. If you have any questions about your supplies or your potential out-of-pocket costs, or if you need to place an order for a refill of supplies (typically monthly), please call the company directly. Your  is BEAU     Follow up with Dr Georgia Mann In 2 week(s) in the wound care center. Wound Care Center Information: Should you experience any significant changes in your wound(s) or have questions about your wound care, please [facility] the [facility] at [phone] Monday  - Thursday 8:00 am - 4:00 pm and Friday 8:00 am - 1:00pm. If you need help with your wound outside these hours and cannot wait until we are again available, contact your PCP or go to the hospital emergency room. PLEASE NOTE: IF YOU ARE UNABLE TO OBTAIN WOUND SUPPLIES, CONTINUE TO USE THE SUPPLIES YOU HAVE AVAILABLE UNTIL YOU ARE ABLE TO REACH US. IT IS MOST IMPORTANT TO KEEP THE WOUND COVERED AT ALL TIMES. Patient Experience     Thank you for trusting us with your care. You may receive a survey from a company called [employer] asking for your feedback. We would appreciate it if you took a few minutes to share your experience. Your input is very valuable to us. Dejan [address]  Georgia Mann MD, FACS  8/10/2022  2:32 PM

## 2022-08-10 NOTE — DISCHARGE INSTRUCTIONS
[address], [address]  Telephone: [phone]     Discharge Instructions     Important reminders:     **If you have any signs and symptoms of illness (Cough, fever, congestion, nausea, vomiting, diarrhea, etc.) please call the wound care center prior to your appointment. 1. Increase Protein intake for optimal wound healing  2. No added salt to reduce any swelling  3. If diabetic, maintain good glucose control  4. If you smoke, smoking prohibits wound healing, we ask that you refrain from smoking. 5. When taking antibiotics take the entire prescription as ordered. Do not stop taking until medication is all gone unless otherwise instructed. 6. Exercise as tolerated. 7. Keep weight off wounds and reposition every 2 hours if applicable. 8. If wound(s) is on your lower extremity, elevate legs to the level of the heart or above for 30 minutes 4-5 times a day and/or when sitting. Avoid standing for long periods of time. 9. Do not get wounds wet in bath or shower unless otherwise instructed by your physician. If your wound is on your foot or leg, you may purchase a cast bag. Please ask at the pharmacy. If Vascular testing is ordered, please call [address] [place] [address] [phone]) to schedule. Vascular tests ordered by Wound Care Physicians may take up to 2 hours to complete. Please keep that in mind when scheduling. If Vascular testing is scheduled, please bring supplies to replace your dressing after testing is done. The vascular department does not stock supplies. Wound: Coccyx     With each dressing change, rinse wounds with 0.9% Saline. (May use wound wash or soft contact solution. Both can be purchased at a local drug store). If unable to obtain saline, may use a gentle soap and water. Dressing care: Collagen, Aquacel Silver Rope, Abd Pad- change Daily.  Turn & reposition every 1-2 hours and as needed for pressure relief and comfort. Keep pressure off of your wound as much as possible. Eat plenty of protein     Home Care Agency/Facility: [facility]     Your wound-care supplies will be provided by:  Please note, depending on your insurance coverage, you may have out-of-pocket expenses for these supplies. Someone from the company should call you to confirm your order and discuss those potential costs before they ship your products -- please anticipate that call. If your out-of-pocket cost could be substantial, Many companies have financial hardship programs for patients who qualify, so please ask about that if you might need a hand. If you have any questions about your supplies or your potential out-of-pocket costs, or if you need to place an order for a refill of supplies (typically monthly), please call the company directly. Your  is Laureen Elizondo     Follow up with Dr Pankaj Valdovinos In 2 week(s) in the wound care center. Wound Care Center Information: Should you experience any significant changes in your wound(s) or have questions about your wound care, please contact the [facility] at [phone] Monday  - Thursday 8:00 am - 4:00 pm and Friday 8:00 am - 1:00pm. If you need help with your wound outside these hours and cannot wait until we are again available, contact your PCP or go to the hospital emergency room. PLEASE NOTE: IF YOU ARE UNABLE TO OBTAIN WOUND SUPPLIES, CONTINUE TO USE THE SUPPLIES YOU HAVE AVAILABLE UNTIL YOU ARE ABLE TO REACH US. IT IS MOST IMPORTANT TO KEEP THE WOUND COVERED AT ALL TIMES. Patient Experience     Thank you for trusting us with your care. You may receive a survey from a company called [employer] asking for your feedback. We would appreciate it if you took a few minutes to share your experience. Your input is very valuable to us.

## 2022-08-11 ENCOUNTER — CARE COORDINATION (OUTPATIENT)
Dept: CASE MANAGEMENT | Age: 72
End: 2022-08-11

## 2022-08-11 NOTE — CARE COORDINATION
This [facility] spoke with pt and pt stated that she is doing well. Patient denied any worsening symptoms. Denied fever, chills, N/V and any difficulty breathing at this time. Denied chest pain and SOB. Denied difficulty with urination, BMs or appetite. Pt stated that she is getting better and is staying well hydrated. HC going well and she is gaining her strength back, cough has almost completely subsided. BS was 175 today as pt stated that she had a candy night and a couple of cookies. Pt advised to eat sweets in moderation only. Denied any needs and concerns at this time. Advised pt to immediately report any worsening symptoms to the PCP. Patient verbalized understanding and agreed. Narcisa Minor LPN, [facility]  PH: [address] Transitions Follow Up Call    2022    Patient: Mark Padilla  Patient : 1950   MRN: [id number]  Reason for Admission: Covid  Discharge Date: 22 RARS: Readmission Risk Score: 12.5         Spoke with: [address] Transitions Follow Up Call    Needs to be reviewed by the provider   Additional needs identified to be addressed with provider: No  home health care-[facility] SN, PT and OT             Method of communication with provider : none      LPN Care Coordinator contacted the patient by telephone to follow up after admission on 22. Verified name and  with patient as identifiers. Addressed changes since last contact: none  Discussed follow-up appointments. If no appointment was previously scheduled, appointment scheduling offered: Yes. Is follow up appointment scheduled within 7 days of discharge? Yes. Advance Care Planning:   Does patient have an Advance Directive: not on file. LPN CC reviewed discharge instructions, medical action plan and red flags with patient and discussed any barriers to care and/or understanding of plan of care after discharge.  Discussed appropriate site of care based on symptoms and resources available to patient including: PCP  Specialist  Urgent care clinics  Home health  When to call 911. The patient agrees to contact the PCP office for questions related to their healthcare. Patients top risk factors for readmission: ineffective coping  Interventions to address risk factors: Obtained and reviewed discharge summary and/or continuity of care documents      Non-[facility] follow up appointment(s):     LPN CC provided contact information for future needs. Plan for follow-up call in 5-7 days based on severity of symptoms and risk factors. Plan for next call: symptom management-Diabetes Management          Care Transitions Subsequent and Final Call    Schedule Follow Up Appointment with PCP: Completed  Subsequent and Final Calls  Do you have any ongoing symptoms?: No  Have your medications changed?: No  Do you have any questions related to your medications?: No  Do you currently have any active services?: Yes  Are you currently active with any services?: Home Health  Do you have any needs or concerns that I can assist you with?: No  Identified Barriers: None  Care Transitions Interventions   Home Care Waiver: Completed Physical Therapy: Completed Other Services: Completed      DME Assistance: Completed    Occupational Therapy: Completed     Other Interventions:              Follow Up  Future Appointments   Date Time Provider Jatin Hughes   8/24/2022  9:00 AM Stacy Elizondo MD [address] [facility] JENNIFER   9/7/2022  9:00 AM Stacy Elizondo MD [address] [place], [place]

## 2022-08-18 ENCOUNTER — CARE COORDINATION (OUTPATIENT)
Dept: CASE MANAGEMENT | Age: 72
End: 2022-08-18

## 2022-08-18 NOTE — CARE COORDINATION
Care Transitions Outreach Attempt    Call within 2 business days of discharge: Yes   Attempted to reach patient for transitions of care follow up. Unable to reach patient. Left HIPPA Compliant LAYNE Hernandez LPN, [facility]: [phone]      Patient: Jace Bojorquez Patient : 1950 MRN: [id number]    Last Discharge [address] [address]       Date Complaint Diagnosis Description Type Department Provider    22 Hypotension COVID . Zenia Gip . ED to Hosp-Admission (Discharged) (ADMITTED) [facility] Rafa Monique MD; Ltanya Salvage. .. Was this an external facility discharge?  No Discharge Facility: [facility]    Noted following upcoming appointments from discharge chart review:   [facility] follow up appointment(s):   Future Appointments   Date Time Provider Jatin Hughes   2022  9:00 AM Anabel Lazar MD [address] [facility]   2022  9:00 AM Anabel Lazar MD [address] [facility]     Non-[facility] follow up appointment(s):

## 2022-08-19 ENCOUNTER — CARE COORDINATION (OUTPATIENT)
Dept: CASE MANAGEMENT | Age: 72
End: 2022-08-19

## 2022-08-19 NOTE — CARE COORDINATION
Care Transitions Outreach Attempt    Call within 2 business days of discharge: Yes   Attempted to reach patient for transitions of care follow up. Unable to reach patient. Left HIPPA Compliant VM. Hector Vallejo LPN, [facility]: [phone]      Patient: Andre Mccracken Patient : 1950 MRN: [id number]    Last Discharge [facility]       Date Complaint Diagnosis Description Type Department Provider    22 Hypotension COVID . Pinky Angles . ED to Hosp-Admission (Discharged) (ADMITTED) [facility] 5T Fina Knox MD; Sally Valentino. .. Was this an external facility discharge?  No Discharge Facility: [facility]    Noted following upcoming appointments from discharge chart review:   [facility] follow up appointment(s):   Future Appointments   Date Time Provider Jatin Hughes   2022  9:00 AM Juliann Mcdonald MD [address] [facility]   2022  9:00 AM Juliann Mcdonald MD [address] [facility]     Non-[facility] follow up appointment(s):

## 2022-08-24 ENCOUNTER — HOSPITAL ENCOUNTER (OUTPATIENT)
Dept: WOUND CARE | Age: 72
Discharge: HOME | End: 2022-08-24
Payer: MEDICARE

## 2022-08-24 VITALS — HEART RATE: 87 BPM | RESPIRATION RATE: 15 BRPM | SYSTOLIC BLOOD PRESSURE: 127 MMHG | DIASTOLIC BLOOD PRESSURE: 69 MMHG

## 2022-08-24 DIAGNOSIS — S31.000A SACRAL WOUND, INITIAL ENCOUNTER: ICD-10-CM

## 2022-08-24 DIAGNOSIS — L89.43 PRESSURE INJURY OF CONTIGUOUS REGION INVOLVING BACK AND BUTTOCK, STAGE 3, UNSPECIFIED LATERALITY (HCC): ICD-10-CM

## 2022-08-24 DIAGNOSIS — Z74.09 POOR MOBILITY: Primary | ICD-10-CM

## 2022-08-24 PROCEDURE — 87205 SMEAR GRAM STAIN: CPT

## 2022-08-24 PROCEDURE — 87070 CULTURE OTHR SPECIMN AEROBIC: CPT

## 2022-08-24 PROCEDURE — 87077 CULTURE AEROBIC IDENTIFY: CPT

## 2022-08-24 PROCEDURE — 87186 SC STD MICRODIL/AGAR DIL: CPT

## 2022-08-24 PROCEDURE — 11042 DBRDMT SUBQ TIS 1ST 20SQCM/<: CPT

## 2022-08-24 PROCEDURE — 11042 DBRDMT SUBQ TIS 1ST 20SQCM/<: CPT | Performed by: SURGERY

## 2022-08-24 RX ORDER — LIDOCAINE 40 MG/G
CREAM TOPICAL ONCE
Status: DISCONTINUED | OUTPATIENT
Start: 2022-08-24 | End: 2022-08-25 | Stop reason: HOSPADM

## 2022-08-24 RX ORDER — BETAMETHASONE DIPROPIONATE 0.05 %
OINTMENT (GRAM) TOPICAL ONCE
Status: CANCELLED | OUTPATIENT
Start: 2022-08-24 | End: 2022-08-24

## 2022-08-24 RX ORDER — GINSENG 100 MG
CAPSULE ORAL ONCE
Status: CANCELLED | OUTPATIENT
Start: 2022-08-24 | End: 2022-08-24

## 2022-08-24 RX ORDER — BACITRACIN ZINC AND POLYMYXIN B SULFATE 500; 1000 [USP'U]/G; [USP'U]/G
OINTMENT TOPICAL ONCE
Status: CANCELLED | OUTPATIENT
Start: 2022-08-24 | End: 2022-08-24

## 2022-08-24 RX ORDER — GENTAMICIN SULFATE 1 MG/G
OINTMENT TOPICAL ONCE
Status: CANCELLED | OUTPATIENT
Start: 2022-08-24 | End: 2022-08-24

## 2022-08-24 RX ORDER — LIDOCAINE 50 MG/G
OINTMENT TOPICAL ONCE
Status: CANCELLED | OUTPATIENT
Start: 2022-08-24 | End: 2022-08-24

## 2022-08-24 RX ORDER — LIDOCAINE HYDROCHLORIDE 40 MG/ML
SOLUTION TOPICAL ONCE
Status: CANCELLED | OUTPATIENT
Start: 2022-08-24 | End: 2022-08-24

## 2022-08-24 RX ORDER — CLOBETASOL PROPIONATE 0.5 MG/G
OINTMENT TOPICAL ONCE
Status: CANCELLED | OUTPATIENT
Start: 2022-08-24 | End: 2022-08-24

## 2022-08-24 RX ORDER — BACITRACIN, NEOMYCIN, POLYMYXIN B 400; 3.5; 5 [USP'U]/G; MG/G; [USP'U]/G
OINTMENT TOPICAL ONCE
Status: CANCELLED | OUTPATIENT
Start: 2022-08-24 | End: 2022-08-24

## 2022-08-24 RX ORDER — LIDOCAINE 40 MG/G
CREAM TOPICAL ONCE
Status: CANCELLED | OUTPATIENT
Start: 2022-08-24 | End: 2022-08-24

## 2022-08-24 RX ORDER — LIDOCAINE HYDROCHLORIDE 20 MG/ML
JELLY TOPICAL ONCE
Status: CANCELLED | OUTPATIENT
Start: 2022-08-24 | End: 2022-08-24

## 2022-08-24 NOTE — PROGRESS NOTES
[address], [address]  Telephone: [phone]        [place]        Discharge Instructions         [address], [address]  Telephone: [phone]     Discharge Instructions     Important reminders:     **If you have any signs and symptoms of illness (Cough, fever, congestion, nausea, vomiting, diarrhea, etc.) please call the wound care center prior to your appointment. 1. Increase Protein intake for optimal wound healing  2. No added salt to reduce any swelling  3. If diabetic, maintain good glucose control  4. If you smoke, smoking prohibits wound healing, we ask that you refrain from smoking. 5. When taking antibiotics take the entire prescription as ordered. Do not stop taking until medication is all gone unless otherwise instructed. 6. Exercise as tolerated. 7. Keep weight off wounds and reposition every 2 hours if applicable. 8. If wound(s) is on your lower extremity, elevate legs to the level of the heart or above for 30 minutes 4-5 times a day and/or when sitting. Avoid standing for long periods of time. 9. Do not get wounds wet in bath or shower unless otherwise instructed by your physician. If your wound is on your foot or leg, you may purchase a cast bag. Please ask at the pharmacy. If Vascular testing is ordered, please call [address] [place] [address] ([phone]) to schedule. Vascular tests ordered by Wound Care Physicians may take up to 2 hours to complete. Please keep that in mind when scheduling. If Vascular testing is scheduled, please bring supplies to replace your dressing after testing is done. The vascular department does not stock supplies. Wound: Coccyx     With each dressing change, rinse wounds with 0.9% Saline. (May use wound wash or soft contact solution. Both can be purchased at a local drug store).  If unable to obtain saline, may use a gentle soap and water. Dressing care: Alginate ag, Abd Pad- change Monday, Wednesday, & Friday. Turn & reposition every 1-2 hours and as needed for pressure relief and comfort. Keep pressure off of your wound as much as possible. Eat plenty of protein     Home Care Agency/Facility: [facility]     Your wound-care supplies will be provided by:  Please note, depending on your insurance coverage, you may have out-of-pocket expenses for these supplies. Someone from the company should call you to confirm your order and discuss those potential costs before they ship your products -- please anticipate that call. If your out-of-pocket cost could be substantial, Many companies have financial hardship programs for patients who qualify, so please ask about that if you might need a hand. If you have any questions about your supplies or your potential out-of-pocket costs, or if you need to place an order for a refill of supplies (typically monthly), please call the company directly. Your  is Clarissa Moreno     Follow up with Dr Juliann Root In 2 week(s) in the wound care center. Wound Care Center Information: Should you experience any significant changes in your wound(s) or have questions about your wound care, please contact the [facility] at [phone] Monday  - Thursday 8:00 am - 4:00 pm and Friday 8:00 am - 1:00pm. If you need help with your wound outside these hours and cannot wait until we are again available, contact your PCP or go to the hospital emergency room. PLEASE NOTE: IF YOU ARE UNABLE TO OBTAIN WOUND SUPPLIES, CONTINUE TO USE THE SUPPLIES YOU HAVE AVAILABLE UNTIL YOU ARE ABLE TO REACH US. IT IS MOST IMPORTANT TO KEEP THE WOUND COVERED AT ALL TIMES. Patient Experience     Thank you for trusting us with your care. You may receive a survey from a company called [employer] asking for your feedback.   We would appreciate it if you took a few minutes to share your

## 2022-08-24 NOTE — DISCHARGE INSTRUCTIONS
[address], [address] [facility]  Telephone: [phone]     Discharge Instructions     Important reminders:     **If you have any signs and symptoms of illness (Cough, fever, congestion, nausea, vomiting, diarrhea, etc.) please call the wound care center prior to your appointment. 1. Increase Protein intake for optimal wound healing  2. No added salt to reduce any swelling  3. If diabetic, maintain good glucose control  4. If you smoke, smoking prohibits wound healing, we ask that you refrain from smoking. 5. When taking antibiotics take the entire prescription as ordered. Do not stop taking until medication is all gone unless otherwise instructed. 6. Exercise as tolerated. 7. Keep weight off wounds and reposition every 2 hours if applicable. 8. If wound(s) is on your lower extremity, elevate legs to the level of the heart or above for 30 minutes 4-5 times a day and/or when sitting. Avoid standing for long periods of time. 9. Do not get wounds wet in bath or shower unless otherwise instructed by your physician. If your wound is on your foot or leg, you may purchase a cast bag. Please ask at the pharmacy. If Vascular testing is ordered, please call [address] [place] [address] [phone]) to schedule. Vascular tests ordered by Wound Care Physicians may take up to 2 hours to complete. Please keep that in mind when scheduling. If Vascular testing is scheduled, please bring supplies to replace your dressing after testing is done. The vascular department does not stock supplies. Wound: Coccyx     With each dressing change, rinse wounds with 0.9% Saline. (May use wound wash or soft contact solution. Both can be purchased at a local drug store). If unable to obtain saline, may use a gentle soap and water. Dressing care: Alginate ag, Abd Pad- change Monday, Wednesday, & Friday.  Turn & reposition every 1-2 hours and as needed for pressure relief and comfort. Keep pressure off of your wound as much as possible. Eat plenty of protein     Home Care Agency/Facility: [facility]     Your wound-care supplies will be provided by:  Please note, depending on your insurance coverage, you may have out-of-pocket expenses for these supplies. Someone from the company should call you to confirm your order and discuss those potential costs before they ship your products -- please anticipate that call. If your out-of-pocket cost could be substantial, Many companies have financial hardship programs for patients who qualify, so please ask about that if you might need a hand. If you have any questions about your supplies or your potential out-of-pocket costs, or if you need to place an order for a refill of supplies (typically monthly), please call the company directly. Your  is Dejon De La Torre     Follow up with Dr Elvin Zapata In 2 week(s) in the wound care center. Wound Care Center Information: Should you experience any significant changes in your wound(s) or have questions about your wound care, please contact the [employer] at [phone] Monday  - Thursday 8:00 am - 4:00 pm and Friday 8:00 am - 1:00pm. If you need help with your wound outside these hours and cannot wait until we are again available, contact your PCP or go to the hospital emergency room. PLEASE NOTE: IF YOU ARE UNABLE TO OBTAIN WOUND SUPPLIES, CONTINUE TO USE THE SUPPLIES YOU HAVE AVAILABLE UNTIL YOU ARE ABLE TO REACH US. IT IS MOST IMPORTANT TO KEEP THE WOUND COVERED AT ALL TIMES. Patient Experience     Thank you for trusting us with your care. You may receive a survey from a company called [employer] asking for your feedback. We would appreciate it if you took a few minutes to share your experience. Your input is very valuable to us.

## 2022-08-24 NOTE — PROGRESS NOTES
[address] Progress and Procedure Note    Marli Aquino  AGE: 66 y.o. GENDER: female    : 1950  TODAY'S DATE: 2022    Chief Complaint   Patient presents with    Wound Check     Follow up coccyx wound        History of Present Illness     Marli Aquino is a 66 y.o. female who presents today for wound evaluation. History of Wound: pressure wound located on the  sacrum . sacral decubitus ulcer stage 3  Wound Pain:  mild  Severity:  2 / 10   Wound Type:  pressure  Modifying Factors:  diabetes, poor glucose control, chronic pressure, decreased mobility, and malnutrition  Associated Signs/Symptoms:  drainage and pain    Past Medical History:   Diagnosis Date    Arthritis     back    Cataracts, bilateral     CHF (congestive heart failure) (HCC)     Diabetes mellitus ([address]     Glaucoma     Hyperlipidemia     Hypertension     Poor mobility 2022    Pressure 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, stage 3 ([address] 2022    Similar wound 2 years ago. Thyroid disease      Past Surgical History:   Procedure Laterality Date    CARPAL TUNNEL RELEASE      bilateral    CHOLECYSTECTOMY      COLONOSCOPY      ELBOW SURGERY      ENDOSCOPY, COLON, DIAGNOSTIC      EYE SURGERY      FOOT SURGERY      SHOULDER SURGERY       Current Outpatient Medications   Medication Sig Dispense Refill    miconazole (MICOTIN) 2 % powder Apply topically 2 times daily to abdominal folds. 45 g 1    traMADol (ULTRAM) 50 MG tablet Take 50 mg by mouth 3 times daily as needed for Pain.       DULoxetine (CYMBALTA) 60 MG extended release capsule Take 1 capsule by mouth daily 30 capsule 3    Travoprost, BAK Free, (TRAVATAN Z) 0.004 % SOLN ophthalmic solution Place 1 drop into both eyes nightly      DICLOFENAC PO Take 100 mg by mouth 2 times daily      acetaminophen (TYLENOL) 500 MG tablet Take 500 mg by mouth every 6 hours as needed for Pain or Fever       aspirin EC 81 MG EC tablet Take 81 mg by mouth daily May restart in AM.      levothyroxine (SYNTHROID) 200 MCG tablet Take 200 mcg by mouth Daily       Insulin Detemir (LEVEMIR FLEXPEN SC) Inject 35 Units into the skin nightly      simvastatin (ZOCOR) 40 MG tablet Take 40 mg by mouth nightly. Insulin Lispro, Human, (HUMALOG SC) Inject 10 Units into the skin 3 times daily (with meals) With sliding      Calcium Carbonate-Vitamin D (CALCIUM + D PO) Take by mouth daily        Current Facility-Administered Medications   Medication Dose Route Frequency Provider Last Rate Last Admin    lidocaine (LMX) 4 % cream   Topical Once Frederick Collier MD          Social History:   Social History     Tobacco Use    Smoking status: Never    Smokeless tobacco: Never   Vaping Use    Vaping Use: Never used   Substance Use Topics    Alcohol use: Not Currently    Drug use: Never     Allergies: Other, Ace inhibitors, Metoprolol, Tetrahydrozoline hcl, and Timolol    Procedure: Indications:  Based on my examination of this patient's wound(s)/ulcer(s) today, debridement is required to promote healing and evaluate the wound base. Performed by: Cesar Li MD    Consent obtained: Yes    Time out taken: Yes    Pain Control: Anesthetic  Anesthetic: 4% Lidocaine Cream     Debridement: Excisional Debridement    Using curette the wound was sharply debrided    down through and including the removal of epidermis, dermis, and subcutaneous tissue. Devitalized Tissue Debrided: fibrin, biofilm, and slough    Pre Debridement Measurements:  Are located in the Wound Documentation Flow Sheet     Wound #: 1     Post  Debridement Measurements:  Wound 06/16/22 Coccyx Mid #1 (Active)   Wound Image   06/16/22 1515   Wound Etiology Pressure Stage 3 08/24/22 0922   Wound Cleansed Cleansed with saline 08/24/22 0922   Dressing/Treatment ABD; Alginate;Dry dressing 08/24/22 1141   Wound Length (cm) 6.6 cm 08/24/22 0922   Wound Width (cm) 1.4 cm 08/24/22 0922   Wound Depth (cm) 2.7 cm 08/24/22 0922   Wound Surface Area (cm^2) 9.24 cm^2 08/24/22 0922   Change in Wound Size % (l*w) 53.33 08/24/22 0922   Wound Volume (cm^3) 24.948 cm^3 08/24/22 0922   Wound Healing % 61 08/24/22 0922   Post-Procedure Length (cm) 6.6 cm 08/24/22 0948   Post-Procedure Width (cm) 1.4 cm 08/24/22 0948   Post-Procedure Depth (cm) 2.7 cm 08/24/22 0948   Post-Procedure Surface Area (cm^2) 9.24 cm^2 08/24/22 0948   Post-Procedure Volume (cm^3) 24.948 cm^3 08/24/22 0948   Wound Assessment Bleeding 08/24/22 0948   Drainage Amount Moderate 08/24/22 0948   Drainage Description Green 08/24/22 0922   Odor Mild 08/24/22 0922   Saba-wound Assessment Excoriated 08/24/22 0922   Margins Attached edges; Defined edges 08/24/22 0922   Number of days: 68       Percent of Wound(s)/Ulcer(s) Debrided: 100%    Total Surface Area Debrided:  9.24 sq cm     Bleeding:  Minimal    Hemostasis Achieved:  by pressure    Procedural Pain:  2  / 10     Post Procedural Pain:  2 / 10     Response to treatment:  Well tolerated by patient. Assessment:     Wound looks stable. (improved, worse or stable)    Patient tolerated procedure well and was given proper instruction. The nature of the patient's condition was explained in depth. The patient was informed that their compliance to the treatment plan is paramount to successful healing and prevention of further ulceration and/or infection     Plan:     Treatment Plan:       Treatment Note please see attached Discharge Instructions    Written patient dismissal instructions given to patient and signed by patient or POA. Discharge [facility] [address]  [address] [place] [address]  Telephone: [phone]     Discharge Instructions     Important reminders:     **If you have any signs and symptoms of illness (Cough, fever, congestion, nausea, vomiting, diarrhea, etc.) please call the wound care center prior to your appointment. 1. Increase Protein intake for optimal wound healing  2. No added salt to reduce any swelling  3. If diabetic, maintain good glucose control  4. If you smoke, smoking prohibits wound healing, we ask that you refrain from smoking. 5. When taking antibiotics take the entire prescription as ordered. Do not stop taking until medication is all gone unless otherwise instructed. 6. Exercise as tolerated. 7. Keep weight off wounds and reposition every 2 hours if applicable. 8. If wound(s) is on your lower extremity, elevate legs to the level of the heart or above for 30 minutes 4-5 times a day and/or when sitting. Avoid standing for long periods of time. 9. Do not get wounds wet in bath or shower unless otherwise instructed by your physician. If your wound is on your foot or leg, you may purchase a cast bag. Please ask at the pharmacy. If Vascular testing is ordered, please call [address] [place] [address] [phone]) to schedule. Vascular tests ordered by Wound Care Physicians may take up to 2 hours to complete. Please keep that in mind when scheduling. If Vascular testing is scheduled, please bring supplies to replace your dressing after testing is done. The vascular department does not stock supplies. Wound: Coccyx     With each dressing change, rinse wounds with 0.9% Saline. (May use wound wash or soft contact solution. Both can be purchased at a local drug store). If unable to obtain saline, may use a gentle soap and water. Dressing care: Alginate ag, Abd Pad- change Monday, Wednesday, & Friday. Turn & reposition every 1-2 hours and as needed for pressure relief and comfort. Keep pressure off of your wound as much as possible. Eat plenty of protein     Home Care Agency/Facility: [facility]     Your wound-care supplies will be provided by:  Please note, depending on your insurance coverage, you may have out-of-pocket expenses for these supplies.  Someone from the company should call you to confirm your order

## 2022-08-24 NOTE — PLAN OF CARE
Discharge instructions given. Patient verbalized understanding. Return to [facility] in 2 week(s) d/t transportation.   Called/faxed orders to  [place]

## 2022-08-27 LAB
GRAM STAIN RESULT: ABNORMAL
ORGANISM: ABNORMAL
WOUND/ABSCESS: ABNORMAL
WOUND/ABSCESS: ABNORMAL

## 2022-09-06 ENCOUNTER — APPOINTMENT (OUTPATIENT)
Dept: GENERAL RADIOLOGY | Age: 72
End: 2022-09-06
Payer: MEDICARE

## 2022-09-06 ENCOUNTER — HOSPITAL ENCOUNTER (EMERGENCY)
Age: 72
Discharge: HOME | End: 2022-09-06
Attending: EMERGENCY MEDICINE
Payer: MEDICARE

## 2022-09-06 VITALS
WEIGHT: 220 LBS | DIASTOLIC BLOOD PRESSURE: 81 MMHG | HEART RATE: 90 BPM | SYSTOLIC BLOOD PRESSURE: 138 MMHG | HEIGHT: 65 IN | RESPIRATION RATE: 18 BRPM | TEMPERATURE: 98.9 F | BODY MASS INDEX: 36.65 KG/M2 | OXYGEN SATURATION: 98 %

## 2022-09-06 DIAGNOSIS — S62.656B OPEN NONDISPLACED FRACTURE OF MIDDLE PHALANX OF RIGHT LITTLE FINGER, INITIAL ENCOUNTER: Primary | ICD-10-CM

## 2022-09-06 DIAGNOSIS — S63.259A DISLOCATION OF FINGER, INITIAL ENCOUNTER: ICD-10-CM

## 2022-09-06 PROCEDURE — 12001 RPR S/N/AX/GEN/TRNK 2.5CM/<: CPT

## 2022-09-06 PROCEDURE — 90715 TDAP VACCINE 7 YRS/> IM: CPT | Performed by: PHYSICIAN ASSISTANT

## 2022-09-06 PROCEDURE — 90471 IMMUNIZATION ADMIN: CPT | Performed by: PHYSICIAN ASSISTANT

## 2022-09-06 PROCEDURE — 6370000000 HC RX 637 (ALT 250 FOR IP): Performed by: PHYSICIAN ASSISTANT

## 2022-09-06 PROCEDURE — 28660 TREAT TOE DISLOCATION: CPT

## 2022-09-06 PROCEDURE — 6360000002 HC RX W HCPCS: Performed by: PHYSICIAN ASSISTANT

## 2022-09-06 PROCEDURE — 99284 EMERGENCY DEPT VISIT MOD MDM: CPT

## 2022-09-06 PROCEDURE — 73130 X-RAY EXAM OF HAND: CPT

## 2022-09-06 PROCEDURE — 73140 X-RAY EXAM OF FINGER(S): CPT

## 2022-09-06 RX ORDER — BUPIVACAINE HYDROCHLORIDE 5 MG/ML
30 INJECTION, SOLUTION EPIDURAL; INTRACAUDAL ONCE
Status: DISCONTINUED | OUTPATIENT
Start: 2022-09-06 | End: 2022-09-06 | Stop reason: HOSPADM

## 2022-09-06 RX ORDER — CEPHALEXIN 500 MG/1
500 CAPSULE ORAL 4 TIMES DAILY
Qty: 40 CAPSULE | Refills: 0 | Status: SHIPPED | OUTPATIENT
Start: 2022-09-06

## 2022-09-06 RX ORDER — CEPHALEXIN 250 MG/1
500 CAPSULE ORAL ONCE
Status: COMPLETED | OUTPATIENT
Start: 2022-09-06 | End: 2022-09-06

## 2022-09-06 RX ADMIN — CEPHALEXIN 500 MG: 250 CAPSULE ORAL at 21:00

## 2022-09-06 RX ADMIN — TETANUS TOXOID, REDUCED DIPHTHERIA TOXOID AND ACELLULAR PERTUSSIS VACCINE, ADSORBED 0.5 ML: 5; 2.5; 8; 8; 2.5 SUSPENSION INTRAMUSCULAR at 20:59

## 2022-09-06 ASSESSMENT — PAIN - FUNCTIONAL ASSESSMENT: PAIN_FUNCTIONAL_ASSESSMENT: NONE - DENIES PAIN

## 2022-09-06 NOTE — DISCHARGE INSTRUCTIONS
[address], [address]  Telephone: [phone]     Discharge Instructions     Important reminders:     **If you have any signs and symptoms of illness (Cough, fever, congestion, nausea, vomiting, diarrhea, etc.) please call the wound care center prior to your appointment. 1. Increase Protein intake for optimal wound healing  2. No added salt to reduce any swelling  3. If diabetic, maintain good glucose control  4. If you smoke, smoking prohibits wound healing, we ask that you refrain from smoking. 5. When taking antibiotics take the entire prescription as ordered. Do not stop taking until medication is all gone unless otherwise instructed. 6. Exercise as tolerated. 7. Keep weight off wounds and reposition every 2 hours if applicable. 8. If wound(s) is on your lower extremity, elevate legs to the level of the heart or above for 30 minutes 4-5 times a day and/or when sitting. Avoid standing for long periods of time. 9. Do not get wounds wet in bath or shower unless otherwise instructed by your physician. If your wound is on your foot or leg, you may purchase a cast bag. Please ask at the pharmacy. If Vascular testing is ordered, please call [address] [place] [address] [phone]) to schedule. Vascular tests ordered by Wound Care Physicians may take up to 2 hours to complete. Please keep that in mind when scheduling. If Vascular testing is scheduled, please bring supplies to replace your dressing after testing is done. The vascular department does not stock supplies. Wound: Coccyx     With each dressing change, rinse wounds with 0.9% Saline. (May use wound wash or soft contact solution. Both can be purchased at a local drug store). If unable to obtain saline, may use a gentle soap and water. Dressing care: Alginate ag, Abd Pad- change Monday, Wednesday, & Friday.  Turn & reposition every 1-2 hours and as needed for pressure relief and comfort. Keep pressure off of your wound as much as possible. Eat plenty of protein     Home Care Agency/Facility: [facility]     Your wound-care supplies will be provided by:  Please note, depending on your insurance coverage, you may have out-of-pocket expenses for these supplies. Someone from the company should call you to confirm your order and discuss those potential costs before they ship your products -- please anticipate that call. If your out-of-pocket cost could be substantial, Many companies have financial hardship programs for patients who qualify, so please ask about that if you might need a hand. If you have any questions about your supplies or your potential out-of-pocket costs, or if you need to place an order for a refill of supplies (typically monthly), please call the company directly. Your  is Chacho Mcneill     Follow up with Dr Meche Guerrero In 2 week(s) in the wound care center. Wound Care Center Information: Should you experience any significant changes in your wound(s) or have questions about your wound care, please contact the [employer] at [phone] Monday  - Thursday 8:00 am - 4:00 pm and Friday 8:00 am - 1:00pm. If you need help with your wound outside these hours and cannot wait until we are again available, contact your PCP or go to the hospital emergency room. PLEASE NOTE: IF YOU ARE UNABLE TO OBTAIN WOUND SUPPLIES, CONTINUE TO USE THE SUPPLIES YOU HAVE AVAILABLE UNTIL YOU ARE ABLE TO REACH US. IT IS MOST IMPORTANT TO KEEP THE WOUND COVERED AT ALL TIMES. Patient Experience     Thank you for trusting us with your care. You may receive a survey from a company called [employer] asking for your feedback. We would appreciate it if you took a few minutes to share your experience.   Your input is very valuable to us

## 2022-09-06 NOTE — ED PROVIDER NOTES
[address] [facility]        Pt Name: Brennon Cortez  MRN: [id number]  [place] 1950  Date of evaluation: 9/6/2022  Provider: Eliazar Pichardo PA-C  PCP: Manuela Cowden, MD  Note Started: 7:31 PM EDT        I have seen and evaluated this patient with my supervising physician Chilo GONZALEZ. CHIEF COMPLAINT       Chief Complaint   Patient presents with    Finger Injury     Pt tripped at her house and caught herself and injured her right pinky finger. EMS reports open fracture. Finger is wrapped @ this time. . Pt diabetic. Pt takes 81mg ASA daily. HISTORY OF PRESENT ILLNESS   (Location, Timing/Onset, Context/Setting, Quality, Duration, Modifying Factors, Severity, Associated Signs and Symptoms)  Note limiting factors. Chief Complaint: Right hand pain    Brennon Cortez is a 66 y.o. female who presents to the emergency department with a chief complaint of right hand pain worse in her right fifth finger. She states she tripped on the transition piece between 2 rooms at her house and somehow injured her right hand. She is right hand dominant. Unsure of her last tetanus vaccination. Does have a wound on her fifth digit with some deformity. Denies any previous history of injuries or surgeries to that hand. She has been able to ambulate since this happened. Denies hitting her head, loss of consciousness or any other injury. Nursing Notes were all reviewed and agreed with or any disagreements were addressed in the HPI. REVIEW OF SYSTEMS    (2-9 systems for level 4, 10 or more for level 5)     Review of Systems    Positives and Pertinent negatives as per HPI. Except as noted above in the ROS, all other systems were reviewed and negative.        PAST MEDICAL HISTORY     Past Medical History:   Diagnosis Date    Arthritis     back    Cataracts, bilateral     CHF (congestive heart failure) (HCC)     Diabetes mellitus Timolol    FAMILYHISTORY       Family History   Problem Relation Age of Onset    No Known Problems Mother     No Known Problems Father           SOCIAL HISTORY       Social History     Tobacco Use    Smoking status: Never    Smokeless tobacco: Never   Vaping Use    Vaping Use: Never used   Substance Use Topics    Alcohol use: Not Currently    Drug use: Never       SCREENINGS    Ferguson Coma Scale  Eye Opening: Spontaneous  Best Verbal Response: Oriented  Best Motor Response: Obeys commands  Ferguson Coma Scale Score: 15        PHYSICAL EXAM    (up to 7 for level 4, 8 or more for level 5)     ED Triage Vitals [09/06/22 1916]   BP Temp Temp src Heart Rate Resp SpO2 Height Weight   138/81 -- -- 90 18 98 % 5' 5\" (1.651 m) 220 lb (99.8 kg)       Physical Exam  Vitals and nursing note reviewed. Constitutional:       Appearance: She is well-developed. She is not diaphoretic. HENT:      Head: Atraumatic. Nose: Nose normal.   Eyes:      General:         Right eye: No discharge. Left eye: No discharge. Cardiovascular:      Pulses: Normal pulses. Comments: 2+ radial pulse in right wrist  Musculoskeletal:         General: Deformity and signs of injury present. Cervical back: Normal range of motion. Comments: There is some generalized tenderness over the right hand with deformity and 1.3 cm laceration on the palmar aspect of the right fifth finger. Sensation light touch on the radial and ulnar side of the fifth pad. Capillary fill brisk. Skin:     General: Skin is warm and dry. Findings: No erythema or rash. Neurological:      Mental Status: She is alert and oriented to person, place, and time. Cranial Nerves: No cranial nerve deficit. Psychiatric:         Behavior: Behavior normal.       DIAGNOSTIC RESULTS   LABS:    Labs Reviewed - No data to display    When ordered only abnormal lab results are displayed.  All other labs were within normal range or not returned as of this dictation. EKG: When ordered, EKG's are interpreted by the Emergency Department Physician in the absence of a cardiologist.  Please see their note for interpretation of EKG. RADIOLOGY:   Non-plain film images such as CT, Ultrasound and MRI are read by the radiologist. Plain radiographic images are visualized and preliminarily interpreted by the ED Provider with the below findings:        Interpretation per the Radiologist below, if available at the time of this note:    XR FINGER RIGHT (MIN 2 VIEWS)   Final Result   1. Reduction of the 5th PIP joint dislocation. 2. Fracture involving the presumed volar base of the 5th middle phalanx. 3. Soft tissue swelling. 4. Osteopenia. XR HAND RIGHT (MIN 3 VIEWS)   Final Result   1. Marked osteopenia limiting evaluation. 2. Dislocation of the 5th PIP joint. 3. Question of a small ossific fragment in the region of the 5th PIP joint,   which could represent a small acute fracture. However, no obvious donor site   seen. No results found. PROCEDURES   Unless otherwise noted below, none     Ortho Injury    Date/Time: 9/6/2022 10:18 PM  Performed by: Lexii Card PA-C  Authorized by: Jhonny Lr MD   Consent: Verbal consent obtained.   Consent given by: patient  Patient identity confirmed: verbally with patient  Injury location: finger  Location details: right little finger  Injury type: fracture-dislocation  Fracture type: proximal phalanx  MCP joint involved: no  Any IP joint involved: yes  Pre-procedure distal perfusion: normal  Pre-procedure neurological function: normal  Pre-procedure range of motion: reduced  Anesthesia: digital block    Anesthesia:  Local anesthesia used: yes  Local Anesthetic: bupivacaine 0.5% without epinephrine  Anesthetic total: 3 mL    Sedation:  Patient sedated: no    Manipulation performed: yes  Skeletal traction used: yes  Reduction successful: yes  X-ray confirmed reduction: yes  Post-procedure distal perfusion: normal  Post-procedure neurological function: normal  Post-procedure range of motion: improved  Patient tolerance: patient tolerated the procedure well with no immediate complications      Lac Repair    Date/Time: 9/6/2022 10:20 PM  Performed by: Olga Ly PA-C  Authorized by: Victorino Bryan MD     Consent:     Consent obtained:  Verbal    Consent given by:  Patient    Risks discussed:  Infection, retained foreign body, tendon damage, poor cosmetic result, need for additional repair and poor wound healing  Universal protocol:     Patient identity confirmed:  Verbally with patient  Anesthesia:     Anesthesia method:  Nerve block    Block needle gauge:  25 G    Block anesthetic:  Bupivacaine 0.5% w/o epi    Block technique:  Digital block    Block injection procedure:  Anatomic landmarks identified    Block outcome:  Anesthesia achieved  Laceration details:     Location:  Finger    Finger location:  R small finger    Length (cm):  1.3  Pre-procedure details:     Preparation:  Patient was prepped and draped in usual sterile fashion and imaging obtained to evaluate for foreign bodies  Exploration:     Imaging obtained: x-ray      Wound exploration: wound explored through full range of motion and entire depth of wound visualized    Treatment:     Area cleansed with:  Saline    Amount of cleaning:  Extensive    Irrigation solution:  Sterile saline    Irrigation volume:  1 L    Irrigation method:  Pressure wash  Skin repair:     Repair method:  Sutures    Suture size:  5-0    Suture material:  Nylon    Suture technique:  Simple interrupted    Number of sutures:  2  Approximation:     Approximation:  Loose  Repair type:     Repair type:  Simple  Post-procedure details:     Dressing:  Antibiotic ointment and non-adherent dressing    Procedure completion:  Tolerated    A AlumaFoam finger splint was applied by myself to the right little finger after nonadhesive dressing was placed.   Splint [address] [facility] [address] [phone]    As needed    DISCHARGE MEDICATIONS:  Discharge Medication List as of 9/6/2022  9:41 PM        START taking these medications    Details   cephALEXin (KEFLEX) 500 MG capsule Take 1 capsule by mouth 4 times daily, Disp-40 capsule, R-0Normal             DISCONTINUED MEDICATIONS:  Discharge Medication List as of 9/6/2022  9:41 PM        STOP taking these medications       sodium chloride (OCEAN, BABY AYR) 0.65 % nasal spray Comments:   Reason for Stopping:         mometasone-formoterol (DULERA) 200-5 MCG/ACT inhaler Comments:   Reason for Stopping:         spironolactone (ALDACTONE) 25 MG tablet Comments:   Reason for Stopping:                      (Please note that portions of this note were completed with a voice recognition program.  Efforts were made to edit the dictations but occasionally words are mis-transcribed.)    Nighat Duong PA-C (electronically signed)           Nighat Duong PA-C  09/06/22 1457

## 2022-09-07 ENCOUNTER — HOSPITAL ENCOUNTER (OUTPATIENT)
Dept: WOUND CARE | Age: 72
Discharge: HOME | End: 2022-09-07

## 2022-09-07 NOTE — ED PROVIDER NOTES
I independently saw performed a substantive portion of the visit (history, physical, and MDM) on Nixon Camargo. All diagnostic, treatment, and disposition decisions were made by myself in conjunction with the advanced practice provider. I have participated in the medical decision making and directed the treatment plan and disposition of the patient. For further details of [address] [place] [address] emergency department encounter, please see the advanced practice provider's documentation. Mel Garcia MD, am the primary physician provider of record. CHIEF COMPLAINT  Chief Complaint   Patient presents with    Finger Injury     Pt tripped at her house and caught herself and injured her right pinky finger. EMS reports open fracture. Finger is wrapped @ this time. . Pt diabetic. Pt takes 81mg ASA daily. Briefly, Nixon Camargo is a 66 y.o. female  who presents to the ED complaining of tripping at home catching herself on her right little finger causing pain and laceration there. FOCUSED PHYSICAL EXAMINATION  /81   Pulse 90   Temp 98.9 °F (37.2 °C)   Resp 18   Ht 5' 5\" (1.651 m)   Wt 220 lb (99.8 kg)   SpO2 98%   BMI 36.61 kg/m²    Focused physical examination notable for no acute distress, well-appearing, well-nourished, normal speech and mentation without obvious facial droop, no obvious rash. No obvious cranial nerve deficits on my initial exam. R little finger is dislocated at the PIP joint and laceration to this finger noted as well. RRR CTAB. MDM:  ED course was notable for R 5th PIP joint dislocation and laceration with questionable acute fracture. Joint reduced, cleaned, and closed after copious irrigation by VENKATESH under my direct bedside supervision, see his note for details. Will cover with abx and update tetanus. Is this patient to be included in the SEP-1 Core Measure?   No   Exclusion criteria - the patient is NOT to be included for SEP-1 Core Measure due to: Infection is not suspected      During the patient's ED course, the patient was given:  Medications   Tetanus-Diphth-Acell Pertussis (BOOSTRIX) injection 0.5 mL (0.5 mLs IntraMUSCular Given 9/6/22 2059)   cephALEXin (KEFLEX) capsule 500 mg (500 mg Oral Given 9/6/22 2100)        CLINICAL IMPRESSION  1. Open nondisplaced fracture of middle phalanx of right little finger, initial encounter    2. Dislocation of finger, initial encounter        Elza Davis was discharged to home in stable condition. I have discussed the findings of today's workup with the patient and addressed the patient's questions and concerns. Important warning signs as well as new or worsening symptoms which would necessitate immediate return to the ED were discussed. The plan is to discharge from the ED at this time, and the patient is in stable condition. The patient acknowledged understanding is agreeable with this plan. Patient was given scripts for the following medications. I counseled patient how to take these medications. Discharge Medication List as of 9/6/2022  9:41 PM        START taking these medications    Details   cephALEXin (KEFLEX) 500 MG capsule Take 1 capsule by mouth 4 times daily, Disp-40 capsule, R-0Normal             Follow-up with:  Nadege López MD  [address]  [address] [place] [address]  [phone]    Schedule an appointment as soon as possible for a visit   5-7 days, suture removal in 7-10 days    [facility] Emergency Department  [address] [facility]  [phone]    As needed    This chart was created using Dragon dictation software. Efforts were made by me to ensure accuracy, however some errors may be present due to limitations of this technology.               Amy Dias MD  09/07/22 9584

## 2022-09-08 ENCOUNTER — TELEPHONE (OUTPATIENT)
Dept: ORTHOPEDIC SURGERY | Age: 72
End: 2022-09-08

## 2022-09-08 NOTE — TELEPHONE ENCOUNTER
Spoke with the patient. Offered her to come in tomorrow to be seen at [facility] but patient wants [facility].   Scheduled patient to see Dr. Sudarshan Marcos 9/14

## 2022-09-13 NOTE — DISCHARGE INSTRUCTIONS
needed for pressure relief and comfort. Keep pressure off of your wound as much as possible. Eat plenty of protein     Home Care Agency/Facility: [facility]     Your wound-care supplies will be provided by:  Please note, depending on your insurance coverage, you may have out-of-pocket expenses for these supplies. Someone from the company should call you to confirm your order and discuss those potential costs before they ship your products -- please anticipate that call. If your out-of-pocket cost could be substantial, Many companies have financial hardship programs for patients who qualify, so please ask about that if you might need a hand. If you have any questions about your supplies or your potential out-of-pocket costs, or if you need to place an order for a refill of supplies (typically monthly), please call the company directly. Your  is Ben Lamas     Follow up with Dr Mario Silveira In 2 week(s) in the wound care center. Wound Care Center Information: Should you experience any significant changes in your wound(s) or have questions about your wound care, please contact the [facility] at [phone] Monday  - Thursday 8:00 am - 4:00 pm and Friday 8:00 am - 1:00pm. If you need help with your wound outside these hours and cannot wait until we are again available, contact your PCP or go to the hospital emergency room. PLEASE NOTE: IF YOU ARE UNABLE TO OBTAIN WOUND SUPPLIES, CONTINUE TO USE THE SUPPLIES YOU HAVE AVAILABLE UNTIL YOU ARE ABLE TO REACH US. IT IS MOST IMPORTANT TO KEEP THE WOUND COVERED AT ALL TIMES. Patient Experience     Thank you for trusting us with your care. You may receive a survey from a company called [employer] asking for your feedback. We would appreciate it if you took a few minutes to share your experience. Your input is very valuable to us.

## 2022-09-14 ENCOUNTER — OFFICE VISIT (OUTPATIENT)
Dept: ORTHOPEDIC SURGERY | Age: 72
End: 2022-09-14
Payer: MEDICARE

## 2022-09-14 ENCOUNTER — HOSPITAL ENCOUNTER (OUTPATIENT)
Dept: WOUND CARE | Age: 72
Discharge: HOME | End: 2022-09-14
Payer: MEDICARE

## 2022-09-14 VITALS — HEART RATE: 87 BPM | SYSTOLIC BLOOD PRESSURE: 135 MMHG | DIASTOLIC BLOOD PRESSURE: 83 MMHG | TEMPERATURE: 95.9 F

## 2022-09-14 VITALS — BODY MASS INDEX: 36.65 KG/M2 | HEIGHT: 65 IN | WEIGHT: 220 LBS | RESPIRATION RATE: 16 BRPM

## 2022-09-14 DIAGNOSIS — S31.000A SACRAL WOUND, INITIAL ENCOUNTER: ICD-10-CM

## 2022-09-14 DIAGNOSIS — S63.259A DISLOCATION OF FINGER, INITIAL ENCOUNTER: Primary | ICD-10-CM

## 2022-09-14 DIAGNOSIS — Z74.09 POOR MOBILITY: Primary | ICD-10-CM

## 2022-09-14 DIAGNOSIS — L89.43 PRESSURE INJURY OF CONTIGUOUS REGION INVOLVING BACK AND BUTTOCK, STAGE 3, UNSPECIFIED LATERALITY (HCC): ICD-10-CM

## 2022-09-14 PROCEDURE — 3017F COLORECTAL CA SCREEN DOC REV: CPT | Performed by: PHYSICIAN ASSISTANT

## 2022-09-14 PROCEDURE — 11042 DBRDMT SUBQ TIS 1ST 20SQCM/<: CPT | Performed by: SURGERY

## 2022-09-14 PROCEDURE — 11042 DBRDMT SUBQ TIS 1ST 20SQCM/<: CPT

## 2022-09-14 PROCEDURE — 99204 OFFICE O/P NEW MOD 45 MIN: CPT | Performed by: PHYSICIAN ASSISTANT

## 2022-09-14 PROCEDURE — 1090F PRES/ABSN URINE INCON ASSESS: CPT | Performed by: PHYSICIAN ASSISTANT

## 2022-09-14 PROCEDURE — 1036F TOBACCO NON-USER: CPT | Performed by: PHYSICIAN ASSISTANT

## 2022-09-14 PROCEDURE — G8400 PT W/DXA NO RESULTS DOC: HCPCS | Performed by: PHYSICIAN ASSISTANT

## 2022-09-14 PROCEDURE — 1123F ACP DISCUSS/DSCN MKR DOCD: CPT | Performed by: PHYSICIAN ASSISTANT

## 2022-09-14 PROCEDURE — G8427 DOCREV CUR MEDS BY ELIG CLIN: HCPCS | Performed by: PHYSICIAN ASSISTANT

## 2022-09-14 PROCEDURE — G8419 CALC BMI OUT NRM PARAM NOF/U: HCPCS | Performed by: PHYSICIAN ASSISTANT

## 2022-09-14 RX ORDER — LIDOCAINE HYDROCHLORIDE 20 MG/ML
JELLY TOPICAL ONCE
Status: CANCELLED | OUTPATIENT
Start: 2022-09-14 | End: 2022-09-14

## 2022-09-14 RX ORDER — LIDOCAINE 40 MG/G
CREAM TOPICAL ONCE
Status: DISCONTINUED | OUTPATIENT
Start: 2022-09-14 | End: 2022-09-15 | Stop reason: HOSPADM

## 2022-09-14 RX ORDER — GENTAMICIN SULFATE 1 MG/G
OINTMENT TOPICAL ONCE
Status: CANCELLED | OUTPATIENT
Start: 2022-09-14 | End: 2022-09-14

## 2022-09-14 RX ORDER — BACITRACIN, NEOMYCIN, POLYMYXIN B 400; 3.5; 5 [USP'U]/G; MG/G; [USP'U]/G
OINTMENT TOPICAL ONCE
Status: CANCELLED | OUTPATIENT
Start: 2022-09-14 | End: 2022-09-14

## 2022-09-14 RX ORDER — GINSENG 100 MG
CAPSULE ORAL ONCE
Status: CANCELLED | OUTPATIENT
Start: 2022-09-14 | End: 2022-09-14

## 2022-09-14 RX ORDER — LIDOCAINE 50 MG/G
OINTMENT TOPICAL ONCE
Status: CANCELLED | OUTPATIENT
Start: 2022-09-14 | End: 2022-09-14

## 2022-09-14 RX ORDER — GENTAMICIN SULFATE 1 MG/G
CREAM TOPICAL
Qty: 30 G | Refills: 1 | Status: SHIPPED | OUTPATIENT
Start: 2022-09-14

## 2022-09-14 RX ORDER — LIDOCAINE 40 MG/G
CREAM TOPICAL ONCE
Status: CANCELLED | OUTPATIENT
Start: 2022-09-14 | End: 2022-09-14

## 2022-09-14 RX ORDER — LIDOCAINE HYDROCHLORIDE 40 MG/ML
SOLUTION TOPICAL ONCE
Status: CANCELLED | OUTPATIENT
Start: 2022-09-14 | End: 2022-09-14

## 2022-09-14 RX ORDER — BETAMETHASONE DIPROPIONATE 0.05 %
OINTMENT (GRAM) TOPICAL ONCE
Status: CANCELLED | OUTPATIENT
Start: 2022-09-14 | End: 2022-09-14

## 2022-09-14 RX ORDER — CLOBETASOL PROPIONATE 0.5 MG/G
OINTMENT TOPICAL ONCE
Status: CANCELLED | OUTPATIENT
Start: 2022-09-14 | End: 2022-09-14

## 2022-09-14 RX ORDER — BACITRACIN ZINC AND POLYMYXIN B SULFATE 500; 1000 [USP'U]/G; [USP'U]/G
OINTMENT TOPICAL ONCE
Status: CANCELLED | OUTPATIENT
Start: 2022-09-14 | End: 2022-09-14

## 2022-09-14 NOTE — PROGRESS NOTES
[facility]  [address], [address]  Telephone: [phone]        Donny Zamudio Fax # [phone]    Discharge Instructions         [facility]  [address], [address]  Telephone: [phone]     Discharge Instructions     Important reminders:     **If you have any signs and symptoms of illness (Cough, fever, congestion, nausea, vomiting, diarrhea, etc.) please call the wound care center prior to your appointment. 1. Increase Protein intake for optimal wound healing  2. No added salt to reduce any swelling  3. If diabetic, maintain good glucose control  4. If you smoke, smoking prohibits wound healing, we ask that you refrain from smoking. 5. When taking antibiotics take the entire prescription as ordered. Do not stop taking until medication is all gone unless otherwise instructed. 6. Exercise as tolerated. 7. Keep weight off wounds and reposition every 2 hours if applicable. 8. If wound(s) is on your lower extremity, elevate legs to the level of the heart or above for 30 minutes 4-5 times a day and/or when sitting. Avoid standing for long periods of time. 9. Do not get wounds wet in bath or shower unless otherwise instructed by your physician. If your wound is on your foot or leg, you may purchase a cast bag. Please ask at the pharmacy. If Vascular testing is ordered, please call [address] [place] [address] ([phone]) to schedule. Vascular tests ordered by Wound Care Physicians may take up to 2 hours to complete. Please keep that in mind when scheduling. If Vascular testing is scheduled, please bring supplies to replace your dressing after testing is done. The vascular department does not stock supplies. Wound: Coccyx     With each dressing change, rinse wounds with 0.9% Saline. (May use wound wash or soft contact solution. Both can be purchased at a local drug store).  If unable to obtain you took a few minutes to share your experience. Your input is very valuable to us. Skilled nurse to evaluate and treat for wound care. Change dressing as ordered  once a day on Monday, Wednesday, and Friday using clean technique. Patient/Family/caregiver may change dressings as needed as instructed when Home Care unavailable. WOUNDS REQUIRING DRESSING Changes:     Wound 06/16/22 Coccyx Mid #1 (Active)   Wound Image   06/16/22 1515   Wound Etiology Pressure Stage 3 09/14/22 0939   Wound Cleansed Cleansed with saline 09/14/22 0939   Dressing/Treatment ABD; Alginate;Dry dressing 08/24/22 1141   Wound Length (cm) 6.6 cm 09/14/22 0939   Wound Width (cm) 1 cm 09/14/22 0939   Wound Depth (cm) 2.5 cm 09/14/22 0939   Wound Surface Area (cm^2) 6.6 cm^2 09/14/22 0939   Change in Wound Size % (l*w) 66.67 09/14/22 0939   Wound Volume (cm^3) 16.5 cm^3 09/14/22 0939   Wound Healing % 74 09/14/22 0939   Post-Procedure Length (cm) 6.6 cm 09/14/22 1000   Post-Procedure Width (cm) 1 cm 09/14/22 1000   Post-Procedure Depth (cm) 2.5 cm 09/14/22 1000   Post-Procedure Surface Area (cm^2) 6.6 cm^2 09/14/22 1000   Post-Procedure Volume (cm^3) 16.5 cm^3 09/14/22 1000   Wound Assessment Bleeding 09/14/22 1000   Drainage Amount Moderate 09/14/22 1000   Drainage Description Green 09/14/22 0939   Odor Mild 09/14/22 0939   Saba-wound Assessment Excoriated 09/14/22 0939   Margins Attached edges 09/14/22 0939   Wound Thickness Description not for Pressure Injury Full thickness 09/14/22 1319   Number of days: 89          Patient seen and treated on 9/14/2022    By Elbert Morton MD NPI: [id number]  (provider/NPI)

## 2022-09-14 NOTE — PLAN OF CARE
Discharge instructions given. Patient verbalized understanding. Return to [address] [place] [address] [address] in 2 week(s).   Called/faxed orders to [facility]

## 2022-09-14 NOTE — PROGRESS NOTES
[address] Progress and Procedure Note    Marli Aquino  AGE: 66 y.o. GENDER: female    : 1950  TODAY'S DATE: 2022    Chief Complaint   Patient presents with    Wound Check     Buttock F/U        History of Present Illness     Marli Aquino is a 66 y.o. female who presents today for wound evaluation. History of Wound: pressure wound located on the  sacrum . sacral decubitus ulcer stage 3  Wound Pain:  mild  Severity:  2 / 10   Wound Type:  pressure  Modifying Factors:  diabetes, poor glucose control, chronic pressure, decreased mobility, and malnutrition  Associated Signs/Symptoms:  drainage and pain    Past Medical History:   Diagnosis Date    Arthritis     back    Cataracts, bilateral     CHF (congestive heart failure) (HCC)     Diabetes mellitus ([address]     Glaucoma     Hyperlipidemia     Hypertension     Poor mobility 2022    Pressure 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, stage 3 ([address] 2022    Similar wound 2 years ago. Thyroid disease      Past Surgical History:   Procedure Laterality Date    CARPAL TUNNEL RELEASE      bilateral    CHOLECYSTECTOMY      COLONOSCOPY      ELBOW SURGERY      ENDOSCOPY, COLON, DIAGNOSTIC      EYE SURGERY      FOOT SURGERY      SHOULDER SURGERY       Current Outpatient Medications   Medication Sig Dispense Refill    gentamicin (GARAMYCIN) 0.1 % cream Apply topically daily. 30 g 1    cephALEXin (KEFLEX) 500 MG capsule Take 1 capsule by mouth 4 times daily 40 capsule 0    miconazole (MICOTIN) 2 % powder Apply topically 2 times daily to abdominal folds. 45 g 1    traMADol (ULTRAM) 50 MG tablet Take 50 mg by mouth 3 times daily as needed for Pain.       DULoxetine (CYMBALTA) 60 MG extended release capsule Take 1 capsule by mouth daily 30 capsule 3    Travoprost, BAK Free, (TRAVATAN Z) 0.004 % SOLN ophthalmic solution Place 1 drop into both eyes nightly      DICLOFENAC PO Take 100 mg by mouth 2 times daily acetaminophen (TYLENOL) 500 MG tablet Take 500 mg by mouth every 6 hours as needed for Pain or Fever       aspirin EC 81 MG EC tablet Take 81 mg by mouth daily May restart in AM.      levothyroxine (SYNTHROID) 200 MCG tablet Take 200 mcg by mouth Daily       Insulin Detemir (LEVEMIR FLEXPEN SC) Inject 35 Units into the skin nightly      simvastatin (ZOCOR) 40 MG tablet Take 40 mg by mouth nightly. Insulin Lispro, Human, (HUMALOG SC) Inject 10 Units into the skin 3 times daily (with meals) With sliding      Calcium Carbonate-Vitamin D (CALCIUM + D PO) Take by mouth daily        Current Facility-Administered Medications   Medication Dose Route Frequency Provider Last Rate Last Admin    lidocaine (LMX) 4 % cream   Topical Once Norma Hawkins MD          Social History:   Social History     Tobacco Use    Smoking status: Never    Smokeless tobacco: Never   Vaping Use    Vaping Use: Never used   Substance Use Topics    Alcohol use: Not Currently    Drug use: Never     Allergies: Other, Ace inhibitors, Metoprolol, Tetrahydrozoline hcl, and Timolol    Procedure: Indications:  Based on my examination of this patient's wound(s)/ulcer(s) today, debridement is required to promote healing and evaluate the wound base. Performed by: Tanmay Serna MD    Consent obtained: Yes    Time out taken: Yes    Pain Control: Anesthetic  Anesthetic: 4% Lidocaine Cream     Debridement: Excisional Debridement    Using curette the wound was sharply debrided    down through and including the removal of epidermis, dermis, and subcutaneous tissue.         Devitalized Tissue Debrided: fibrin, biofilm, and slough    Pre Debridement Measurements:  Are located in the Wound Documentation Flow Sheet     Wound #: 1     Post  Debridement Measurements:  Wound 06/16/22 Coccyx Mid #1 (Active)   Wound Image   06/16/22 1515   Wound Etiology Pressure Stage 3 09/14/22 0939   Wound Cleansed Cleansed with saline 09/14/22 0939 Dressing/Treatment ABD; Alginate;Dry dressing 08/24/22 1141   Wound Length (cm) 6.6 cm 09/14/22 0939   Wound Width (cm) 1 cm 09/14/22 0939   Wound Depth (cm) 2.5 cm 09/14/22 0939   Wound Surface Area (cm^2) 6.6 cm^2 09/14/22 0939   Change in Wound Size % (l*w) 66.67 09/14/22 0939   Wound Volume (cm^3) 16.5 cm^3 09/14/22 0939   Wound Healing % 74 09/14/22 0939   Post-Procedure Length (cm) 6.6 cm 09/14/22 1000   Post-Procedure Width (cm) 1 cm 09/14/22 1000   Post-Procedure Depth (cm) 2.5 cm 09/14/22 1000   Post-Procedure Surface Area (cm^2) 6.6 cm^2 09/14/22 1000   Post-Procedure Volume (cm^3) 16.5 cm^3 09/14/22 1000   Wound Assessment Bleeding 09/14/22 1000   Drainage Amount Moderate 09/14/22 1000   Drainage Description Green 09/14/22 0939   Odor Mild 09/14/22 0939   Saba-wound Assessment Excoriated 09/14/22 0939   Margins Attached edges 09/14/22 0939   Wound Thickness Description not for Pressure Injury Full thickness 09/14/22 0939   Number of days: 89       Percent of Wound(s)/Ulcer(s) Debrided: 100%    Total Surface Area Debrided:  6.6 sq cm     Bleeding:  Minimal    Hemostasis Achieved:  by pressure    Procedural Pain:  2  / 10     Post Procedural Pain:  2 / 10     Response to treatment:  Well tolerated by patient. Assessment:     Wound looks improved. (improved, worse or stable)    Patient tolerated procedure well and was given proper instruction. The nature of the patient's condition was explained in depth. The patient was informed that their compliance to the treatment plan is paramount to successful healing and prevention of further ulceration and/or infection     Plan:     Treatment Plan:       Treatment Note please see attached Discharge Instructions    Written patient dismissal instructions given to patient and signed by patient or POA.          Discharge Instructions         [address], [address] [place] [address] Road  Telephone: [phone] Discharge Instructions     Important reminders:     **If you have any signs and symptoms of illness (Cough, fever, congestion, nausea, vomiting, diarrhea, etc.) please call the wound care center prior to your appointment. 1. Increase Protein intake for optimal wound healing  2. No added salt to reduce any swelling  3. If diabetic, maintain good glucose control  4. If you smoke, smoking prohibits wound healing, we ask that you refrain from smoking. 5. When taking antibiotics take the entire prescription as ordered. Do not stop taking until medication is all gone unless otherwise instructed. 6. Exercise as tolerated. 7. Keep weight off wounds and reposition every 2 hours if applicable. 8. If wound(s) is on your lower extremity, elevate legs to the level of the heart or above for 30 minutes 4-5 times a day and/or when sitting. Avoid standing for long periods of time. 9. Do not get wounds wet in bath or shower unless otherwise instructed by your physician. If your wound is on your foot or leg, you may purchase a cast bag. Please ask at the pharmacy. If Vascular testing is ordered, please call [address] [place] [address] ([phone]) to schedule. Vascular tests ordered by Wound Care Physicians may take up to 2 hours to complete. Please keep that in mind when scheduling. If Vascular testing is scheduled, please bring supplies to replace your dressing after testing is done. The vascular department does not stock supplies. Wound: Coccyx     With each dressing change, rinse wounds with 0.9% Saline. (May use wound wash or soft contact solution. Both can be purchased at a local drug store). If unable to obtain saline, may use a gentle soap and water. Dressing care: Mix Gentamicin cream & Triad, Alginate, Abd Pad- change Monday, Wednesday, & Friday. Turn & reposition every 1-2 hours and as needed for pressure relief and comfort. Keep pressure off of your wound as much as possible.  Eat plenty of protein     Home Care Agency/Facility: [facility]     Your wound-care supplies will be provided by:  Please note, depending on your insurance coverage, you may have out-of-pocket expenses for these supplies. Someone from the company should call you to confirm your order and discuss those potential costs before they ship your products -- please anticipate that call. If your out-of-pocket cost could be substantial, Many companies have financial hardship programs for patients who qualify, so please ask about that if you might need a hand. If you have any questions about your supplies or your potential out-of-pocket costs, or if you need to place an order for a refill of supplies (typically monthly), please call the company directly. Your  is Ben Lamas     Follow up with Dr Mario Silveira In 2 week(s) in the wound care center. Wound Care Center Information: Should you experience any significant changes in your wound(s) or have questions about your wound care, please contact the [facility] [employer] [facility] at [phone] Monday  - Thursday 8:00 am - 4:00 pm and Friday 8:00 am - 1:00pm. If you need help with your wound outside these hours and cannot wait until we are again available, contact your PCP or go to the hospital emergency room. PLEASE NOTE: IF YOU ARE UNABLE TO OBTAIN WOUND SUPPLIES, CONTINUE TO USE THE SUPPLIES YOU HAVE AVAILABLE UNTIL YOU ARE ABLE TO REACH US. IT IS MOST IMPORTANT TO KEEP THE WOUND COVERED AT ALL TIMES. Patient Experience     Thank you for trusting us with your care. You may receive a survey from a company called [employer] asking for your feedback. We would appreciate it if you took a few minutes to share your experience. Your input is very valuable to us. Dejan [address]  Mario Silveira MD, FACS  9/14/2022  12:40 PM

## 2022-09-14 NOTE — PROGRESS NOTES
Ms. Nixon Camargo is a 66 y.o. right handed woman  who is seen today in Hand Surgical Consultation at the request of Gurjit Hagan MD.    She is seen today regarding an injury occurring on September 6th, 2022. She reports injuring her right Small Finger, having Taisha Reel an inanimate object at Home. At the time of injury, there was clear dislocation or malposition of the finger. She was seen for Emergency evaluation elsewhere, radiographs were obtained & she has been immobilized, but she states that she took it off recently. By report, there  was not an associated skin injury. She reports mild pain located in the Dorsal and Volar aspect of the Small Finger at the level of the PIP Joint, no tenderness of the remaining hand, wrist, or elbow. She notes today, no neurologic symptoms in the Whole Hand. Symptoms are slowly improving over time. I have today reviewed with Nixon Camargo the clinically relevant, past medical history, medications, allergies,  family history, social history, and Review Of Systems & I have documented any details relevant to today's presenting complaints in my history above. Ms. James Deluna self-reported past medical history, medications, allergies,  family history, social history, and Review Of Systems have been scanned into the chart under the \"Media\" tab. Physical Exam:  Ms. James Deluna most recent vitals:  Vitals  Resp: 16  Height: 5' 5\" (165.1 cm)  Weight: 220 lb (99.8 kg)    She is well nourished, oriented to person, place & time. She demonstrates appropriate mood and affect as well as normal gait and station. Skin: Normal in appearance, Normal Color, and Free of Lesions Bilaterally. Nylon sutures noted to the volar aspect of the small finger. Digital range of motion is limited by pain and stiff from immobilization in the Small Finger on the Right, normal on the Left. FDS, FDP, and Common Extensor tendon function is intact to each digit.   FPL & EPL tendon function is intact to the thumb. Wrist range of motion is without significant limitation bilaterally  Sensation is subjectively normal in the Whole Hand. All other digits are normally sensate bilaterally  Vascular examination reveals normal, good capillary refill, and good color bilaterally. There is mild acute ecchymosis. Swelling is mild in the Small Finger, centered about the Proximal Interphalangeal Joint. No other digit bilaterally shows sign of swelling. Maximal pain is elicited with palpation of the Proximal Interphalangeal Joint of the Small Finger there is no instability of the injured joint. There is otherwise no evidence of gross joint instability bilaterally. Muscular strength is clinically appropriate bilaterally. The base of the hand & wrist are not tender to palpation. There is not clinical evidence of deformity or mal-rotation of the injured digit. Radiographic Evaluation:  Radiographs, taken From a Hospital location outside of my practice were Personally Reviewed & Interpreted by myself today (2 views of the right Small Finger). The small finger appears appropriately reduced at the PIP joint. They demonstrate no evidence of an acute fracture of the phalanges or metacarpals. There is no evidence of degenerative change. There is evidence of a volar base avulsion fracture of the small finger middle phalanx. There is not evidence of other injury or bony fracture. Impression:  Ms. Marli Aquino has sustained recent right Small Finger PIP Joint soft tissue injury and volar avulsion fracture and presents requesting further treatment. Plan:  I have discussed with Ms. Marli Aquino the various treatment options for treatment of right Small Finger ligament injury. We discussed the options of Conservative management and surgical repair. She has elected to proceed conservativly, voicing an understanding of the other options available to her.   I have explained the complications, limitations, expectations, alternatives, & risks of her chosen treatment. I have explained the likely healing time and the need for protection and limited mobilization. We discussed the possibility of residual symptoms as may be related to closed treatment of ligament injuries including the possiblities of: persistant deformity, persistant pain, limitation of motion, future arthritic symptoms & the possible need for further treatment. I also explained the likelihood of permanent joint enlargement and limited motion. She understood our discussion and was comfortable with her decision; she was provided with appropriate expectations. I have discussed with Ms. Ember Coy the various treatment options for treatment of her right Small Finger Middle Phalanx avulsion fracture. We discussed the options of Conservative management of the fracture (accepting its current position and the functional consequences thereof), attempted closed reduction & cast immobilization (and the limitations which go along with cast immobilization), and Surgical Management in the form of Closed Reduction & Percutaneous Pinning  and/or Open Reduction & Internal Fixation of the fracture. She has elected to proceed conservatively, voicing an understanding of the other options available to her. I have explained the complications, limitations, expectations, alternatives, & risks of her chosen treatment. We discussed the possibility of residual symptoms as may be related to conservative treatment of fractures including the possibilities of: mal-union, non-union, delayed union, persistent deformity, persistent pain, limitation of motion, future arthritic symptoms & the possible need for further treatment. She understood our discussion and was comfortable with her decision; she was provided with appropriate expectations.     She is today fitted with a carefully applied, well padded & appropriately molded Aluminum Finger Splint involving the MCP Joint, PIP Joint, and DIP Joint. She was given a Patient Instruction Sheet related to cast care. She has been instructed to take the splint off a few times a day to work on gentle ROM. I have asked her to schedule a follow-up appointment for 1 week from now for suture removal.     She is specifically instructed to contact the office between now & her scheduled appointment if she has concerns related to the cast or the underlying injury. She is welcome to call for an appointment sooner if she has any additional concerns or questions.

## 2022-09-23 ENCOUNTER — OFFICE VISIT (OUTPATIENT)
Dept: ORTHOPEDIC SURGERY | Age: 72
End: 2022-09-23
Payer: MEDICARE

## 2022-09-23 VITALS — BODY MASS INDEX: 36.65 KG/M2 | WEIGHT: 220 LBS | HEIGHT: 65 IN

## 2022-09-23 DIAGNOSIS — S63.259A DISLOCATION OF FINGER, INITIAL ENCOUNTER: Primary | ICD-10-CM

## 2022-09-23 PROCEDURE — 1036F TOBACCO NON-USER: CPT | Performed by: PHYSICIAN ASSISTANT

## 2022-09-23 PROCEDURE — 1090F PRES/ABSN URINE INCON ASSESS: CPT | Performed by: PHYSICIAN ASSISTANT

## 2022-09-23 PROCEDURE — 3017F COLORECTAL CA SCREEN DOC REV: CPT | Performed by: PHYSICIAN ASSISTANT

## 2022-09-23 PROCEDURE — G8417 CALC BMI ABV UP PARAM F/U: HCPCS | Performed by: PHYSICIAN ASSISTANT

## 2022-09-23 PROCEDURE — G8428 CUR MEDS NOT DOCUMENT: HCPCS | Performed by: PHYSICIAN ASSISTANT

## 2022-09-23 PROCEDURE — G8400 PT W/DXA NO RESULTS DOC: HCPCS | Performed by: PHYSICIAN ASSISTANT

## 2022-09-23 PROCEDURE — 99213 OFFICE O/P EST LOW 20 MIN: CPT | Performed by: PHYSICIAN ASSISTANT

## 2022-09-23 PROCEDURE — 1123F ACP DISCUSS/DSCN MKR DOCD: CPT | Performed by: PHYSICIAN ASSISTANT

## 2022-09-23 NOTE — PROGRESS NOTES
Ms. Mark Padilla returns today in follow-up of her recent right Small Finger Proximal Interphalangeal Joint Radial and Ulnar collateral ligament injury which occurred approximately  2.5  weeks ago. She has been treated with immobilization for protection and gentle work on range of motion. She has noted decreased discomfort and decreased swelling. She notes no symptoms of numbness, tingling, no symptoms related to perfusion. I have today reviewed with Mark Padilla the clinically relevant, past medical history, medications, allergies,  family history, social history, and Review Of Systems & I have documented any details relevant to today's presenting complaints in my history above. Ms. Cyrus Maher self-reported past medical history, medications, allergies,  family history, social history, and Review Of Systems have been scanned into the chart under the \"Media\" tab. Physical Exam:  Vitals  Height: 5' 5\" (165.1 cm)  Weight: 220 lb (99.8 kg)  Ms. Mark Padilla appears well, she is in no apparent distress, she demonstrates appropriate mood & affect. Skin: Normal in appearance, Normal Color, and Free of Lesions Bilaterally   Digits: limited by stiffness in the small finger on the Right, normal on the Left. Thumb shows full range of motion bilaterally  Wrist range of motion is full bilaterally  There is no evidence of gross joint instability bilaterally. Sensation is normal on the Right, normal on the Left  Vascular examination reveals normal, good capillary refill, and good color bilaterally  Swelling is mild Small Finger on the Right, normal on the Left  There is no laxity to a gentle stress test of the injured right Small Finger Proximal Interphalangeal Joint Radial and Ulnar collateral ligament. Ligament injury site is mildly tender to palpation. Muscular strength is clinically appropriate bilaterally.         Radiographic Evaluation:  Radiographs were not obtained today    Impression:  Ms. Vipin Hill is doing well after recent right Small Finger Proximal Interphalangeal Joint Radial and Ulnar collateral ligament injury. It would appear that she has substantially healed her injury. Plan:  Ms. Vipin Hill is instructed in the appropriate short term protection of her healing ligament injury. We discussed the use of either a removable protective orthosis or activity adjustments as the situation demands. She is demonstrated the application and use of both devices. She is also instructed in work on Active & Passive range of motion of the digits, wrist, & elbow. These modalities were demonstrated to her today. We discussed the continued restrictions on the use of the injured hand and the limitations on resumption of activities until full range of motion and comfort have been regained. I have explained the time course and likely expectations for maximal recovery of motion and function. I have explained that at this time she has substantially healed her injury and she should continue to work on ROM. She understood our discussion. We discussed the option of pursuing formalized hand therapy and a prescription  was not indicated. I have asked Ms. Vipin Hill to follow-up with me by either scheduling an appointment for 4 weeks from now or by calling me at that time if she has not been able to regain full painless range of motion and functional use of the injured extremity. She is also specifically instructed to return to the office or call for an appointment sooner if her symptoms are changing or worsening prior to that time.

## 2022-09-26 NOTE — DISCHARGE INSTRUCTIONS
[address], [address] [facility]  Telephone: [phone]     Discharge Instructions     Important reminders:     **If you have any signs and symptoms of illness (Cough, fever, congestion, nausea, vomiting, diarrhea, etc.) please call the wound care center prior to your appointment. 1. Increase Protein intake for optimal wound healing  2. No added salt to reduce any swelling  3. If diabetic, maintain good glucose control  4. If you smoke, smoking prohibits wound healing, we ask that you refrain from smoking. 5. When taking antibiotics take the entire prescription as ordered. Do not stop taking until medication is all gone unless otherwise instructed. 6. Exercise as tolerated. 7. Keep weight off wounds and reposition every 2 hours if applicable. 8. If wound(s) is on your lower extremity, elevate legs to the level of the heart or above for 30 minutes 4-5 times a day and/or when sitting. Avoid standing for long periods of time. 9. Do not get wounds wet in bath or shower unless otherwise instructed by your physician. If your wound is on your foot or leg, you may purchase a cast bag. Please ask at the pharmacy. If Vascular testing is ordered, please call [address] [place] [address] [phone]) to schedule. Vascular tests ordered by Wound Care Physicians may take up to 2 hours to complete. Please keep that in mind when scheduling. If Vascular testing is scheduled, please bring supplies to replace your dressing after testing is done. The vascular department does not stock supplies. Wound: Coccyx     With each dressing change, rinse wounds with 0.9% Saline. (May use wound wash or soft contact solution. Both can be purchased at a local drug store). If unable to obtain saline, may use a gentle soap and water. Dressing care: Mix Gentamicin cream & Triad, Abd Pad- change Monday, Wednesday, & Friday.  Turn & reposition every 1-2 hours and as needed for pressure relief and comfort. Keep pressure off of your wound as much as possible. Eat plenty of protein     Home Care Agency/Facility: [facility]     Your wound-care supplies will be provided by:  Please note, depending on your insurance coverage, you may have out-of-pocket expenses for these supplies. Someone from the company should call you to confirm your order and discuss those potential costs before they ship your products -- please anticipate that call. If your out-of-pocket cost could be substantial, Many companies have financial hardship programs for patients who qualify, so please ask about that if you might need a hand. If you have any questions about your supplies or your potential out-of-pocket costs, or if you need to place an order for a refill of supplies (typically monthly), please call the company directly. Your  is [place] [place]     Follow up with Dr Erasmo Shellye In 2 week(s) in the wound care center. Wound Care Center Information: Should you experience any significant changes in your wound(s) or have questions about your wound care, please contact the [employer] at [phone] Monday  - Thursday 8:00 am - 4:00 pm and Friday 8:00 am - 1:00pm. If you need help with your wound outside these hours and cannot wait until we are again available, contact your PCP or go to the hospital emergency room. PLEASE NOTE: IF YOU ARE UNABLE TO OBTAIN WOUND SUPPLIES, CONTINUE TO USE THE SUPPLIES YOU HAVE AVAILABLE UNTIL YOU ARE ABLE TO REACH US. IT IS MOST IMPORTANT TO KEEP THE WOUND COVERED AT ALL TIMES. Patient Experience     Thank you for trusting us with your care. You may receive a survey from a company called [employer] asking for your feedback. We would appreciate it if you took a few minutes to share your experience.   Your input is very valuable to us

## 2022-09-28 ENCOUNTER — HOSPITAL ENCOUNTER (OUTPATIENT)
Dept: WOUND CARE | Age: 72
Discharge: HOME | End: 2022-09-28
Payer: MEDICARE

## 2022-09-28 VITALS
HEART RATE: 82 BPM | TEMPERATURE: 96.9 F | DIASTOLIC BLOOD PRESSURE: 82 MMHG | RESPIRATION RATE: 16 BRPM | SYSTOLIC BLOOD PRESSURE: 123 MMHG

## 2022-09-28 DIAGNOSIS — S31.000A SACRAL WOUND, INITIAL ENCOUNTER: ICD-10-CM

## 2022-09-28 DIAGNOSIS — Z74.09 POOR MOBILITY: Primary | ICD-10-CM

## 2022-09-28 DIAGNOSIS — L89.43 PRESSURE INJURY OF CONTIGUOUS REGION INVOLVING BACK AND BUTTOCK, STAGE 3, UNSPECIFIED LATERALITY (HCC): ICD-10-CM

## 2022-09-28 PROCEDURE — 11042 DBRDMT SUBQ TIS 1ST 20SQCM/<: CPT

## 2022-09-28 PROCEDURE — 11042 DBRDMT SUBQ TIS 1ST 20SQCM/<: CPT | Performed by: SURGERY

## 2022-09-28 RX ORDER — LIDOCAINE HYDROCHLORIDE 40 MG/ML
SOLUTION TOPICAL ONCE
Status: CANCELLED | OUTPATIENT
Start: 2022-09-28 | End: 2022-09-28

## 2022-09-28 RX ORDER — GINSENG 100 MG
CAPSULE ORAL ONCE
Status: CANCELLED | OUTPATIENT
Start: 2022-09-28 | End: 2022-09-28

## 2022-09-28 RX ORDER — LIDOCAINE HYDROCHLORIDE 20 MG/ML
JELLY TOPICAL ONCE
Status: CANCELLED | OUTPATIENT
Start: 2022-09-28 | End: 2022-09-28

## 2022-09-28 RX ORDER — LIDOCAINE 40 MG/G
CREAM TOPICAL ONCE
Status: CANCELLED | OUTPATIENT
Start: 2022-09-28 | End: 2022-09-28

## 2022-09-28 RX ORDER — CLOBETASOL PROPIONATE 0.5 MG/G
OINTMENT TOPICAL ONCE
Status: CANCELLED | OUTPATIENT
Start: 2022-09-28 | End: 2022-09-28

## 2022-09-28 RX ORDER — BACITRACIN ZINC AND POLYMYXIN B SULFATE 500; 1000 [USP'U]/G; [USP'U]/G
OINTMENT TOPICAL ONCE
Status: CANCELLED | OUTPATIENT
Start: 2022-09-28 | End: 2022-09-28

## 2022-09-28 RX ORDER — GENTAMICIN SULFATE 1 MG/G
OINTMENT TOPICAL ONCE
Status: CANCELLED | OUTPATIENT
Start: 2022-09-28 | End: 2022-09-28

## 2022-09-28 RX ORDER — LIDOCAINE 50 MG/G
OINTMENT TOPICAL ONCE
Status: CANCELLED | OUTPATIENT
Start: 2022-09-28 | End: 2022-09-28

## 2022-09-28 RX ORDER — BETAMETHASONE DIPROPIONATE 0.05 %
OINTMENT (GRAM) TOPICAL ONCE
Status: CANCELLED | OUTPATIENT
Start: 2022-09-28 | End: 2022-09-28

## 2022-09-28 RX ORDER — LIDOCAINE 40 MG/G
CREAM TOPICAL ONCE
Status: DISCONTINUED | OUTPATIENT
Start: 2022-09-28 | End: 2022-09-29 | Stop reason: HOSPADM

## 2022-09-28 RX ORDER — BACITRACIN, NEOMYCIN, POLYMYXIN B 400; 3.5; 5 [USP'U]/G; MG/G; [USP'U]/G
OINTMENT TOPICAL ONCE
Status: CANCELLED | OUTPATIENT
Start: 2022-09-28 | End: 2022-09-28

## 2022-09-28 NOTE — PROGRESS NOTES
[address]   [place], [address]  Telephone: [phone]        Donny Zamudio Fax # [phone]      Discharge Instructions         [address] [facility]  [place], [address]  Telephone: [phone]     Discharge Instructions     Important reminders:     **If you have any signs and symptoms of illness (Cough, fever, congestion, nausea, vomiting, diarrhea, etc.) please call the wound care center prior to your appointment. 1. Increase Protein intake for optimal wound healing  2. No added salt to reduce any swelling  3. If diabetic, maintain good glucose control  4. If you smoke, smoking prohibits wound healing, we ask that you refrain from smoking. 5. When taking antibiotics take the entire prescription as ordered. Do not stop taking until medication is all gone unless otherwise instructed. 6. Exercise as tolerated. 7. Keep weight off wounds and reposition every 2 hours if applicable. 8. If wound(s) is on your lower extremity, elevate legs to the level of the heart or above for 30 minutes 4-5 times a day and/or when sitting. Avoid standing for long periods of time. 9. Do not get wounds wet in bath or shower unless otherwise instructed by your physician. If your wound is on your foot or leg, you may purchase a cast bag. Please ask at the pharmacy. If Vascular testing is ordered, please call [address] [place] [address] [phone]) to schedule. Vascular tests ordered by Wound Care Physicians may take up to 2 hours to complete. Please keep that in mind when scheduling. If Vascular testing is scheduled, please bring supplies to replace your dressing after testing is done. The vascular department does not stock supplies. Wound: Coccyx     With each dressing change, rinse wounds with 0.9% Saline. (May use wound wash or soft contact solution. Both can be purchased at a local drug store).  If unable to took a few minutes to share your experience. Your input is very valuable to us        Skilled nurse to evaluate and treat for wound care. Change dressing as ordered  once a day on Monday, Wednesday, and Friday using clean technique. Patient/Family/caregiver may change dressings as needed as instructed when Home Care unavailable. WOUNDS REQUIRING DRESSING Changes:     Wound 06/16/22 Coccyx Mid #1 (Active)   Wound Image   06/16/22 1515   Wound Etiology Pressure Stage 3 09/28/22 0920   Wound Cleansed Cleansed with saline 09/28/22 0920   Dressing/Treatment ABD; Alginate;Dry dressing 08/24/22 1141   Wound Length (cm) 3 cm 09/28/22 0920   Wound Width (cm) 1 cm 09/28/22 0920   Wound Depth (cm) 3 cm 09/28/22 0920   Wound Surface Area (cm^2) 3 cm^2 09/28/22 0920   Change in Wound Size % (l*w) 84.85 09/28/22 0920   Wound Volume (cm^3) 9 cm^3 09/28/22 0920   Wound Healing % 86 09/28/22 0920   Post-Procedure Length (cm) 3 cm 09/28/22 0943   Post-Procedure Width (cm) 1 cm 09/28/22 0943   Post-Procedure Depth (cm) 3 cm 09/28/22 0943   Post-Procedure Surface Area (cm^2) 3 cm^2 09/28/22 0943   Post-Procedure Volume (cm^3) 9 cm^3 09/28/22 0943   Wound Assessment Bleeding 09/28/22 0943   Drainage Amount Moderate 09/28/22 0943   Drainage Description Yellow;Serosanguinous 09/28/22 0920   Odor None 09/28/22 0920   Saba-wound Assessment Intact 09/28/22 0920   Margins Defined edges 09/28/22 0920   Wound Thickness Description not for Pressure Injury Full thickness 09/14/22 1067   Number of days: 103          Patient seen and treated on 9/28/2022    By Yesenia Andrew MD NPI: [id number]  (provider/NPI)

## 2022-09-28 NOTE — PLAN OF CARE
Discharge instructions given. Patient verbalized understanding. Return to [facility] in 2 week(s).   Called/faxed orders to [facility]

## 2022-09-28 NOTE — PROGRESS NOTES
[address] Progress and Procedure Note    Terisa Dakin  AGE: 66 y.o. GENDER: female    : 1950  TODAY'S DATE: 2022    Chief Complaint   Patient presents with    Wound Check        History of Present Illness     Terisa Dakin is a 66 y.o. female who presents today for wound evaluation. History of Wound: pressure wound located on the  sacrum . Sacral decubitus ulcer stage 3  Wound Pain:  mild  Severity:  2 / 10   Wound Type:  pressure  Modifying Factors:  diabetes, poor glucose control, chronic pressure, decreased mobility, and malnutrition  Associated Signs/Symptoms:  drainage and pain    Past Medical History:   Diagnosis Date    Arthritis     back    Cataracts, bilateral     CHF (congestive heart failure) (HCC)     Diabetes mellitus ([address]     Glaucoma     Hyperlipidemia     Hypertension     Poor mobility 2022    Pressure 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, stage 3 ([address] 2022    Similar wound 2 years ago. Thyroid disease      Past Surgical History:   Procedure Laterality Date    CARPAL TUNNEL RELEASE      bilateral    CHOLECYSTECTOMY      COLONOSCOPY      ELBOW SURGERY      ENDOSCOPY, COLON, DIAGNOSTIC      EYE SURGERY      FOOT SURGERY      SHOULDER SURGERY       Current Outpatient Medications   Medication Sig Dispense Refill    gentamicin (GARAMYCIN) 0.1 % cream Apply topically daily. 30 g 1    cephALEXin (KEFLEX) 500 MG capsule Take 1 capsule by mouth 4 times daily 40 capsule 0    miconazole (MICOTIN) 2 % powder Apply topically 2 times daily to abdominal folds. 45 g 1    traMADol (ULTRAM) 50 MG tablet Take 50 mg by mouth 3 times daily as needed for Pain.       DULoxetine (CYMBALTA) 60 MG extended release capsule Take 1 capsule by mouth daily 30 capsule 3    Travoprost, BAK Free, (TRAVATAN Z) 0.004 % SOLN ophthalmic solution Place 1 drop into both eyes nightly      DICLOFENAC PO Take 100 mg by mouth 2 times daily acetaminophen (TYLENOL) 500 MG tablet Take 500 mg by mouth every 6 hours as needed for Pain or Fever       aspirin EC 81 MG EC tablet Take 81 mg by mouth daily May restart in AM.      levothyroxine (SYNTHROID) 200 MCG tablet Take 200 mcg by mouth Daily       Insulin Detemir (LEVEMIR FLEXPEN SC) Inject 35 Units into the skin nightly      simvastatin (ZOCOR) 40 MG tablet Take 40 mg by mouth nightly. Insulin Lispro, Human, (HUMALOG SC) Inject 10 Units into the skin 3 times daily (with meals) With sliding      Calcium Carbonate-Vitamin D (CALCIUM + D PO) Take by mouth daily        Current Facility-Administered Medications   Medication Dose Route Frequency Provider Last Rate Last Admin    lidocaine (LMX) 4 % cream   Topical Once Stacy Elizondo MD          Social History:   Social History     Tobacco Use    Smoking status: Never    Smokeless tobacco: Never   Vaping Use    Vaping Use: Never used   Substance Use Topics    Alcohol use: Not Currently    Drug use: Never     Allergies: Other, Ace inhibitors, Metoprolol, Tetrahydrozoline hcl, and Timolol    Procedure: Indications:  Based on my examination of this patient's wound(s)/ulcer(s) today, debridement is required to promote healing and evaluate the wound base. Performed by: Waqar Ndiaye MD    Consent obtained: Yes    Time out taken: Yes    Pain Control: Anesthetic  Anesthetic: 4% Lidocaine Cream     Debridement: Excisional Debridement    Using curette the wound was sharply debrided    down through and including the removal of epidermis, dermis, and subcutaneous tissue.         Devitalized Tissue Debrided: fibrin, biofilm, and slough    Pre Debridement Measurements:  Are located in the Wound Documentation Flow Sheet     Wound #: 1     Post  Debridement Measurements:  Wound 06/16/22 Coccyx Mid #1 (Active)   Wound Image   06/16/22 1515   Wound Etiology Pressure Stage 3 09/28/22 0920   Wound Cleansed Cleansed with saline 09/28/22 0920   Dressing/Treatment Antibacterial ointment;Triad hydro/zinc oxide-based hydrophilic paste;Dry dressing 09/28/22 1010   Wound Length (cm) 3 cm 09/28/22 0920   Wound Width (cm) 1 cm 09/28/22 0920   Wound Depth (cm) 3 cm 09/28/22 0920   Wound Surface Area (cm^2) 3 cm^2 09/28/22 0920   Change in Wound Size % (l*w) 84.85 09/28/22 0920   Wound Volume (cm^3) 9 cm^3 09/28/22 0920   Wound Healing % 86 09/28/22 0920   Post-Procedure Length (cm) 3 cm 09/28/22 0943   Post-Procedure Width (cm) 1 cm 09/28/22 0943   Post-Procedure Depth (cm) 3 cm 09/28/22 0943   Post-Procedure Surface Area (cm^2) 3 cm^2 09/28/22 0943   Post-Procedure Volume (cm^3) 9 cm^3 09/28/22 0943   Wound Assessment Bleeding 09/28/22 0943   Drainage Amount Moderate 09/28/22 0943   Drainage Description Yellow;Serosanguinous 09/28/22 0920   Odor None 09/28/22 0920   Saba-wound Assessment Intact 09/28/22 0920   Margins Defined edges 09/28/22 0920   Wound Thickness Description not for Pressure Injury Full thickness 09/14/22 0939   Number of days: 103       Percent of Wound(s)/Ulcer(s) Debrided: 100%    Total Surface Area Debrided:  3 sq cm     Bleeding:  Minimal    Hemostasis Achieved:  by pressure    Procedural Pain:  2  / 10     Post Procedural Pain:  2 / 10     Response to treatment:  Well tolerated by patient. Assessment:     Wound looks improved. (improved, worse or stable)    Patient tolerated procedure well and was given proper instruction. The nature of the patient's condition was explained in depth. The patient was informed that their compliance to the treatment plan is paramount to successful healing and prevention of further ulceration and/or infection     Plan:     Treatment Plan:       Treatment Note please see attached Discharge Instructions    Written patient dismissal instructions given to patient and signed by patient or POA.          Discharge Instructions         [address]  Telephone: [phone] FAX [phone]     Discharge Instructions     Important reminders:     **If you have any signs and symptoms of illness (Cough, fever, congestion, nausea, vomiting, diarrhea, etc.) please call the wound care center prior to your appointment. 1. Increase Protein intake for optimal wound healing  2. No added salt to reduce any swelling  3. If diabetic, maintain good glucose control  4. If you smoke, smoking prohibits wound healing, we ask that you refrain from smoking. 5. When taking antibiotics take the entire prescription as ordered. Do not stop taking until medication is all gone unless otherwise instructed. 6. Exercise as tolerated. 7. Keep weight off wounds and reposition every 2 hours if applicable. 8. If wound(s) is on your lower extremity, elevate legs to the level of the heart or above for 30 minutes 4-5 times a day and/or when sitting. Avoid standing for long periods of time. 9. Do not get wounds wet in bath or shower unless otherwise instructed by your physician. If your wound is on your foot or leg, you may purchase a cast bag. Please ask at the pharmacy. If Vascular testing is ordered, please call [address] [place] [address] [phone]) to schedule. Vascular tests ordered by Wound Care Physicians may take up to 2 hours to complete. Please keep that in mind when scheduling. If Vascular testing is scheduled, please bring supplies to replace your dressing after testing is done. The vascular department does not stock supplies. Wound: Coccyx     With each dressing change, rinse wounds with 0.9% Saline. (May use wound wash or soft contact solution. Both can be purchased at a local drug store). If unable to obtain saline, may use a gentle soap and water. Dressing care: Mix Gentamicin cream & Triad, Abd Pad- change Monday, Wednesday, & Friday. Turn & reposition every 1-2 hours and as needed for pressure relief and comfort. Keep pressure off of your wound as much as possible.  Eat plenty of protein     Home Care Agency/Facility: [facility]     Your wound-care supplies will be provided by:  Please note, depending on your insurance coverage, you may have out-of-pocket expenses for these supplies. Someone from the company should call you to confirm your order and discuss those potential costs before they ship your products -- please anticipate that call. If your out-of-pocket cost could be substantial, Many companies have financial hardship programs for patients who qualify, so please ask about that if you might need a hand. If you have any questions about your supplies or your potential out-of-pocket costs, or if you need to place an order for a refill of supplies (typically monthly), please call the company directly. Your  is Joseph Anguiano     Follow up with Dr Inocente Gao In 2 week(s) in the wound care center. Wound Care Center Information: Should you experience any significant changes in your wound(s) or have questions about your wound care, please contact the [email or web address] at [phone] Monday  - Thursday 8:00 am - 4:00 pm and Friday 8:00 am - 1:00pm. If you need help with your wound outside these hours and cannot wait until we are again available, contact your PCP or go to the hospital emergency room. PLEASE NOTE: IF YOU ARE UNABLE TO OBTAIN WOUND SUPPLIES, CONTINUE TO USE THE SUPPLIES YOU HAVE AVAILABLE UNTIL YOU ARE ABLE TO REACH US. IT IS MOST IMPORTANT TO KEEP THE WOUND COVERED AT ALL TIMES. Patient Experience     Thank you for trusting us with your care. You may receive a survey from a company called [employer] asking for your feedback. We would appreciate it if you took a few minutes to share your experience. Your input is very valuable to us       Shagufta Gao MD, FACS  9/28/2022  11:52 AM

## 2022-10-12 ENCOUNTER — HOSPITAL ENCOUNTER (OUTPATIENT)
Dept: WOUND CARE | Age: 72
Discharge: HOME | End: 2022-10-12
Payer: MEDICARE

## 2022-10-12 VITALS — TEMPERATURE: 96.8 F | SYSTOLIC BLOOD PRESSURE: 116 MMHG | DIASTOLIC BLOOD PRESSURE: 71 MMHG | HEART RATE: 81 BPM

## 2022-10-12 DIAGNOSIS — Z74.09 POOR MOBILITY: Primary | ICD-10-CM

## 2022-10-12 DIAGNOSIS — L89.43 PRESSURE INJURY OF CONTIGUOUS REGION INVOLVING BACK AND BUTTOCK, STAGE 3, UNSPECIFIED LATERALITY (HCC): ICD-10-CM

## 2022-10-12 DIAGNOSIS — S31.000A SACRAL WOUND, INITIAL ENCOUNTER: ICD-10-CM

## 2022-10-12 PROCEDURE — 11042 DBRDMT SUBQ TIS 1ST 20SQCM/<: CPT | Performed by: SURGERY

## 2022-10-12 PROCEDURE — 11042 DBRDMT SUBQ TIS 1ST 20SQCM/<: CPT

## 2022-10-12 RX ORDER — LIDOCAINE HYDROCHLORIDE 20 MG/ML
JELLY TOPICAL ONCE
OUTPATIENT
Start: 2022-10-12 | End: 2022-10-12

## 2022-10-12 RX ORDER — BACITRACIN ZINC AND POLYMYXIN B SULFATE 500; 1000 [USP'U]/G; [USP'U]/G
OINTMENT TOPICAL ONCE
OUTPATIENT
Start: 2022-10-12 | End: 2022-10-12

## 2022-10-12 RX ORDER — LIDOCAINE HYDROCHLORIDE 40 MG/ML
SOLUTION TOPICAL ONCE
OUTPATIENT
Start: 2022-10-12 | End: 2022-10-12

## 2022-10-12 RX ORDER — GENTAMICIN SULFATE 1 MG/G
OINTMENT TOPICAL ONCE
OUTPATIENT
Start: 2022-10-12 | End: 2022-10-12

## 2022-10-12 RX ORDER — LIDOCAINE 40 MG/G
CREAM TOPICAL ONCE
Status: DISCONTINUED | OUTPATIENT
Start: 2022-10-12 | End: 2022-10-13 | Stop reason: HOSPADM

## 2022-10-12 RX ORDER — LIDOCAINE 40 MG/G
CREAM TOPICAL ONCE
Status: CANCELLED | OUTPATIENT
Start: 2022-10-12 | End: 2022-10-12

## 2022-10-12 RX ORDER — GINSENG 100 MG
CAPSULE ORAL ONCE
OUTPATIENT
Start: 2022-10-12 | End: 2022-10-12

## 2022-10-12 RX ORDER — BACITRACIN, NEOMYCIN, POLYMYXIN B 400; 3.5; 5 [USP'U]/G; MG/G; [USP'U]/G
OINTMENT TOPICAL ONCE
OUTPATIENT
Start: 2022-10-12 | End: 2022-10-12

## 2022-10-12 RX ORDER — BETAMETHASONE DIPROPIONATE 0.05 %
OINTMENT (GRAM) TOPICAL ONCE
OUTPATIENT
Start: 2022-10-12 | End: 2022-10-12

## 2022-10-12 RX ORDER — CLOBETASOL PROPIONATE 0.5 MG/G
OINTMENT TOPICAL ONCE
OUTPATIENT
Start: 2022-10-12 | End: 2022-10-12

## 2022-10-12 RX ORDER — LIDOCAINE 50 MG/G
OINTMENT TOPICAL ONCE
OUTPATIENT
Start: 2022-10-12 | End: 2022-10-12

## 2022-10-12 NOTE — PLAN OF CARE
Discharge instructions given. Patient verbalized understanding. Return to [address] [place] [address],[address] in 2 week(s).

## 2022-10-12 NOTE — PROGRESS NOTES
[address] Progress and Procedure Note    Jen Hilton  AGE: 66 y.o. GENDER: female    : 1950  TODAY'S DATE: 10/12/2022    Chief Complaint   Patient presents with    Wound Check     buttocks        History of Present Illness     Jen Hilton is a 66 y.o. female who presents today for wound evaluation. History of Wound: pressure and diabetic wound located on the  sacrum . Sacral decubitus ulcer stage 3  Wound Pain:  mild  Severity:  2 / 10   Wound Type:  diabetic and pressure  Modifying Factors:  diabetes, poor glucose control, chronic pressure, decreased mobility, and malnutrition  Associated Signs/Symptoms:  drainage and pain    Past Medical History:   Diagnosis Date    Arthritis     back    Cataracts, bilateral     CHF (congestive heart failure) ([facility])     Diabetes mellitus ([address]     Glaucoma     Hyperlipidemia     Hypertension     Poor mobility 2022    Pressure 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, stage 3 ([facility] 2022    Similar wound 2 years ago. Thyroid disease      Past Surgical History:   Procedure Laterality Date    CARPAL TUNNEL RELEASE      bilateral    CHOLECYSTECTOMY      COLONOSCOPY      ELBOW SURGERY      ENDOSCOPY, COLON, DIAGNOSTIC      EYE SURGERY      FOOT SURGERY      SHOULDER SURGERY       Current Outpatient Medications   Medication Sig Dispense Refill    gentamicin (GARAMYCIN) 0.1 % cream Apply topically daily. 30 g 1    cephALEXin (KEFLEX) 500 MG capsule Take 1 capsule by mouth 4 times daily 40 capsule 0    miconazole (MICOTIN) 2 % powder Apply topically 2 times daily to abdominal folds. 45 g 1    traMADol (ULTRAM) 50 MG tablet Take 50 mg by mouth 3 times daily as needed for Pain.       DULoxetine (CYMBALTA) 60 MG extended release capsule Take 1 capsule by mouth daily 30 capsule 3    Travoprost, BAK Free, (TRAVATAN Z) 0.004 % SOLN ophthalmic solution Place 1 drop into both eyes nightly      DICLOFENAC PO Take 100 mg by mouth 2 times daily      acetaminophen (TYLENOL) 500 MG tablet Take 500 mg by mouth every 6 hours as needed for Pain or Fever       aspirin EC 81 MG EC tablet Take 81 mg by mouth daily May restart in AM.      levothyroxine (SYNTHROID) 200 MCG tablet Take 200 mcg by mouth Daily       Insulin Detemir (LEVEMIR FLEXPEN SC) Inject 35 Units into the skin nightly      simvastatin (ZOCOR) 40 MG tablet Take 40 mg by mouth nightly. Insulin Lispro, Human, (HUMALOG SC) Inject 10 Units into the skin 3 times daily (with meals) With sliding      Calcium Carbonate-Vitamin D (CALCIUM + D PO) Take by mouth daily        Current Facility-Administered Medications   Medication Dose Route Frequency Provider Last Rate Last Admin    lidocaine (LMX) 4 % cream   Topical Once López Lyons MD          Social History:   Social History     Tobacco Use    Smoking status: Never    Smokeless tobacco: Never   Vaping Use    Vaping Use: Never used   Substance Use Topics    Alcohol use: Not Currently    Drug use: Never     Allergies: Other, Ace inhibitors, Metoprolol, Tetrahydrozoline hcl, and Timolol    Procedure: Indications:  Based on my examination of this patient's wound(s)/ulcer(s) today, debridement is required to promote healing and evaluate the wound base. Performed by: Manuel Sandoval MD    Consent obtained: Yes    Time out taken: Yes    Pain Control: Anesthetic  Anesthetic: 4% Lidocaine Cream     Debridement: Excisional Debridement    Using curette the wound was sharply debrided    down through and including the removal of epidermis, dermis, and subcutaneous tissue.         Devitalized Tissue Debrided: fibrin, biofilm, and slough    Pre Debridement Measurements:  Are located in the Wound Documentation Flow Sheet     Wound #: 1     Post  Debridement Measurements:  Wound 06/16/22 Coccyx Mid #1 (Active)   Wound Image   06/16/22 1515   Wound Etiology Other 10/12/22 0905   Wound Cleansed Cleansed with saline 10/12/22 0905 Dressing/Treatment Antibacterial ointment;Triad hydro/zinc oxide-based hydrophilic paste;Dry dressing 09/28/22 1010   Wound Length (cm) 3 cm 10/12/22 0905   Wound Width (cm) 1 cm 10/12/22 0905   Wound Depth (cm) 3 cm 10/12/22 0905   Wound Surface Area (cm^2) 3 cm^2 10/12/22 0905   Change in Wound Size % (l*w) 84.85 10/12/22 0905   Wound Volume (cm^3) 9 cm^3 10/12/22 0905   Wound Healing % 86 10/12/22 0905   Post-Procedure Length (cm) 3 cm 10/12/22 0922   Post-Procedure Width (cm) 1 cm 10/12/22 1797   Post-Procedure Depth (cm) 3 cm 10/12/22 0922   Post-Procedure Surface Area (cm^2) 3 cm^2 10/12/22 0922   Post-Procedure Volume (cm^3) 9 cm^3 10/12/22 0922   Wound Assessment Bleeding 10/12/22 0922   Drainage Amount Moderate 10/12/22 0922   Drainage Description Yellow 10/12/22 0905   Odor None 10/12/22 0905   Saba-wound Assessment Intact 10/12/22 0905   Margins Defined edges 10/12/22 0905   Wound Thickness Description not for Pressure Injury Full thickness 09/14/22 0939   Number of days: 117       Percent of Wound(s)/Ulcer(s) Debrided: 100%    Total Surface Area Debrided:  3 sq cm     Bleeding:  Minimal    Hemostasis Achieved:  by pressure    Procedural Pain:  2  / 10     Post Procedural Pain:  2 / 10     Response to treatment:  Well tolerated by patient. Assessment:     Wound looks improved. (improved, worse or stable)    Patient tolerated procedure well and was given proper instruction. The nature of the patient's condition was explained in depth. The patient was informed that their compliance to the treatment plan is paramount to successful healing and prevention of further ulceration and/or infection     Plan:     Treatment Plan:       Treatment Note please see attached Discharge Instructions    Written patient dismissal instructions given to patient and signed by patient or POA.          Discharge Instructions         [address], [address] [place] [address]  Telephone: [phone]     Home Care Agency/Facility: [facility]     Your wound-care supplies will be provided by:  Please note, depending on your insurance coverage, you may have out-of-pocket expenses for these supplies. Someone from the company should call you to confirm your order and discuss those potential costs before they ship your products -- please anticipate that call. If your out-of-pocket cost could be substantial, Many companies have financial hardship programs for patients who qualify, so please ask about that if you might need a hand. If you have any questions about your supplies or your potential out-of-pocket costs, or if you need to place an order for a refill of supplies (typically monthly), please call the company directly. Your  is Remberto Auguste     Follow up with Dr Dusty Porter In 2 week(s) in the wound care center. Wound Care Center Information: Should you experience any significant changes in your wound(s) or have questions about your wound care, please contact the [facility] at [phone] Monday  - Thursday 8:00 am - 4:00 pm and Friday 8:00 am - 1:00pm. If you need help with your wound outside these hours and cannot wait until we are again available, contact your PCP or go to the hospital emergency room. PLEASE NOTE: IF YOU ARE UNABLE TO OBTAIN WOUND SUPPLIES, CONTINUE TO USE THE SUPPLIES YOU HAVE AVAILABLE UNTIL YOU ARE ABLE TO REACH US. IT IS MOST IMPORTANT TO KEEP THE WOUND COVERED AT ALL TIMES. Patient Experience     Thank you for trusting us with your care. You may receive a survey from a company called [employer] asking for your feedback. We would appreciate it if you took a few minutes to share your experience. Your input is very valuable to us       Dayna Porter MD, FACS  10/12/2022  9:44 AM

## 2022-10-12 NOTE — DISCHARGE INSTRUCTIONS
[address], [address]  Telephone: [phone]     Discharge Instructions     Important reminders:     **If you have any signs and symptoms of illness (Cough, fever, congestion, nausea, vomiting, diarrhea, etc.) please call the wound care center prior to your appointment. 1. Increase Protein intake for optimal wound healing  2. No added salt to reduce any swelling  3. If diabetic, maintain good glucose control  4. If you smoke, smoking prohibits wound healing, we ask that you refrain from smoking. 5. When taking antibiotics take the entire prescription as ordered. Do not stop taking until medication is all gone unless otherwise instructed. 6. Exercise as tolerated. 7. Keep weight off wounds and reposition every 2 hours if applicable. 8. If wound(s) is on your lower extremity, elevate legs to the level of the heart or above for 30 minutes 4-5 times a day and/or when sitting. Avoid standing for long periods of time. 9. Do not get wounds wet in bath or shower unless otherwise instructed by your physician. If your wound is on your foot or leg, you may purchase a cast bag. Please ask at the pharmacy. If Vascular testing is ordered, please call [address] [place] [address] [phone]) to schedule. Vascular tests ordered by Wound Care Physicians may take up to 2 hours to complete. Please keep that in mind when scheduling. If Vascular testing is scheduled, please bring supplies to replace your dressing after testing is done. The vascular department does not stock supplies. Wound: Coccyx     With each dressing change, rinse wounds with 0.9% Saline. (May use wound wash or soft contact solution. Both can be purchased at a local drug store). If unable to obtain saline, may use a gentle soap and water. Dressing care: Mix Gentamicin cream & Triad, Abd Pad- change Monday, Wednesday, & Friday.  Turn & reposition every 1-2 hours and as needed for pressure relief and comfort. Keep pressure off of your wound as much as possible. Eat plenty of protein     Home Care Agency/Facility: [facility]     Your wound-care supplies will be provided by:  Please note, depending on your insurance coverage, you may have out-of-pocket expenses for these supplies. Someone from the company should call you to confirm your order and discuss those potential costs before they ship your products -- please anticipate that call. If your out-of-pocket cost could be substantial, Many companies have financial hardship programs for patients who qualify, so please ask about that if you might need a hand. If you have any questions about your supplies or your potential out-of-pocket costs, or if you need to place an order for a refill of supplies (typically monthly), please call the company directly. Your  is Tena Parham     Follow up with Dr Shelia Vasquez In 2 week(s) in the wound care center. Wound Care Center Information: Should you experience any significant changes in your wound(s) or have questions about your wound care, please contact the [facility] at [phone] Monday  - Thursday 8:00 am - 4:00 pm and Friday 8:00 am - 1:00pm. If you need help with your wound outside these hours and cannot wait until we are again available, contact your PCP or go to the hospital emergency room. PLEASE NOTE: IF YOU ARE UNABLE TO OBTAIN WOUND SUPPLIES, CONTINUE TO USE THE SUPPLIES YOU HAVE AVAILABLE UNTIL YOU ARE ABLE TO REACH US. IT IS MOST IMPORTANT TO KEEP THE WOUND COVERED AT ALL TIMES. Patient Experience     Thank you for trusting us with your care. You may receive a survey from a company called [employer] asking for your feedback. We would appreciate it if you took a few minutes to share your experience.   Your input is very valuable to us

## 2022-11-02 ENCOUNTER — HOSPITAL ENCOUNTER (OUTPATIENT)
Dept: WOUND CARE | Age: 72
Discharge: HOME | End: 2022-11-02
Payer: MEDICARE

## 2022-11-02 VITALS
SYSTOLIC BLOOD PRESSURE: 119 MMHG | HEART RATE: 90 BPM | RESPIRATION RATE: 16 BRPM | DIASTOLIC BLOOD PRESSURE: 71 MMHG | TEMPERATURE: 97.1 F

## 2022-11-02 DIAGNOSIS — Z74.09 POOR MOBILITY: Primary | ICD-10-CM

## 2022-11-02 DIAGNOSIS — L89.43 PRESSURE INJURY OF CONTIGUOUS REGION INVOLVING BACK AND BUTTOCK, STAGE 3, UNSPECIFIED LATERALITY (HCC): ICD-10-CM

## 2022-11-02 DIAGNOSIS — S31.000A SACRAL WOUND, INITIAL ENCOUNTER: ICD-10-CM

## 2022-11-02 PROCEDURE — 11042 DBRDMT SUBQ TIS 1ST 20SQCM/<: CPT | Performed by: SURGERY

## 2022-11-02 PROCEDURE — 11042 DBRDMT SUBQ TIS 1ST 20SQCM/<: CPT

## 2022-11-02 RX ORDER — BACITRACIN, NEOMYCIN, POLYMYXIN B 400; 3.5; 5 [USP'U]/G; MG/G; [USP'U]/G
OINTMENT TOPICAL ONCE
OUTPATIENT
Start: 2022-11-02 | End: 2022-11-02

## 2022-11-02 RX ORDER — LIDOCAINE HYDROCHLORIDE 20 MG/ML
JELLY TOPICAL ONCE
OUTPATIENT
Start: 2022-11-02 | End: 2022-11-02

## 2022-11-02 RX ORDER — BETAMETHASONE DIPROPIONATE 0.05 %
OINTMENT (GRAM) TOPICAL ONCE
OUTPATIENT
Start: 2022-11-02 | End: 2022-11-02

## 2022-11-02 RX ORDER — GENTAMICIN SULFATE 1 MG/G
OINTMENT TOPICAL ONCE
Status: CANCELLED | OUTPATIENT
Start: 2022-11-02 | End: 2022-11-02

## 2022-11-02 RX ORDER — LIDOCAINE 40 MG/G
CREAM TOPICAL ONCE
Status: DISCONTINUED | OUTPATIENT
Start: 2022-11-02 | End: 2022-11-03 | Stop reason: HOSPADM

## 2022-11-02 RX ORDER — LIDOCAINE 50 MG/G
OINTMENT TOPICAL ONCE
OUTPATIENT
Start: 2022-11-02 | End: 2022-11-02

## 2022-11-02 RX ORDER — CLOBETASOL PROPIONATE 0.5 MG/G
OINTMENT TOPICAL ONCE
OUTPATIENT
Start: 2022-11-02 | End: 2022-11-02

## 2022-11-02 RX ORDER — BACITRACIN ZINC AND POLYMYXIN B SULFATE 500; 1000 [USP'U]/G; [USP'U]/G
OINTMENT TOPICAL ONCE
OUTPATIENT
Start: 2022-11-02 | End: 2022-11-02

## 2022-11-02 RX ORDER — LIDOCAINE HYDROCHLORIDE 40 MG/ML
SOLUTION TOPICAL ONCE
OUTPATIENT
Start: 2022-11-02 | End: 2022-11-02

## 2022-11-02 RX ORDER — GINSENG 100 MG
CAPSULE ORAL ONCE
OUTPATIENT
Start: 2022-11-02 | End: 2022-11-02

## 2022-11-02 RX ORDER — LIDOCAINE 40 MG/G
CREAM TOPICAL ONCE
Status: CANCELLED | OUTPATIENT
Start: 2022-11-02 | End: 2022-11-02

## 2022-11-02 NOTE — PROGRESS NOTES
[address] Progress and Procedure Note    Lorinda Saint  AGE: 74 y.o. GENDER: female    : 1950  TODAY'S DATE: 2022    Chief Complaint   Patient presents with    Wound Check        History of Present Illness     Lorinda Saint is a 74 y.o. female who presents today for wound evaluation. History of Wound: diabetic and pressure wound located on the  sacrum . Sacral decubitus ulcer stage 3  Wound Pain:  mild  Severity:  2 / 10   Wound Type:  diabetic and pressure  Modifying Factors:  diabetes, poor glucose control, chronic pressure, decreased mobility, and malnutrition  Associated Signs/Symptoms:  drainage and pain    Past Medical History:   Diagnosis Date    Arthritis     back    Cataracts, bilateral     CHF (congestive heart failure) ([facility])     Diabetes mellitus ([address]     Glaucoma     Hyperlipidemia     Hypertension     Poor mobility 2022    Pressure 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, stage 3 ([address] 2022    Similar wound 2 years ago. Thyroid disease      Past Surgical History:   Procedure Laterality Date    CARPAL TUNNEL RELEASE      bilateral    CHOLECYSTECTOMY      COLONOSCOPY      ELBOW SURGERY      ENDOSCOPY, COLON, DIAGNOSTIC      EYE SURGERY      FOOT SURGERY      SHOULDER SURGERY       Current Outpatient Medications   Medication Sig Dispense Refill    gentamicin (GARAMYCIN) 0.1 % cream Apply topically daily. 30 g 1    cephALEXin (KEFLEX) 500 MG capsule Take 1 capsule by mouth 4 times daily 40 capsule 0    miconazole (MICOTIN) 2 % powder Apply topically 2 times daily to abdominal folds. 45 g 1    traMADol (ULTRAM) 50 MG tablet Take 50 mg by mouth 3 times daily as needed for Pain.       DULoxetine (CYMBALTA) 60 MG extended release capsule Take 1 capsule by mouth daily 30 capsule 3    Travoprost, BAK Free, (TRAVATAN Z) 0.004 % SOLN ophthalmic solution Place 1 drop into both eyes nightly      DICLOFENAC PO Take 100 mg by mouth 2 times daily      acetaminophen (TYLENOL) 500 MG tablet Take 500 mg by mouth every 6 hours as needed for Pain or Fever       aspirin EC 81 MG EC tablet Take 81 mg by mouth daily May restart in AM.      levothyroxine (SYNTHROID) 200 MCG tablet Take 200 mcg by mouth Daily       Insulin Detemir (LEVEMIR FLEXPEN SC) Inject 35 Units into the skin nightly      simvastatin (ZOCOR) 40 MG tablet Take 40 mg by mouth nightly. Insulin Lispro, Human, (HUMALOG SC) Inject 10 Units into the skin 3 times daily (with meals) With sliding      Calcium Carbonate-Vitamin D (CALCIUM + D PO) Take by mouth daily        Current Facility-Administered Medications   Medication Dose Route Frequency Provider Last Rate Last Admin    lidocaine (LMX) 4 % cream   Topical Once Raven MD Elizabeth          Social History:   Social History     Tobacco Use    Smoking status: Never    Smokeless tobacco: Never   Vaping Use    Vaping Use: Never used   Substance Use Topics    Alcohol use: Not Currently    Drug use: Never     Allergies: Other, Ace inhibitors, Metoprolol, Tetrahydrozoline hcl, and Timolol    Procedure: Indications:  Based on my examination of this patient's wound(s)/ulcer(s) today, debridement is required to promote healing and evaluate the wound base. Performed by: Colton Sutton MD    Consent obtained: Yes    Time out taken: Yes    Pain Control: Anesthetic  Anesthetic: 4% Lidocaine Cream     Debridement: Excisional Debridement    Using curette the wound was sharply debrided    down through and including the removal of epidermis, dermis, and subcutaneous tissue.         Devitalized Tissue Debrided: fibrin, biofilm, and slough    Pre Debridement Measurements:  Are located in the Wound Documentation Flow Sheet     Wound #: 1     Post  Debridement Measurements:  Wound 06/16/22 Coccyx Mid #1 (Active)   Wound Image   06/16/22 1515   Wound Etiology Other 11/02/22 0843   Wound Cleansed Cleansed with saline 11/02/22 0843 Dressing/Treatment Antibacterial ointment;Triad hydro/zinc oxide-based hydrophilic paste;Dry dressing 09/28/22 1010   Wound Length (cm) 2.6 cm 11/02/22 0843   Wound Width (cm) 1 cm 11/02/22 0843   Wound Depth (cm) 2.2 cm 11/02/22 0843   Wound Surface Area (cm^2) 2.6 cm^2 11/02/22 0843   Change in Wound Size % (l*w) 86.87 11/02/22 0843   Wound Volume (cm^3) 5.72 cm^3 11/02/22 0843   Wound Healing % 91 11/02/22 0843   Post-Procedure Length (cm) 2.6 cm 11/02/22 0859   Post-Procedure Width (cm) 1 cm 11/02/22 0859   Post-Procedure Depth (cm) 2.2 cm 11/02/22 0859   Post-Procedure Surface Area (cm^2) 2.6 cm^2 11/02/22 0859   Post-Procedure Volume (cm^3) 5.72 cm^3 11/02/22 0859   Wound Assessment Bleeding 11/02/22 0859   Drainage Amount Moderate 11/02/22 0859   Drainage Description Yellow;Serosanguinous 11/02/22 0843   Odor None 11/02/22 0843   Saba-wound Assessment Intact 11/02/22 0843   Margins Defined edges 11/02/22 0843   Wound Thickness Description not for Pressure Injury Full thickness 09/14/22 0939   Number of days: 138       Percent of Wound(s)/Ulcer(s) Debrided: 100%    Total Surface Area Debrided:  2.6 sq cm     Bleeding:  Minimal    Hemostasis Achieved:  by pressure    Procedural Pain:  2  / 10     Post Procedural Pain:  2 / 10     Response to treatment:  Well tolerated by patient. Assessment:     Wound looks improved. (improved, worse or stable)    Patient tolerated procedure well and was given proper instruction. The nature of the patient's condition was explained in depth. The patient was informed that their compliance to the treatment plan is paramount to successful healing and prevention of further ulceration and/or infection     Plan:     Treatment Plan:       Treatment Note please see attached Discharge Instructions    Written patient dismissal instructions given to patient and signed by patient or POA.          Discharge [address] [place] [address] O  [address] [place] [address] [address], [address]  Telephone: [phone]     Discharge Instructions     Important reminders:     **If you have any signs and symptoms of illness (Cough, fever, congestion, nausea, vomiting, diarrhea, etc.) please call the wound care center prior to your appointment. 1. Increase Protein intake for optimal wound healing  2. No added salt to reduce any swelling  3. If diabetic, maintain good glucose control  4. If you smoke, smoking prohibits wound healing, we ask that you refrain from smoking. 5. When taking antibiotics take the entire prescription as ordered. Do not stop taking until medication is all gone unless otherwise instructed. 6. Exercise as tolerated. 7. Keep weight off wounds and reposition every 2 hours if applicable. 8. If wound(s) is on your lower extremity, elevate legs to the level of the heart or above for 30 minutes 4-5 times a day and/or when sitting. Avoid standing for long periods of time. 9. Do not get wounds wet in bath or shower unless otherwise instructed by your physician. If your wound is on your foot or leg, you may purchase a cast bag. Please ask at the pharmacy. If Vascular testing is ordered, please call [address] [place] [address] [phone]) to schedule. Vascular tests ordered by Wound Care Physicians may take up to 2 hours to complete. Please keep that in mind when scheduling. If Vascular testing is scheduled, please bring supplies to replace your dressing after testing is done. The vascular department does not stock supplies. Wound: Coccyx     With each dressing change, rinse wounds with 0.9% Saline. (May use wound wash or soft contact solution. Both can be purchased at a local drug store). If unable to obtain saline, may use a gentle soap and water. Dressing care: Mix Gentamicin cream & Triad, Abd Pad- change Monday, Wednesday, & Friday. Turn & reposition every 1-2 hours and as needed for pressure relief and comfort.  Keep pressure off of your wound as much as possible. Eat plenty of protein     Home Care Agency/Facility: [facility]     Your wound-care supplies will be provided by:  Please note, depending on your insurance coverage, you may have out-of-pocket expenses for these supplies. Someone from the company should call you to confirm your order and discuss those potential costs before they ship your products -- please anticipate that call. If your out-of-pocket cost could be substantial, Many companies have financial hardship programs for patients who qualify, so please ask about that if you might need a hand. If you have any questions about your supplies or your potential out-of-pocket costs, or if you need to place an order for a refill of supplies (typically monthly), please call the company directly. Your  is Dior Infante     Follow up with Dr Segundo Tovar In 2 week(s) in the wound care center. Wound Care Center Information: Should you experience any significant changes in your wound(s) or have questions about your wound care, please contact the [facility] at [phone] Monday  - Thursday 8:00 am - 4:00 pm and Friday 8:00 am - 1:00pm. If you need help with your wound outside these hours and cannot wait until we are again available, contact your PCP or go to the hospital emergency room. PLEASE NOTE: IF YOU ARE UNABLE TO OBTAIN WOUND SUPPLIES, CONTINUE TO USE THE SUPPLIES YOU HAVE AVAILABLE UNTIL YOU ARE ABLE TO REACH US. IT IS MOST IMPORTANT TO KEEP THE WOUND COVERED AT ALL TIMES. Patient Experience     Thank you for trusting us with your care. You may receive a survey from a company called [employer] asking for your feedback. We would appreciate it if you took a few minutes to share your experience. Your input is very valuable to us       Georgia Tovar MD, FACS  11/2/2022  12:08 PM

## 2022-11-02 NOTE — DISCHARGE INSTRUCTIONS
[address], [address] [facility]  Telephone: [phone]     Discharge Instructions     Important reminders:     **If you have any signs and symptoms of illness (Cough, fever, congestion, nausea, vomiting, diarrhea, etc.) please call the wound care center prior to your appointment. 1. Increase Protein intake for optimal wound healing  2. No added salt to reduce any swelling  3. If diabetic, maintain good glucose control  4. If you smoke, smoking prohibits wound healing, we ask that you refrain from smoking. 5. When taking antibiotics take the entire prescription as ordered. Do not stop taking until medication is all gone unless otherwise instructed. 6. Exercise as tolerated. 7. Keep weight off wounds and reposition every 2 hours if applicable. 8. If wound(s) is on your lower extremity, elevate legs to the level of the heart or above for 30 minutes 4-5 times a day and/or when sitting. Avoid standing for long periods of time. 9. Do not get wounds wet in bath or shower unless otherwise instructed by your physician. If your wound is on your foot or leg, you may purchase a cast bag. Please ask at the pharmacy. If Vascular testing is ordered, please call [address] [place] [address] [phone]) to schedule. Vascular tests ordered by Wound Care Physicians may take up to 2 hours to complete. Please keep that in mind when scheduling. If Vascular testing is scheduled, please bring supplies to replace your dressing after testing is done. The vascular department does not stock supplies. Wound: Coccyx     With each dressing change, rinse wounds with 0.9% Saline. (May use wound wash or soft contact solution. Both can be purchased at a local drug store). If unable to obtain saline, may use a gentle soap and water. Dressing care: Mix Gentamicin cream & Triad, Abd Pad- change Monday, Wednesday, & Friday.  Turn & reposition every 1-2 hours and as needed for pressure relief and comfort. Keep pressure off of your wound as much as possible. Eat plenty of protein     Home Care Agency/Facility: [facility]     Your wound-care supplies will be provided by:  Please note, depending on your insurance coverage, you may have out-of-pocket expenses for these supplies. Someone from the company should call you to confirm your order and discuss those potential costs before they ship your products -- please anticipate that call. If your out-of-pocket cost could be substantial, Many companies have financial hardship programs for patients who qualify, so please ask about that if you might need a hand. If you have any questions about your supplies or your potential out-of-pocket costs, or if you need to place an order for a refill of supplies (typically monthly), please call the company directly. Your  is Christina Collado     Follow up with Dr Inez Doherty In 2 week(s) in the wound care center. Wound Care Center Information: Should you experience any significant changes in your wound(s) or have questions about your wound care, please contact the [facility] at [phone] Monday  - Thursday 8:00 am - 4:00 pm and Friday 8:00 am - 1:00pm. If you need help with your wound outside these hours and cannot wait until we are again available, contact your PCP or go to the hospital emergency room. PLEASE NOTE: IF YOU ARE UNABLE TO OBTAIN WOUND SUPPLIES, CONTINUE TO USE THE SUPPLIES YOU HAVE AVAILABLE UNTIL YOU ARE ABLE TO REACH US. IT IS MOST IMPORTANT TO KEEP THE WOUND COVERED AT ALL TIMES. Patient Experience     Thank you for trusting us with your care. You may receive a survey from a company called [employer] asking for your feedback. We would appreciate it if you took a few minutes to share your experience.   Your input is very valuable to us

## 2022-11-02 NOTE — PLAN OF CARE
Discharge instructions given. Patient verbalized understanding. Return to [facility] in 2 week(s).

## 2022-11-14 NOTE — DISCHARGE INSTRUCTIONS
[address], [address]  Telephone: [phone]     Discharge Instructions     Important reminders:     **If you have any signs and symptoms of illness (Cough, fever, congestion, nausea, vomiting, diarrhea, etc.) please call the wound care center prior to your appointment. 1. Increase Protein intake for optimal wound healing  2. No added salt to reduce any swelling  3. If diabetic, maintain good glucose control  4. If you smoke, smoking prohibits wound healing, we ask that you refrain from smoking. 5. When taking antibiotics take the entire prescription as ordered. Do not stop taking until medication is all gone unless otherwise instructed. 6. Exercise as tolerated. 7. Keep weight off wounds and reposition every 2 hours if applicable. 8. If wound(s) is on your lower extremity, elevate legs to the level of the heart or above for 30 minutes 4-5 times a day and/or when sitting. Avoid standing for long periods of time. 9. Do not get wounds wet in bath or shower unless otherwise instructed by your physician. If your wound is on your foot or leg, you may purchase a cast bag. Please ask at the pharmacy. If Vascular testing is ordered, please call [address] [place] [address] [phone]) to schedule. Vascular tests ordered by Wound Care Physicians may take up to 2 hours to complete. Please keep that in mind when scheduling. If Vascular testing is scheduled, please bring supplies to replace your dressing after testing is done. The vascular department does not stock supplies. Wound: Coccyx     With each dressing change, rinse wounds with 0.9% Saline. (May use wound wash or soft contact solution. Both can be purchased at a local drug store). If unable to obtain saline, may use a gentle soap and water. Dressing care: Mix Gentamicin cream & Triad, Mepilex border- change Monday, Wednesday, & Friday.  Turn & reposition every 1-2 hours and as needed for pressure relief and comfort. Keep pressure off of your wound as much as possible. Eat plenty of protein     Home Care Agency/Facility: [facility]     Your wound-care supplies will be provided by:  Please note, depending on your insurance coverage, you may have out-of-pocket expenses for these supplies. Someone from the company should call you to confirm your order and discuss those potential costs before they ship your products -- please anticipate that call. If your out-of-pocket cost could be substantial, Many companies have financial hardship programs for patients who qualify, so please ask about that if you might need a hand. If you have any questions about your supplies or your potential out-of-pocket costs, or if you need to place an order for a refill of supplies (typically monthly), please call the company directly. Your  is Kimmy Thompson     Follow up with Dr Carmencita Yoder In 3 week(s) in the wound care center. Wound Care Center Information: Should you experience any significant changes in your wound(s) or have questions about your wound care, please contact the [employer] at [phone] Monday  - Thursday 8:00 am - 4:00 pm and Friday 8:00 am - 1:00pm. If you need help with your wound outside these hours and cannot wait until we are again available, contact your PCP or go to the hospital emergency room. PLEASE NOTE: IF YOU ARE UNABLE TO OBTAIN WOUND SUPPLIES, CONTINUE TO USE THE SUPPLIES YOU HAVE AVAILABLE UNTIL YOU ARE ABLE TO REACH US. IT IS MOST IMPORTANT TO KEEP THE WOUND COVERED AT ALL TIMES. Patient Experience     Thank you for trusting us with your care. You may receive a survey from a company called [employer] asking for your feedback. We would appreciate it if you took a few minutes to share your experience.   Your input is very valuable to us

## 2022-11-16 ENCOUNTER — HOSPITAL ENCOUNTER (OUTPATIENT)
Dept: WOUND CARE | Age: 72
Discharge: HOME | End: 2022-11-16
Payer: MEDICARE

## 2022-11-16 VITALS
DIASTOLIC BLOOD PRESSURE: 73 MMHG | HEART RATE: 91 BPM | TEMPERATURE: 97.7 F | RESPIRATION RATE: 16 BRPM | SYSTOLIC BLOOD PRESSURE: 124 MMHG

## 2022-11-16 DIAGNOSIS — Z74.09 POOR MOBILITY: Primary | ICD-10-CM

## 2022-11-16 DIAGNOSIS — S31.000A SACRAL WOUND, INITIAL ENCOUNTER: ICD-10-CM

## 2022-11-16 DIAGNOSIS — L89.43 PRESSURE INJURY OF CONTIGUOUS REGION INVOLVING BACK AND BUTTOCK, STAGE 3, UNSPECIFIED LATERALITY (HCC): ICD-10-CM

## 2022-11-16 PROCEDURE — 11042 DBRDMT SUBQ TIS 1ST 20SQCM/<: CPT

## 2022-11-16 PROCEDURE — 11042 DBRDMT SUBQ TIS 1ST 20SQCM/<: CPT | Performed by: SURGERY

## 2022-11-16 RX ORDER — BETAMETHASONE DIPROPIONATE 0.05 %
OINTMENT (GRAM) TOPICAL ONCE
OUTPATIENT
Start: 2022-11-16 | End: 2022-11-16

## 2022-11-16 RX ORDER — LIDOCAINE 40 MG/G
CREAM TOPICAL ONCE
Status: DISCONTINUED | OUTPATIENT
Start: 2022-11-16 | End: 2022-11-17 | Stop reason: HOSPADM

## 2022-11-16 RX ORDER — LIDOCAINE HYDROCHLORIDE 20 MG/ML
JELLY TOPICAL ONCE
OUTPATIENT
Start: 2022-11-16 | End: 2022-11-16

## 2022-11-16 RX ORDER — CLOBETASOL PROPIONATE 0.5 MG/G
OINTMENT TOPICAL ONCE
OUTPATIENT
Start: 2022-11-16 | End: 2022-11-16

## 2022-11-16 RX ORDER — BACITRACIN, NEOMYCIN, POLYMYXIN B 400; 3.5; 5 [USP'U]/G; MG/G; [USP'U]/G
OINTMENT TOPICAL ONCE
OUTPATIENT
Start: 2022-11-16 | End: 2022-11-16

## 2022-11-16 RX ORDER — LIDOCAINE 40 MG/G
CREAM TOPICAL ONCE
OUTPATIENT
Start: 2022-11-16 | End: 2022-11-16

## 2022-11-16 RX ORDER — GINSENG 100 MG
CAPSULE ORAL ONCE
OUTPATIENT
Start: 2022-11-16 | End: 2022-11-16

## 2022-11-16 RX ORDER — GENTAMICIN SULFATE 1 MG/G
OINTMENT TOPICAL ONCE
OUTPATIENT
Start: 2022-11-16 | End: 2022-11-16

## 2022-11-16 RX ORDER — BACITRACIN ZINC AND POLYMYXIN B SULFATE 500; 1000 [USP'U]/G; [USP'U]/G
OINTMENT TOPICAL ONCE
OUTPATIENT
Start: 2022-11-16 | End: 2022-11-16

## 2022-11-16 RX ORDER — LIDOCAINE HYDROCHLORIDE 40 MG/ML
SOLUTION TOPICAL ONCE
OUTPATIENT
Start: 2022-11-16 | End: 2022-11-16

## 2022-11-16 RX ORDER — LIDOCAINE 50 MG/G
OINTMENT TOPICAL ONCE
OUTPATIENT
Start: 2022-11-16 | End: 2022-11-16

## 2022-11-16 RX ORDER — GENTAMICIN SULFATE 1 MG/G
OINTMENT TOPICAL ONCE
Status: DISCONTINUED | OUTPATIENT
Start: 2022-11-16 | End: 2022-11-17 | Stop reason: HOSPADM

## 2022-11-16 NOTE — PLAN OF CARE
Discharge instructions given. Patient verbalized understanding. Return to [facility] in 3 week(s).

## 2022-11-16 NOTE — PROGRESS NOTES
[address] Progress and Procedure Note    Jose Manuel John  AGE: 74 y.o. GENDER: female    : 1950  TODAY'S DATE: 2022    No chief complaint on file. History of Present Illness     Jose Manuel John is a 74 y.o. female who presents today for wound evaluation. History of Wound: diabetic and pressure wound located on the  sacrum . Sacral decubitus ulcer stage 3  Wound Pain:  mild  Severity:  2 / 10   Wound Type:  diabetic and pressure  Modifying Factors:  diabetes, poor glucose control, chronic pressure, decreased mobility, and malnutrition  Associated Signs/Symptoms:  drainage and pain    Past Medical History:   Diagnosis Date    Arthritis     back    Cataracts, bilateral     CHF (congestive heart failure) (HCC)     Diabetes mellitus ([address]     Glaucoma     Hyperlipidemia     Hypertension     Poor mobility 2022    Pressure 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, stage 3 ([address] 2022    Similar wound 2 years ago. Thyroid disease      Past Surgical History:   Procedure Laterality Date    CARPAL TUNNEL RELEASE      bilateral    CHOLECYSTECTOMY      COLONOSCOPY      ELBOW SURGERY      ENDOSCOPY, COLON, DIAGNOSTIC      EYE SURGERY      FOOT SURGERY      SHOULDER SURGERY       Current Outpatient Medications   Medication Sig Dispense Refill    gentamicin (GARAMYCIN) 0.1 % cream Apply topically daily. 30 g 1    cephALEXin (KEFLEX) 500 MG capsule Take 1 capsule by mouth 4 times daily 40 capsule 0    miconazole (MICOTIN) 2 % powder Apply topically 2 times daily to abdominal folds. 45 g 1    traMADol (ULTRAM) 50 MG tablet Take 50 mg by mouth 3 times daily as needed for Pain.       DULoxetine (CYMBALTA) 60 MG extended release capsule Take 1 capsule by mouth daily 30 capsule 3    Travoprost, BAK Free, (TRAVATAN Z) 0.004 % SOLN ophthalmic solution Place 1 drop into both eyes nightly      DICLOFENAC PO Take 100 mg by mouth 2 times daily      acetaminophen (TYLENOL) 500 MG tablet Take 500 mg by mouth every 6 hours as needed for Pain or Fever       aspirin EC 81 MG EC tablet Take 81 mg by mouth daily May restart in AM.      levothyroxine (SYNTHROID) 200 MCG tablet Take 200 mcg by mouth Daily       Insulin Detemir (LEVEMIR FLEXPEN SC) Inject 35 Units into the skin nightly      simvastatin (ZOCOR) 40 MG tablet Take 40 mg by mouth nightly. Insulin Lispro, Human, (HUMALOG SC) Inject 10 Units into the skin 3 times daily (with meals) With sliding      Calcium Carbonate-Vitamin D (CALCIUM + D PO) Take by mouth daily        Current Facility-Administered Medications   Medication Dose Route Frequency Provider Last Rate Last Admin    lidocaine (LMX) 4 % cream   Topical Once Kayli Michelle MD        gentamicin (GARAMYCIN) 0.1 % ointment   Topical Once Kayli Michelle MD          Social History:   Social History     Tobacco Use    Smoking status: Never    Smokeless tobacco: Never   Vaping Use    Vaping Use: Never used   Substance Use Topics    Alcohol use: Not Currently    Drug use: Never     Allergies: Other, Ace inhibitors, Metoprolol, Tetrahydrozoline hcl, and Timolol    Procedure: Indications:  Based on my examination of this patient's wound(s)/ulcer(s) today, debridement is required to promote healing and evaluate the wound base. Performed by: Rose Ramírez MD    Consent obtained: Yes    Time out taken: Yes    Pain Control: Anesthetic  Anesthetic: 4% Lidocaine Cream     Debridement: Excisional Debridement    Using curette the wound was sharply debrided    down through and including the removal of epidermis, dermis, and subcutaneous tissue.         Devitalized Tissue Debrided: fibrin, biofilm, and slough    Pre Debridement Measurements:  Are located in the Wound Documentation Flow Sheet     Wound #: 1     Post  Debridement Measurements:  Wound 06/16/22 Coccyx Mid #1 (Active)   Wound Image   06/16/22 1515   Wound Etiology Other 11/16/22 0850   Wound Cleansed Cleansed with saline 11/16/22 0850   Dressing/Treatment Antibacterial ointment;Triad hydro/zinc oxide-based hydrophilic paste;Dry dressing 09/28/22 1010   Wound Length (cm) 2.2 cm 11/16/22 0850   Wound Width (cm) 1 cm 11/16/22 0850   Wound Depth (cm) 1.5 cm 11/16/22 0850   Wound Surface Area (cm^2) 2.2 cm^2 11/16/22 0850   Change in Wound Size % (l*w) 88.89 11/16/22 0850   Wound Volume (cm^3) 3.3 cm^3 11/16/22 0850   Wound Healing % 95 11/16/22 0850   Post-Procedure Length (cm) 2.2 cm 11/16/22 0911   Post-Procedure Width (cm) 1 cm 11/16/22 0911   Post-Procedure Depth (cm) 1.5 cm 11/16/22 0911   Post-Procedure Surface Area (cm^2) 2.2 cm^2 11/16/22 0911   Post-Procedure Volume (cm^3) 3.3 cm^3 11/16/22 0911   Wound Assessment Bleeding 11/16/22 0911   Drainage Amount Moderate 11/16/22 0911   Drainage Description Yellow;Brown; Thick 11/16/22 0850   Odor None 11/16/22 0850   Saba-wound Assessment Intact 11/16/22 0850   Margins Defined edges 11/16/22 0850   Wound Thickness Description not for Pressure Injury Full thickness 09/14/22 0939   Number of days: 153       Percent of Wound(s)/Ulcer(s) Debrided: 100%    Total Surface Area Debrided:  2.2 sq cm     Bleeding:  Minimal    Hemostasis Achieved:  by pressure    Procedural Pain:  2  / 10     Post Procedural Pain:  2 / 10     Response to treatment:  Well tolerated by patient. Assessment:     Wound looks improved. (improved, worse or stable)    Patient tolerated procedure well and was given proper instruction. The nature of the patient's condition was explained in depth. The patient was informed that their compliance to the treatment plan is paramount to successful healing and prevention of further ulceration and/or infection     Plan:     Treatment Plan:       Treatment Note please see attached Discharge Instructions    Written patient dismissal instructions given to patient and signed by patient or POA.          Discharge [address] [facility] [place], [facility]  Telephone: [phone]     Discharge Instructions     Important reminders:     **If you have any signs and symptoms of illness (Cough, fever, congestion, nausea, vomiting, diarrhea, etc.) please call the wound care center prior to your appointment. 1. Increase Protein intake for optimal wound healing  2. No added salt to reduce any swelling  3. If diabetic, maintain good glucose control  4. If you smoke, smoking prohibits wound healing, we ask that you refrain from smoking. 5. When taking antibiotics take the entire prescription as ordered. Do not stop taking until medication is all gone unless otherwise instructed. 6. Exercise as tolerated. 7. Keep weight off wounds and reposition every 2 hours if applicable. 8. If wound(s) is on your lower extremity, elevate legs to the level of the heart or above for 30 minutes 4-5 times a day and/or when sitting. Avoid standing for long periods of time. 9. Do not get wounds wet in bath or shower unless otherwise instructed by your physician. If your wound is on your foot or leg, you may purchase a cast bag. Please ask at the pharmacy. If Vascular testing is ordered, please call [address] [place] [address] [phone]) to schedule. Vascular tests ordered by Wound Care Physicians may take up to 2 hours to complete. Please keep that in mind when scheduling. If Vascular testing is scheduled, please bring supplies to replace your dressing after testing is done. The vascular department does not stock supplies. Wound: Coccyx     With each dressing change, rinse wounds with 0.9% Saline. (May use wound wash or soft contact solution. Both can be purchased at a local drug store). If unable to obtain saline, may use a gentle soap and water. Dressing care: Mix Gentamicin cream & Triad, Mepilex border- change Monday, Wednesday, & Friday.  Turn & reposition every 1-2 hours and as needed for pressure relief and comfort. Keep pressure off of your wound as much as possible. Eat plenty of protein     Home Care Agency/Facility: [facility]     Your wound-care supplies will be provided by:  Please note, depending on your insurance coverage, you may have out-of-pocket expenses for these supplies. Someone from the company should call you to confirm your order and discuss those potential costs before they ship your products -- please anticipate that call. If your out-of-pocket cost could be substantial, Many companies have financial hardship programs for patients who qualify, so please ask about that if you might need a hand. If you have any questions about your supplies or your potential out-of-pocket costs, or if you need to place an order for a refill of supplies (typically monthly), please call the company directly. Your  is Tena Parham     Follow up with Dr Shelia Vasquez In 3 week(s) in the wound care center. Wound Care Center Information: Should you experience any significant changes in your wound(s) or have questions about your wound care, please contact the [employer] at [phone] Monday  - Thursday 8:00 am - 4:00 pm and Friday 8:00 am - 1:00pm. If you need help with your wound outside these hours and cannot wait until we are again available, contact your PCP or go to the hospital emergency room. PLEASE NOTE: IF YOU ARE UNABLE TO OBTAIN WOUND SUPPLIES, CONTINUE TO USE THE SUPPLIES YOU HAVE AVAILABLE UNTIL YOU ARE ABLE TO REACH US. IT IS MOST IMPORTANT TO KEEP THE WOUND COVERED AT ALL TIMES. Patient Experience     Thank you for trusting us with your care. You may receive a survey from a company called [employer] asking for your feedback. We would appreciate it if you took a few minutes to share your experience. Your input is very valuable to us       Dayna Vasquez MD, FACS  11/16/2022  3:48 PM

## 2022-12-12 NOTE — DISCHARGE INSTRUCTIONS
[address], [address] [facility]  Telephone: [phone]     Discharge Instructions     Important reminders:     **If you have any signs and symptoms of illness (Cough, fever, congestion, nausea, vomiting, diarrhea, etc.) please call the wound care center prior to your appointment. 1. Increase Protein intake for optimal wound healing  2. No added salt to reduce any swelling  3. If diabetic, maintain good glucose control  4. If you smoke, smoking prohibits wound healing, we ask that you refrain from smoking. 5. When taking antibiotics take the entire prescription as ordered. Do not stop taking until medication is all gone unless otherwise instructed. 6. Exercise as tolerated. 7. Keep weight off wounds and reposition every 2 hours if applicable. 8. If wound(s) is on your lower extremity, elevate legs to the level of the heart or above for 30 minutes 4-5 times a day and/or when sitting. Avoid standing for long periods of time. 9. Do not get wounds wet in bath or shower unless otherwise instructed by your physician. If your wound is on your foot or leg, you may purchase a cast bag. Please ask at the pharmacy. If Vascular testing is ordered, please call [address] [place] [address] [phone]) to schedule. Vascular tests ordered by Wound Care Physicians may take up to 2 hours to complete. Please keep that in mind when scheduling. If Vascular testing is scheduled, please bring supplies to replace your dressing after testing is done. The vascular department does not stock supplies. Wound: Coccyx     With each dressing change, rinse wounds with 0.9% Saline. (May use wound wash or soft contact solution. Both can be purchased at a local drug store). If unable to obtain saline, may use a gentle soap and water. Dressing care: Mix Gentamicin cream & Triad, Mepilex border- change Monday, Wednesday, & Friday.  Turn & reposition every 1-2 hours and as needed for pressure relief and comfort. Keep pressure off of your wound as much as possible. Eat plenty of protein     Home Care Agency/Facility: [facility]     Your wound-care supplies will be provided by:  Please note, depending on your insurance coverage, you may have out-of-pocket expenses for these supplies. Someone from the company should call you to confirm your order and discuss those potential costs before they ship your products -- please anticipate that call. If your out-of-pocket cost could be substantial, Many companies have financial hardship programs for patients who qualify, so please ask about that if you might need a hand. If you have any questions about your supplies or your potential out-of-pocket costs, or if you need to place an order for a refill of supplies (typically monthly), please call the company directly. Your  is Destini Vidal     Follow up with Dr Vipin Naqvi In 3 week(s) in the wound care center. Wound Care Center Information: Should you experience any significant changes in your wound(s) or have questions about your wound care, please contact the [employer]  at [phone] Monday  - Thursday 8:00 am - 4:00 pm and Friday 8:00 am - 1:00pm. If you need help with your wound outside these hours and cannot wait until we are again available, contact your PCP or go to the hospital emergency room. PLEASE NOTE: IF YOU ARE UNABLE TO OBTAIN WOUND SUPPLIES, CONTINUE TO USE THE SUPPLIES YOU HAVE AVAILABLE UNTIL YOU ARE ABLE TO REACH US. IT IS MOST IMPORTANT TO KEEP THE WOUND COVERED AT ALL TIMES. Patient Experience     Thank you for trusting us with your care. You may receive a survey from a company called [employer] asking for your feedback. We would appreciate it if you took a few minutes to share your experience.   Your input is very valuable to us

## 2022-12-14 ENCOUNTER — HOSPITAL ENCOUNTER (OUTPATIENT)
Dept: WOUND CARE | Age: 72
Discharge: HOME | End: 2022-12-14
Payer: MEDICARE

## 2022-12-14 VITALS
TEMPERATURE: 97.2 F | DIASTOLIC BLOOD PRESSURE: 71 MMHG | RESPIRATION RATE: 18 BRPM | SYSTOLIC BLOOD PRESSURE: 113 MMHG | HEART RATE: 118 BPM

## 2022-12-14 DIAGNOSIS — Z74.09 POOR MOBILITY: Primary | ICD-10-CM

## 2022-12-14 DIAGNOSIS — L89.43 PRESSURE INJURY OF CONTIGUOUS REGION INVOLVING BACK AND BUTTOCK, STAGE 3, UNSPECIFIED LATERALITY (HCC): ICD-10-CM

## 2022-12-14 DIAGNOSIS — S31.000A SACRAL WOUND, INITIAL ENCOUNTER: ICD-10-CM

## 2022-12-14 PROCEDURE — 11042 DBRDMT SUBQ TIS 1ST 20SQCM/<: CPT

## 2022-12-14 RX ORDER — LIDOCAINE 50 MG/G
OINTMENT TOPICAL ONCE
OUTPATIENT
Start: 2022-12-14 | End: 2022-12-14

## 2022-12-14 RX ORDER — LIDOCAINE HYDROCHLORIDE 40 MG/ML
SOLUTION TOPICAL ONCE
OUTPATIENT
Start: 2022-12-14 | End: 2022-12-14

## 2022-12-14 RX ORDER — LIDOCAINE HYDROCHLORIDE 20 MG/ML
JELLY TOPICAL ONCE
OUTPATIENT
Start: 2022-12-14 | End: 2022-12-14

## 2022-12-14 RX ORDER — BACITRACIN ZINC AND POLYMYXIN B SULFATE 500; 1000 [USP'U]/G; [USP'U]/G
OINTMENT TOPICAL ONCE
OUTPATIENT
Start: 2022-12-14 | End: 2022-12-14

## 2022-12-14 RX ORDER — BACITRACIN, NEOMYCIN, POLYMYXIN B 400; 3.5; 5 [USP'U]/G; MG/G; [USP'U]/G
OINTMENT TOPICAL ONCE
OUTPATIENT
Start: 2022-12-14 | End: 2022-12-14

## 2022-12-14 RX ORDER — LIDOCAINE 40 MG/G
CREAM TOPICAL ONCE
OUTPATIENT
Start: 2022-12-14 | End: 2022-12-14

## 2022-12-14 RX ORDER — GENTAMICIN SULFATE 1 MG/G
OINTMENT TOPICAL ONCE
OUTPATIENT
Start: 2022-12-14 | End: 2022-12-14

## 2022-12-14 RX ORDER — GINSENG 100 MG
CAPSULE ORAL ONCE
OUTPATIENT
Start: 2022-12-14 | End: 2022-12-14

## 2022-12-14 RX ORDER — CLOBETASOL PROPIONATE 0.5 MG/G
OINTMENT TOPICAL ONCE
OUTPATIENT
Start: 2022-12-14 | End: 2022-12-14

## 2022-12-14 RX ORDER — LIDOCAINE 40 MG/G
CREAM TOPICAL ONCE
Status: DISCONTINUED | OUTPATIENT
Start: 2022-12-14 | End: 2022-12-15 | Stop reason: HOSPADM

## 2022-12-14 RX ORDER — BETAMETHASONE DIPROPIONATE 0.05 %
OINTMENT (GRAM) TOPICAL ONCE
OUTPATIENT
Start: 2022-12-14 | End: 2022-12-14

## 2022-12-14 ASSESSMENT — PAIN SCALES - GENERAL: PAINLEVEL_OUTOF10: 4

## 2022-12-14 ASSESSMENT — PAIN DESCRIPTION - LOCATION: LOCATION: COCCYX

## 2022-12-14 NOTE — PROGRESS NOTES
[address] Progress and Procedure Note    Jose Manuel John  AGE: 74 y.o. GENDER: female    : 1950  TODAY'S DATE: 2022    Chief Complaint   Patient presents with    Wound Check     Coccyx          History of Present Illness     Jose Manuel John is a 74 y.o. female who presents today for wound evaluation. History of Wound: diabetic and pressure wound located on the  Sacral decubitus ulcer stage 3 . Wound Pain:  mild  Severity:  2 / 10   Wound Type:  diabetic and pressure  Modifying Factors:  diabetes, poor glucose control, chronic pressure, decreased mobility, shear force, and malnutrition  Associated Signs/Symptoms:  drainage and pain    Past Medical History:   Diagnosis Date    Arthritis     back    Cataracts, bilateral     CHF (congestive heart failure) (HCC)     Diabetes mellitus ([address]     Glaucoma     Hyperlipidemia     Hypertension     Poor mobility 2022    Pressure 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, stage 3 ([address] 2022    Similar wound 2 years ago. Thyroid disease      Past Surgical History:   Procedure Laterality Date    CARPAL TUNNEL RELEASE      bilateral    CHOLECYSTECTOMY      COLONOSCOPY      ELBOW SURGERY      ENDOSCOPY, COLON, DIAGNOSTIC      EYE SURGERY      FOOT SURGERY      SHOULDER SURGERY       Current Outpatient Medications   Medication Sig Dispense Refill    gentamicin (GARAMYCIN) 0.1 % cream Apply topically daily. 30 g 1    cephALEXin (KEFLEX) 500 MG capsule Take 1 capsule by mouth 4 times daily 40 capsule 0    miconazole (MICOTIN) 2 % powder Apply topically 2 times daily to abdominal folds. 45 g 1    traMADol (ULTRAM) 50 MG tablet Take 50 mg by mouth 3 times daily as needed for Pain.       DULoxetine (CYMBALTA) 60 MG extended release capsule Take 1 capsule by mouth daily 30 capsule 3    Travoprost, BAK Free, (TRAVATAN Z) 0.004 % SOLN ophthalmic solution Place 1 drop into both eyes nightly      DICLOFENAC PO Take 100 mg by mouth 2 times daily      acetaminophen (TYLENOL) 500 MG tablet Take 500 mg by mouth every 6 hours as needed for Pain or Fever       aspirin EC 81 MG EC tablet Take 81 mg by mouth daily May restart in AM.      levothyroxine (SYNTHROID) 200 MCG tablet Take 200 mcg by mouth Daily       Insulin Detemir (LEVEMIR FLEXPEN SC) Inject 35 Units into the skin nightly      simvastatin (ZOCOR) 40 MG tablet Take 40 mg by mouth nightly. Insulin Lispro, Human, (HUMALOG SC) Inject 10 Units into the skin 3 times daily (with meals) With sliding      Calcium Carbonate-Vitamin D (CALCIUM + D PO) Take by mouth daily        Current Facility-Administered Medications   Medication Dose Route Frequency Provider Last Rate Last Admin    lidocaine (LMX) 4 % cream   Topical Once Kayli Michelle MD          Social History:   Social History     Tobacco Use    Smoking status: Never    Smokeless tobacco: Never   Vaping Use    Vaping Use: Never used   Substance Use Topics    Alcohol use: Not Currently    Drug use: Never     Allergies: Other, Ace inhibitors, Metoprolol, Tetrahydrozoline hcl, and Timolol    Procedure: Indications:  Based on my examination of this patient's wound(s)/ulcer(s) today, debridement is required to promote healing and evaluate the wound base. Performed by: Rose Ramírez MD    Consent obtained: Yes    Time out taken: Yes    Pain Control: Anesthetic  Anesthetic: 4% Lidocaine Cream     Debridement: Excisional Debridement    Using curette the wound was sharply debrided    down through and including the removal of epidermis, dermis, and subcutaneous tissue.         Devitalized Tissue Debrided: fibrin, biofilm, and slough    Pre Debridement Measurements:  Are located in the Wound Documentation Flow Sheet     Wound #: 1     Post  Debridement Measurements:  Wound 06/16/22 Coccyx Mid #1 (Active)   Wound Image   12/14/22 0911   Wound Etiology Other 12/14/22 0911   Wound Cleansed Cleansed with saline 12/14/22 9596   Dressing/Treatment Antibacterial ointment;Triad hydro/zinc oxide-based hydrophilic paste;Dry dressing 09/28/22 1010   Wound Length (cm) 2 cm 12/14/22 0911   Wound Width (cm) 0.8 cm 12/14/22 0911   Wound Depth (cm) 2 cm 12/14/22 0911   Wound Surface Area (cm^2) 1.6 cm^2 12/14/22 0911   Change in Wound Size % (l*w) 91.92 12/14/22 0911   Wound Volume (cm^3) 3.2 cm^3 12/14/22 0911   Wound Healing % 95 12/14/22 0911   Post-Procedure Length (cm) 2 cm 12/14/22 0929   Post-Procedure Width (cm) 0.8 cm 12/14/22 0929   Post-Procedure Depth (cm) 2 cm 12/14/22 0929   Post-Procedure Surface Area (cm^2) 1.6 cm^2 12/14/22 0929   Post-Procedure Volume (cm^3) 3.2 cm^3 12/14/22 0929   Wound Assessment Bleeding 12/14/22 0929   Drainage Amount Moderate 12/14/22 0929   Drainage Description Yellow;Brown 12/14/22 0911   Odor None 12/14/22 0911   Saba-wound Assessment Intact 12/14/22 0911   Margins Defined edges 12/14/22 0911   Wound Thickness Description not for Pressure Injury Full thickness 09/14/22 0939   Number of days: 180       Percent of Wound(s)/Ulcer(s) Debrided: 100%    Total Surface Area Debrided:  1.6 sq cm     Bleeding:  Minimal    Hemostasis Achieved:  by pressure    Procedural Pain:  2  / 10     Post Procedural Pain:  2 / 10     Response to treatment:  Well tolerated by patient. Assessment:     Wound looks improved. (improved, worse or stable)    Patient tolerated procedure well and was given proper instruction. The nature of the patient's condition was explained in depth. The patient was informed that their compliance to the treatment plan is paramount to successful healing and prevention of further ulceration and/or infection     Plan:     Treatment Plan:       Treatment Note please see attached Discharge Instructions    Written patient dismissal instructions given to patient and signed by patient or POA.          Discharge Instructions         [address] [address]  Telephone: [phone]     Discharge Instructions     Important reminders:     **If you have any signs and symptoms of illness (Cough, fever, congestion, nausea, vomiting, diarrhea, etc.) please call the wound care center prior to your appointment. 1. Increase Protein intake for optimal wound healing  2. No added salt to reduce any swelling  3. If diabetic, maintain good glucose control  4. If you smoke, smoking prohibits wound healing, we ask that you refrain from smoking. 5. When taking antibiotics take the entire prescription as ordered. Do not stop taking until medication is all gone unless otherwise instructed. 6. Exercise as tolerated. 7. Keep weight off wounds and reposition every 2 hours if applicable. 8. If wound(s) is on your lower extremity, elevate legs to the level of the heart or above for 30 minutes 4-5 times a day and/or when sitting. Avoid standing for long periods of time. 9. Do not get wounds wet in bath or shower unless otherwise instructed by your physician. If your wound is on your foot or leg, you may purchase a cast bag. Please ask at the pharmacy. If Vascular testing is ordered, please call [address] [place] [address] [phone]) to schedule. Vascular tests ordered by Wound Care Physicians may take up to 2 hours to complete. Please keep that in mind when scheduling. If Vascular testing is scheduled, please bring supplies to replace your dressing after testing is done. The vascular department does not stock supplies. Wound: Coccyx     With each dressing change, rinse wounds with 0.9% Saline. (May use wound wash or soft contact solution. Both can be purchased at a local drug store). If unable to obtain saline, may use a gentle soap and water. Dressing care: Mix Gentamicin cream & Triad, Mepilex border- change Monday, Wednesday, & Friday. Turn & reposition every 1-2 hours and as needed for pressure relief and comfort.  Keep pressure off of your wound as much as possible. Eat plenty of protein     Home Care Agency/Facility: [facility]     Your wound-care supplies will be provided by:  Please note, depending on your insurance coverage, you may have out-of-pocket expenses for these supplies. Someone from the company should call you to confirm your order and discuss those potential costs before they ship your products -- please anticipate that call. If your out-of-pocket cost could be substantial, Many companies have financial hardship programs for patients who qualify, so please ask about that if you might need a hand. If you have any questions about your supplies or your potential out-of-pocket costs, or if you need to place an order for a refill of supplies (typically monthly), please call the company directly. Your  is Bekah Segovia     Follow up with Dr Graham Rachel In 3 week(s) in the wound care center. Wound Care Center Information: Should you experience any significant changes in your wound(s) or have questions about your wound care, please contact the [employer] at [phone] Monday  - Thursday 8:00 am - 4:00 pm and Friday 8:00 am - 1:00pm. If you need help with your wound outside these hours and cannot wait until we are again available, contact your PCP or go to the hospital emergency room. PLEASE NOTE: IF YOU ARE UNABLE TO OBTAIN WOUND SUPPLIES, CONTINUE TO USE THE SUPPLIES YOU HAVE AVAILABLE UNTIL YOU ARE ABLE TO REACH US. IT IS MOST IMPORTANT TO KEEP THE WOUND COVERED AT ALL TIMES. Patient Experience     Thank you for trusting us with your care. You may receive a survey from a company called [employer] asking for your feedback. We would appreciate it if you took a few minutes to share your experience. Your input is very valuable to us       Dayna Rachel MD, FACS  12/14/2022  10:38 AM

## 2022-12-14 NOTE — PLAN OF CARE
Discharge instructions given. Patient verbalized understanding. Return to [facility] in 2 week(s).

## 2022-12-29 NOTE — DISCHARGE INSTRUCTIONS
[address], [address] [facility] [address]  Telephone: [phone]     Discharge Instructions     Important reminders:     **If you have any signs and symptoms of illness (Cough, fever, congestion, nausea, vomiting, diarrhea, etc.) please call the wound care center prior to your appointment. 1. Increase Protein intake for optimal wound healing  2. No added salt to reduce any swelling  3. If diabetic, maintain good glucose control  4. If you smoke, smoking prohibits wound healing, we ask that you refrain from smoking. 5. When taking antibiotics take the entire prescription as ordered. Do not stop taking until medication is all gone unless otherwise instructed. 6. Exercise as tolerated. 7. Keep weight off wounds and reposition every 2 hours if applicable. 8. If wound(s) is on your lower extremity, elevate legs to the level of the heart or above for 30 minutes 4-5 times a day and/or when sitting. Avoid standing for long periods of time. 9. Do not get wounds wet in bath or shower unless otherwise instructed by your physician. If your wound is on your foot or leg, you may purchase a cast bag. Please ask at the pharmacy. If Vascular testing is ordered, please call [address] [place] [address] [phone]) to schedule. Vascular tests ordered by Wound Care Physicians may take up to 2 hours to complete. Please keep that in mind when scheduling. If Vascular testing is scheduled, please bring supplies to replace your dressing after testing is done. The vascular department does not stock supplies. Wound: Coccyx     With each dressing change, rinse wounds with 0.9% Saline. (May use wound wash or soft contact solution. Both can be purchased at a local drug store). If unable to obtain saline, may use a gentle soap and water. Dressing care: Mix Gentamicin cream & Triad, Mepilex border or Kerramx Gentle Border- change Monday, Wednesday, & Friday.  Turn & reposition every 1-2 hours and as needed for pressure relief and comfort. Keep pressure off of your wound as much as possible. Eat plenty of protein     Home Care Agency/Facility: [facility]     Your wound-care supplies will be provided by:  Please note, depending on your insurance coverage, you may have out-of-pocket expenses for these supplies. Someone from the company should call you to confirm your order and discuss those potential costs before they ship your products -- please anticipate that call. If your out-of-pocket cost could be substantial, Many companies have financial hardship programs for patients who qualify, so please ask about that if you might need a hand. If you have any questions about your supplies or your potential out-of-pocket costs, or if you need to place an order for a refill of supplies (typically monthly), please call the company directly. Your  is Yoshi Vaca     Follow up with Dr Titus Salinas In 3 week(s) in the wound care center. Wound Care Center Information: Should you experience any significant changes in your wound(s) or have questions about your wound care, please contact the [employer]  at [phone] Monday  - Thursday 8:00 am - 4:00 pm and Friday 8:00 am - 1:00pm. If you need help with your wound outside these hours and cannot wait until we are again available, contact your PCP or go to the hospital emergency room. PLEASE NOTE: IF YOU ARE UNABLE TO OBTAIN WOUND SUPPLIES, CONTINUE TO USE THE SUPPLIES YOU HAVE AVAILABLE UNTIL YOU ARE ABLE TO REACH US. IT IS MOST IMPORTANT TO KEEP THE WOUND COVERED AT ALL TIMES. Patient Experience     Thank you for trusting us with your care. You may receive a survey from a company called [employer] asking for your feedback. We would appreciate it if you took a few minutes to share your experience.   Your input is very valuable to us

## 2023-01-04 ENCOUNTER — HOSPITAL ENCOUNTER (OUTPATIENT)
Dept: WOUND CARE | Age: 73
Discharge: HOME | End: 2023-01-04
Payer: MEDICARE

## 2023-01-04 VITALS — HEART RATE: 83 BPM | SYSTOLIC BLOOD PRESSURE: 132 MMHG | DIASTOLIC BLOOD PRESSURE: 72 MMHG | RESPIRATION RATE: 16 BRPM

## 2023-01-04 DIAGNOSIS — L89.43 PRESSURE INJURY OF CONTIGUOUS REGION INVOLVING BACK AND BUTTOCK, STAGE 3, UNSPECIFIED LATERALITY (HCC): ICD-10-CM

## 2023-01-04 DIAGNOSIS — S31.000A SACRAL WOUND, INITIAL ENCOUNTER: ICD-10-CM

## 2023-01-04 DIAGNOSIS — Z74.09 POOR MOBILITY: Primary | ICD-10-CM

## 2023-01-04 PROCEDURE — 11042 DBRDMT SUBQ TIS 1ST 20SQCM/<: CPT

## 2023-01-04 RX ORDER — LIDOCAINE HYDROCHLORIDE 40 MG/ML
SOLUTION TOPICAL ONCE
OUTPATIENT
Start: 2023-01-04 | End: 2023-01-04

## 2023-01-04 RX ORDER — BACITRACIN ZINC AND POLYMYXIN B SULFATE 500; 1000 [USP'U]/G; [USP'U]/G
OINTMENT TOPICAL ONCE
OUTPATIENT
Start: 2023-01-04 | End: 2023-01-04

## 2023-01-04 RX ORDER — LIDOCAINE 40 MG/G
CREAM TOPICAL ONCE
Status: DISCONTINUED | OUTPATIENT
Start: 2023-01-04 | End: 2023-01-05 | Stop reason: HOSPADM

## 2023-01-04 RX ORDER — LIDOCAINE HYDROCHLORIDE 20 MG/ML
JELLY TOPICAL ONCE
OUTPATIENT
Start: 2023-01-04 | End: 2023-01-04

## 2023-01-04 RX ORDER — BETAMETHASONE DIPROPIONATE 0.05 %
OINTMENT (GRAM) TOPICAL ONCE
OUTPATIENT
Start: 2023-01-04 | End: 2023-01-04

## 2023-01-04 RX ORDER — CLOBETASOL PROPIONATE 0.5 MG/G
OINTMENT TOPICAL ONCE
OUTPATIENT
Start: 2023-01-04 | End: 2023-01-04

## 2023-01-04 RX ORDER — LIDOCAINE 40 MG/G
CREAM TOPICAL ONCE
OUTPATIENT
Start: 2023-01-04 | End: 2023-01-04

## 2023-01-04 RX ORDER — LIDOCAINE 50 MG/G
OINTMENT TOPICAL ONCE
OUTPATIENT
Start: 2023-01-04 | End: 2023-01-04

## 2023-01-04 RX ORDER — BACITRACIN, NEOMYCIN, POLYMYXIN B 400; 3.5; 5 [USP'U]/G; MG/G; [USP'U]/G
OINTMENT TOPICAL ONCE
OUTPATIENT
Start: 2023-01-04 | End: 2023-01-04

## 2023-01-04 RX ORDER — GINSENG 100 MG
CAPSULE ORAL ONCE
OUTPATIENT
Start: 2023-01-04 | End: 2023-01-04

## 2023-01-04 RX ORDER — GENTAMICIN SULFATE 1 MG/G
OINTMENT TOPICAL ONCE
OUTPATIENT
Start: 2023-01-04 | End: 2023-01-04

## 2023-01-04 NOTE — PROGRESS NOTES
[address], [address] [place] [address] Road  Telephone: [phone]        Donny Zamudio Fax # [phone]      Discharge Instructions         [place] [id number] [facility], [address] [place] [address] Road  Telephone: [phone]     Discharge Instructions     Important reminders:     **If you have any signs and symptoms of illness (Cough, fever, congestion, nausea, vomiting, diarrhea, etc.) please call the wound care center prior to your appointment. 1. Increase Protein intake for optimal wound healing  2. No added salt to reduce any swelling  3. If diabetic, maintain good glucose control  4. If you smoke, smoking prohibits wound healing, we ask that you refrain from smoking. 5. When taking antibiotics take the entire prescription as ordered. Do not stop taking until medication is all gone unless otherwise instructed. 6. Exercise as tolerated. 7. Keep weight off wounds and reposition every 2 hours if applicable. 8. If wound(s) is on your lower extremity, elevate legs to the level of the heart or above for 30 minutes 4-5 times a day and/or when sitting. Avoid standing for long periods of time. 9. Do not get wounds wet in bath or shower unless otherwise instructed by your physician. If your wound is on your foot or leg, you may purchase a cast bag. Please ask at the pharmacy. If Vascular testing is ordered, please call [address] [place] [address] [phone]) to schedule. Vascular tests ordered by Wound Care Physicians may take up to 2 hours to complete. Please keep that in mind when scheduling. If Vascular testing is scheduled, please bring supplies to replace your dressing after testing is done. The vascular department does not stock supplies. Wound: Coccyx     With each dressing change, rinse wounds with 0.9% Saline. (May use wound wash or soft contact solution. Both can be purchased at a local drug store).  If unable to obtain saline, may use a gentle soap and water. Dressing care: Mix Gentamicin cream & Triad, Mepilex border or Kerramx Gentle Border- change Monday, Wednesday, & Friday. Turn & reposition every 1-2 hours and as needed for pressure relief and comfort. Keep pressure off of your wound as much as possible. Eat plenty of protein     Home Care Agency/Facility: [facility]     Your wound-care supplies will be provided by:  Please note, depending on your insurance coverage, you may have out-of-pocket expenses for these supplies. Someone from the company should call you to confirm your order and discuss those potential costs before they ship your products -- please anticipate that call. If your out-of-pocket cost could be substantial, Many companies have financial hardship programs for patients who qualify, so please ask about that if you might need a hand. If you have any questions about your supplies or your potential out-of-pocket costs, or if you need to place an order for a refill of supplies (typically monthly), please call the company directly. Your  is Julian Baron     Follow up with Dr Hoang Rueda In 3 week(s) in the wound care center. Wound Care Center Information: Should you experience any significant changes in your wound(s) or have questions about your wound care, please contact the [employer] at [phone] Monday  - Thursday 8:00 am - 4:00 pm and Friday 8:00 am - 1:00pm. If you need help with your wound outside these hours and cannot wait until we are again available, contact your PCP or go to the hospital emergency room. PLEASE NOTE: IF YOU ARE UNABLE TO OBTAIN WOUND SUPPLIES, CONTINUE TO USE THE SUPPLIES YOU HAVE AVAILABLE UNTIL YOU ARE ABLE TO REACH US. IT IS MOST IMPORTANT TO KEEP THE WOUND COVERED AT ALL TIMES. Patient Experience     Thank you for trusting us with your care. You may receive a survey from a company called [employer] asking for your feedback. We would appreciate it if you took a few minutes to share your experience. Your input is very valuable to us      Skilled nurse to evaluate and treat for wound care. Change dressing as ordered  once a day on Monday, Wednesday, and Friday using clean technique. Patient/Family/caregiver may change dressings as needed as instructed when Home Care unavailable.     WOUNDS REQUIRING DRESSING Changes:     Wound 06/16/22 Coccyx Mid #1 (Active)   Wound Image   12/14/22 0911   Wound Etiology Other 01/04/23 0920   Wound Cleansed Cleansed with saline 01/04/23 0920   Dressing/Treatment Antibacterial ointment;Triad hydro/zinc oxide-based hydrophilic paste;Dry dressing 09/28/22 1010   Wound Length (cm) 1.6 cm 01/04/23 0920   Wound Width (cm) 0.7 cm 01/04/23 0920   Wound Depth (cm) 1.5 cm 01/04/23 0920   Wound Surface Area (cm^2) 1.12 cm^2 01/04/23 0920   Change in Wound Size % (l*w) 94.34 01/04/23 0920   Wound Volume (cm^3) 1.68 cm^3 01/04/23 0920   Wound Healing % 97 01/04/23 0920   Post-Procedure Length (cm) 1.6 cm 01/04/23 0933   Post-Procedure Width (cm) 0.7 cm 01/04/23 0933   Post-Procedure Depth (cm) 1.5 cm 01/04/23 0933   Post-Procedure Surface Area (cm^2) 1.12 cm^2 01/04/23 0933   Post-Procedure Volume (cm^3) 1.68 cm^3 01/04/23 0933   Wound Assessment Bleeding 01/04/23 0933   Drainage Amount Moderate 01/04/23 0933   Drainage Description Serosanguinous 01/04/23 0920   Odor None 01/04/23 0920   Saba-wound Assessment Intact 01/04/23 0920   Margins Defined edges 12/14/22 0911   Wound Thickness Description not for Pressure Injury Full thickness 09/14/22 8852   Number of days: 201          Patient seen and treated on 1/4/2023    By Adama Zapata MD NPI: [id number]  (provider/NPI)

## 2023-01-04 NOTE — PLAN OF CARE
Discharge instructions given. Patient verbalized understanding. Return to [facility] in 3 week(s).   Called/faxed orders to  [facility]

## 2023-01-04 NOTE — PROGRESS NOTES
[address] Progress and Procedure Note    Ty Bustos  AGE: 74 y.o. GENDER: female    : 1950  TODAY'S DATE: 2023    Chief Complaint   Patient presents with    Wound Check     Follow up coccyx wound        History of Present Illness     Ty Bustos is a 74 y.o. female who presents today for wound evaluation. History of Wound: diabetic and pressure wound located on the  Sacral decubitus ulcer stage 3 . Wound Pain:  mild  Severity:  2 / 10   Wound Type:  diabetic and pressure  Modifying Factors:  diabetes, poor glucose control, chronic pressure, decreased mobility, shear force, and malnutrition  Associated Signs/Symptoms:  pain    Past Medical History:   Diagnosis Date    Arthritis     back    Cataracts, bilateral     CHF (congestive heart failure) (HCC)     Diabetes mellitus ([address]     Glaucoma     Hyperlipidemia     Hypertension     Poor mobility 2022    Pressure 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, stage 3 ([address] 2022    Similar wound 2 years ago. Thyroid disease      Past Surgical History:   Procedure Laterality Date    CARPAL TUNNEL RELEASE      bilateral    CHOLECYSTECTOMY      COLONOSCOPY      ELBOW SURGERY      ENDOSCOPY, COLON, DIAGNOSTIC      EYE SURGERY      FOOT SURGERY      SHOULDER SURGERY       Current Outpatient Medications   Medication Sig Dispense Refill    gentamicin (GARAMYCIN) 0.1 % cream Apply topically daily. 30 g 1    cephALEXin (KEFLEX) 500 MG capsule Take 1 capsule by mouth 4 times daily 40 capsule 0    miconazole (MICOTIN) 2 % powder Apply topically 2 times daily to abdominal folds. 45 g 1    traMADol (ULTRAM) 50 MG tablet Take 50 mg by mouth 3 times daily as needed for Pain.       DULoxetine (CYMBALTA) 60 MG extended release capsule Take 1 capsule by mouth daily 30 capsule 3    Travoprost, BAK Free, (TRAVATAN Z) 0.004 % SOLN ophthalmic solution Place 1 drop into both eyes nightly      DICLOFENAC PO Take 100 mg by mouth 2 times daily      acetaminophen (TYLENOL) 500 MG tablet Take 500 mg by mouth every 6 hours as needed for Pain or Fever       aspirin EC 81 MG EC tablet Take 81 mg by mouth daily May restart in AM.      levothyroxine (SYNTHROID) 200 MCG tablet Take 200 mcg by mouth Daily       Insulin Detemir (LEVEMIR FLEXPEN SC) Inject 35 Units into the skin nightly      simvastatin (ZOCOR) 40 MG tablet Take 40 mg by mouth nightly. Insulin Lispro, Human, (HUMALOG SC) Inject 10 Units into the skin 3 times daily (with meals) With sliding      Calcium Carbonate-Vitamin D (CALCIUM + D PO) Take by mouth daily        Current Facility-Administered Medications   Medication Dose Route Frequency Provider Last Rate Last Admin    lidocaine (LMX) 4 % cream   Topical Once Ilsa Madsen MD          Social History:   Social History     Tobacco Use    Smoking status: Never    Smokeless tobacco: Never   Vaping Use    Vaping Use: Never used   Substance Use Topics    Alcohol use: Not Currently    Drug use: Never     Allergies: Other, Ace inhibitors, Metoprolol, Tetrahydrozoline hcl, and Timolol    Procedure: Indications:  Based on my examination of this patient's wound(s)/ulcer(s) today, debridement is required to promote healing and evaluate the wound base. Performed by: Ranjit Hernández MD    Consent obtained: Yes    Time out taken: Yes    Pain Control: Anesthetic  Anesthetic: 4% Lidocaine Cream     Debridement: Excisional Debridement    Using curette the wound was sharply debrided    down through and including the removal of epidermis, dermis, and subcutaneous tissue.         Devitalized Tissue Debrided: fibrin, biofilm, and slough    Pre Debridement Measurements:  Are located in the Wound Documentation Flow Sheet     Wound #: 1     Post  Debridement Measurements:  Wound 06/16/22 Coccyx Mid #1 (Active)   Wound Image   12/14/22 0911   Wound Etiology Other 01/04/23 0920   Wound Cleansed Cleansed with saline 01/04/23 0920   Dressing/Treatment Antibacterial ointment;Triad hydro/zinc oxide-based hydrophilic paste;Dry dressing 09/28/22 1010   Wound Length (cm) 1.6 cm 01/04/23 0920   Wound Width (cm) 0.7 cm 01/04/23 0920   Wound Depth (cm) 1.5 cm 01/04/23 0920   Wound Surface Area (cm^2) 1.12 cm^2 01/04/23 0920   Change in Wound Size % (l*w) 94.34 01/04/23 0920   Wound Volume (cm^3) 1.68 cm^3 01/04/23 0920   Wound Healing % 97 01/04/23 0920   Post-Procedure Length (cm) 1.6 cm 01/04/23 0933   Post-Procedure Width (cm) 0.7 cm 01/04/23 0933   Post-Procedure Depth (cm) 1.5 cm 01/04/23 0933   Post-Procedure Surface Area (cm^2) 1.12 cm^2 01/04/23 0933   Post-Procedure Volume (cm^3) 1.68 cm^3 01/04/23 0933   Wound Assessment Bleeding 01/04/23 0933   Drainage Amount Moderate 01/04/23 0933   Drainage Description Serosanguinous 01/04/23 0920   Odor None 01/04/23 0920   Saba-wound Assessment Intact 01/04/23 0920   Margins Defined edges 12/14/22 0911   Wound Thickness Description not for Pressure Injury Full thickness 09/14/22 0939   Number of days: 201       Percent of Wound(s)/Ulcer(s) Debrided: 100%    Total Surface Area Debrided:  1.12 sq cm     Bleeding:  Minimal    Hemostasis Achieved:  by pressure    Procedural Pain:  2  / 10     Post Procedural Pain:  2 / 10     Response to treatment:  Well tolerated by patient. Assessment:     Wound looks improved. (improved, worse or stable)    Patient tolerated procedure well and was given proper instruction. The nature of the patient's condition was explained in depth. The patient was informed that their compliance to the treatment plan is paramount to successful healing and prevention of further ulceration and/or infection     Plan:     Treatment Plan:       Treatment Note please see attached Discharge Instructions    Written patient dismissal instructions given to patient and signed by patient or POA.          Discharge [address]  [address] [place] [address] [address], [address] [facility] [address]  Telephone: [phone]     Discharge Instructions     Important reminders:     **If you have any signs and symptoms of illness (Cough, fever, congestion, nausea, vomiting, diarrhea, etc.) please call the wound care center prior to your appointment. 1. Increase Protein intake for optimal wound healing  2. No added salt to reduce any swelling  3. If diabetic, maintain good glucose control  4. If you smoke, smoking prohibits wound healing, we ask that you refrain from smoking. 5. When taking antibiotics take the entire prescription as ordered. Do not stop taking until medication is all gone unless otherwise instructed. 6. Exercise as tolerated. 7. Keep weight off wounds and reposition every 2 hours if applicable. 8. If wound(s) is on your lower extremity, elevate legs to the level of the heart or above for 30 minutes 4-5 times a day and/or when sitting. Avoid standing for long periods of time. 9. Do not get wounds wet in bath or shower unless otherwise instructed by your physician. If your wound is on your foot or leg, you may purchase a cast bag. Please ask at the pharmacy. If Vascular testing is ordered, please call [address] [place] [address] [phone]) to schedule. Vascular tests ordered by Wound Care Physicians may take up to 2 hours to complete. Please keep that in mind when scheduling. If Vascular testing is scheduled, please bring supplies to replace your dressing after testing is done. The vascular department does not stock supplies. Wound: Coccyx     With each dressing change, rinse wounds with 0.9% Saline. (May use wound wash or soft contact solution. Both can be purchased at a local drug store). If unable to obtain saline, may use a gentle soap and water. Dressing care: Mix Gentamicin cream & Triad, Mepilex border or Kerramx Gentle Border- change Monday, Wednesday, & Friday.  Turn & reposition every 1-2 hours and as needed for pressure relief and comfort. Keep pressure off of your wound as much as possible. Eat plenty of protein     Home Care Agency/Facility: [facility]     Your wound-care supplies will be provided by:  Please note, depending on your insurance coverage, you may have out-of-pocket expenses for these supplies. Someone from the company should call you to confirm your order and discuss those potential costs before they ship your products -- please anticipate that call. If your out-of-pocket cost could be substantial, Many companies have financial hardship programs for patients who qualify, so please ask about that if you might need a hand. If you have any questions about your supplies or your potential out-of-pocket costs, or if you need to place an order for a refill of supplies (typically monthly), please call the company directly. Your  is Yoshi Vaca     Follow up with Dr Titus Salinas In 3 week(s) in the wound care center. Wound Care Center Information: Should you experience any significant changes in your wound(s) or have questions about your wound care, please contact the [facility]  at [phone] Monday  - Thursday 8:00 am - 4:00 pm and Friday 8:00 am - 1:00pm. If you need help with your wound outside these hours and cannot wait until we are again available, contact your PCP or go to the hospital emergency room. PLEASE NOTE: IF YOU ARE UNABLE TO OBTAIN WOUND SUPPLIES, CONTINUE TO USE THE SUPPLIES YOU HAVE AVAILABLE UNTIL YOU ARE ABLE TO REACH US. IT IS MOST IMPORTANT TO KEEP THE WOUND COVERED AT ALL TIMES. Patient Experience     Thank you for trusting us with your care. You may receive a survey from a company called [employer] asking for your feedback. We would appreciate it if you took a few minutes to share your experience. Your input is very valuable to us       Dayna Salinas MD, FACS  1/4/2023  11:14 AM

## 2023-01-15 NOTE — ED NOTES
Goal Outcome Evaluation: Remains on HFNC 2 liters; 21% oxygen. Tolerating gavage feeding. Voiding and stooling.                          Report given to [facility] on 3A. Pt taken up on 02 with tank, by wheelchair.       Zoey Young RN  12/28/19 1645

## 2023-01-23 NOTE — DISCHARGE INSTRUCTIONS
[address], [address] [place] [address]  Telephone: [phone]     Discharge Instructions     Important reminders:     **If you have any signs and symptoms of illness (Cough, fever, congestion, nausea, vomiting, diarrhea, etc.) please call the wound care center prior to your appointment. 1. Increase Protein intake for optimal wound healing  2. No added salt to reduce any swelling  3. If diabetic, maintain good glucose control  4. If you smoke, smoking prohibits wound healing, we ask that you refrain from smoking. 5. When taking antibiotics take the entire prescription as ordered. Do not stop taking until medication is all gone unless otherwise instructed. 6. Exercise as tolerated. 7. Keep weight off wounds and reposition every 2 hours if applicable. 8. If wound(s) is on your lower extremity, elevate legs to the level of the heart or above for 30 minutes 4-5 times a day and/or when sitting. Avoid standing for long periods of time. 9. Do not get wounds wet in bath or shower unless otherwise instructed by your physician. If your wound is on your foot or leg, you may purchase a cast bag. Please ask at the pharmacy. If Vascular testing is ordered, please call [address] [place] [address] [phone]) to schedule. Vascular tests ordered by Wound Care Physicians may take up to 2 hours to complete. Please keep that in mind when scheduling. If Vascular testing is scheduled, please bring supplies to replace your dressing after testing is done. The vascular department does not stock supplies. Wound: Coccyx     With each dressing change, rinse wounds with 0.9% Saline. (May use wound wash or soft contact solution. Both can be purchased at a local drug store). If unable to obtain saline, may use a gentle soap and water. Dressing care: Mix Gentamicin cream & Triad, Mepilex border or Kerramx Gentle Border- change Monday, Wednesday, & Friday.  Turn & reposition every 1-2 hours and as needed for pressure relief and comfort. Keep pressure off of your wound as much as possible. Eat plenty of protein     Home Care Agency/Facility: [facility]     Your wound-care supplies will be provided by:  Please note, depending on your insurance coverage, you may have out-of-pocket expenses for these supplies. Someone from the company should call you to confirm your order and discuss those potential costs before they ship your products -- please anticipate that call. If your out-of-pocket cost could be substantial, Many companies have financial hardship programs for patients who qualify, so please ask about that if you might need a hand. If you have any questions about your supplies or your potential out-of-pocket costs, or if you need to place an order for a refill of supplies (typically monthly), please call the company directly. Your  is BEAU     Follow up with Dr Ana Maria Mccarthy In 3 week(s) in the wound care center. Wound Care Center Information: Should you experience any significant changes in your wound(s) or have questions about your wound care, please contact the [employer] at [phone] Monday  - Thursday 8:00 am - 4:00 pm and Friday 8:00 am - 1:00pm. If you need help with your wound outside these hours and cannot wait until we are again available, contact your PCP or go to the hospital emergency room. PLEASE NOTE: IF YOU ARE UNABLE TO OBTAIN WOUND SUPPLIES, CONTINUE TO USE THE SUPPLIES YOU HAVE AVAILABLE UNTIL YOU ARE ABLE TO REACH US. IT IS MOST IMPORTANT TO KEEP THE WOUND COVERED AT ALL TIMES. Patient Experience     Thank you for trusting us with your care. You may receive a survey from a company called [employer] asking for your feedback. We would appreciate it if you took a few minutes to share your experience.   Your input is very valuable to us

## 2023-01-25 ENCOUNTER — HOSPITAL ENCOUNTER (OUTPATIENT)
Dept: WOUND CARE | Age: 73
Discharge: HOME | End: 2023-01-25

## 2023-02-21 NOTE — DISCHARGE INSTRUCTIONS
[address], [address]  Telephone: [phone]     Discharge Instructions     Important reminders:     **If you have any signs and symptoms of illness (Cough, fever, congestion, nausea, vomiting, diarrhea, etc.) please call the wound care center prior to your appointment. 1. Increase Protein intake for optimal wound healing  2. No added salt to reduce any swelling  3. If diabetic, maintain good glucose control  4. If you smoke, smoking prohibits wound healing, we ask that you refrain from smoking. 5. When taking antibiotics take the entire prescription as ordered. Do not stop taking until medication is all gone unless otherwise instructed. 6. Exercise as tolerated. 7. Keep weight off wounds and reposition every 2 hours if applicable. 8. If wound(s) is on your lower extremity, elevate legs to the level of the heart or above for 30 minutes 4-5 times a day and/or when sitting. Avoid standing for long periods of time. 9. Do not get wounds wet in bath or shower unless otherwise instructed by your physician. If your wound is on your foot or leg, you may purchase a cast bag. Please ask at the pharmacy. If Vascular testing is ordered, please call [address] [place] [address] [phone]) to schedule. Vascular tests ordered by Wound Care Physicians may take up to 2 hours to complete. Please keep that in mind when scheduling. If Vascular testing is scheduled, please bring supplies to replace your dressing after testing is done. The vascular department does not stock supplies. Wound: Coccyx     With each dressing change, rinse wounds with 0.9% Saline. (May use wound wash or soft contact solution. Both can be purchased at a local drug store). If unable to obtain saline, may use a gentle soap and water. Dressing care: Ana M, Mepilex border or Kerramx Gentle Border- change Monday, Wednesday, & Friday.  Turn & reposition every 1-2 hours and as needed for pressure relief and comfort. Keep pressure off of your wound as much as possible. Eat plenty of protein     Home Care Agency/Facility: [facility]     Your wound-care supplies will be provided by:  Please note, depending on your insurance coverage, you may have out-of-pocket expenses for these supplies. Someone from the company should call you to confirm your order and discuss those potential costs before they ship your products -- please anticipate that call. If your out-of-pocket cost could be substantial, Many companies have financial hardship programs for patients who qualify, so please ask about that if you might need a hand. If you have any questions about your supplies or your potential out-of-pocket costs, or if you need to place an order for a refill of supplies (typically monthly), please call the company directly. Your  is Christina Collado     Follow up with Dr Inez Doherty In 2 week(s) in the wound care center. Wound Care Center Information: Should you experience any significant changes in your wound(s) or have questions about your wound care, please contact the [facility] at [phone] Monday  - Thursday 8:00 am - 4:00 pm and Friday 8:00 am - 1:00pm. If you need help with your wound outside these hours and cannot wait until we are again available, contact your PCP or go to the hospital emergency room. PLEASE NOTE: IF YOU ARE UNABLE TO OBTAIN WOUND SUPPLIES, CONTINUE TO USE THE SUPPLIES YOU HAVE AVAILABLE UNTIL YOU ARE ABLE TO REACH US. IT IS MOST IMPORTANT TO KEEP THE WOUND COVERED AT ALL TIMES. Patient Experience     Thank you for trusting us with your care. You may receive a survey from a company called [employer] asking for your feedback. We would appreciate it if you took a few minutes to share your experience.   Your input is very valuable to us

## 2023-02-22 ENCOUNTER — HOSPITAL ENCOUNTER (OUTPATIENT)
Dept: WOUND CARE | Age: 73
Discharge: HOME | End: 2023-02-22
Payer: MEDICARE

## 2023-02-22 VITALS — HEART RATE: 86 BPM | DIASTOLIC BLOOD PRESSURE: 70 MMHG | SYSTOLIC BLOOD PRESSURE: 112 MMHG | RESPIRATION RATE: 16 BRPM

## 2023-02-22 DIAGNOSIS — Z74.09 POOR MOBILITY: Primary | ICD-10-CM

## 2023-02-22 DIAGNOSIS — L89.43 PRESSURE INJURY OF CONTIGUOUS REGION INVOLVING BACK AND BUTTOCK, STAGE 3, UNSPECIFIED LATERALITY (HCC): ICD-10-CM

## 2023-02-22 DIAGNOSIS — S31.000A SACRAL WOUND, INITIAL ENCOUNTER: ICD-10-CM

## 2023-02-22 PROCEDURE — 11042 DBRDMT SUBQ TIS 1ST 20SQCM/<: CPT | Performed by: SURGERY

## 2023-02-22 PROCEDURE — 11042 DBRDMT SUBQ TIS 1ST 20SQCM/<: CPT

## 2023-02-22 RX ORDER — BACITRACIN ZINC AND POLYMYXIN B SULFATE 500; 1000 [USP'U]/G; [USP'U]/G
OINTMENT TOPICAL ONCE
OUTPATIENT
Start: 2023-02-22 | End: 2023-02-22

## 2023-02-22 RX ORDER — GINSENG 100 MG
CAPSULE ORAL ONCE
OUTPATIENT
Start: 2023-02-22 | End: 2023-02-22

## 2023-02-22 RX ORDER — LIDOCAINE HYDROCHLORIDE 20 MG/ML
JELLY TOPICAL ONCE
OUTPATIENT
Start: 2023-02-22 | End: 2023-02-22

## 2023-02-22 RX ORDER — GENTAMICIN SULFATE 1 MG/G
OINTMENT TOPICAL ONCE
OUTPATIENT
Start: 2023-02-22 | End: 2023-02-22

## 2023-02-22 RX ORDER — LIDOCAINE 40 MG/G
CREAM TOPICAL ONCE
Status: DISCONTINUED | OUTPATIENT
Start: 2023-02-22 | End: 2023-02-23 | Stop reason: HOSPADM

## 2023-02-22 RX ORDER — LIDOCAINE 50 MG/G
OINTMENT TOPICAL ONCE
OUTPATIENT
Start: 2023-02-22 | End: 2023-02-22

## 2023-02-22 RX ORDER — CLOBETASOL PROPIONATE 0.5 MG/G
OINTMENT TOPICAL ONCE
OUTPATIENT
Start: 2023-02-22 | End: 2023-02-22

## 2023-02-22 RX ORDER — BETAMETHASONE DIPROPIONATE 0.05 %
OINTMENT (GRAM) TOPICAL ONCE
OUTPATIENT
Start: 2023-02-22 | End: 2023-02-22

## 2023-02-22 RX ORDER — BACITRACIN, NEOMYCIN, POLYMYXIN B 400; 3.5; 5 [USP'U]/G; MG/G; [USP'U]/G
OINTMENT TOPICAL ONCE
OUTPATIENT
Start: 2023-02-22 | End: 2023-02-22

## 2023-02-22 RX ORDER — LIDOCAINE 40 MG/G
CREAM TOPICAL ONCE
OUTPATIENT
Start: 2023-02-22 | End: 2023-02-22

## 2023-02-22 RX ORDER — LIDOCAINE HYDROCHLORIDE 40 MG/ML
SOLUTION TOPICAL ONCE
OUTPATIENT
Start: 2023-02-22 | End: 2023-02-22

## 2023-02-22 NOTE — PLAN OF CARE
Discharge instructions given. Patient verbalized understanding. Return to [facility] in 2 week(s).   Called/faxed orders to  [facility]

## 2023-02-22 NOTE — PROGRESS NOTES
[facility]  [address], [address]  Telephone: [phone]        [facility] AT [facility] Fax # [phone]      Discharge Instructions         [facility]  [address], [address]  Telephone: [phone]     Discharge Instructions     Important reminders:     **If you have any signs and symptoms of illness (Cough, fever, congestion, nausea, vomiting, diarrhea, etc.) please call the wound care center prior to your appointment. 1. Increase Protein intake for optimal wound healing  2. No added salt to reduce any swelling  3. If diabetic, maintain good glucose control  4. If you smoke, smoking prohibits wound healing, we ask that you refrain from smoking. 5. When taking antibiotics take the entire prescription as ordered. Do not stop taking until medication is all gone unless otherwise instructed. 6. Exercise as tolerated. 7. Keep weight off wounds and reposition every 2 hours if applicable. 8. If wound(s) is on your lower extremity, elevate legs to the level of the heart or above for 30 minutes 4-5 times a day and/or when sitting. Avoid standing for long periods of time. 9. Do not get wounds wet in bath or shower unless otherwise instructed by your physician. If your wound is on your foot or leg, you may purchase a cast bag. Please ask at the pharmacy. If Vascular testing is ordered, please call [address] [place] [address] [phone]) to schedule. Vascular tests ordered by Wound Care Physicians may take up to 2 hours to complete. Please keep that in mind when scheduling. If Vascular testing is scheduled, please bring supplies to replace your dressing after testing is done. The vascular department does not stock supplies. Wound: Coccyx     With each dressing change, rinse wounds with 0.9% Saline. (May use wound wash or soft contact solution. Both can be purchased at a local drug store).  If unable to obtain saline, may use a gentle soap and water. Dressing care: Ana M, Mepilex border or Kerramx Gentle Border- change Monday, Wednesday, & Friday. Turn & reposition every 1-2 hours and as needed for pressure relief and comfort. Keep pressure off of your wound as much as possible. Eat plenty of protein     Home Care Agency/Facility: [facility]     Your wound-care supplies will be provided by:  Please note, depending on your insurance coverage, you may have out-of-pocket expenses for these supplies. Someone from the company should call you to confirm your order and discuss those potential costs before they ship your products -- please anticipate that call. If your out-of-pocket cost could be substantial, Many companies have financial hardship programs for patients who qualify, so please ask about that if you might need a hand. If you have any questions about your supplies or your potential out-of-pocket costs, or if you need to place an order for a refill of supplies (typically monthly), please call the company directly. Your  is Vamsi Gore     Follow up with Dr Neha Correa In 2 week(s) in the wound care center. Wound Care Center Information: Should you experience any significant changes in your wound(s) or have questions about your wound care, please contact the [facility] at [phone] Monday  - Thursday 8:00 am - 4:00 pm and Friday 8:00 am - 1:00pm. If you need help with your wound outside these hours and cannot wait until we are again available, contact your PCP or go to the hospital emergency room. PLEASE NOTE: IF YOU ARE UNABLE TO OBTAIN WOUND SUPPLIES, CONTINUE TO USE THE SUPPLIES YOU HAVE AVAILABLE UNTIL YOU ARE ABLE TO REACH US. IT IS MOST IMPORTANT TO KEEP THE WOUND COVERED AT ALL TIMES. Patient Experience     Thank you for trusting us with your care. You may receive a survey from a company called [employer] asking for your feedback.   We would appreciate it if you took a few minutes to share your experience. Your input is very valuable to us        Skilled nurse to evaluate and treat for wound care. Change dressing as ordered  once a day on Monday, Wednesday, and Friday using clean technique. Patient/Family/caregiver may change dressings as needed as instructed when Home Care unavailable. WOUNDS REQUIRING DRESSING Changes:     Wound 06/16/22 Coccyx Mid #1 (Active)   Wound Image   12/14/22 0911   Wound Etiology Other 02/22/23 0916   Wound Cleansed Cleansed with saline 02/22/23 0916   Dressing/Treatment Antibacterial ointment;Triad hydro/zinc oxide-based hydrophilic paste;Dry dressing 09/28/22 1010   Wound Length (cm) 1.1 cm 02/22/23 0916   Wound Width (cm) 0.4 cm 02/22/23 0916   Wound Depth (cm) 1.5 cm 02/22/23 0916   Wound Surface Area (cm^2) 0.44 cm^2 02/22/23 0916   Change in Wound Size % (l*w) 97.78 02/22/23 0916   Wound Volume (cm^3) 0.66 cm^3 02/22/23 0916   Wound Healing % 99 02/22/23 0916   Post-Procedure Length (cm) 1.1 cm 02/22/23 0941   Post-Procedure Width (cm) 0.4 cm 02/22/23 0941   Post-Procedure Depth (cm) 1.5 cm 02/22/23 0941   Post-Procedure Surface Area (cm^2) 0.44 cm^2 02/22/23 0941   Post-Procedure Volume (cm^3) 0.66 cm^3 02/22/23 0941   Wound Assessment Bleeding 02/22/23 0941   Drainage Amount Moderate 02/22/23 0941   Drainage Description Serosanguinous 02/22/23 0916   Odor None 02/22/23 0916   Saba-wound Assessment Intact 02/22/23 0916   Margins Attached edges; Defined edges 02/22/23 0916   Wound Thickness Description not for Pressure Injury Full thickness 09/14/22 5728   Number of days: 250          Patient seen and treated on 2/22/2023    By Rivka Osman MD NPI: [id number]  (provider/NPI)

## 2023-02-22 NOTE — PROGRESS NOTES
[address] Progress and Procedure Note    Monisha Roche  AGE: 74 y.o. GENDER: female    : 1950  TODAY'S DATE: 2023    Chief Complaint   Patient presents with    Wound Check     Follow up coccyx wound        History of Present Illness     Monisha Roche is a 74 y.o. female who presents today for wound evaluation. History of Wound: pressure and diabetic wound located on the sacral decubitus ulcer stage 3 . Wound Pain:  mild  Severity: 2/10   Wound Type: pressure and diabetic  Modifying Factors:  diabetes, poor glucose control, chronic pressure, decreased mobility, shear force, and malnutrition  Associated Signs/Symptoms:  pain    Past Medical History:   Diagnosis Date    Arthritis     back    Cataracts, bilateral     CHF (congestive heart failure) ([address]     Diabetes mellitus ([address]     Glaucoma     Hyperlipidemia     Hypertension     Poor mobility 2022    Pressure 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, stage 3 ([address] 2022    Similar wound 2 years ago. Thyroid disease      Past Surgical History:   Procedure Laterality Date    CARPAL TUNNEL RELEASE      bilateral    CHOLECYSTECTOMY      COLONOSCOPY      ELBOW SURGERY      ENDOSCOPY, COLON, DIAGNOSTIC      EYE SURGERY      FOOT SURGERY      SHOULDER SURGERY       Current Outpatient Medications   Medication Sig Dispense Refill    gentamicin (GARAMYCIN) 0.1 % cream Apply topically daily. 30 g 1    cephALEXin (KEFLEX) 500 MG capsule Take 1 capsule by mouth 4 times daily 40 capsule 0    miconazole (MICOTIN) 2 % powder Apply topically 2 times daily to abdominal folds. 45 g 1    traMADol (ULTRAM) 50 MG tablet Take 50 mg by mouth 3 times daily as needed for Pain.       DULoxetine (CYMBALTA) 60 MG extended release capsule Take 1 capsule by mouth daily 30 capsule 3    Travoprost, BAK Free, (TRAVATAN Z) 0.004 % SOLN ophthalmic solution Place 1 drop into both eyes nightly      DICLOFENAC PO Take 100 mg by mouth 2 times daily      acetaminophen (TYLENOL) 500 MG tablet Take 500 mg by mouth every 6 hours as needed for Pain or Fever       aspirin EC 81 MG EC tablet Take 81 mg by mouth daily May restart in AM.      levothyroxine (SYNTHROID) 200 MCG tablet Take 200 mcg by mouth Daily       Insulin Detemir (LEVEMIR FLEXPEN SC) Inject 35 Units into the skin nightly      simvastatin (ZOCOR) 40 MG tablet Take 40 mg by mouth nightly. Insulin Lispro, Human, (HUMALOG SC) Inject 10 Units into the skin 3 times daily (with meals) With sliding      Calcium Carbonate-Vitamin D (CALCIUM + D PO) Take by mouth daily        Current Facility-Administered Medications   Medication Dose Route Frequency Provider Last Rate Last Admin    lidocaine (LMX) 4 % cream   Topical Once Lilian Catalan MD          Social History:   Social History     Tobacco Use    Smoking status: Never    Smokeless tobacco: Never   Vaping Use    Vaping Use: Never used   Substance Use Topics    Alcohol use: Not Currently    Drug use: Never     Allergies: Other, Ace inhibitors, Metoprolol, Tetrahydrozoline hcl, and Timolol    Procedure: Indications:  Based on my examination of this patient's wound(s)/ulcer(s) today, debridement is required to promote healing and evaluate the wound base. Performed by: Natale Ganser, MD    Consent obtained: Yes    Time out taken: Yes    Pain Control: Anesthetic  Anesthetic: 4% Lidocaine Cream     Debridement: Sharp excisional debridement  Using curette the wound was sharply debrided    down through and including the removal of epidermis, dermis, and subcutaneous tissue.     Devitalized Tissue Debrided: fibrin, biofilm, and slough    Pre Debridement Measurements:  Are located in the Wound Documentation Flow Sheet     Wound #: 1     Post  Debridement Measurements:  Wound 06/16/22 Coccyx Mid #1 (Active)   Wound Image   12/14/22 0911   Wound Etiology Other 02/22/23 0916   Wound Cleansed Cleansed with saline 02/22/23 0916 Dressing/Treatment Antibacterial ointment;Triad hydro/zinc oxide-based hydrophilic paste;Dry dressing 09/28/22 1010   Wound Length (cm) 1.1 cm 02/22/23 0916   Wound Width (cm) 0.4 cm 02/22/23 0916   Wound Depth (cm) 1.5 cm 02/22/23 0916   Wound Surface Area (cm^2) 0.44 cm^2 02/22/23 0916   Change in Wound Size % (l*w) 97.78 02/22/23 0916   Wound Volume (cm^3) 0.66 cm^3 02/22/23 0916   Wound Healing % 99 02/22/23 0916   Post-Procedure Length (cm) 1.1 cm 02/22/23 0941   Post-Procedure Width (cm) 0.4 cm 02/22/23 0941   Post-Procedure Depth (cm) 1.5 cm 02/22/23 0941   Post-Procedure Surface Area (cm^2) 0.44 cm^2 02/22/23 0941   Post-Procedure Volume (cm^3) 0.66 cm^3 02/22/23 0941   Wound Assessment Bleeding 02/22/23 0941   Drainage Amount Moderate 02/22/23 0941   Drainage Description Serosanguinous 02/22/23 0916   Odor None 02/22/23 0916   Saba-wound Assessment Intact 02/22/23 0916   Margins Attached edges; Defined edges 02/22/23 0916   Wound Thickness Description not for Pressure Injury Full thickness 09/14/22 0939   Number of days: 250       Percent of Wound(s)/Ulcer(s) Debrided: 100 %    Total Surface Area Debrided:  0.44 sq cm     Bleeding: Minimal    Hemostasis Achieved: Pressure    Pre Procedural Pain:  2/10     Post Procedural Pain: 2/10     Response to treatment: Well tolerated by patient    Assessment:     Wound looks Improved    Patient tolerated procedure well and was given proper instruction. The nature of the patient's condition was explained in depth. The patient was informed that their compliance to the treatment plan is paramount to successful healing and prevention of further ulceration and/or infection     Plan:     Treatment Plan:       Treatment Note please see attached Discharge Instructions    Written patient dismissal instructions given to patient and signed by patient or POA.          Discharge Instructions         [address] [address]  Telephone: [phone]     Discharge Instructions     Important reminders:     **If you have any signs and symptoms of illness (Cough, fever, congestion, nausea, vomiting, diarrhea, etc.) please call the wound care center prior to your appointment. 1. Increase Protein intake for optimal wound healing  2. No added salt to reduce any swelling  3. If diabetic, maintain good glucose control  4. If you smoke, smoking prohibits wound healing, we ask that you refrain from smoking. 5. When taking antibiotics take the entire prescription as ordered. Do not stop taking until medication is all gone unless otherwise instructed. 6. Exercise as tolerated. 7. Keep weight off wounds and reposition every 2 hours if applicable. 8. If wound(s) is on your lower extremity, elevate legs to the level of the heart or above for 30 minutes 4-5 times a day and/or when sitting. Avoid standing for long periods of time. 9. Do not get wounds wet in bath or shower unless otherwise instructed by your physician. If your wound is on your foot or leg, you may purchase a cast bag. Please ask at the pharmacy. If Vascular testing is ordered, please call [address] [place] [address] [phone]) to schedule. Vascular tests ordered by Wound Care Physicians may take up to 2 hours to complete. Please keep that in mind when scheduling. If Vascular testing is scheduled, please bring supplies to replace your dressing after testing is done. The vascular department does not stock supplies. Wound: Coccyx     With each dressing change, rinse wounds with 0.9% Saline. (May use wound wash or soft contact solution. Both can be purchased at a local drug store). If unable to obtain saline, may use a gentle soap and water. Dressing care: Ana M, Mepilex border or Kerramx Gentle Border- change Monday, Wednesday, & Friday. Turn & reposition every 1-2 hours and as needed for pressure relief and comfort.  Keep pressure off of your wound as much as possible. Eat plenty of protein     [facility] Care Agency/Facility: [facility]     Your wound-care supplies will be provided by:  Please note, depending on your insurance coverage, you may have out-of-pocket expenses for these supplies. Someone from the company should call you to confirm your order and discuss those potential costs before they ship your products -- please anticipate that call. If your out-of-pocket cost could be substantial, Many companies have financial hardship programs for patients who qualify, so please ask about that if you might need a hand. If you have any questions about your supplies or your potential out-of-pocket costs, or if you need to place an order for a refill of supplies (typically monthly), please call the company directly. Your  is Bridgette Rod     Follow up with Dr Joy Mahmood In 2 week(s) in the wound care center. Wound Care Center Information: Should you experience any significant changes in your wound(s) or have questions about your wound care, please contact the [facility] at [phone] Monday  - Thursday 8:00 am - 4:00 pm and Friday 8:00 am - 1:00pm. If you need help with your wound outside these hours and cannot wait until we are again available, contact your PCP or go to the hospital emergency room. PLEASE NOTE: IF YOU ARE UNABLE TO OBTAIN WOUND SUPPLIES, CONTINUE TO USE THE SUPPLIES YOU HAVE AVAILABLE UNTIL YOU ARE ABLE TO REACH US. IT IS MOST IMPORTANT TO KEEP THE WOUND COVERED AT ALL TIMES. Patient Experience     Thank you for trusting us with your care. You may receive a survey from a company called [employer] asking for your feedback. We would appreciate it if you took a few minutes to share your experience. Your input is very valuable to us       Senia Mahmood MD, FACS  2/22/2023  12:42 PM

## 2023-03-14 NOTE — DISCHARGE INSTRUCTIONS
[address], [address]  Telephone: [phone]     Discharge Instructions     Important reminders:     **If you have any signs and symptoms of illness (Cough, fever, congestion, nausea, vomiting, diarrhea, etc.) please call the wound care center prior to your appointment. 1. Increase Protein intake for optimal wound healing  2. No added salt to reduce any swelling  3. If diabetic, maintain good glucose control  4. If you smoke, smoking prohibits wound healing, we ask that you refrain from smoking. 5. When taking antibiotics take the entire prescription as ordered. Do not stop taking until medication is all gone unless otherwise instructed. 6. Exercise as tolerated. 7. Keep weight off wounds and reposition every 2 hours if applicable. 8. If wound(s) is on your lower extremity, elevate legs to the level of the heart or above for 30 minutes 4-5 times a day and/or when sitting. Avoid standing for long periods of time. 9. Do not get wounds wet in bath or shower unless otherwise instructed by your physician. If your wound is on your foot or leg, you may purchase a cast bag. Please ask at the pharmacy. If Vascular testing is ordered, please call [address] [place] [address] [phone]) to schedule. Vascular tests ordered by Wound Care Physicians may take up to 2 hours to complete. Please keep that in mind when scheduling. If Vascular testing is scheduled, please bring supplies to replace your dressing after testing is done. The vascular department does not stock supplies. Wound: Coccyx     With each dressing change, rinse wounds with 0.9% Saline. (May use wound wash or soft contact solution. Both can be purchased at a local drug store). If unable to obtain saline, may use a gentle soap and water. Dressing care: Ana M, Mepilex border or Kerramx Gentle Border- change Monday, Wednesday, & Friday.  Turn & reposition every 1-2 hours and as needed for pressure relief and comfort. Keep pressure off of your wound as much as possible. Eat plenty of protein. Place a pillow under one side when sitting & reposition pillow on the other side every hour and as needed for comfort     Home Care Agency/Facility: [facility]     Your wound-care supplies will be provided by:  Please note, depending on your insurance coverage, you may have out-of-pocket expenses for these supplies. Someone from the company should call you to confirm your order and discuss those potential costs before they ship your products -- please anticipate that call. If your out-of-pocket cost could be substantial, Many companies have financial hardship programs for patients who qualify, so please ask about that if you might need a hand. If you have any questions about your supplies or your potential out-of-pocket costs, or if you need to place an order for a refill of supplies (typically monthly), please call the company directly. Your  is Bridgette Rod     Follow up with Dr Joy Mahmood In 2 week(s) in the wound care center. Wound Care Center Information: Should you experience any significant changes in your wound(s) or have questions about your wound care, please contact the [facility] at [phone] Monday  - Thursday 8:00 am - 4:00 pm and Friday 8:00 am - 1:00pm. If you need help with your wound outside these hours and cannot wait until we are again available, contact your PCP or go to the hospital emergency room. PLEASE NOTE: IF YOU ARE UNABLE TO OBTAIN WOUND SUPPLIES, CONTINUE TO USE THE SUPPLIES YOU HAVE AVAILABLE UNTIL YOU ARE ABLE TO REACH US. IT IS MOST IMPORTANT TO KEEP THE WOUND COVERED AT ALL TIMES. Patient Experience     Thank you for trusting us with your care. You may receive a survey from a company called [employer] asking for your feedback. We would appreciate it if you took a few minutes to share your experience.   Your input is very valuable to us

## 2023-03-15 ENCOUNTER — HOSPITAL ENCOUNTER (OUTPATIENT)
Dept: WOUND CARE | Age: 73
Discharge: HOME | End: 2023-03-15
Payer: MEDICARE

## 2023-03-15 VITALS
TEMPERATURE: 97.1 F | RESPIRATION RATE: 16 BRPM | SYSTOLIC BLOOD PRESSURE: 149 MMHG | DIASTOLIC BLOOD PRESSURE: 66 MMHG | HEART RATE: 72 BPM

## 2023-03-15 DIAGNOSIS — S31.000A SACRAL WOUND, INITIAL ENCOUNTER: ICD-10-CM

## 2023-03-15 DIAGNOSIS — L89.43 PRESSURE INJURY OF CONTIGUOUS REGION INVOLVING BACK AND BUTTOCK, STAGE 3, UNSPECIFIED LATERALITY (HCC): ICD-10-CM

## 2023-03-15 DIAGNOSIS — Z74.09 POOR MOBILITY: Primary | ICD-10-CM

## 2023-03-15 PROCEDURE — 11042 DBRDMT SUBQ TIS 1ST 20SQCM/<: CPT

## 2023-03-15 PROCEDURE — 11042 DBRDMT SUBQ TIS 1ST 20SQCM/<: CPT | Performed by: SURGERY

## 2023-03-15 RX ORDER — BETAMETHASONE DIPROPIONATE 0.05 %
OINTMENT (GRAM) TOPICAL ONCE
OUTPATIENT
Start: 2023-03-15 | End: 2023-03-15

## 2023-03-15 RX ORDER — BACITRACIN, NEOMYCIN, POLYMYXIN B 400; 3.5; 5 [USP'U]/G; MG/G; [USP'U]/G
OINTMENT TOPICAL ONCE
OUTPATIENT
Start: 2023-03-15 | End: 2023-03-15

## 2023-03-15 RX ORDER — LIDOCAINE 40 MG/G
CREAM TOPICAL ONCE
OUTPATIENT
Start: 2023-03-15 | End: 2023-03-15

## 2023-03-15 RX ORDER — LIDOCAINE 40 MG/G
CREAM TOPICAL ONCE
Status: DISCONTINUED | OUTPATIENT
Start: 2023-03-15 | End: 2023-03-16 | Stop reason: HOSPADM

## 2023-03-15 RX ORDER — LIDOCAINE HYDROCHLORIDE 20 MG/ML
JELLY TOPICAL ONCE
OUTPATIENT
Start: 2023-03-15 | End: 2023-03-15

## 2023-03-15 RX ORDER — GENTAMICIN SULFATE 1 MG/G
OINTMENT TOPICAL ONCE
OUTPATIENT
Start: 2023-03-15 | End: 2023-03-15

## 2023-03-15 RX ORDER — BACITRACIN ZINC AND POLYMYXIN B SULFATE 500; 1000 [USP'U]/G; [USP'U]/G
OINTMENT TOPICAL ONCE
OUTPATIENT
Start: 2023-03-15 | End: 2023-03-15

## 2023-03-15 RX ORDER — GINSENG 100 MG
CAPSULE ORAL ONCE
OUTPATIENT
Start: 2023-03-15 | End: 2023-03-15

## 2023-03-15 RX ORDER — LIDOCAINE 50 MG/G
OINTMENT TOPICAL ONCE
OUTPATIENT
Start: 2023-03-15 | End: 2023-03-15

## 2023-03-15 RX ORDER — CLOBETASOL PROPIONATE 0.5 MG/G
OINTMENT TOPICAL ONCE
OUTPATIENT
Start: 2023-03-15 | End: 2023-03-15

## 2023-03-15 RX ORDER — LIDOCAINE HYDROCHLORIDE 40 MG/ML
SOLUTION TOPICAL ONCE
OUTPATIENT
Start: 2023-03-15 | End: 2023-03-15

## 2023-03-15 NOTE — PLAN OF CARE
Discharge instructions given. Patient verbalized understanding. Return to [address] [place] [address],[address] in 2 week(s).

## 2023-03-15 NOTE — PROGRESS NOTES
[address] Progress and Procedure Note    Dallin Marcus  AGE: 74 y.o. GENDER: female    : 1950  TODAY'S DATE: 3/15/2023    Chief Complaint   Patient presents with    Wound Check     Follow up wound coccyx        History of Present Illness     Dallin Marcus is a 74 y.o. female who presents today for wound evaluation. History of Wound: pressure and diabetic wound located on the sacral decubitus ulcer stage 3 . Wound Pain:  mild  Severity: 2/10   Wound Type: pressure and diabetic  Modifying Factors:  diabetes, poor glucose control, chronic pressure, decreased mobility, shear force, and malnutrition  Associated Signs/Symptoms:  none    Past Medical History:   Diagnosis Date    Arthritis     back    Cataracts, bilateral     CHF (congestive heart failure) (HCC)     Diabetes mellitus ([address]     Glaucoma     Hyperlipidemia     Hypertension     Poor mobility 2022    Pressure 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, stage 3 ([address] 2022    Similar wound 2 years ago. Thyroid disease      Past Surgical History:   Procedure Laterality Date    CARPAL TUNNEL RELEASE      bilateral    CHOLECYSTECTOMY      COLONOSCOPY      ELBOW SURGERY      ENDOSCOPY, COLON, DIAGNOSTIC      EYE SURGERY      FOOT SURGERY      SHOULDER SURGERY       Current Outpatient Medications   Medication Sig Dispense Refill    gentamicin (GARAMYCIN) 0.1 % cream Apply topically daily. 30 g 1    cephALEXin (KEFLEX) 500 MG capsule Take 1 capsule by mouth 4 times daily 40 capsule 0    miconazole (MICOTIN) 2 % powder Apply topically 2 times daily to abdominal folds. 45 g 1    traMADol (ULTRAM) 50 MG tablet Take 50 mg by mouth 3 times daily as needed for Pain.       DULoxetine (CYMBALTA) 60 MG extended release capsule Take 1 capsule by mouth daily 30 capsule 3    Travoprost, BAK Free, (TRAVATAN Z) 0.004 % SOLN ophthalmic solution Place 1 drop into both eyes nightly      DICLOFENAC PO Take 100 mg by mouth 2 times daily      acetaminophen (TYLENOL) 500 MG tablet Take 500 mg by mouth every 6 hours as needed for Pain or Fever       aspirin EC 81 MG EC tablet Take 81 mg by mouth daily May restart in AM.      levothyroxine (SYNTHROID) 200 MCG tablet Take 200 mcg by mouth Daily       Insulin Detemir (LEVEMIR FLEXPEN SC) Inject 35 Units into the skin nightly      simvastatin (ZOCOR) 40 MG tablet Take 40 mg by mouth nightly. Insulin Lispro, Human, (HUMALOG SC) Inject 10 Units into the skin 3 times daily (with meals) With sliding      Calcium Carbonate-Vitamin D (CALCIUM + D PO) Take by mouth daily        Current Facility-Administered Medications   Medication Dose Route Frequency Provider Last Rate Last Admin    lidocaine (LMX) 4 % cream   Topical Once Larwance MD Isael          Social History:   Social History     Tobacco Use    Smoking status: Never    Smokeless tobacco: Never   Vaping Use    Vaping Use: Never used   Substance Use Topics    Alcohol use: Not Currently    Drug use: Never     Allergies: Other, Ace inhibitors, Metoprolol, Tetrahydrozoline hcl, and Timolol    Procedure: Indications:  Based on my examination of this patient's wound(s)/ulcer(s) today, debridement is required to promote healing and evaluate the wound base. Performed by: Abel Carlson MD    Consent obtained: Yes    Time out taken: Yes    Pain Control: Anesthetic  Anesthetic: 4% Lidocaine Cream     Debridement: Sharp excisional debridement  Using curette the wound was sharply debrided    down through and including the removal of epidermis, dermis, and subcutaneous tissue.     Devitalized Tissue Debrided: fibrin, biofilm, and slough    Pre Debridement Measurements:  Are located in the Wound Documentation Flow Sheet     Wound #: 1     Post  Debridement Measurements:  Wound 06/16/22 Coccyx Mid #1 (Active)   Wound Image   12/14/22 0911   Wound Etiology Other 03/15/23 0851   Wound Cleansed Cleansed with saline 03/15/23 0851 Dressing/Treatment Antibacterial ointment;Triad hydro/zinc oxide-based hydrophilic paste;Dry dressing 09/28/22 1010   Wound Length (cm) 1.5 cm 03/15/23 0851   Wound Width (cm) 0.5 cm 03/15/23 0851   Wound Depth (cm) 1.4 cm 03/15/23 0851   Wound Surface Area (cm^2) 0.75 cm^2 03/15/23 0851   Change in Wound Size % (l*w) 96.21 03/15/23 0851   Wound Volume (cm^3) 1.05 cm^3 03/15/23 0851   Wound Healing % 98 03/15/23 0851   Post-Procedure Length (cm) 1.5 cm 03/15/23 0902   Post-Procedure Width (cm) 0.5 cm 03/15/23 0902   Post-Procedure Depth (cm) 1.4 cm 03/15/23 0902   Post-Procedure Surface Area (cm^2) 0.75 cm^2 03/15/23 0902   Post-Procedure Volume (cm^3) 1.05 cm^3 03/15/23 0902   Wound Assessment Bleeding 03/15/23 0902   Drainage Amount Moderate 03/15/23 0902   Drainage Description Serosanguinous 03/15/23 0851   Odor None 03/15/23 0851   Saba-wound Assessment Intact 03/15/23 0851   Margins Defined edges; Attached edges 03/15/23 0851   Wound Thickness Description not for Pressure Injury Full thickness 09/14/22 0939   Number of days: 271       Percent of Wound(s)/Ulcer(s) Debrided: 100 %    Total Surface Area Debrided:  0.75 sq cm     Bleeding: Minimal    Hemostasis Achieved: Pressure    Pre Procedural Pain:  2/10     Post Procedural Pain: 2/10     Response to treatment: Well tolerated by patient    Assessment:     Wound looks Improved    Patient tolerated procedure well and was given proper instruction. The nature of the patient's condition was explained in depth. The patient was informed that their compliance to the treatment plan is paramount to successful healing and prevention of further ulceration and/or infection     Plan:     Treatment Plan:       Treatment Note please see attached Discharge Instructions    Written patient dismissal instructions given to patient and signed by patient or POA.          Discharge Instructions         [address], [address] [place] [address]  [address]  Telephone: [phone]     Discharge Instructions     Important reminders:     **If you have any signs and symptoms of illness (Cough, fever, congestion, nausea, vomiting, diarrhea, etc.) please call the wound care center prior to your appointment. 1. Increase Protein intake for optimal wound healing  2. No added salt to reduce any swelling  3. If diabetic, maintain good glucose control  4. If you smoke, smoking prohibits wound healing, we ask that you refrain from smoking. 5. When taking antibiotics take the entire prescription as ordered. Do not stop taking until medication is all gone unless otherwise instructed. 6. Exercise as tolerated. 7. Keep weight off wounds and reposition every 2 hours if applicable. 8. If wound(s) is on your lower extremity, elevate legs to the level of the heart or above for 30 minutes 4-5 times a day and/or when sitting. Avoid standing for long periods of time. 9. Do not get wounds wet in bath or shower unless otherwise instructed by your physician. If your wound is on your foot or leg, you may purchase a cast bag. Please ask at the pharmacy. If Vascular testing is ordered, please call [address] [place] [address] [phone]) to schedule. Vascular tests ordered by Wound Care Physicians may take up to 2 hours to complete. Please keep that in mind when scheduling. If Vascular testing is scheduled, please bring supplies to replace your dressing after testing is done. The vascular department does not stock supplies. Wound: Coccyx     With each dressing change, rinse wounds with 0.9% Saline. (May use wound wash or soft contact solution. Both can be purchased at a local drug store). If unable to obtain saline, may use a gentle soap and water. Dressing care: Ana M, Mepilex border or Kerramx Gentle Border- change Monday, Wednesday, & Friday. Turn & reposition every 1-2 hours and as needed for pressure relief and comfort.  Keep pressure off of your wound as much as possible. Eat plenty of protein. Place a pillow under one side when sitting & reposition pillow on the other side every hour and as needed for comfort     Home Care Agency/Facility: [facility]     Your wound-care supplies will be provided by:  Please note, depending on your insurance coverage, you may have out-of-pocket expenses for these supplies. Someone from the company should call you to confirm your order and discuss those potential costs before they ship your products -- please anticipate that call. If your out-of-pocket cost could be substantial, Many companies have financial hardship programs for patients who qualify, so please ask about that if you might need a hand. If you have any questions about your supplies or your potential out-of-pocket costs, or if you need to place an order for a refill of supplies (typically monthly), please call the company directly. Your  is Dom Elaine     Follow up with Dr Beryle Opal In 2 week(s) in the wound care center. Wound Care Center Information: Should you experience any significant changes in your wound(s) or have questions about your wound care, please contact the [place] [address] at [phone] Monday  - Thursday 8:00 am - 4:00 pm and Friday 8:00 am - 1:00pm. If you need help with your wound outside these hours and cannot wait until we are again available, contact your PCP or go to the hospital emergency room. PLEASE NOTE: IF YOU ARE UNABLE TO OBTAIN WOUND SUPPLIES, CONTINUE TO USE THE SUPPLIES YOU HAVE AVAILABLE UNTIL YOU ARE ABLE TO REACH US. IT IS MOST IMPORTANT TO KEEP THE WOUND COVERED AT ALL TIMES. Patient Experience     Thank you for trusting us with your care. You may receive a survey from a company called [employer] asking for your feedback. We would appreciate it if you took a few minutes to share your experience. Your input is very valuable to us       Ashdown B. Beryle Opal MD, FACS  3/15/2023  11:36 AM

## 2023-03-27 NOTE — DISCHARGE INSTRUCTIONS
[address], [address] [facility] [address]  Telephone: [phone]     Discharge Instructions     Important reminders:     **If you have any signs and symptoms of illness (Cough, fever, congestion, nausea, vomiting, diarrhea, etc.) please call the wound care center prior to your appointment. 1. Increase Protein intake for optimal wound healing  2. No added salt to reduce any swelling  3. If diabetic, maintain good glucose control  4. If you smoke, smoking prohibits wound healing, we ask that you refrain from smoking. 5. When taking antibiotics take the entire prescription as ordered. Do not stop taking until medication is all gone unless otherwise instructed. 6. Exercise as tolerated. 7. Keep weight off wounds and reposition every 2 hours if applicable. 8. If wound(s) is on your lower extremity, elevate legs to the level of the heart or above for 30 minutes 4-5 times a day and/or when sitting. Avoid standing for long periods of time. 9. Do not get wounds wet in bath or shower unless otherwise instructed by your physician. If your wound is on your foot or leg, you may purchase a cast bag. Please ask at the pharmacy. If [facility]ascular [facility] is ordered, [facility] ([phone]) to schedule. Vascular tests ordered by Wound Care Physicians may take up to 2 hours to complete. Please keep that in mind when scheduling. If Vascular testing is scheduled, please bring supplies to replace your dressing after testing is done. The vascular department does not stock supplies. Wound: Coccyx     With each dressing change, rinse wounds with 0.9% Saline. (May use wound wash or soft contact solution. Both can be purchased at a local drug store). If unable to obtain saline, may use a gentle soap and water. Dressing care: Ana M, Mepilex border or Kerramx Gentle Border- change Monday, Wednesday, & Friday.  Turn & reposition every 1-2 hours and as

## 2023-03-29 ENCOUNTER — HOSPITAL ENCOUNTER (OUTPATIENT)
Dept: WOUND CARE | Age: 73
Discharge: HOME | End: 2023-03-29
Payer: MEDICARE

## 2023-03-29 VITALS — DIASTOLIC BLOOD PRESSURE: 78 MMHG | SYSTOLIC BLOOD PRESSURE: 136 MMHG | HEART RATE: 85 BPM | TEMPERATURE: 96.8 F

## 2023-03-29 DIAGNOSIS — Z74.09 POOR MOBILITY: Primary | ICD-10-CM

## 2023-03-29 DIAGNOSIS — L89.43 PRESSURE INJURY OF CONTIGUOUS REGION INVOLVING BACK AND BUTTOCK, STAGE 3, UNSPECIFIED LATERALITY (HCC): ICD-10-CM

## 2023-03-29 DIAGNOSIS — S31.000A SACRAL WOUND, INITIAL ENCOUNTER: ICD-10-CM

## 2023-03-29 PROCEDURE — 11042 DBRDMT SUBQ TIS 1ST 20SQCM/<: CPT | Performed by: SURGERY

## 2023-03-29 PROCEDURE — 11042 DBRDMT SUBQ TIS 1ST 20SQCM/<: CPT

## 2023-03-29 RX ORDER — LIDOCAINE HYDROCHLORIDE 20 MG/ML
JELLY TOPICAL ONCE
OUTPATIENT
Start: 2023-03-29 | End: 2023-03-29

## 2023-03-29 RX ORDER — GINSENG 100 MG
CAPSULE ORAL ONCE
OUTPATIENT
Start: 2023-03-29 | End: 2023-03-29

## 2023-03-29 RX ORDER — BACITRACIN, NEOMYCIN, POLYMYXIN B 400; 3.5; 5 [USP'U]/G; MG/G; [USP'U]/G
OINTMENT TOPICAL ONCE
OUTPATIENT
Start: 2023-03-29 | End: 2023-03-29

## 2023-03-29 RX ORDER — GENTAMICIN SULFATE 1 MG/G
OINTMENT TOPICAL ONCE
OUTPATIENT
Start: 2023-03-29 | End: 2023-03-29

## 2023-03-29 RX ORDER — LIDOCAINE HYDROCHLORIDE 40 MG/ML
SOLUTION TOPICAL ONCE
OUTPATIENT
Start: 2023-03-29 | End: 2023-03-29

## 2023-03-29 RX ORDER — BETAMETHASONE DIPROPIONATE 0.05 %
OINTMENT (GRAM) TOPICAL ONCE
OUTPATIENT
Start: 2023-03-29 | End: 2023-03-29

## 2023-03-29 RX ORDER — LIDOCAINE 40 MG/G
CREAM TOPICAL ONCE
OUTPATIENT
Start: 2023-03-29 | End: 2023-03-29

## 2023-03-29 RX ORDER — CLOBETASOL PROPIONATE 0.5 MG/G
OINTMENT TOPICAL ONCE
OUTPATIENT
Start: 2023-03-29 | End: 2023-03-29

## 2023-03-29 RX ORDER — LIDOCAINE 50 MG/G
OINTMENT TOPICAL ONCE
OUTPATIENT
Start: 2023-03-29 | End: 2023-03-29

## 2023-03-29 RX ORDER — LIDOCAINE 40 MG/G
CREAM TOPICAL ONCE
Status: DISCONTINUED | OUTPATIENT
Start: 2023-03-29 | End: 2023-03-30 | Stop reason: HOSPADM

## 2023-03-29 RX ORDER — BACITRACIN ZINC AND POLYMYXIN B SULFATE 500; 1000 [USP'U]/G; [USP'U]/G
OINTMENT TOPICAL ONCE
OUTPATIENT
Start: 2023-03-29 | End: 2023-03-29

## 2023-03-29 NOTE — PROGRESS NOTES
[phone]     Discharge Instructions     Important reminders:     **If you have any signs and symptoms of illness (Cough, fever, congestion, nausea, vomiting, diarrhea, etc.) please call the wound care center prior to your appointment. 1. Increase Protein intake for optimal wound healing  2. No added salt to reduce any swelling  3. If diabetic, maintain good glucose control  4. If you smoke, smoking prohibits wound healing, we ask that you refrain from smoking. 5. When taking antibiotics take the entire prescription as ordered. Do not stop taking until medication is all gone unless otherwise instructed. 6. Exercise as tolerated. 7. Keep weight off wounds and reposition every 2 hours if applicable. 8. If wound(s) is on your lower extremity, elevate legs to the level of the heart or above for 30 minutes 4-5 times a day and/or when sitting. Avoid standing for long periods of time. 9. Do not get wounds wet in bath or shower unless otherwise instructed by your physician. If your wound is on your foot or leg, you may purchase a cast bag. Please ask at the pharmacy. If Vascular testing is ordered, please call [address] [place] [address] [phone]) to schedule. Vascular tests ordered by Wound Care Physicians may take up to 2 hours to complete. Please keep that in mind when scheduling. If Vascular testing is scheduled, please bring supplies to replace your dressing after testing is done. The vascular department does not stock supplies. Wound: Coccyx     With each dressing change, rinse wounds with 0.9% Saline. (May use wound wash or soft contact solution. Both can be purchased at a local drug store). If unable to obtain saline, may use a gentle soap and water. Dressing care: Ana M, Mepilex border or Kerramx Gentle Border- change Monday, Wednesday, & Friday. Turn & reposition every 1-2 hours and as needed for pressure relief and comfort. Keep pressure off of your wound as much as possible.  Eat plenty of

## 2023-03-29 NOTE — PLAN OF CARE
Discharge instructions given. Patient verbalized understanding. Return to [address] [place] [address],[address] in 2 week(s).

## 2023-05-03 ENCOUNTER — HOSPITAL ENCOUNTER (OUTPATIENT)
Dept: WOUND CARE | Age: 73
Discharge: HOME | End: 2023-05-03
Payer: MEDICARE

## 2023-05-03 VITALS
TEMPERATURE: 97.1 F | DIASTOLIC BLOOD PRESSURE: 65 MMHG | RESPIRATION RATE: 16 BRPM | HEART RATE: 87 BPM | SYSTOLIC BLOOD PRESSURE: 113 MMHG

## 2023-05-03 DIAGNOSIS — Z74.09 POOR MOBILITY: Primary | ICD-10-CM

## 2023-05-03 DIAGNOSIS — S31.000A SACRAL WOUND, INITIAL ENCOUNTER: ICD-10-CM

## 2023-05-03 DIAGNOSIS — L89.43 PRESSURE INJURY OF CONTIGUOUS REGION INVOLVING BACK AND BUTTOCK, STAGE 3, UNSPECIFIED LATERALITY (HCC): ICD-10-CM

## 2023-05-03 PROCEDURE — 11042 DBRDMT SUBQ TIS 1ST 20SQCM/<: CPT | Performed by: SURGERY

## 2023-05-03 PROCEDURE — 11042 DBRDMT SUBQ TIS 1ST 20SQCM/<: CPT

## 2023-05-03 RX ORDER — GENTAMICIN SULFATE 1 MG/G
OINTMENT TOPICAL ONCE
OUTPATIENT
Start: 2023-05-03 | End: 2023-05-03

## 2023-05-03 RX ORDER — LIDOCAINE 40 MG/G
CREAM TOPICAL ONCE
Status: DISCONTINUED | OUTPATIENT
Start: 2023-05-03 | End: 2023-05-04 | Stop reason: HOSPADM

## 2023-05-03 RX ORDER — LIDOCAINE 50 MG/G
OINTMENT TOPICAL ONCE
OUTPATIENT
Start: 2023-05-03 | End: 2023-05-03

## 2023-05-03 RX ORDER — LIDOCAINE HYDROCHLORIDE 20 MG/ML
JELLY TOPICAL ONCE
OUTPATIENT
Start: 2023-05-03 | End: 2023-05-03

## 2023-05-03 RX ORDER — GINSENG 100 MG
CAPSULE ORAL ONCE
OUTPATIENT
Start: 2023-05-03 | End: 2023-05-03

## 2023-05-03 RX ORDER — CLOBETASOL PROPIONATE 0.5 MG/G
OINTMENT TOPICAL ONCE
OUTPATIENT
Start: 2023-05-03 | End: 2023-05-03

## 2023-05-03 RX ORDER — BACITRACIN, NEOMYCIN, POLYMYXIN B 400; 3.5; 5 [USP'U]/G; MG/G; [USP'U]/G
OINTMENT TOPICAL ONCE
OUTPATIENT
Start: 2023-05-03 | End: 2023-05-03

## 2023-05-03 RX ORDER — LIDOCAINE HYDROCHLORIDE 40 MG/ML
SOLUTION TOPICAL ONCE
OUTPATIENT
Start: 2023-05-03 | End: 2023-05-03

## 2023-05-03 RX ORDER — BETAMETHASONE DIPROPIONATE 0.05 %
OINTMENT (GRAM) TOPICAL ONCE
OUTPATIENT
Start: 2023-05-03 | End: 2023-05-03

## 2023-05-03 RX ORDER — LIDOCAINE 40 MG/G
CREAM TOPICAL ONCE
OUTPATIENT
Start: 2023-05-03 | End: 2023-05-03

## 2023-05-03 RX ORDER — BACITRACIN ZINC AND POLYMYXIN B SULFATE 500; 1000 [USP'U]/G; [USP'U]/G
OINTMENT TOPICAL ONCE
OUTPATIENT
Start: 2023-05-03 | End: 2023-05-03

## 2023-05-03 NOTE — DISCHARGE INSTRUCTIONS
[address], [address]  Telephone: [phone]     Discharge Instructions     Important reminders:     **If you have any signs and symptoms of illness (Cough, fever, congestion, nausea, vomiting, diarrhea, etc.) please call the wound care center prior to your appointment. 1. Increase Protein intake for optimal wound healing  2. No added salt to reduce any swelling  3. If diabetic, maintain good glucose control  4. If you smoke, smoking prohibits wound healing, we ask that you refrain from smoking. 5. When taking antibiotics take the entire prescription as ordered. Do not stop taking until medication is all gone unless otherwise instructed. 6. Exercise as tolerated. 7. Keep weight off wounds and reposition every 2 hours if applicable. 8. If wound(s) is on your lower extremity, elevate legs to the level of the heart or above for 30 minutes 4-5 times a day and/or when sitting. Avoid standing for long periods of time. 9. Do not get wounds wet in bath or shower unless otherwise instructed by your physician. If your wound is on your foot or leg, you may purchase a cast bag. Please ask at the pharmacy. If Vascular testing is ordered, please call [address] [place] [address] [phone]) to schedule. Vascular tests ordered by Wound Care Physicians may take up to 2 hours to complete. Please keep that in mind when scheduling. If Vascular testing is scheduled, please bring supplies to replace your dressing after testing is done. The vascular department does not stock supplies. Wound: Coccyx     With each dressing change, rinse wounds with 0.9% Saline. (May use wound wash or soft contact solution. Both can be purchased at a local drug store). If unable to obtain saline, may use a gentle soap and water. Dressing care: Ana M, Mepilex border or Kerramx Gentle Border- change Monday, Wednesday, & Friday.  Turn & reposition every 1-2 hours and as

## 2023-05-03 NOTE — PROGRESS NOTES
[address] Progress and Procedure Note    Guillermo Escobar  AGE: 74 y.o. GENDER: female    : 1950  TODAY'S DATE: 5/3/2023    Chief Complaint   Patient presents with    Wound Check        History of Present Illness     Guillermo Escobar is a 74 y.o. female who presents today for wound evaluation. History of Wound: pressure and diabetic wound located on the sacral decubitus ulcer stage 3 . Wound Pain:  mild  Severity: 2/10   Wound Type: pressure and diabetic  Modifying Factors:  diabetes, poor glucose control, decreased mobility, shear force, and malnutrition  Associated Signs/Symptoms:  pain    Past Medical History:   Diagnosis Date    Arthritis     back    Cataracts, bilateral     CHF (congestive heart failure) ([address]     Diabetes mellitus ([address]     Glaucoma     Hyperlipidemia     Hypertension     Poor mobility 2022    Pressure 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, stage 3 ([address] 2022    Similar wound 2 years ago. Thyroid disease      Past Surgical History:   Procedure Laterality Date    CARPAL TUNNEL RELEASE      bilateral    CHOLECYSTECTOMY      COLONOSCOPY      ELBOW SURGERY      ENDOSCOPY, COLON, DIAGNOSTIC      EYE SURGERY      FOOT SURGERY      SHOULDER SURGERY       Current Outpatient Medications   Medication Sig Dispense Refill    gentamicin (GARAMYCIN) 0.1 % cream Apply topically daily. 30 g 1    cephALEXin (KEFLEX) 500 MG capsule Take 1 capsule by mouth 4 times daily 40 capsule 0    miconazole (MICOTIN) 2 % powder Apply topically 2 times daily to abdominal folds. 45 g 1    traMADol (ULTRAM) 50 MG tablet Take 50 mg by mouth 3 times daily as needed for Pain.       DULoxetine (CYMBALTA) 60 MG extended release capsule Take 1 capsule by mouth daily 30 capsule 3    Travoprost, BAK Free, (TRAVATAN Z) 0.004 % SOLN ophthalmic solution Place 1 drop into both eyes nightly      DICLOFENAC PO Take 100 mg by mouth 2 times daily      acetaminophen

## 2023-05-03 NOTE — PLAN OF CARE
Discharge instructions given. Patient verbalized understanding. Return to [facility] in 2 week(s).

## 2023-05-17 ENCOUNTER — HOSPITAL ENCOUNTER (OUTPATIENT)
Dept: WOUND CARE | Age: 73
Discharge: HOME | End: 2023-05-17
Payer: MEDICARE

## 2023-05-17 VITALS
HEART RATE: 83 BPM | SYSTOLIC BLOOD PRESSURE: 105 MMHG | RESPIRATION RATE: 16 BRPM | TEMPERATURE: 96.8 F | DIASTOLIC BLOOD PRESSURE: 69 MMHG

## 2023-05-17 DIAGNOSIS — L89.43 PRESSURE INJURY OF CONTIGUOUS REGION INVOLVING BACK AND BUTTOCK, STAGE 3, UNSPECIFIED LATERALITY (HCC): ICD-10-CM

## 2023-05-17 DIAGNOSIS — Z74.09 POOR MOBILITY: Primary | ICD-10-CM

## 2023-05-17 DIAGNOSIS — S31.000A SACRAL WOUND, INITIAL ENCOUNTER: ICD-10-CM

## 2023-05-17 PROCEDURE — 11042 DBRDMT SUBQ TIS 1ST 20SQCM/<: CPT | Performed by: SURGERY

## 2023-05-17 PROCEDURE — 11042 DBRDMT SUBQ TIS 1ST 20SQCM/<: CPT

## 2023-05-17 RX ORDER — GINSENG 100 MG
CAPSULE ORAL ONCE
OUTPATIENT
Start: 2023-05-17 | End: 2023-05-17

## 2023-05-17 RX ORDER — CLOBETASOL PROPIONATE 0.5 MG/G
OINTMENT TOPICAL ONCE
OUTPATIENT
Start: 2023-05-17 | End: 2023-05-17

## 2023-05-17 RX ORDER — BACITRACIN, NEOMYCIN, POLYMYXIN B 400; 3.5; 5 [USP'U]/G; MG/G; [USP'U]/G
OINTMENT TOPICAL ONCE
OUTPATIENT
Start: 2023-05-17 | End: 2023-05-17

## 2023-05-17 RX ORDER — LIDOCAINE 50 MG/G
OINTMENT TOPICAL ONCE
OUTPATIENT
Start: 2023-05-17 | End: 2023-05-17

## 2023-05-17 RX ORDER — LIDOCAINE 40 MG/G
CREAM TOPICAL ONCE
Status: DISCONTINUED | OUTPATIENT
Start: 2023-05-17 | End: 2023-05-18 | Stop reason: HOSPADM

## 2023-05-17 RX ORDER — LIDOCAINE HYDROCHLORIDE 20 MG/ML
JELLY TOPICAL ONCE
OUTPATIENT
Start: 2023-05-17 | End: 2023-05-17

## 2023-05-17 RX ORDER — BACITRACIN ZINC AND POLYMYXIN B SULFATE 500; 1000 [USP'U]/G; [USP'U]/G
OINTMENT TOPICAL ONCE
OUTPATIENT
Start: 2023-05-17 | End: 2023-05-17

## 2023-05-17 RX ORDER — LIDOCAINE HYDROCHLORIDE 40 MG/ML
SOLUTION TOPICAL ONCE
OUTPATIENT
Start: 2023-05-17 | End: 2023-05-17

## 2023-05-17 RX ORDER — GENTAMICIN SULFATE 1 MG/G
OINTMENT TOPICAL ONCE
OUTPATIENT
Start: 2023-05-17 | End: 2023-05-17

## 2023-05-17 RX ORDER — LIDOCAINE 40 MG/G
CREAM TOPICAL ONCE
OUTPATIENT
Start: 2023-05-17 | End: 2023-05-17

## 2023-05-17 RX ORDER — BETAMETHASONE DIPROPIONATE 0.05 %
OINTMENT (GRAM) TOPICAL ONCE
OUTPATIENT
Start: 2023-05-17 | End: 2023-05-17

## 2023-05-17 ASSESSMENT — PAIN DESCRIPTION - LOCATION: LOCATION: COCCYX

## 2023-05-17 ASSESSMENT — PAIN - FUNCTIONAL ASSESSMENT: PAIN_FUNCTIONAL_ASSESSMENT: ACTIVITIES ARE NOT PREVENTED

## 2023-05-17 ASSESSMENT — PAIN DESCRIPTION - ONSET: ONSET: ON-GOING

## 2023-05-17 ASSESSMENT — PAIN DESCRIPTION - PAIN TYPE: TYPE: CHRONIC PAIN

## 2023-05-17 ASSESSMENT — PAIN DESCRIPTION - FREQUENCY: FREQUENCY: INTERMITTENT

## 2023-05-17 ASSESSMENT — PAIN DESCRIPTION - DESCRIPTORS: DESCRIPTORS: ACHING

## 2023-05-17 ASSESSMENT — PAIN SCALES - GENERAL: PAINLEVEL_OUTOF10: 4

## 2023-05-17 NOTE — PROGRESS NOTES
[address] Progress and Procedure Note    Linette Madrigal  AGE: 74 y.o. GENDER: female    : 1950  TODAY'S DATE: 2023    Chief Complaint   Patient presents with    Wound Check     BUTTOCKS        History of Present Illness     Linette Madrigal is a 74 y.o. female who presents today for wound evaluation. History of Wound: pressure and diabetic wound located on the sacral decubitus ulcer stage 3 . Wound Pain:  mild  Severity: 2/10   Wound Type: pressure and diabetic  Modifying Factors:  diabetes, poor glucose control, decreased mobility, shear force, and malnutrition  Associated Signs/Symptoms:  pain    Past Medical History:   Diagnosis Date    Arthritis     back    Cataracts, bilateral     CHF (congestive heart failure) ([address]     Diabetes mellitus ([address]     Glaucoma     Hyperlipidemia     Hypertension     Poor mobility 2022    Pressure 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, stage 3 ([address] 2022    Similar wound 2 years ago. Thyroid disease      Past Surgical History:   Procedure Laterality Date    CARPAL TUNNEL RELEASE      bilateral    CHOLECYSTECTOMY      COLONOSCOPY      ELBOW SURGERY      ENDOSCOPY, COLON, DIAGNOSTIC      EYE SURGERY      FOOT SURGERY      SHOULDER SURGERY       Current Outpatient Medications   Medication Sig Dispense Refill    gentamicin (GARAMYCIN) 0.1 % cream Apply topically daily. 30 g 1    cephALEXin (KEFLEX) 500 MG capsule Take 1 capsule by mouth 4 times daily 40 capsule 0    miconazole (MICOTIN) 2 % powder Apply topically 2 times daily to abdominal folds. 45 g 1    traMADol (ULTRAM) 50 MG tablet Take 50 mg by mouth 3 times daily as needed for Pain.       DULoxetine (CYMBALTA) 60 MG extended release capsule Take 1 capsule by mouth daily 30 capsule 3    Travoprost, BAK Free, (TRAVATAN Z) 0.004 % SOLN ophthalmic solution Place 1 drop into both eyes nightly      DICLOFENAC PO Take 100 mg by mouth 2 times daily

## 2023-05-17 NOTE — PLAN OF CARE
Discharge instructions given. Patient verbalized understanding. Return to [facility] in 2 week(s).

## 2023-05-31 ENCOUNTER — HOSPITAL ENCOUNTER (OUTPATIENT)
Dept: WOUND CARE | Age: 73
Discharge: HOME | End: 2023-05-31
Payer: MEDICARE

## 2023-05-31 VITALS — HEART RATE: 78 BPM | TEMPERATURE: 96.6 F | SYSTOLIC BLOOD PRESSURE: 133 MMHG | DIASTOLIC BLOOD PRESSURE: 82 MMHG

## 2023-05-31 DIAGNOSIS — S31.000A SACRAL WOUND, INITIAL ENCOUNTER: ICD-10-CM

## 2023-05-31 DIAGNOSIS — L98.429 STAGE 3 SKIN ULCER OF SACRAL REGION (HCC): ICD-10-CM

## 2023-05-31 DIAGNOSIS — L89.43 PRESSURE INJURY OF CONTIGUOUS REGION INVOLVING BACK AND BUTTOCK, STAGE 3, UNSPECIFIED LATERALITY (HCC): ICD-10-CM

## 2023-05-31 DIAGNOSIS — Z74.09 POOR MOBILITY: Primary | ICD-10-CM

## 2023-05-31 PROCEDURE — 11042 DBRDMT SUBQ TIS 1ST 20SQCM/<: CPT | Performed by: SURGERY

## 2023-05-31 PROCEDURE — 11042 DBRDMT SUBQ TIS 1ST 20SQCM/<: CPT

## 2023-05-31 RX ORDER — BACITRACIN ZINC AND POLYMYXIN B SULFATE 500; 1000 [USP'U]/G; [USP'U]/G
OINTMENT TOPICAL ONCE
OUTPATIENT
Start: 2023-05-31 | End: 2023-05-31

## 2023-05-31 RX ORDER — GINSENG 100 MG
CAPSULE ORAL ONCE
OUTPATIENT
Start: 2023-05-31 | End: 2023-05-31

## 2023-05-31 RX ORDER — BACITRACIN, NEOMYCIN, POLYMYXIN B 400; 3.5; 5 [USP'U]/G; MG/G; [USP'U]/G
OINTMENT TOPICAL ONCE
OUTPATIENT
Start: 2023-05-31 | End: 2023-05-31

## 2023-05-31 RX ORDER — LIDOCAINE 40 MG/G
CREAM TOPICAL ONCE
OUTPATIENT
Start: 2023-05-31 | End: 2023-05-31

## 2023-05-31 RX ORDER — GENTAMICIN SULFATE 1 MG/G
OINTMENT TOPICAL ONCE
OUTPATIENT
Start: 2023-05-31 | End: 2023-05-31

## 2023-05-31 RX ORDER — BETAMETHASONE DIPROPIONATE 0.05 %
OINTMENT (GRAM) TOPICAL ONCE
OUTPATIENT
Start: 2023-05-31 | End: 2023-05-31

## 2023-05-31 RX ORDER — LIDOCAINE 50 MG/G
OINTMENT TOPICAL ONCE
OUTPATIENT
Start: 2023-05-31 | End: 2023-05-31

## 2023-05-31 RX ORDER — LIDOCAINE HYDROCHLORIDE 20 MG/ML
JELLY TOPICAL ONCE
OUTPATIENT
Start: 2023-05-31 | End: 2023-05-31

## 2023-05-31 RX ORDER — LIDOCAINE 40 MG/G
CREAM TOPICAL ONCE
Status: DISCONTINUED | OUTPATIENT
Start: 2023-05-31 | End: 2023-06-01 | Stop reason: HOSPADM

## 2023-05-31 RX ORDER — LIDOCAINE HYDROCHLORIDE 40 MG/ML
SOLUTION TOPICAL ONCE
OUTPATIENT
Start: 2023-05-31 | End: 2023-05-31

## 2023-05-31 RX ORDER — CLOBETASOL PROPIONATE 0.5 MG/G
OINTMENT TOPICAL ONCE
OUTPATIENT
Start: 2023-05-31 | End: 2023-05-31

## 2023-05-31 ASSESSMENT — PAIN SCALES - GENERAL: PAINLEVEL_OUTOF10: 4

## 2023-05-31 ASSESSMENT — PAIN DESCRIPTION - DESCRIPTORS: DESCRIPTORS: ACHING

## 2023-05-31 ASSESSMENT — PAIN DESCRIPTION - ONSET: ONSET: ON-GOING

## 2023-05-31 ASSESSMENT — PAIN DESCRIPTION - FREQUENCY: FREQUENCY: CONTINUOUS

## 2023-05-31 ASSESSMENT — PAIN DESCRIPTION - PAIN TYPE: TYPE: CHRONIC PAIN

## 2023-05-31 ASSESSMENT — PAIN - FUNCTIONAL ASSESSMENT: PAIN_FUNCTIONAL_ASSESSMENT: PREVENTS OR INTERFERES SOME ACTIVE ACTIVITIES AND ADLS

## 2023-05-31 ASSESSMENT — PAIN DESCRIPTION - LOCATION: LOCATION: BUTTOCKS

## 2023-05-31 NOTE — PROGRESS NOTES
Dressing/Treatment Antibacterial ointment;Triad hydro/zinc oxide-based hydrophilic paste;Dry dressing 09/28/22 1010   Wound Length (cm) 2.7 cm 05/31/23 0906   Wound Width (cm) 0.8 cm 05/31/23 0906   Wound Depth (cm) 2.6 cm 05/31/23 0906   Wound Surface Area (cm^2) 2.16 cm^2 05/31/23 0906   Change in Wound Size % (l*w) 89.09 05/31/23 0906   Wound Volume (cm^3) 5.616 cm^3 05/31/23 0906   Wound Healing % 91 05/31/23 0906   Post-Procedure Length (cm) 2.7 cm 05/31/23 0921   Post-Procedure Width (cm) 0.8 cm 05/31/23 0921   Post-Procedure Depth (cm) 2.6 cm 05/31/23 0921   Post-Procedure Surface Area (cm^2) 2.16 cm^2 05/31/23 0921   Post-Procedure Volume (cm^3) 5.616 cm^3 05/31/23 0921   Wound Assessment Bleeding 05/31/23 0921   Drainage Amount Moderate 05/31/23 0921   Drainage Description Yellow;Brown 05/31/23 0906   Odor Moderate 05/31/23 0906   Saba-wound Assessment Intact 05/31/23 0906   Margins Defined edges 05/31/23 0906   Wound Thickness Description not for Pressure Injury Full thickness 05/31/23 0906   Number of days: 348       Percent of Wound(s)/Ulcer(s) Debrided: 100 %    Total Surface Area Debrided:  2.16 sq cm     Bleeding: Minimal    Hemostasis Achieved: Pressure    Pre Procedural Pain:  2/10     Post Procedural Pain: 2/10     Response to treatment: Well tolerated by patient    Assessment:     Wound looks Stable  Will order CT to rule out osteomyelitis    Patient tolerated procedure well and was given proper instruction. The nature of the patient's condition was explained in depth. The patient was informed that their compliance to the treatment plan is paramount to successful healing and prevention of further ulceration and/or infection     Plan:     Treatment Plan:       Treatment Note please see attached Discharge Instructions    Written patient dismissal instructions given to patient and signed by patient or POA.          Discharge [address]  [address] [place] [address]

## 2023-05-31 NOTE — PLAN OF CARE
Discharge instructions given. Patient verbalized understanding. Return to [facility] in 1 week(s).

## 2023-05-31 NOTE — DISCHARGE INSTRUCTIONS
[address], [address]  Telephone: [phone]     Discharge Instructions     Important reminders:     **If you have any signs and symptoms of illness (Cough, fever, congestion, nausea, vomiting, diarrhea, etc.) please call the wound care center prior to your appointment. 1. Increase Protein intake for optimal wound healing  2. No added salt to reduce any swelling  3. If diabetic, maintain good glucose control  4. If you smoke, smoking prohibits wound healing, we ask that you refrain from smoking. 5. When taking antibiotics take the entire prescription as ordered. Do not stop taking until medication is all gone unless otherwise instructed. 6. Exercise as tolerated. 7. Keep weight off wounds and reposition every 2 hours if applicable. 8. If wound(s) is on your lower extremity, elevate legs to the level of the heart or above for 30 minutes 4-5 times a day and/or when sitting. Avoid standing for long periods of time. 9. Do not get wounds wet in bath or shower unless otherwise instructed by your physician. If your wound is on your foot or leg, you may purchase a cast bag. Please ask at the pharmacy. If Vascular testing is ordered, please call [address] [place] [address] [phone]) to schedule. Vascular tests ordered by Wound Care Physicians may take up to 2 hours to complete. Please keep that in mind when scheduling. If Vascular testing is scheduled, please bring supplies to replace your dressing after testing is done. The vascular department does not stock supplies. Wound: Coccyx     With each dressing change, rinse wounds with 0.9% Saline. (May use wound wash or soft contact solution. Both can be purchased at a local drug store). If unable to obtain saline, may use a gentle soap and water. Dressing care: Ana M, Mepilex border or Kerramx Gentle Border- change Monday, Wednesday, & Friday.  Turn & reposition every 1-2 hours and as

## 2023-06-19 ENCOUNTER — HOSPITAL ENCOUNTER (OUTPATIENT)
Dept: CT IMAGING | Age: 73
Discharge: HOME | End: 2023-06-19
Attending: SURGERY
Payer: MEDICARE

## 2023-06-19 DIAGNOSIS — L98.429 STAGE 3 SKIN ULCER OF SACRAL REGION (HCC): ICD-10-CM

## 2023-06-19 LAB
CREAT SERPL-MCNC: 0.7 MG/DL (ref 0.6–1.2)
GFR SERPLBLD CREATININE-BSD FMLA CKD-EPI: >60 ML/MIN/{1.73_M2}

## 2023-06-19 PROCEDURE — 82565 ASSAY OF CREATININE: CPT

## 2023-06-19 PROCEDURE — 72193 CT PELVIS W/DYE: CPT

## 2023-06-19 PROCEDURE — 6360000004 HC RX CONTRAST MEDICATION: Performed by: SURGERY

## 2023-06-19 PROCEDURE — 36415 COLL VENOUS BLD VENIPUNCTURE: CPT

## 2023-06-19 RX ADMIN — IOPAMIDOL 75 ML: 755 INJECTION, SOLUTION INTRAVENOUS at 09:19

## 2023-06-21 ENCOUNTER — HOSPITAL ENCOUNTER (OUTPATIENT)
Dept: WOUND CARE | Age: 73
Discharge: HOME | End: 2023-06-21
Payer: MEDICARE

## 2023-06-21 VITALS
DIASTOLIC BLOOD PRESSURE: 82 MMHG | TEMPERATURE: 96.5 F | RESPIRATION RATE: 16 BRPM | HEART RATE: 80 BPM | SYSTOLIC BLOOD PRESSURE: 105 MMHG

## 2023-06-21 DIAGNOSIS — S31.000A SACRAL WOUND, INITIAL ENCOUNTER: ICD-10-CM

## 2023-06-21 DIAGNOSIS — L89.43 PRESSURE INJURY OF CONTIGUOUS REGION INVOLVING BACK AND BUTTOCK, STAGE 3, UNSPECIFIED LATERALITY (HCC): ICD-10-CM

## 2023-06-21 DIAGNOSIS — Z74.09 POOR MOBILITY: Primary | ICD-10-CM

## 2023-06-21 PROCEDURE — 11042 DBRDMT SUBQ TIS 1ST 20SQCM/<: CPT

## 2023-06-21 PROCEDURE — 11042 DBRDMT SUBQ TIS 1ST 20SQCM/<: CPT | Performed by: SURGERY

## 2023-06-21 RX ORDER — CLOBETASOL PROPIONATE 0.5 MG/G
OINTMENT TOPICAL ONCE
OUTPATIENT
Start: 2023-06-21 | End: 2023-06-21

## 2023-06-21 RX ORDER — LIDOCAINE HYDROCHLORIDE 20 MG/ML
JELLY TOPICAL ONCE
OUTPATIENT
Start: 2023-06-21 | End: 2023-06-21

## 2023-06-21 RX ORDER — BACITRACIN ZINC AND POLYMYXIN B SULFATE 500; 1000 [USP'U]/G; [USP'U]/G
OINTMENT TOPICAL ONCE
OUTPATIENT
Start: 2023-06-21 | End: 2023-06-21

## 2023-06-21 RX ORDER — GENTAMICIN SULFATE 1 MG/G
OINTMENT TOPICAL ONCE
OUTPATIENT
Start: 2023-06-21 | End: 2023-06-21

## 2023-06-21 RX ORDER — LIDOCAINE HYDROCHLORIDE 40 MG/ML
SOLUTION TOPICAL ONCE
OUTPATIENT
Start: 2023-06-21 | End: 2023-06-21

## 2023-06-21 RX ORDER — LIDOCAINE 40 MG/G
CREAM TOPICAL ONCE
OUTPATIENT
Start: 2023-06-21 | End: 2023-06-21

## 2023-06-21 RX ORDER — LIDOCAINE 50 MG/G
OINTMENT TOPICAL ONCE
OUTPATIENT
Start: 2023-06-21 | End: 2023-06-21

## 2023-06-21 RX ORDER — GINSENG 100 MG
CAPSULE ORAL ONCE
OUTPATIENT
Start: 2023-06-21 | End: 2023-06-21

## 2023-06-21 RX ORDER — LIDOCAINE 40 MG/G
CREAM TOPICAL ONCE
Status: DISCONTINUED | OUTPATIENT
Start: 2023-06-21 | End: 2023-06-22 | Stop reason: HOSPADM

## 2023-06-21 RX ORDER — BETAMETHASONE DIPROPIONATE 0.05 %
OINTMENT (GRAM) TOPICAL ONCE
OUTPATIENT
Start: 2023-06-21 | End: 2023-06-21

## 2023-06-21 RX ORDER — IBUPROFEN 200 MG
TABLET ORAL ONCE
OUTPATIENT
Start: 2023-06-21 | End: 2023-06-21

## 2023-06-21 ASSESSMENT — PAIN - FUNCTIONAL ASSESSMENT: PAIN_FUNCTIONAL_ASSESSMENT: PREVENTS OR INTERFERES SOME ACTIVE ACTIVITIES AND ADLS

## 2023-06-21 ASSESSMENT — PAIN DESCRIPTION - FREQUENCY: FREQUENCY: CONTINUOUS

## 2023-06-21 ASSESSMENT — PAIN DESCRIPTION - LOCATION: LOCATION: COCCYX

## 2023-06-21 ASSESSMENT — PAIN SCALES - GENERAL: PAINLEVEL_OUTOF10: 5

## 2023-06-21 ASSESSMENT — PAIN DESCRIPTION - PAIN TYPE: TYPE: CHRONIC PAIN

## 2023-06-21 ASSESSMENT — PAIN DESCRIPTION - ONSET: ONSET: ON-GOING

## 2023-06-21 ASSESSMENT — PAIN DESCRIPTION - DESCRIPTORS: DESCRIPTORS: ACHING

## 2023-06-21 NOTE — DISCHARGE INSTRUCTIONS
[facility], [address] [place] [address] Road  Telephone: [phone]     Discharge Instructions     Important reminders:     **If you have any signs and symptoms of illness (Cough, fever, congestion, nausea, vomiting, diarrhea, etc.) please call the wound care center prior to your appointment. 1. Increase Protein intake for optimal wound healing  2. No added salt to reduce any swelling  3. If diabetic, maintain good glucose control  4. If you smoke, smoking prohibits wound healing, we ask that you refrain from smoking. 5. When taking antibiotics take the entire prescription as ordered. Do not stop taking until medication is all gone unless otherwise instructed. 6. Exercise as tolerated. 7. Keep weight off wounds and reposition every 2 hours if applicable. 8. If wound(s) is on your lower extremity, elevate legs to the level of the heart or above for 30 minutes 4-5 times a day and/or when sitting. Avoid standing for long periods of time. 9. Do not get wounds wet in bath or shower unless otherwise instructed by your physician. If your wound is on your foot or leg, you may purchase a cast bag. Please ask at the pharmacy. If Vascular testing is ordered, please call [address] [place] [address] [phone]) to schedule. Vascular tests ordered by Wound Care Physicians may take up to 2 hours to complete. Please keep that in mind when scheduling. If Vascular testing is scheduled, please bring supplies to replace your dressing after testing is done. The vascular department does not stock supplies. Wound: Coccyx     With each dressing change, rinse wounds with 0.9% Saline. (May use wound wash or soft contact solution. Both can be purchased at a local drug store). If unable to obtain saline, may use a gentle soap and water. Dressing care: Ana M, Mepilex border or Kerramx Gentle Border- change Monday, Wednesday, & Friday.  Turn & reposition every 1-2 hours and as

## 2023-06-21 NOTE — PROGRESS NOTES
POA.         Discharge Instructions         [place]  [address] [place] [address], [address]  Telephone: [phone]     Discharge Instructions     Important reminders:     **If you have any signs and symptoms of illness (Cough, fever, congestion, nausea, vomiting, diarrhea, etc.) please call the wound care center prior to your appointment. 1. Increase Protein intake for optimal wound healing  2. No added salt to reduce any swelling  3. If diabetic, maintain good glucose control  4. If you smoke, smoking prohibits wound healing, we ask that you refrain from smoking. 5. When taking antibiotics take the entire prescription as ordered. Do not stop taking until medication is all gone unless otherwise instructed. 6. Exercise as tolerated. 7. Keep weight off wounds and reposition every 2 hours if applicable. 8. If wound(s) is on your lower extremity, elevate legs to the level of the heart or above for 30 minutes 4-5 times a day and/or when sitting. Avoid standing for long periods of time. 9. Do not get wounds wet in bath or shower unless otherwise instructed by your physician. If your wound is on your foot or leg, you may purchase a cast bag. Please ask at the pharmacy. If Vascular testing is ordered, please call [address] [place] [address] [phone]) to schedule. Vascular tests ordered by Wound Care Physicians may take up to 2 hours to complete. Please keep that in mind when scheduling. If Vascular testing is scheduled, please bring supplies to replace your dressing after testing is done. The vascular department does not stock supplies. Wound: Coccyx     With each dressing change, rinse wounds with 0.9% Saline. (May use wound wash or soft contact solution. Both can be purchased at a local drug store). If unable to obtain saline, may use a gentle soap and water. Dressing care: Ana M, Mepilex border or Kerramx Gentle Border- change Monday, Wednesday, & Friday.

## 2023-06-21 NOTE — PLAN OF CARE
Discharge instructions given. Patient verbalized understanding. Return to [facility] in 2 week(s).

## 2023-07-05 ENCOUNTER — HOSPITAL ENCOUNTER (OUTPATIENT)
Dept: WOUND CARE | Age: 73
Discharge: HOME | End: 2023-07-05
Payer: MEDICARE

## 2023-07-05 VITALS — DIASTOLIC BLOOD PRESSURE: 73 MMHG | RESPIRATION RATE: 15 BRPM | HEART RATE: 78 BPM | SYSTOLIC BLOOD PRESSURE: 130 MMHG

## 2023-07-05 DIAGNOSIS — S31.000A SACRAL WOUND, INITIAL ENCOUNTER: ICD-10-CM

## 2023-07-05 DIAGNOSIS — Z74.09 POOR MOBILITY: Primary | ICD-10-CM

## 2023-07-05 DIAGNOSIS — L89.43 PRESSURE INJURY OF CONTIGUOUS REGION INVOLVING BACK AND BUTTOCK, STAGE 3, UNSPECIFIED LATERALITY (HCC): ICD-10-CM

## 2023-07-05 PROCEDURE — 11042 DBRDMT SUBQ TIS 1ST 20SQCM/<: CPT

## 2023-07-05 PROCEDURE — 11042 DBRDMT SUBQ TIS 1ST 20SQCM/<: CPT | Performed by: SURGERY

## 2023-07-05 RX ORDER — GINSENG 100 MG
CAPSULE ORAL ONCE
OUTPATIENT
Start: 2023-07-05 | End: 2023-07-05

## 2023-07-05 RX ORDER — LIDOCAINE 40 MG/G
CREAM TOPICAL ONCE
Status: DISCONTINUED | OUTPATIENT
Start: 2023-07-05 | End: 2023-07-06 | Stop reason: HOSPADM

## 2023-07-05 RX ORDER — BACITRACIN ZINC AND POLYMYXIN B SULFATE 500; 1000 [USP'U]/G; [USP'U]/G
OINTMENT TOPICAL ONCE
OUTPATIENT
Start: 2023-07-05 | End: 2023-07-05

## 2023-07-05 RX ORDER — CLOBETASOL PROPIONATE 0.5 MG/G
OINTMENT TOPICAL ONCE
OUTPATIENT
Start: 2023-07-05 | End: 2023-07-05

## 2023-07-05 RX ORDER — LIDOCAINE HYDROCHLORIDE 20 MG/ML
JELLY TOPICAL ONCE
OUTPATIENT
Start: 2023-07-05 | End: 2023-07-05

## 2023-07-05 RX ORDER — LIDOCAINE HYDROCHLORIDE 40 MG/ML
SOLUTION TOPICAL ONCE
OUTPATIENT
Start: 2023-07-05 | End: 2023-07-05

## 2023-07-05 RX ORDER — LIDOCAINE 50 MG/G
OINTMENT TOPICAL ONCE
OUTPATIENT
Start: 2023-07-05 | End: 2023-07-05

## 2023-07-05 RX ORDER — IBUPROFEN 200 MG
TABLET ORAL ONCE
OUTPATIENT
Start: 2023-07-05 | End: 2023-07-05

## 2023-07-05 RX ORDER — LIDOCAINE 40 MG/G
CREAM TOPICAL ONCE
OUTPATIENT
Start: 2023-07-05 | End: 2023-07-05

## 2023-07-05 RX ORDER — GENTAMICIN SULFATE 1 MG/G
OINTMENT TOPICAL ONCE
OUTPATIENT
Start: 2023-07-05 | End: 2023-07-05

## 2023-07-05 RX ORDER — BETAMETHASONE DIPROPIONATE 0.05 %
OINTMENT (GRAM) TOPICAL ONCE
OUTPATIENT
Start: 2023-07-05 | End: 2023-07-05

## 2023-07-05 NOTE — DISCHARGE INSTRUCTIONS
[address], [address] [facility]  Telephone: [phone]     Discharge Instructions     Important reminders:     **If you have any signs and symptoms of illness (Cough, fever, congestion, nausea, vomiting, diarrhea, etc.) please call the wound care center prior to your appointment. 1. Increase Protein intake for optimal wound healing  2. No added salt to reduce any swelling  3. If diabetic, maintain good glucose control  4. If you smoke, smoking prohibits wound healing, we ask that you refrain from smoking. 5. When taking antibiotics take the entire prescription as ordered. Do not stop taking until medication is all gone unless otherwise instructed. 6. Exercise as tolerated. 7. Keep weight off wounds and reposition every 2 hours if applicable. 8. If wound(s) is on your lower extremity, elevate legs to the level of the heart or above for 30 minutes 4-5 times a day and/or when sitting. Avoid standing for long periods of time. 9. Do not get wounds wet in bath or shower unless otherwise instructed by your physician. If your wound is on your foot or leg, you may purchase a cast bag. Please ask at the pharmacy. If Vascular testing is ordered, please call [address] [place] [address] [phone]) to schedule. Vascular tests ordered by Wound Care Physicians may take up to 2 hours to complete. Please keep that in mind when scheduling. If Vascular testing is scheduled, please bring supplies to replace your dressing after testing is done. The vascular department does not stock supplies. Wound: Coccyx     With each dressing change, rinse wounds with 0.9% Saline. (May use wound wash or soft contact solution. Both can be purchased at a local drug store). If unable to obtain saline, may use a gentle soap and water. Dressing care: Ana M, Mepilex border or Kerramx Gentle Border- change Monday, Wednesday, & Friday.  Turn & reposition every 1-2 hours and as

## 2023-07-05 NOTE — PLAN OF CARE
Discharge instructions given. Patient verbalized understanding. Return to [facility] in 2 week(s).

## 2023-07-05 NOTE — PROGRESS NOTES
[address] [facility] [address],Third Floor Progress and Procedure Note    Dayton Zafar  AGE: 74 y.o. GENDER: female    : 1950  TODAY'S DATE: 2023    Chief Complaint   Patient presents with    Wound Check     Follow up coccyx wound        History of Present Illness     Dayton Zafar is a 74 y.o. female who presents today for wound evaluation. History of Wound: pressure and diabetic wound located on the sacral decubitus ulcer stage 4 . 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 Thyroid disease      Past Surgical History:   Procedure Laterality Date    CARPAL TUNNEL RELEASE      bilateral    CHOLECYSTECTOMY      COLONOSCOPY      ELBOW SURGERY      ENDOSCOPY, COLON, DIAGNOSTIC      EYE SURGERY      FOOT SURGERY      SHOULDER SURGERY       Current Outpatient Medications   Medication Sig Dispense Refill    gentamicin (GARAMYCIN) 0.1 % cream Apply topically daily. 30 g 1    cephALEXin (KEFLEX) 500 MG capsule Take 1 capsule by mouth 4 times daily 40 capsule 0    miconazole (MICOTIN) 2 % powder Apply topically 2 times daily to abdominal folds. 45 g 1    traMADol (ULTRAM) 50 MG tablet Take 50 mg by mouth 3 times daily as needed for Pain.       DULoxetine (CYMBALTA) 60 MG extended release capsule Take 1 capsule by mouth daily 30 capsule 3    Travoprost, BAK Free, (TRAVATAN Z) 0.004 % SOLN ophthalmic solution Place 1 drop into both eyes nightly      DICLOFENAC PO Take 100 mg

## 2023-07-19 ENCOUNTER — HOSPITAL ENCOUNTER (OUTPATIENT)
Dept: WOUND CARE | Age: 73
Discharge: HOME | End: 2023-07-19
Payer: MEDICARE

## 2023-07-19 VITALS — SYSTOLIC BLOOD PRESSURE: 149 MMHG | DIASTOLIC BLOOD PRESSURE: 86 MMHG | RESPIRATION RATE: 15 BRPM | HEART RATE: 84 BPM

## 2023-07-19 DIAGNOSIS — Z74.09 POOR MOBILITY: Primary | ICD-10-CM

## 2023-07-19 DIAGNOSIS — L89.43 PRESSURE INJURY OF CONTIGUOUS REGION INVOLVING BACK AND BUTTOCK, STAGE 3, UNSPECIFIED LATERALITY (HCC): ICD-10-CM

## 2023-07-19 DIAGNOSIS — S31.000A SACRAL WOUND, INITIAL ENCOUNTER: ICD-10-CM

## 2023-07-19 PROCEDURE — 11042 DBRDMT SUBQ TIS 1ST 20SQCM/<: CPT

## 2023-07-19 PROCEDURE — 11042 DBRDMT SUBQ TIS 1ST 20SQCM/<: CPT | Performed by: SURGERY

## 2023-07-19 RX ORDER — LIDOCAINE HYDROCHLORIDE 20 MG/ML
JELLY TOPICAL ONCE
OUTPATIENT
Start: 2023-07-19 | End: 2023-07-19

## 2023-07-19 RX ORDER — CLOBETASOL PROPIONATE 0.5 MG/G
OINTMENT TOPICAL ONCE
OUTPATIENT
Start: 2023-07-19 | End: 2023-07-19

## 2023-07-19 RX ORDER — LIDOCAINE HYDROCHLORIDE 40 MG/ML
SOLUTION TOPICAL ONCE
OUTPATIENT
Start: 2023-07-19 | End: 2023-07-19

## 2023-07-19 RX ORDER — LIDOCAINE 50 MG/G
OINTMENT TOPICAL ONCE
OUTPATIENT
Start: 2023-07-19 | End: 2023-07-19

## 2023-07-19 RX ORDER — LIDOCAINE 40 MG/G
CREAM TOPICAL ONCE
OUTPATIENT
Start: 2023-07-19 | End: 2023-07-19

## 2023-07-19 RX ORDER — BETAMETHASONE DIPROPIONATE 0.05 %
OINTMENT (GRAM) TOPICAL ONCE
OUTPATIENT
Start: 2023-07-19 | End: 2023-07-19

## 2023-07-19 RX ORDER — IBUPROFEN 200 MG
TABLET ORAL ONCE
OUTPATIENT
Start: 2023-07-19 | End: 2023-07-19

## 2023-07-19 RX ORDER — LIDOCAINE 40 MG/G
CREAM TOPICAL ONCE
Status: DISCONTINUED | OUTPATIENT
Start: 2023-07-19 | End: 2023-07-20 | Stop reason: HOSPADM

## 2023-07-19 RX ORDER — GENTAMICIN SULFATE 1 MG/G
OINTMENT TOPICAL ONCE
OUTPATIENT
Start: 2023-07-19 | End: 2023-07-19

## 2023-07-19 RX ORDER — GINSENG 100 MG
CAPSULE ORAL ONCE
OUTPATIENT
Start: 2023-07-19 | End: 2023-07-19

## 2023-07-19 RX ORDER — BACITRACIN ZINC AND POLYMYXIN B SULFATE 500; 1000 [USP'U]/G; [USP'U]/G
OINTMENT TOPICAL ONCE
OUTPATIENT
Start: 2023-07-19 | End: 2023-07-19

## 2023-07-19 NOTE — PROGRESS NOTES
[address]  Telephone: [phone]     Discharge Instructions     Important reminders:     **If you have any signs and symptoms of illness (Cough, fever, congestion, nausea, vomiting, diarrhea, etc.) please call the wound care center prior to your appointment. 1. Increase Protein intake for optimal wound healing  2. No added salt to reduce any swelling  3. If diabetic, maintain good glucose control  4. If you smoke, smoking prohibits wound healing, we ask that you refrain from smoking. 5. When taking antibiotics take the entire prescription as ordered. Do not stop taking until medication is all gone unless otherwise instructed. 6. Exercise as tolerated. 7. Keep weight off wounds and reposition every 2 hours if applicable. 8. If wound(s) is on your lower extremity, elevate legs to the level of the heart or above for 30 minutes 4-5 times a day and/or when sitting. Avoid standing for long periods of time. 9. Do not get wounds wet in bath or shower unless otherwise instructed by your physician. If your wound is on your foot or leg, you may purchase a cast bag. Please ask at the pharmacy. If Vascular testing is ordered, please call [address] [place] [address] [phone]) to schedule. Vascular tests ordered by Wound Care Physicians may take up to 2 hours to complete. Please keep that in mind when scheduling. If Vascular testing is scheduled, please bring supplies to replace your dressing after testing is done. The vascular department does not stock supplies. Wound: Coccyx     With each dressing change, rinse wounds with 0.9% Saline. (May use wound wash or soft contact solution. Both can be purchased at a local drug store). If unable to obtain saline, may use a gentle soap and water. Dressing care: Ana M, Mepilex border or Kerramx Gentle Border- change Monday, Wednesday, & Friday. Turn & reposition every 1-2 hours and as needed for pressure relief and comfort.  Keep pressure off of your

## 2023-07-19 NOTE — DISCHARGE INSTRUCTIONS
[address], [address] [facility]  Telephone: [phone]     Discharge Instructions     Important reminders:     **If you have any signs and symptoms of illness (Cough, fever, congestion, nausea, vomiting, diarrhea, etc.) please call the wound care center prior to your appointment. 1. Increase Protein intake for optimal wound healing  2. No added salt to reduce any swelling  3. If diabetic, maintain good glucose control  4. If you smoke, smoking prohibits wound healing, we ask that you refrain from smoking. 5. When taking antibiotics take the entire prescription as ordered. Do not stop taking until medication is all gone unless otherwise instructed. 6. Exercise as tolerated. 7. Keep weight off wounds and reposition every 2 hours if applicable. 8. If wound(s) is on your lower extremity, elevate legs to the level of the heart or above for 30 minutes 4-5 times a day and/or when sitting. Avoid standing for long periods of time. 9. Do not get wounds wet in bath or shower unless otherwise instructed by your physician. If your wound is on your foot or leg, you may purchase a cast bag. Please ask at the pharmacy. If Vascular testing is ordered, please call [address] [place] [address] [phone]) to schedule. Vascular tests ordered by Wound Care Physicians may take up to 2 hours to complete. Please keep that in mind when scheduling. If Vascular testing is scheduled, please bring supplies to replace your dressing after testing is done. The vascular department does not stock supplies. Wound: Coccyx     With each dressing change, rinse wounds with 0.9% Saline. (May use wound wash or soft contact solution. Both can be purchased at a local drug store). If unable to obtain saline, may use a gentle soap and water. Dressing care: Ana M, Mepilex border or Kerramx Gentle Border- change Monday, Wednesday, & Friday.  Turn & reposition every 1-2 hours and as

## 2023-08-02 ENCOUNTER — HOSPITAL ENCOUNTER (OUTPATIENT)
Dept: WOUND CARE | Age: 73
Discharge: HOME | End: 2023-08-02
Payer: MEDICARE

## 2023-08-02 VITALS
RESPIRATION RATE: 16 BRPM | HEART RATE: 91 BPM | TEMPERATURE: 96.6 F | SYSTOLIC BLOOD PRESSURE: 106 MMHG | DIASTOLIC BLOOD PRESSURE: 71 MMHG

## 2023-08-02 DIAGNOSIS — S31.000A SACRAL WOUND, INITIAL ENCOUNTER: ICD-10-CM

## 2023-08-02 DIAGNOSIS — Z74.09 POOR MOBILITY: Primary | ICD-10-CM

## 2023-08-02 DIAGNOSIS — L89.43 PRESSURE INJURY OF CONTIGUOUS REGION INVOLVING BACK AND BUTTOCK, STAGE 3, UNSPECIFIED LATERALITY (HCC): ICD-10-CM

## 2023-08-02 PROCEDURE — 11042 DBRDMT SUBQ TIS 1ST 20SQCM/<: CPT

## 2023-08-02 PROCEDURE — 11042 DBRDMT SUBQ TIS 1ST 20SQCM/<: CPT | Performed by: SURGERY

## 2023-08-02 RX ORDER — BACITRACIN ZINC AND POLYMYXIN B SULFATE 500; 1000 [USP'U]/G; [USP'U]/G
OINTMENT TOPICAL ONCE
OUTPATIENT
Start: 2023-08-02 | End: 2023-08-02

## 2023-08-02 RX ORDER — IBUPROFEN 200 MG
TABLET ORAL ONCE
OUTPATIENT
Start: 2023-08-02 | End: 2023-08-02

## 2023-08-02 RX ORDER — LIDOCAINE 50 MG/G
OINTMENT TOPICAL ONCE
OUTPATIENT
Start: 2023-08-02 | End: 2023-08-02

## 2023-08-02 RX ORDER — GENTAMICIN SULFATE 1 MG/G
OINTMENT TOPICAL ONCE
OUTPATIENT
Start: 2023-08-02 | End: 2023-08-02

## 2023-08-02 RX ORDER — GINSENG 100 MG
CAPSULE ORAL ONCE
OUTPATIENT
Start: 2023-08-02 | End: 2023-08-02

## 2023-08-02 RX ORDER — LIDOCAINE HYDROCHLORIDE 40 MG/ML
SOLUTION TOPICAL ONCE
OUTPATIENT
Start: 2023-08-02 | End: 2023-08-02

## 2023-08-02 RX ORDER — BETAMETHASONE DIPROPIONATE 0.05 %
OINTMENT (GRAM) TOPICAL ONCE
OUTPATIENT
Start: 2023-08-02 | End: 2023-08-02

## 2023-08-02 RX ORDER — LIDOCAINE HYDROCHLORIDE 20 MG/ML
JELLY TOPICAL ONCE
OUTPATIENT
Start: 2023-08-02 | End: 2023-08-02

## 2023-08-02 RX ORDER — LIDOCAINE 40 MG/G
CREAM TOPICAL ONCE
OUTPATIENT
Start: 2023-08-02 | End: 2023-08-02

## 2023-08-02 RX ORDER — LIDOCAINE 40 MG/G
CREAM TOPICAL ONCE
Status: DISCONTINUED | OUTPATIENT
Start: 2023-08-02 | End: 2023-08-03 | Stop reason: HOSPADM

## 2023-08-02 RX ORDER — CLOBETASOL PROPIONATE 0.5 MG/G
OINTMENT TOPICAL ONCE
OUTPATIENT
Start: 2023-08-02 | End: 2023-08-02

## 2023-08-02 NOTE — PROGRESS NOTES
[address] [facility],Third Floor Progress and Procedure Note    Abimbola Hernandez  AGE: 74 y.o. GENDER: female    : 1950  TODAY'S DATE: 2023    Chief Complaint   Patient presents with    Wound Check     buttocks        History of Present Illness     Abimbola Hernandez is a 74 y.o. female who presents today for wound evaluation. History of Wound: pressure and diabetic wound located on the sacral decubitus ulcer stage 4 . 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 Thyroid disease      Past Surgical History:   Procedure Laterality Date    CARPAL TUNNEL RELEASE      bilateral    CHOLECYSTECTOMY      COLONOSCOPY      ELBOW SURGERY      ENDOSCOPY, COLON, DIAGNOSTIC      EYE SURGERY      FOOT SURGERY      SHOULDER SURGERY       Current Outpatient Medications   Medication Sig Dispense Refill    gentamicin (GARAMYCIN) 0.1 % cream Apply topically daily. 30 g 1    cephALEXin (KEFLEX) 500 MG capsule Take 1 capsule by mouth 4 times daily 40 capsule 0    miconazole (MICOTIN) 2 % powder Apply topically 2 times daily to abdominal folds. 45 g 1    traMADol (ULTRAM) 50 MG tablet Take 50 mg by mouth 3 times daily as needed for Pain.       DULoxetine (CYMBALTA) 60 MG extended release capsule Take 1 capsule by mouth daily 30 capsule 3    Travoprost, BAK Free, (TRAVATAN Z) 0.004 % SOLN ophthalmic solution Place 1 drop into both eyes nightly      DICLOFENAC PO Take

## 2023-08-02 NOTE — PLAN OF CARE
Discharge instructions given. Patient verbalized understanding. Return to [facility] in 2 week(s).

## 2023-08-16 ENCOUNTER — HOSPITAL ENCOUNTER (OUTPATIENT)
Dept: WOUND CARE | Age: 73
Discharge: HOME | End: 2023-08-16
Payer: MEDICARE

## 2023-08-16 VITALS — DIASTOLIC BLOOD PRESSURE: 72 MMHG | RESPIRATION RATE: 15 BRPM | HEART RATE: 83 BPM | SYSTOLIC BLOOD PRESSURE: 116 MMHG

## 2023-08-16 DIAGNOSIS — S31.000A SACRAL WOUND, INITIAL ENCOUNTER: ICD-10-CM

## 2023-08-16 DIAGNOSIS — L89.43 PRESSURE INJURY OF CONTIGUOUS REGION INVOLVING BACK AND BUTTOCK, STAGE 3, UNSPECIFIED LATERALITY (HCC): ICD-10-CM

## 2023-08-16 DIAGNOSIS — L89.154 DECUBITUS ULCER OF SACRAL REGION, STAGE 4 (HCC): ICD-10-CM

## 2023-08-16 DIAGNOSIS — Z74.09 POOR MOBILITY: Primary | ICD-10-CM

## 2023-08-16 PROCEDURE — 11042 DBRDMT SUBQ TIS 1ST 20SQCM/<: CPT

## 2023-08-16 PROCEDURE — 11042 DBRDMT SUBQ TIS 1ST 20SQCM/<: CPT | Performed by: SURGERY

## 2023-08-16 RX ORDER — LIDOCAINE HYDROCHLORIDE 20 MG/ML
JELLY TOPICAL ONCE
OUTPATIENT
Start: 2023-08-16 | End: 2023-08-16

## 2023-08-16 RX ORDER — LIDOCAINE 50 MG/G
OINTMENT TOPICAL ONCE
OUTPATIENT
Start: 2023-08-16 | End: 2023-08-16

## 2023-08-16 RX ORDER — GINSENG 100 MG
CAPSULE ORAL ONCE
OUTPATIENT
Start: 2023-08-16 | End: 2023-08-16

## 2023-08-16 RX ORDER — BETAMETHASONE DIPROPIONATE 0.05 %
OINTMENT (GRAM) TOPICAL ONCE
OUTPATIENT
Start: 2023-08-16 | End: 2023-08-16

## 2023-08-16 RX ORDER — SODIUM CHLOR/HYPOCHLOROUS ACID 0.033 %
SOLUTION, IRRIGATION IRRIGATION ONCE
OUTPATIENT
Start: 2023-08-16 | End: 2023-08-16

## 2023-08-16 RX ORDER — GENTAMICIN SULFATE 1 MG/G
OINTMENT TOPICAL ONCE
OUTPATIENT
Start: 2023-08-16 | End: 2023-08-16

## 2023-08-16 RX ORDER — LIDOCAINE HYDROCHLORIDE 40 MG/ML
SOLUTION TOPICAL ONCE
OUTPATIENT
Start: 2023-08-16 | End: 2023-08-16

## 2023-08-16 RX ORDER — BACITRACIN ZINC AND POLYMYXIN B SULFATE 500; 1000 [USP'U]/G; [USP'U]/G
OINTMENT TOPICAL ONCE
OUTPATIENT
Start: 2023-08-16 | End: 2023-08-16

## 2023-08-16 RX ORDER — IBUPROFEN 200 MG
TABLET ORAL ONCE
OUTPATIENT
Start: 2023-08-16 | End: 2023-08-16

## 2023-08-16 RX ORDER — CLOBETASOL PROPIONATE 0.5 MG/G
OINTMENT TOPICAL ONCE
OUTPATIENT
Start: 2023-08-16 | End: 2023-08-16

## 2023-08-16 RX ORDER — LIDOCAINE 40 MG/G
CREAM TOPICAL ONCE
OUTPATIENT
Start: 2023-08-16 | End: 2023-08-16

## 2023-08-16 RX ORDER — LIDOCAINE 40 MG/G
CREAM TOPICAL ONCE
Status: DISCONTINUED | OUTPATIENT
Start: 2023-08-16 | End: 2023-08-17 | Stop reason: HOSPADM

## 2023-08-16 NOTE — PLAN OF CARE
Discharge instructions given. Patient verbalized understanding. Return to [address] [place] [address] in 2 week(s).

## 2023-08-16 NOTE — DISCHARGE INSTRUCTIONS
needed for pressure relief and comfort. Keep pressure off of your wound as much as possible. Eat plenty of protein. Place a pillow under one side when sitting & reposition pillow on the other side every hour and as needed for comfort     Home Care Agency/Facility: [facility]     Your wound-care supplies will be provided by:  Please note, depending on your insurance coverage, you may have out-of-pocket expenses for these supplies. Someone from the company should call you to confirm your order and discuss those potential costs before they ship your products -- please anticipate that call. If your out-of-pocket cost could be substantial, Many companies have financial hardship programs for patients who qualify, so please ask about that if you might need a hand. If you have any questions about your supplies or your potential out-of-pocket costs, or if you need to place an order for a refill of supplies (typically monthly), please call the company directly. Your  is Tobias Needs     Follow up with Dr Yanelis Gunter In 2 week(s) in the wound care center. Wound Care Center Information: Should you experience any significant changes in your wound(s) or have questions about your wound care, please contact the [address] [place] [address] at [phone] Monday  - Thursday 8:00 am - 4:00 pm and Friday 8:00 am - 1:00pm. If you need help with your wound outside these hours and cannot wait until we are again available, contact your PCP or go to the hospital emergency room. PLEASE NOTE: IF YOU ARE UNABLE TO OBTAIN WOUND SUPPLIES, CONTINUE TO USE THE SUPPLIES YOU HAVE AVAILABLE UNTIL YOU ARE ABLE TO REACH US. IT IS MOST IMPORTANT TO KEEP THE WOUND COVERED AT ALL TIMES. Patient Experience     Thank you for trusting us with your care. You may receive a survey from a company called [employer] asking for your feedback. We would appreciate it if you took a few minutes to share your experience.   Your input is very

## 2023-08-16 NOTE — PROGRESS NOTES
of illness (Cough, fever, congestion, nausea, vomiting, diarrhea, etc.) please call the wound care center prior to your appointment. 1. Increase Protein intake for optimal wound healing  2. No added salt to reduce any swelling  3. If diabetic, maintain good glucose control  4. If you smoke, smoking prohibits wound healing, we ask that you refrain from smoking. 5. When taking antibiotics take the entire prescription as ordered. Do not stop taking until medication is all gone unless otherwise instructed. 6. Exercise as tolerated. 7. Keep weight off wounds and reposition every 2 hours if applicable. 8. If wound(s) is on your lower extremity, elevate legs to the level of the heart or above for 30 minutes 4-5 times a day and/or when sitting. Avoid standing for long periods of time. 9. Do not get wounds wet in bath or shower unless otherwise instructed by your physician. If your wound is on your foot or leg, you may purchase a cast bag. Please ask at the pharmacy. If Vascular testing is ordered, please call [address] [place] [address] [phone]) to schedule. Vascular tests ordered by Wound Care Physicians may take up to 2 hours to complete. Please keep that in mind when scheduling. If Vascular testing is scheduled, please bring supplies to replace your dressing after testing is done. The vascular department does not stock supplies. Wound: Coccyx     With each dressing change, rinse wounds with 0.9% Saline. (May use wound wash or soft contact solution. Both can be purchased at a local drug store). If unable to obtain saline, may use a gentle soap and water. Dressing care: Ana M, Mepilex border or Kerramx Gentle Border- change Monday, Wednesday, & Friday. Turn & reposition every 1-2 hours and as needed for pressure relief and comfort. Keep pressure off of your wound as much as possible. Eat plenty of protein.  Place a pillow under one side when sitting & reposition pillow on the other side every hour and as

## 2023-09-06 ENCOUNTER — HOSPITAL ENCOUNTER (OUTPATIENT)
Dept: WOUND CARE | Age: 73
Discharge: HOME | End: 2023-09-06
Payer: MEDICARE

## 2023-09-06 VITALS
RESPIRATION RATE: 16 BRPM | HEART RATE: 86 BPM | DIASTOLIC BLOOD PRESSURE: 72 MMHG | TEMPERATURE: 97.1 F | SYSTOLIC BLOOD PRESSURE: 115 MMHG

## 2023-09-06 DIAGNOSIS — L89.154 DECUBITUS ULCER OF SACRAL REGION, STAGE 4 (HCC): Primary | ICD-10-CM

## 2023-09-06 DIAGNOSIS — S31.000A SACRAL WOUND, INITIAL ENCOUNTER: ICD-10-CM

## 2023-09-06 DIAGNOSIS — L89.43 PRESSURE INJURY OF CONTIGUOUS REGION INVOLVING BACK AND BUTTOCK, STAGE 3, UNSPECIFIED LATERALITY (HCC): ICD-10-CM

## 2023-09-06 PROCEDURE — 11042 DBRDMT SUBQ TIS 1ST 20SQCM/<: CPT

## 2023-09-06 PROCEDURE — 11042 DBRDMT SUBQ TIS 1ST 20SQCM/<: CPT | Performed by: SURGERY

## 2023-09-06 RX ORDER — CLOBETASOL PROPIONATE 0.5 MG/G
OINTMENT TOPICAL ONCE
OUTPATIENT
Start: 2023-09-06 | End: 2023-09-06

## 2023-09-06 RX ORDER — LIDOCAINE 40 MG/G
CREAM TOPICAL ONCE
Status: DISCONTINUED | OUTPATIENT
Start: 2023-09-06 | End: 2023-09-07 | Stop reason: HOSPADM

## 2023-09-06 RX ORDER — LIDOCAINE HYDROCHLORIDE 20 MG/ML
JELLY TOPICAL ONCE
OUTPATIENT
Start: 2023-09-06 | End: 2023-09-06

## 2023-09-06 RX ORDER — SODIUM CHLOR/HYPOCHLOROUS ACID 0.033 %
SOLUTION, IRRIGATION IRRIGATION ONCE
OUTPATIENT
Start: 2023-09-06 | End: 2023-09-06

## 2023-09-06 RX ORDER — BETAMETHASONE DIPROPIONATE 0.05 %
OINTMENT (GRAM) TOPICAL ONCE
OUTPATIENT
Start: 2023-09-06 | End: 2023-09-06

## 2023-09-06 RX ORDER — BACITRACIN ZINC AND POLYMYXIN B SULFATE 500; 1000 [USP'U]/G; [USP'U]/G
OINTMENT TOPICAL ONCE
OUTPATIENT
Start: 2023-09-06 | End: 2023-09-06

## 2023-09-06 RX ORDER — LIDOCAINE 50 MG/G
OINTMENT TOPICAL ONCE
OUTPATIENT
Start: 2023-09-06 | End: 2023-09-06

## 2023-09-06 RX ORDER — LIDOCAINE HYDROCHLORIDE 40 MG/ML
SOLUTION TOPICAL ONCE
OUTPATIENT
Start: 2023-09-06 | End: 2023-09-06

## 2023-09-06 RX ORDER — GENTAMICIN SULFATE 1 MG/G
OINTMENT TOPICAL ONCE
OUTPATIENT
Start: 2023-09-06 | End: 2023-09-06

## 2023-09-06 RX ORDER — GINSENG 100 MG
CAPSULE ORAL ONCE
OUTPATIENT
Start: 2023-09-06 | End: 2023-09-06

## 2023-09-06 RX ORDER — LIDOCAINE 40 MG/G
CREAM TOPICAL ONCE
OUTPATIENT
Start: 2023-09-06 | End: 2023-09-06

## 2023-09-06 RX ORDER — IBUPROFEN 200 MG
TABLET ORAL ONCE
OUTPATIENT
Start: 2023-09-06 | End: 2023-09-06

## 2023-09-06 NOTE — PLAN OF CARE
Discharge instructions given. Patient verbalized understanding. Return to [facility] in 2 week(s).

## 2023-09-06 NOTE — PROGRESS NOTES
Important reminders:     **If you have any signs and symptoms of illness (Cough, fever, congestion, nausea, vomiting, diarrhea, etc.) please call the wound care center prior to your appointment. 1. Increase Protein intake for optimal wound healing  2. No added salt to reduce any swelling  3. If diabetic, maintain good glucose control  4. If you smoke, smoking prohibits wound healing, we ask that you refrain from smoking. 5. When taking antibiotics take the entire prescription as ordered. Do not stop taking until medication is all gone unless otherwise instructed. 6. Exercise as tolerated. 7. Keep weight off wounds and reposition every 2 hours if applicable. 8. If wound(s) is on your lower extremity, elevate legs to the level of the heart or above for 30 minutes 4-5 times a day and/or when sitting. Avoid standing for long periods of time. 9. Do not get wounds wet in bath or shower unless otherwise instructed by your physician. If your wound is on your foot or leg, you may purchase a cast bag. Please ask at the pharmacy. If Vascular testing is ordered, please call [address] [place] [address] [phone]) to schedule. Vascular tests ordered by Wound Care Physicians may take up to 2 hours to complete. Please keep that in mind when scheduling. If Vascular testing is scheduled, please bring supplies to replace your dressing after testing is done. The vascular department does not stock supplies. Wound: Coccyx     With each dressing change, rinse wounds with 0.9% Saline. (May use wound wash or soft contact solution. Both can be purchased at a local drug store). If unable to obtain saline, may use a gentle soap and water. Dressing care: Ana M, Mepilex border or Kerramx Gentle Border- change Monday, Wednesday, & Friday. Turn & reposition every 1-2 hours and as needed for pressure relief and comfort. Keep pressure off of your wound as much as possible. Eat plenty of protein.  Place a pillow under one side when

## 2023-09-25 NOTE — PATIENT INSTRUCTIONS
[address], [address] [facility]  Telephone: [phone]     Discharge Instructions     Important reminders:     **If you have any signs and symptoms of illness (Cough, fever, congestion, nausea, vomiting, diarrhea, etc.) please call the wound care center prior to your appointment. 1. Increase Protein intake for optimal wound healing  2. No added salt to reduce any swelling  3. If diabetic, maintain good glucose control  4. If you smoke, smoking prohibits wound healing, we ask that you refrain from smoking. 5. When taking antibiotics take the entire prescription as ordered. Do not stop taking until medication is all gone unless otherwise instructed. 6. Exercise as tolerated. 7. Keep weight off wounds and reposition every 2 hours if applicable. 8. If wound(s) is on your lower extremity, elevate legs to the level of the heart or above for 30 minutes 4-5 times a day and/or when sitting. Avoid standing for long periods of time. 9. Do not get wounds wet in bath or shower unless otherwise instructed by your physician. If your wound is on your foot or leg, you may purchase a cast bag. Please ask at the pharmacy. If Vascular testing is ordered, please call [address] [place] [address] [phone]) to schedule. Vascular tests ordered by Wound Care Physicians may take up to 2 hours to complete. Please keep that in mind when scheduling. If Vascular testing is scheduled, please bring supplies to replace your dressing after testing is done. The vascular department does not stock supplies. Wound: Coccyx     With each dressing change, rinse wounds with 0.9% Saline. (May use wound wash or soft contact solution. Both can be purchased at a local drug store). If unable to obtain saline, may use a gentle soap and water. Dressing care: Ana M, Mepilex border or Kerramx Gentle Border- change Monday, Wednesday, & Friday.  Turn & reposition every 1-2 hours and as

## 2023-09-27 ENCOUNTER — HOSPITAL ENCOUNTER (OUTPATIENT)
Dept: WOUND CARE | Age: 73
Discharge: HOME | End: 2023-09-27
Payer: MEDICARE

## 2023-09-27 VITALS — HEART RATE: 79 BPM | SYSTOLIC BLOOD PRESSURE: 115 MMHG | DIASTOLIC BLOOD PRESSURE: 73 MMHG | TEMPERATURE: 95.3 F

## 2023-09-27 DIAGNOSIS — L89.43 PRESSURE INJURY OF CONTIGUOUS REGION INVOLVING BACK AND BUTTOCK, STAGE 3, UNSPECIFIED LATERALITY (HCC): ICD-10-CM

## 2023-09-27 DIAGNOSIS — L89.154 DECUBITUS ULCER OF SACRAL REGION, STAGE 4 (HCC): Primary | ICD-10-CM

## 2023-09-27 DIAGNOSIS — S31.000A SACRAL WOUND, INITIAL ENCOUNTER: ICD-10-CM

## 2023-09-27 PROCEDURE — 11042 DBRDMT SUBQ TIS 1ST 20SQCM/<: CPT

## 2023-09-27 PROCEDURE — 11042 DBRDMT SUBQ TIS 1ST 20SQCM/<: CPT | Performed by: SURGERY

## 2023-09-27 RX ORDER — CLOBETASOL PROPIONATE 0.5 MG/G
OINTMENT TOPICAL ONCE
OUTPATIENT
Start: 2023-09-27 | End: 2023-09-27

## 2023-09-27 RX ORDER — LIDOCAINE 50 MG/G
OINTMENT TOPICAL ONCE
OUTPATIENT
Start: 2023-09-27 | End: 2023-09-27

## 2023-09-27 RX ORDER — LIDOCAINE HYDROCHLORIDE 20 MG/ML
JELLY TOPICAL ONCE
OUTPATIENT
Start: 2023-09-27 | End: 2023-09-27

## 2023-09-27 RX ORDER — GINSENG 100 MG
CAPSULE ORAL ONCE
OUTPATIENT
Start: 2023-09-27 | End: 2023-09-27

## 2023-09-27 RX ORDER — LIDOCAINE 40 MG/G
CREAM TOPICAL ONCE
Status: DISCONTINUED | OUTPATIENT
Start: 2023-09-27 | End: 2023-09-28 | Stop reason: HOSPADM

## 2023-09-27 RX ORDER — LIDOCAINE HYDROCHLORIDE 40 MG/ML
SOLUTION TOPICAL ONCE
OUTPATIENT
Start: 2023-09-27 | End: 2023-09-27

## 2023-09-27 RX ORDER — IBUPROFEN 200 MG
TABLET ORAL ONCE
OUTPATIENT
Start: 2023-09-27 | End: 2023-09-27

## 2023-09-27 RX ORDER — LIDOCAINE 40 MG/G
CREAM TOPICAL ONCE
OUTPATIENT
Start: 2023-09-27 | End: 2023-09-27

## 2023-09-27 RX ORDER — GENTAMICIN SULFATE 1 MG/G
OINTMENT TOPICAL ONCE
OUTPATIENT
Start: 2023-09-27 | End: 2023-09-27

## 2023-09-27 RX ORDER — BACITRACIN ZINC AND POLYMYXIN B SULFATE 500; 1000 [USP'U]/G; [USP'U]/G
OINTMENT TOPICAL ONCE
OUTPATIENT
Start: 2023-09-27 | End: 2023-09-27

## 2023-09-27 RX ORDER — BETAMETHASONE DIPROPIONATE 0.05 %
OINTMENT (GRAM) TOPICAL ONCE
OUTPATIENT
Start: 2023-09-27 | End: 2023-09-27

## 2023-09-27 RX ORDER — TRIAMCINOLONE ACETONIDE 1 MG/G
OINTMENT TOPICAL ONCE
OUTPATIENT
Start: 2023-09-27 | End: 2023-09-27

## 2023-09-27 RX ORDER — SODIUM CHLOR/HYPOCHLOROUS ACID 0.033 %
SOLUTION, IRRIGATION IRRIGATION ONCE
OUTPATIENT
Start: 2023-09-27 | End: 2023-09-27

## 2023-09-27 NOTE — PROGRESS NOTES
[address] [facility],Third Floor Progress and Procedure Note    Mark Wheeler  AGE: 74 y.o. GENDER: female    : 1950  TODAY'S DATE: 2023    Chief Complaint   Patient presents with    Wound Check     Buttock F/U        History of Present Illness     Mark Wheeler is a 74 y.o. female who presents today for wound evaluation. History of Wound: pressure and diabetic wound located on the sacral decubitus ulcer stage 4 . Wound Pain:  mild  Severity: 2/10   Wound Type: pressure and diabetic  Modifying Factors:  diabetes, poor glucose control, chronic pressure, decreased mobility, shear force, obesity, malnutrition, and chronic back pain  Associated Signs/Symptoms:  pain    Past Medical History:   Diagnosis Date    Arthritis     back    Cataracts, bilateral     CHF (congestive heart failure) ([facility])     Diabetes mellitus ([address])     Glaucoma     Hyperlipidemia     Hypertension     Poor mobility 2022    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 Thyroid disease      Past Surgical History:   Procedure Laterality Date    CARPAL TUNNEL RELEASE      bilateral    CHOLECYSTECTOMY      COLONOSCOPY      ELBOW SURGERY      ENDOSCOPY, COLON, DIAGNOSTIC      EYE SURGERY      FOOT SURGERY      SHOULDER SURGERY       Current Outpatient Medications   Medication Sig Dispense Refill    gentamicin (GARAMYCIN) 0.1 % cream Apply topically daily. 30 g 1    cephALEXin (KEFLEX) 500 MG capsule Take 1 capsule by mouth 4 times daily 40 capsule 0    miconazole (MICOTIN) 2 % powder Apply topically 2 times daily to abdominal folds. 45 g 1    traMADol (ULTRAM) 50 MG tablet Take 50 mg by mouth 3 times daily as needed for Pain.       DULoxetine (CYMBALTA) 60 MG extended release capsule Take 1 capsule by mouth daily 30 capsule 3    Travoprost, BAK Free, (TRAVATAN Z) 0.004 % SOLN ophthalmic solution Place 1 drop into both eyes nightly

## 2023-09-27 NOTE — PLAN OF CARE
Discharge instructions given. Patient verbalized understanding. Return to [facility] in 2 week(s). Nsaids Pregnancy And Lactation Text: These medications are considered safe up to 30 weeks gestation. It is excreted in breast milk.

## 2023-10-09 NOTE — PATIENT INSTRUCTIONS
needed for pressure relief and comfort. Keep pressure off of your wound as much as possible. Eat plenty of protein. Place a pillow under one side when sitting & reposition pillow on the other side every hour and as needed for comfort     Home Care Agency/Facility: [facility]     Your wound-care supplies will be provided by:  Please note, depending on your insurance coverage, you may have out-of-pocket expenses for these supplies. Someone from the company should call you to confirm your order and discuss those potential costs before they ship your products -- please anticipate that call. If your out-of-pocket cost could be substantial, Many companies have financial hardship programs for patients who qualify, so please ask about that if you might need a hand. If you have any questions about your supplies or your potential out-of-pocket costs, or if you need to place an order for a refill of supplies (typically monthly), please call the company directly. Your  is Lorna Suarez     Follow up with Dr Luda Mendez In 2 week(s) in the wound care center. Wound Care Center Information: Should you experience any significant changes in your wound(s) or have questions about your wound care, please contact the [address] [place] [address] at [phone] Monday  - Thursday 8:00 am - 4:00 pm and Friday 8:00 am - 1:00pm. If you need help with your wound outside these hours and cannot wait until we are again available, contact your PCP or go to the hospital emergency room. PLEASE NOTE: IF YOU ARE UNABLE TO OBTAIN WOUND SUPPLIES, CONTINUE TO USE THE SUPPLIES YOU HAVE AVAILABLE UNTIL YOU ARE ABLE TO REACH US. IT IS MOST IMPORTANT TO KEEP THE WOUND COVERED AT ALL TIMES. Patient Experience     Thank you for trusting us with your care. You may receive a survey from a company called [employer] asking for your feedback. We would appreciate it if you took a few minutes to share your experience.   Your input is very

## 2023-10-11 ENCOUNTER — HOSPITAL ENCOUNTER (OUTPATIENT)
Dept: WOUND CARE | Age: 73
Discharge: HOME | End: 2023-10-11
Attending: SURGERY
Payer: MEDICARE

## 2023-10-11 VITALS — TEMPERATURE: 96 F | SYSTOLIC BLOOD PRESSURE: 140 MMHG | DIASTOLIC BLOOD PRESSURE: 73 MMHG | HEART RATE: 77 BPM

## 2023-10-11 DIAGNOSIS — L89.43 PRESSURE INJURY OF CONTIGUOUS REGION INVOLVING BACK AND BUTTOCK, STAGE 3, UNSPECIFIED LATERALITY (HCC): ICD-10-CM

## 2023-10-11 DIAGNOSIS — S31.000A SACRAL WOUND, INITIAL ENCOUNTER: ICD-10-CM

## 2023-10-11 DIAGNOSIS — L89.154 DECUBITUS ULCER OF SACRAL REGION, STAGE 4 (HCC): Primary | ICD-10-CM

## 2023-10-11 PROCEDURE — 11042 DBRDMT SUBQ TIS 1ST 20SQCM/<: CPT | Performed by: SURGERY

## 2023-10-11 PROCEDURE — 11042 DBRDMT SUBQ TIS 1ST 20SQCM/<: CPT

## 2023-10-11 RX ORDER — SODIUM CHLOR/HYPOCHLOROUS ACID 0.033 %
SOLUTION, IRRIGATION IRRIGATION ONCE
OUTPATIENT
Start: 2023-10-11 | End: 2023-10-11

## 2023-10-11 RX ORDER — CLOBETASOL PROPIONATE 0.5 MG/G
OINTMENT TOPICAL ONCE
OUTPATIENT
Start: 2023-10-11 | End: 2023-10-11

## 2023-10-11 RX ORDER — TRIAMCINOLONE ACETONIDE 1 MG/G
OINTMENT TOPICAL ONCE
OUTPATIENT
Start: 2023-10-11 | End: 2023-10-11

## 2023-10-11 RX ORDER — IBUPROFEN 200 MG
TABLET ORAL ONCE
OUTPATIENT
Start: 2023-10-11 | End: 2023-10-11

## 2023-10-11 RX ORDER — LIDOCAINE HYDROCHLORIDE 20 MG/ML
JELLY TOPICAL ONCE
OUTPATIENT
Start: 2023-10-11 | End: 2023-10-11

## 2023-10-11 RX ORDER — BACITRACIN ZINC AND POLYMYXIN B SULFATE 500; 1000 [USP'U]/G; [USP'U]/G
OINTMENT TOPICAL ONCE
OUTPATIENT
Start: 2023-10-11 | End: 2023-10-11

## 2023-10-11 RX ORDER — LIDOCAINE 40 MG/G
CREAM TOPICAL ONCE
Status: DISCONTINUED | OUTPATIENT
Start: 2023-10-11 | End: 2023-10-12 | Stop reason: HOSPADM

## 2023-10-11 RX ORDER — LIDOCAINE HYDROCHLORIDE 40 MG/ML
SOLUTION TOPICAL ONCE
OUTPATIENT
Start: 2023-10-11 | End: 2023-10-11

## 2023-10-11 RX ORDER — LIDOCAINE 50 MG/G
OINTMENT TOPICAL ONCE
OUTPATIENT
Start: 2023-10-11 | End: 2023-10-11

## 2023-10-11 RX ORDER — GENTAMICIN SULFATE 1 MG/G
OINTMENT TOPICAL ONCE
OUTPATIENT
Start: 2023-10-11 | End: 2023-10-11

## 2023-10-11 RX ORDER — GINSENG 100 MG
CAPSULE ORAL ONCE
OUTPATIENT
Start: 2023-10-11 | End: 2023-10-11

## 2023-10-11 RX ORDER — BETAMETHASONE DIPROPIONATE 0.05 %
OINTMENT (GRAM) TOPICAL ONCE
OUTPATIENT
Start: 2023-10-11 | End: 2023-10-11

## 2023-10-11 RX ORDER — LIDOCAINE 40 MG/G
CREAM TOPICAL ONCE
OUTPATIENT
Start: 2023-10-11 | End: 2023-10-11

## 2023-10-11 ASSESSMENT — PAIN DESCRIPTION - ONSET: ONSET: ON-GOING

## 2023-10-11 ASSESSMENT — PAIN DESCRIPTION - DESCRIPTORS: DESCRIPTORS: ACHING

## 2023-10-11 ASSESSMENT — PAIN - FUNCTIONAL ASSESSMENT: PAIN_FUNCTIONAL_ASSESSMENT: PREVENTS OR INTERFERES SOME ACTIVE ACTIVITIES AND ADLS

## 2023-10-11 ASSESSMENT — PAIN DESCRIPTION - ORIENTATION: ORIENTATION: RIGHT

## 2023-10-11 ASSESSMENT — PAIN DESCRIPTION - LOCATION: LOCATION: BUTTOCKS

## 2023-10-11 ASSESSMENT — PAIN DESCRIPTION - FREQUENCY: FREQUENCY: CONTINUOUS

## 2023-10-11 ASSESSMENT — PAIN SCALES - GENERAL: PAINLEVEL_OUTOF10: 5

## 2023-10-11 ASSESSMENT — PAIN DESCRIPTION - PAIN TYPE: TYPE: CHRONIC PAIN

## 2023-10-11 NOTE — PLAN OF CARE
Discharge instructions given. Patient verbalized understanding. Return to [facility] in 2 week(s).   Called/faxed orders to [place]

## 2023-10-11 NOTE — PROGRESS NOTES
[facility], [address] [facility]  Telephone: [phone]        [facility] AT [facility] Fax # [phone]      Patient [address] [facility], [address]  Telephone: [phone]     Discharge Instructions     Important reminders:     **If you have any signs and symptoms of illness (Cough, fever, congestion, nausea, vomiting, diarrhea, etc.) please call the wound care center prior to your appointment. 1. Increase Protein intake for optimal wound healing  2. No added salt to reduce any swelling  3. If diabetic, maintain good glucose control  4. If you smoke, smoking prohibits wound healing, we ask that you refrain from smoking. 5. When taking antibiotics take the entire prescription as ordered. Do not stop taking until medication is all gone unless otherwise instructed. 6. Exercise as tolerated. 7. Keep weight off wounds and reposition every 2 hours if applicable. 8. If wound(s) is on your lower extremity, elevate legs to the level of the heart or above for 30 minutes 4-5 times a day and/or when sitting. Avoid standing for long periods of time. 9. Do not get wounds wet in bath or shower unless otherwise instructed by your physician. If your wound is on your foot or leg, you may purchase a cast bag. Please ask at the pharmacy. If Vascular testing is ordered, please call [address] [place] [address] [phone]) to schedule. Vascular tests ordered by Wound Care Physicians may take up to 2 hours to complete. Please keep that in mind when scheduling. If Vascular testing is scheduled, please bring supplies to replace your dressing after testing is done. The vascular department does not stock supplies. Wound: Coccyx     With each dressing change, rinse wounds with 0.9% Saline. (May use wound wash or soft contact solution. Both can be purchased at a local drug store).  If unable to obtain
DICLOFENAC PO Take 100 mg by mouth 2 times daily      acetaminophen (TYLENOL) 500 MG tablet Take 500 mg by mouth every 6 hours as needed for Pain or Fever       aspirin EC 81 MG EC tablet Take 81 mg by mouth daily May restart in AM.      levothyroxine (SYNTHROID) 200 MCG tablet Take 200 mcg by mouth Daily       Insulin Detemir (LEVEMIR FLEXPEN SC) Inject 35 Units into the skin nightly      simvastatin (ZOCOR) 40 MG tablet Take 40 mg by mouth nightly. Insulin Lispro, Human, (HUMALOG SC) Inject 10 Units into the skin 3 times daily (with meals) With sliding      Calcium Carbonate-Vitamin D (CALCIUM + D PO) Take by mouth daily        Current Facility-Administered Medications   Medication Dose Route Frequency Provider Last Rate Last Admin    lidocaine (LMX) 4 % cream   Topical Once Karla Mohr MD          Social History:   Social History     Tobacco Use    Smoking status: Never    Smokeless tobacco: Never   Vaping Use    Vaping Use: Never used   Substance Use Topics    Alcohol use: Not Currently    Drug use: Never     Allergies: Other, Ace inhibitors, Metoprolol, Tetrahydrozoline hcl, and Timolol    Procedure: Indications:  Based on my examination of this patient's wound(s)/ulcer(s) today, debridement is required to promote healing and evaluate the wound base. Performed by: Karla Mohr MD    Consent obtained: Yes    Time out taken: Yes    Pain Control: Anesthetic  Anesthetic: 4% Lidocaine Cream     Debridement: Sharp excisional debridement  Using curette the wound was sharply debrided    down through and including the removal of epidermis, dermis, and subcutaneous tissue.     Devitalized Tissue Debrided: fibrin, biofilm, and slough    Pre Debridement Measurements:  Are located in the Wound Documentation Flow Sheet     Wound #: 1     Post  Debridement Measurements:  Wound 06/16/22 Coccyx Mid #1 (Active)   Wound Image   08/16/23 0905   Wound Etiology Pressure Stage 4 10/11/23 0848   Wound Cleansed

## 2023-10-25 ENCOUNTER — APPOINTMENT (OUTPATIENT)
Dept: CT IMAGING | Age: 73
DRG: 463 | End: 2023-10-25
Payer: MEDICARE

## 2023-10-25 ENCOUNTER — APPOINTMENT (OUTPATIENT)
Dept: GENERAL RADIOLOGY | Age: 73
DRG: 463 | End: 2023-10-25
Payer: MEDICARE

## 2023-10-25 ENCOUNTER — HOSPITAL ENCOUNTER (OUTPATIENT)
Dept: WOUND CARE | Age: 73
Discharge: HOME | End: 2023-10-25
Attending: SURGERY
Payer: MEDICARE

## 2023-10-25 ENCOUNTER — HOSPITAL ENCOUNTER (INPATIENT)
Age: 73
LOS: 11 days | Discharge: NURSING, REHAB OR LONG-TERM CARE | DRG: 463 | End: 2023-11-05
Attending: STUDENT IN AN ORGANIZED HEALTH CARE EDUCATION/TRAINING PROGRAM | Admitting: STUDENT IN AN ORGANIZED HEALTH CARE EDUCATION/TRAINING PROGRAM
Payer: MEDICARE

## 2023-10-25 VITALS
TEMPERATURE: 97.4 F | SYSTOLIC BLOOD PRESSURE: 124 MMHG | RESPIRATION RATE: 16 BRPM | HEART RATE: 86 BPM | DIASTOLIC BLOOD PRESSURE: 77 MMHG

## 2023-10-25 DIAGNOSIS — R09.02 HYPOXEMIA: ICD-10-CM

## 2023-10-25 DIAGNOSIS — L89.154 DECUBITUS ULCER OF SACRAL REGION, STAGE 4 (HCC): Primary | ICD-10-CM

## 2023-10-25 DIAGNOSIS — S72.001A CLOSED FRACTURE OF RIGHT HIP, INITIAL ENCOUNTER (HCC): ICD-10-CM

## 2023-10-25 DIAGNOSIS — S31.000A SACRAL WOUND, INITIAL ENCOUNTER: ICD-10-CM

## 2023-10-25 DIAGNOSIS — L89.43 PRESSURE INJURY OF CONTIGUOUS REGION INVOLVING BACK AND BUTTOCK, STAGE 3, UNSPECIFIED LATERALITY (HCC): ICD-10-CM
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
## 2023-10-25 LAB
ABO + RH BLD: NORMAL
ALBUMIN SERPL-MCNC: 3.9 G/DL (ref 3.4–5)
ALBUMIN/GLOB SERPL: 2.1 {RATIO} (ref 1.1–2.2)
ALP SERPL-CCNC: 56 U/L (ref 40–129)
ALT SERPL-CCNC: 18 U/L (ref 10–40)
AMORPHOUS: PRESENT
ANION GAP SERPL CALCULATED.3IONS-SCNC: 11 MMOL/L (ref 3–16)
APTT BLD: 28.2 SEC (ref 22.7–35.9)
AST SERPL-CCNC: 48 U/L (ref 15–37)
BACTERIA URNS QL MICRO: ABNORMAL /HPF
BASOPHILS # BLD: 0 K/UL (ref 0–0.2)
BASOPHILS NFR BLD: 0.1 %
BILIRUB SERPL-MCNC: 0.6 MG/DL (ref 0–1)
BILIRUB UR QL STRIP.AUTO: NEGATIVE
BLD GP AB SCN SERPL QL: NORMAL
BUN SERPL-MCNC: 17 MG/DL (ref 7–20)
CALCIUM SERPL-MCNC: 8.7 MG/DL (ref 8.3–10.6)
CHLORIDE SERPL-SCNC: 102 MMOL/L (ref 99–110)
CLARITY UR: CLEAR
CO2 SERPL-SCNC: 26 MMOL/L (ref 21–32)
COLOR UR: YELLOW
CREAT SERPL-MCNC: 0.7 MG/DL (ref 0.6–1.2)
DEPRECATED RDW RBC AUTO: 15.5 % (ref 12.4–15.4)
EOSINOPHIL # BLD: 0 K/UL (ref 0–0.6)
EOSINOPHIL NFR BLD: 0.1 %
EPI CELLS #/AREA URNS AUTO: 0 /HPF (ref 0–5)
GFR SERPLBLD CREATININE-BSD FMLA CKD-EPI: >60 ML/MIN/{1.73_M2}
GLUCOSE BLD-MCNC: 163 MG/DL (ref 70–99)
GLUCOSE BLD-MCNC: 193 MG/DL (ref 70–99)
GLUCOSE SERPL-MCNC: 205 MG/DL (ref 70–99)
GLUCOSE UR STRIP.AUTO-MCNC: NEGATIVE MG/DL
HCT VFR BLD AUTO: 42.9 % (ref 36–48)
HGB BLD-MCNC: 13.7 G/DL (ref 12–16)
HGB UR QL STRIP.AUTO: NEGATIVE
HYALINE CASTS #/AREA URNS AUTO: 1 /LPF (ref 0–8)
INR PPP: 0.99 (ref 0.84–1.16)
KETONES UR STRIP.AUTO-MCNC: NEGATIVE MG/DL
LEUKOCYTE ESTERASE UR QL STRIP.AUTO: ABNORMAL
LYMPHOCYTES # BLD: 0.5 K/UL (ref 1–5.1)
LYMPHOCYTES NFR BLD: 3.9 %
MCH RBC QN AUTO: 28.8 PG (ref 26–34)
MCHC RBC AUTO-ENTMCNC: 31.9 G/DL (ref 31–36)
MCV RBC AUTO: 90.2 FL (ref 80–100)
MONOCYTES # BLD: 0.8 K/UL (ref 0–1.3)
MONOCYTES NFR BLD: 5.8 %
NEUTROPHILS # BLD: 11.8 K/UL (ref 1.7–7.7)
NEUTROPHILS NFR BLD: 90.1 %
NITRITE UR QL STRIP.AUTO: NEGATIVE
NT-PROBNP SERPL-MCNC: 182 PG/ML (ref 0–449)
PERFORMED ON: ABNORMAL
PERFORMED ON: ABNORMAL
PH UR STRIP.AUTO: 6.5 [PH] (ref 5–8)
PLATELET # BLD AUTO: 164 K/UL (ref 135–450)
PMV BLD AUTO: 8.3 FL (ref 5–10.5)
POTASSIUM SERPL-SCNC: 5 MMOL/L (ref 3.5–5.1)
PROT SERPL-MCNC: 5.8 G/DL (ref 6.4–8.2)
PROT UR STRIP.AUTO-MCNC: NEGATIVE MG/DL
PROTHROMBIN TIME: 13.1 SEC (ref 11.5–14.8)
RBC # BLD AUTO: 4.76 M/UL (ref 4–5.2)
RBC CLUMPS #/AREA URNS AUTO: 1 /HPF (ref 0–4)
SODIUM SERPL-SCNC: 139 MMOL/L (ref 136–145)
SP GR UR STRIP.AUTO: >=1.03 (ref 1–1.03)
TROPONIN, HIGH SENSITIVITY: 12 NG/L (ref 0–14)
TROPONIN, HIGH SENSITIVITY: 14 NG/L (ref 0–14)
UA COMPLETE W REFLEX CULTURE PNL UR: YES
UA DIPSTICK W REFLEX MICRO PNL UR: YES
URN SPEC COLLECT METH UR: ABNORMAL
UROBILINOGEN UR STRIP-ACNC: 1 E.U./DL
WBC # BLD AUTO: 13.1 K/UL (ref 4–11)
WBC #/AREA URNS AUTO: 61 /HPF (ref 0–5)

## 2023-10-25 PROCEDURE — 73502 X-RAY EXAM HIP UNI 2-3 VIEWS: CPT

## 2023-10-25 PROCEDURE — 70450 CT HEAD/BRAIN W/O DYE: CPT

## 2023-10-25 PROCEDURE — 73700 CT LOWER EXTREMITY W/O DYE: CPT

## 2023-10-25 PROCEDURE — 80053 COMPREHEN METABOLIC PANEL: CPT

## 2023-10-25 PROCEDURE — 81001 URINALYSIS AUTO W/SCOPE: CPT

## 2023-10-25 PROCEDURE — 83880 ASSAY OF NATRIURETIC PEPTIDE: CPT

## 2023-10-25 PROCEDURE — 96375 TX/PRO/DX INJ NEW DRUG ADDON: CPT

## 2023-10-25 PROCEDURE — 51702 INSERT TEMP BLADDER CATH: CPT

## 2023-10-25 PROCEDURE — 72131 CT LUMBAR SPINE W/O DYE: CPT

## 2023-10-25 PROCEDURE — 2580000003 HC RX 258: Performed by: NURSE PRACTITIONER

## 2023-10-25 PROCEDURE — 84484 ASSAY OF TROPONIN QUANT: CPT

## 2023-10-25 PROCEDURE — 6360000002 HC RX W HCPCS: Performed by: PHYSICIAN ASSISTANT

## 2023-10-25 PROCEDURE — 11042 DBRDMT SUBQ TIS 1ST 20SQCM/<: CPT

## 2023-10-25 PROCEDURE — 86900 BLOOD TYPING SEROLOGIC ABO: CPT

## 2023-10-25 PROCEDURE — 87086 URINE CULTURE/COLONY COUNT: CPT

## 2023-10-25 PROCEDURE — 6360000004 HC RX CONTRAST MEDICATION: Performed by: PHYSICIAN ASSISTANT

## 2023-10-25 PROCEDURE — 0JB70ZZ EXCISION OF BACK SUBCUTANEOUS TISSUE AND FASCIA, OPEN APPROACH: ICD-10-PCS | Performed by: SURGERY

## 2023-10-25 PROCEDURE — 85730 THROMBOPLASTIN TIME PARTIAL: CPT

## 2023-10-25 PROCEDURE — 71045 X-RAY EXAM CHEST 1 VIEW: CPT

## 2023-10-25 PROCEDURE — 85025 COMPLETE CBC W/AUTO DIFF WBC: CPT

## 2023-10-25 PROCEDURE — 86850 RBC ANTIBODY SCREEN: CPT

## 2023-10-25 PROCEDURE — 73080 X-RAY EXAM OF ELBOW: CPT

## 2023-10-25 PROCEDURE — 87186 SC STD MICRODIL/AGAR DIL: CPT

## 2023-10-25 PROCEDURE — 93005 ELECTROCARDIOGRAM TRACING: CPT | Performed by: NURSE PRACTITIONER

## 2023-10-25 PROCEDURE — 72125 CT NECK SPINE W/O DYE: CPT

## 2023-10-25 PROCEDURE — 96374 THER/PROPH/DIAG INJ IV PUSH: CPT

## 2023-10-25 PROCEDURE — 11042 DBRDMT SUBQ TIS 1ST 20SQCM/<: CPT | Performed by: SURGERY

## 2023-10-25 PROCEDURE — 6370000000 HC RX 637 (ALT 250 FOR IP): Performed by: NURSE PRACTITIONER

## 2023-10-25 PROCEDURE — 36415 COLL VENOUS BLD VENIPUNCTURE: CPT

## 2023-10-25 PROCEDURE — 85610 PROTHROMBIN TIME: CPT

## 2023-10-25 PROCEDURE — 6360000002 HC RX W HCPCS: Performed by: NURSE PRACTITIONER

## 2023-10-25 PROCEDURE — 87077 CULTURE AEROBIC IDENTIFY: CPT

## 2023-10-25 PROCEDURE — 73030 X-RAY EXAM OF SHOULDER: CPT

## 2023-10-25 PROCEDURE — 99285 EMERGENCY DEPT VISIT HI MDM: CPT

## 2023-10-25 PROCEDURE — 1200000000 HC SEMI PRIVATE

## 2023-10-25 PROCEDURE — 71260 CT THORAX DX C+: CPT

## 2023-10-25 PROCEDURE — 86901 BLOOD TYPING SEROLOGIC RH(D): CPT

## 2023-10-25 PROCEDURE — 93005 ELECTROCARDIOGRAM TRACING: CPT | Performed by: STUDENT IN AN ORGANIZED HEALTH CARE EDUCATION/TRAINING PROGRAM

## 2023-10-25 RX ORDER — CLOBETASOL PROPIONATE 0.5 MG/G
OINTMENT TOPICAL ONCE
OUTPATIENT
Start: 2023-10-25 | End: 2023-10-25

## 2023-10-25 RX ORDER — ENOXAPARIN SODIUM 100 MG/ML
40 INJECTION SUBCUTANEOUS DAILY
Status: DISCONTINUED | OUTPATIENT
Start: 2023-10-26 | End: 2023-11-05 | Stop reason: HOSPADM

## 2023-10-25 RX ORDER — INSULIN GLARGINE 100 [IU]/ML
15 INJECTION, SOLUTION SUBCUTANEOUS NIGHTLY
Status: DISCONTINUED | OUTPATIENT
Start: 2023-10-25 | End: 2023-11-05 | Stop reason: HOSPADM

## 2023-10-25 RX ORDER — GENTAMICIN SULFATE 1 MG/G
OINTMENT TOPICAL ONCE
OUTPATIENT
Start: 2023-10-25 | End: 2023-10-25

## 2023-10-25 RX ORDER — ONDANSETRON 4 MG/1
4 TABLET, ORALLY DISINTEGRATING ORAL EVERY 8 HOURS PRN
Status: DISCONTINUED | OUTPATIENT
Start: 2023-10-25 | End: 2023-11-05 | Stop reason: HOSPADM

## 2023-10-25 RX ORDER — INSULIN LISPRO 100 [IU]/ML
0-4 INJECTION, SOLUTION INTRAVENOUS; SUBCUTANEOUS NIGHTLY
Status: DISCONTINUED | OUTPATIENT
Start: 2023-10-25 | End: 2023-11-05 | Stop reason: HOSPADM

## 2023-10-25 RX ORDER — TRIAMCINOLONE ACETONIDE 1 MG/G
OINTMENT TOPICAL ONCE
OUTPATIENT
Start: 2023-10-25 | End: 2023-10-25

## 2023-10-25 RX ORDER — SODIUM CHLORIDE 9 MG/ML
INJECTION, SOLUTION INTRAVENOUS PRN
Status: DISCONTINUED | OUTPATIENT
Start: 2023-10-25 | End: 2023-11-05 | Stop reason: HOSPADM

## 2023-10-25 RX ORDER — LIDOCAINE HYDROCHLORIDE 20 MG/ML
JELLY TOPICAL ONCE
OUTPATIENT
Start: 2023-10-25 | End: 2023-10-25

## 2023-10-25 RX ORDER — MORPHINE SULFATE 2 MG/ML
2 INJECTION, SOLUTION INTRAMUSCULAR; INTRAVENOUS
Status: DISCONTINUED | OUTPATIENT
Start: 2023-10-25 | End: 2023-10-29

## 2023-10-25 RX ORDER — LIDOCAINE 50 MG/G
OINTMENT TOPICAL ONCE
OUTPATIENT
Start: 2023-10-25 | End: 2023-10-25

## 2023-10-25 RX ORDER — INSULIN LISPRO 100 [IU]/ML
0-4 INJECTION, SOLUTION INTRAVENOUS; SUBCUTANEOUS
Status: DISCONTINUED | OUTPATIENT
Start: 2023-10-26 | End: 2023-11-05 | Stop reason: HOSPADM

## 2023-10-25 RX ORDER — LIDOCAINE 40 MG/G
CREAM TOPICAL ONCE
OUTPATIENT
Start: 2023-10-25 | End: 2023-10-25

## 2023-10-25 RX ORDER — LIDOCAINE 40 MG/G
CREAM TOPICAL ONCE
Status: DISCONTINUED | OUTPATIENT
Start: 2023-10-25 | End: 2023-10-26 | Stop reason: HOSPADM

## 2023-10-25 RX ORDER — DULOXETIN HYDROCHLORIDE 60 MG/1
60 CAPSULE, DELAYED RELEASE ORAL DAILY
Status: DISCONTINUED | OUTPATIENT
Start: 2023-10-26 | End: 2023-11-05 | Stop reason: HOSPADM

## 2023-10-25 RX ORDER — SODIUM CHLOR/HYPOCHLOROUS ACID 0.033 %
SOLUTION, IRRIGATION IRRIGATION ONCE
OUTPATIENT
Start: 2023-10-25 | End: 2023-10-25

## 2023-10-25 RX ORDER — BETAMETHASONE DIPROPIONATE 0.05 %
OINTMENT (GRAM) TOPICAL ONCE
OUTPATIENT
Start: 2023-10-25 | End: 2023-10-25

## 2023-10-25 RX ORDER — HYDROCODONE BITARTRATE AND ACETAMINOPHEN 5; 325 MG/1; MG/1
1 TABLET ORAL EVERY 6 HOURS PRN
Status: DISCONTINUED | OUTPATIENT
Start: 2023-10-25 | End: 2023-11-05 | Stop reason: HOSPADM

## 2023-10-25 RX ORDER — DOCUSATE SODIUM 100 MG/1
100 CAPSULE, LIQUID FILLED ORAL DAILY
Status: DISCONTINUED | OUTPATIENT
Start: 2023-10-26 | End: 2023-10-29

## 2023-10-25 RX ORDER — ATORVASTATIN CALCIUM 20 MG/1
20 TABLET, FILM COATED ORAL NIGHTLY
Status: DISCONTINUED | OUTPATIENT
Start: 2023-10-25 | End: 2023-11-05 | Stop reason: HOSPADM

## 2023-10-25 RX ORDER — IBUPROFEN 200 MG
TABLET ORAL ONCE
OUTPATIENT
Start: 2023-10-25 | End: 2023-10-25

## 2023-10-25 RX ORDER — BACITRACIN ZINC AND POLYMYXIN B SULFATE 500; 1000 [USP'U]/G; [USP'U]/G
OINTMENT TOPICAL ONCE
OUTPATIENT
Start: 2023-10-25 | End: 2023-10-25

## 2023-10-25 RX ORDER — LANOLIN ALCOHOL/MO/W.PET/CERES
3 CREAM (GRAM) TOPICAL NIGHTLY PRN
Status: DISCONTINUED | OUTPATIENT
Start: 2023-10-25 | End: 2023-11-05 | Stop reason: HOSPADM

## 2023-10-25 RX ORDER — LEVOTHYROXINE SODIUM 0.1 MG/1
200 TABLET ORAL DAILY
Status: DISCONTINUED | OUTPATIENT
Start: 2023-10-26 | End: 2023-10-30

## 2023-10-25 RX ORDER — SODIUM CHLORIDE 0.9 % (FLUSH) 0.9 %
5-40 SYRINGE (ML) INJECTION PRN
Status: DISCONTINUED | OUTPATIENT
Start: 2023-10-25 | End: 2023-11-05 | Stop reason: HOSPADM

## 2023-10-25 RX ORDER — GINSENG 100 MG
CAPSULE ORAL ONCE
OUTPATIENT
Start: 2023-10-25 | End: 2023-10-25

## 2023-10-25 RX ORDER — POLYETHYLENE GLYCOL 3350 17 G/17G
17 POWDER, FOR SOLUTION ORAL DAILY PRN
Status: DISCONTINUED | OUTPATIENT
Start: 2023-10-25 | End: 2023-10-29

## 2023-10-25 RX ORDER — DEXTROSE MONOHYDRATE 100 MG/ML
INJECTION, SOLUTION INTRAVENOUS CONTINUOUS PRN
Status: DISCONTINUED | OUTPATIENT
Start: 2023-10-25 | End: 2023-11-05 | Stop reason: HOSPADM

## 2023-10-25 RX ORDER — SODIUM CHLORIDE 0.9 % (FLUSH) 0.9 %
5-40 SYRINGE (ML) INJECTION EVERY 12 HOURS SCHEDULED
Status: DISCONTINUED | OUTPATIENT
Start: 2023-10-25 | End: 2023-11-05 | Stop reason: HOSPADM

## 2023-10-25 RX ORDER — LIDOCAINE HYDROCHLORIDE 40 MG/ML
SOLUTION TOPICAL ONCE
OUTPATIENT
Start: 2023-10-25 | End: 2023-10-25

## 2023-10-25 RX ORDER — MORPHINE SULFATE 4 MG/ML
4 INJECTION, SOLUTION INTRAMUSCULAR; INTRAVENOUS ONCE
Status: COMPLETED | OUTPATIENT
Start: 2023-10-25 | End: 2023-10-25

## 2023-10-25 RX ORDER — ONDANSETRON 2 MG/ML
4 INJECTION INTRAMUSCULAR; INTRAVENOUS ONCE
Status: COMPLETED | OUTPATIENT
Start: 2023-10-25 | End: 2023-10-25

## 2023-10-25 RX ORDER — ACETAMINOPHEN 325 MG/1
650 TABLET ORAL EVERY 6 HOURS PRN
Status: DISCONTINUED | OUTPATIENT
Start: 2023-10-25 | End: 2023-11-05 | Stop reason: HOSPADM

## 2023-10-25 RX ORDER — ONDANSETRON 2 MG/ML
4 INJECTION INTRAMUSCULAR; INTRAVENOUS EVERY 6 HOURS PRN
Status: DISCONTINUED | OUTPATIENT
Start: 2023-10-25 | End: 2023-11-05 | Stop reason: HOSPADM

## 2023-10-25 RX ORDER — ACETAMINOPHEN 650 MG/1
650 SUPPOSITORY RECTAL EVERY 6 HOURS PRN
Status: DISCONTINUED | OUTPATIENT
Start: 2023-10-25 | End: 2023-11-05 | Stop reason: HOSPADM

## 2023-10-25 RX ADMIN — IOPAMIDOL 75 ML: 755 INJECTION, SOLUTION INTRAVENOUS at 19:19

## 2023-10-25 RX ADMIN — MORPHINE SULFATE 2 MG: 2 INJECTION, SOLUTION INTRAMUSCULAR; INTRAVENOUS at 23:41

## 2023-10-25 RX ADMIN — CEFTRIAXONE SODIUM 1000 MG: 1 INJECTION, POWDER, FOR SOLUTION INTRAMUSCULAR; INTRAVENOUS at 23:41

## 2023-10-25 RX ADMIN — Medication 10 ML: at 22:00

## 2023-10-25 RX ADMIN — INSULIN GLARGINE 15 UNITS: 100 INJECTION, SOLUTION SUBCUTANEOUS at 23:50

## 2023-10-25 RX ADMIN — ONDANSETRON 4 MG: 2 INJECTION INTRAMUSCULAR; INTRAVENOUS at 20:15

## 2023-10-25 RX ADMIN — MORPHINE SULFATE 4 MG: 4 INJECTION, SOLUTION INTRAMUSCULAR; INTRAVENOUS at 20:15

## 2023-10-25 RX ADMIN — ATORVASTATIN CALCIUM 20 MG: 20 TABLET, FILM COATED ORAL at 23:41

## 2023-10-25 ASSESSMENT — ENCOUNTER SYMPTOMS
CHEST TIGHTNESS: 0
ABDOMINAL PAIN: 0
VOMITING: 0
BACK PAIN: 0
COUGH: 0
CONSTIPATION: 0
COLOR CHANGE: 0
DIARRHEA: 0
RESPIRATORY NEGATIVE: 1
NAUSEA: 0
SHORTNESS OF BREATH: 0
PHOTOPHOBIA: 0

## 2023-10-25 ASSESSMENT — LIFESTYLE VARIABLES
HOW MANY STANDARD DRINKS CONTAINING ALCOHOL DO YOU HAVE ON A TYPICAL DAY: PATIENT DOES NOT DRINK
HOW OFTEN DO YOU HAVE A DRINK CONTAINING ALCOHOL: NEVER

## 2023-10-25 ASSESSMENT — PAIN - FUNCTIONAL ASSESSMENT
PAIN_FUNCTIONAL_ASSESSMENT: PREVENTS OR INTERFERES SOME ACTIVE ACTIVITIES AND ADLS
PAIN_FUNCTIONAL_ASSESSMENT: 0-10

## 2023-10-25 ASSESSMENT — PATIENT HEALTH QUESTIONNAIRE - PHQ9
SUM OF ALL RESPONSES TO PHQ QUESTIONS 1-9: 0
1. LITTLE INTEREST OR PLEASURE IN DOING THINGS: 0
SUM OF ALL RESPONSES TO PHQ QUESTIONS 1-9: 0
SUM OF ALL RESPONSES TO PHQ9 QUESTIONS 1 & 2: 0
2. FEELING DOWN, DEPRESSED OR HOPELESS: 0
SUM OF ALL RESPONSES TO PHQ QUESTIONS 1-9: 0
SUM OF ALL RESPONSES TO PHQ QUESTIONS 1-9: 0

## 2023-10-25 ASSESSMENT — PAIN DESCRIPTION - ORIENTATION: ORIENTATION: RIGHT

## 2023-10-25 ASSESSMENT — PAIN SCALES - GENERAL
PAINLEVEL_OUTOF10: 9
PAINLEVEL_OUTOF10: 9
PAINLEVEL_OUTOF10: 8
PAINLEVEL_OUTOF10: 8

## 2023-10-25 ASSESSMENT — PAIN DESCRIPTION - FREQUENCY: FREQUENCY: CONTINUOUS

## 2023-10-25 ASSESSMENT — PAIN DESCRIPTION - PAIN TYPE: TYPE: ACUTE PAIN

## 2023-10-25 ASSESSMENT — PAIN DESCRIPTION - ONSET: ONSET: ON-GOING

## 2023-10-25 ASSESSMENT — PAIN DESCRIPTION - LOCATION: LOCATION: HIP

## 2023-10-25 ASSESSMENT — PAIN DESCRIPTION - DESCRIPTORS: DESCRIPTORS: SHARP

## 2023-10-25 NOTE — PATIENT INSTRUCTIONS
[address], [address] [facility]  Telephone: [phone]     Discharge Instructions     Important reminders:     **If you have any signs and symptoms of illness (Cough, fever, congestion, nausea, vomiting, diarrhea, etc.) please call the wound care center prior to your appointment. 1. Increase Protein intake for optimal wound healing  2. No added salt to reduce any swelling  3. If diabetic, maintain good glucose control  4. If you smoke, smoking prohibits wound healing, we ask that you refrain from smoking. 5. When taking antibiotics take the entire prescription as ordered. Do not stop taking until medication is all gone unless otherwise instructed. 6. Exercise as tolerated. 7. Keep weight off wounds and reposition every 2 hours if applicable. 8. If wound(s) is on your lower extremity, elevate legs to the level of the heart or above for 30 minutes 4-5 times a day and/or when sitting. Avoid standing for long periods of time. 9. Do not get wounds wet in bath or shower unless otherwise instructed by your physician. If your wound is on your foot or leg, you may purchase a cast bag. Please ask at the pharmacy. If Vascular testing is ordered, please call [address] [place] [address] [phone]) to schedule. Vascular tests ordered by Wound Care Physicians may take up to 2 hours to complete. Please keep that in mind when scheduling. If Vascular testing is scheduled, please bring supplies to replace your dressing after testing is done. The vascular department does not stock supplies. Wound: Coccyx     With each dressing change, rinse wounds with 0.9% Saline. (May use wound wash or soft contact solution. Both can be purchased at a local drug store). If unable to obtain saline, may use a gentle soap and water. Dressing care: Ana M, Mepilex border or Kerramx Gentle Border- change Monday, Wednesday, & Friday.  Turn & reposition every 1-2 hours and as

## 2023-10-25 NOTE — PROGRESS NOTES
[address] [facility],Third Floor Progress and Procedure Note    Gracie Wheeler  AGE: 74 y.o. GENDER: female    : 1950  TODAY'S DATE: 10/25/2023    Chief Complaint   Patient presents with    Wound Check     coccyx        History of Present Illness     Gracie Wheeler is a 74 y.o. female who presents today for wound evaluation. History of Wound: pressure and diabetic wound located on the sacral decubitus ulcer stage 4 . Wound Pain:  mild  Severity: 2/10   Wound Type: pressure and diabetic  Modifying Factors:   diabetes, poor glucose control, chronic pressure, decreased mobility, shear force, obesity, malnutrition, and chronic back pain  Associated Signs/Symptoms:  pain    Past Medical History:   Diagnosis Date    Arthritis     back    Cataracts, bilateral     CHF (congestive heart failure) ([facility])     Diabetes mellitus ([address])     Glaucoma     Hyperlipidemia     Hypertension     Poor mobility 2022    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 Thyroid disease      Past Surgical History:   Procedure Laterality Date    CARPAL TUNNEL RELEASE      bilateral    CHOLECYSTECTOMY      COLONOSCOPY      ELBOW SURGERY      ENDOSCOPY, COLON, DIAGNOSTIC      EYE SURGERY      FOOT SURGERY      SHOULDER SURGERY       Current Outpatient Medications   Medication Sig Dispense Refill    gentamicin (GARAMYCIN) 0.1 % cream Apply topically daily. 30 g 1    cephALEXin (KEFLEX) 500 MG capsule Take 1 capsule by mouth 4 times daily 40 capsule 0    miconazole (MICOTIN) 2 % powder Apply topically 2 times daily to abdominal folds. (Patient not taking: Reported on 10/25/2023) 45 g 1    traMADol (ULTRAM) 50 MG tablet Take 1 tablet by mouth 3 times daily as needed for Pain.       DULoxetine (CYMBALTA) 60 MG extended release capsule Take 1 capsule by mouth daily 30 capsule 3    Travoprost, BAK Free, (TRAVATAN Z) 0.004 % SOLN ophthalmic solution

## 2023-10-25 NOTE — PLAN OF CARE
Discharge instructions given. Patient verbalized understanding. Return to [address] [place] [address] in 2 week(s).

## 2023-10-26 ENCOUNTER — ANESTHESIA EVENT (OUTPATIENT)
Dept: OPERATING ROOM | Age: 73
End: 2023-10-26
Payer: MEDICARE

## 2023-10-26 ENCOUNTER — ANESTHESIA (OUTPATIENT)
Dept: OPERATING ROOM | Age: 73
End: 2023-10-26
Payer: MEDICARE

## 2023-10-26 ENCOUNTER — APPOINTMENT (OUTPATIENT)
Dept: GENERAL RADIOLOGY | Age: 73
DRG: 463 | End: 2023-10-26
Payer: MEDICARE

## 2023-10-26 LAB
ALBUMIN SERPL-MCNC: 3.5 G/DL (ref 3.4–5)
ALBUMIN/GLOB SERPL: 1.5 {RATIO} (ref 1.1–2.2)
ALP SERPL-CCNC: 47 U/L (ref 40–129)
ALT SERPL-CCNC: 18 U/L (ref 10–40)
ANION GAP SERPL CALCULATED.3IONS-SCNC: 9 MMOL/L (ref 3–16)
AST SERPL-CCNC: 43 U/L (ref 15–37)
BASOPHILS # BLD: 0 K/UL (ref 0–0.2)
BASOPHILS NFR BLD: 0.4 %
BILIRUB SERPL-MCNC: 0.6 MG/DL (ref 0–1)
BUN SERPL-MCNC: 13 MG/DL (ref 7–20)
CALCIUM SERPL-MCNC: 8.7 MG/DL (ref 8.3–10.6)
CHLORIDE SERPL-SCNC: 104 MMOL/L (ref 99–110)
CO2 SERPL-SCNC: 27 MMOL/L (ref 21–32)
CREAT SERPL-MCNC: 0.5 MG/DL (ref 0.6–1.2)
DEPRECATED RDW RBC AUTO: 15.6 % (ref 12.4–15.4)
EKG ATRIAL RATE: 95 BPM
EKG ATRIAL RATE: 95 BPM
EKG DIAGNOSIS: NORMAL
EKG DIAGNOSIS: NORMAL
EKG P AXIS: 33 DEGREES
EKG P AXIS: 77 DEGREES
EKG P-R INTERVAL: 172 MS
EKG P-R INTERVAL: 176 MS
EKG Q-T INTERVAL: 354 MS
EKG Q-T INTERVAL: 356 MS
EKG QRS DURATION: 72 MS
EKG QRS DURATION: 74 MS
EKG QTC CALCULATION (BAZETT): 444 MS
EKG QTC CALCULATION (BAZETT): 447 MS
EKG R AXIS: 27 DEGREES
EKG R AXIS: 41 DEGREES
EKG T AXIS: 54 DEGREES
EKG T AXIS: 68 DEGREES
EKG VENTRICULAR RATE: 95 BPM
EKG VENTRICULAR RATE: 95 BPM
EOSINOPHIL # BLD: 0 K/UL (ref 0–0.6)
EOSINOPHIL NFR BLD: 0.2 %
GFR SERPLBLD CREATININE-BSD FMLA CKD-EPI: >60 ML/MIN/{1.73_M2}
GLUCOSE BLD-MCNC: 141 MG/DL (ref 70–99)
GLUCOSE BLD-MCNC: 147 MG/DL (ref 70–99)
GLUCOSE BLD-MCNC: 162 MG/DL (ref 70–99)
GLUCOSE BLD-MCNC: 199 MG/DL (ref 70–99)
GLUCOSE BLD-MCNC: 261 MG/DL (ref 70–99)
GLUCOSE BLD-MCNC: 339 MG/DL (ref 70–99)
GLUCOSE SERPL-MCNC: 170 MG/DL (ref 70–99)
HCT VFR BLD AUTO: 42.1 % (ref 36–48)
HGB BLD-MCNC: 13.4 G/DL (ref 12–16)
LYMPHOCYTES # BLD: 0.5 K/UL (ref 1–5.1)
LYMPHOCYTES NFR BLD: 5.7 %
MCH RBC QN AUTO: 29.1 PG (ref 26–34)
MCHC RBC AUTO-ENTMCNC: 31.9 G/DL (ref 31–36)
MCV RBC AUTO: 91.2 FL (ref 80–100)
MONOCYTES # BLD: 0.7 K/UL (ref 0–1.3)
MONOCYTES NFR BLD: 8.3 %
NEUTROPHILS # BLD: 7.6 K/UL (ref 1.7–7.7)
NEUTROPHILS NFR BLD: 85.4 %
PERFORMED ON: ABNORMAL
PLATELET # BLD AUTO: 140 K/UL (ref 135–450)
PMV BLD AUTO: 7.8 FL (ref 5–10.5)
POTASSIUM SERPL-SCNC: 4.9 MMOL/L (ref 3.5–5.1)
PROT SERPL-MCNC: 5.8 G/DL (ref 6.4–8.2)
RBC # BLD AUTO: 4.61 M/UL (ref 4–5.2)
SODIUM SERPL-SCNC: 140 MMOL/L (ref 136–145)
WBC # BLD AUTO: 8.9 K/UL (ref 4–11)

## 2023-10-26 PROCEDURE — 3600000004 HC SURGERY LEVEL 4 BASE: Performed by: ORTHOPAEDIC SURGERY

## 2023-10-26 PROCEDURE — 6360000002 HC RX W HCPCS: Performed by: NURSE PRACTITIONER

## 2023-10-26 PROCEDURE — 99223 1ST HOSP IP/OBS HIGH 75: CPT | Performed by: ORTHOPAEDIC SURGERY

## 2023-10-26 PROCEDURE — C1769 GUIDE WIRE: HCPCS | Performed by: ORTHOPAEDIC SURGERY

## 2023-10-26 PROCEDURE — C1713 ANCHOR/SCREW BN/BN,TIS/BN: HCPCS | Performed by: ORTHOPAEDIC SURGERY

## 2023-10-26 PROCEDURE — 80053 COMPREHEN METABOLIC PANEL: CPT

## 2023-10-26 PROCEDURE — 6360000002 HC RX W HCPCS: Performed by: NURSE ANESTHETIST, CERTIFIED REGISTERED

## 2023-10-26 PROCEDURE — 6370000000 HC RX 637 (ALT 250 FOR IP): Performed by: NURSE PRACTITIONER

## 2023-10-26 PROCEDURE — 2580000003 HC RX 258: Performed by: NURSE PRACTITIONER

## 2023-10-26 PROCEDURE — 6370000000 HC RX 637 (ALT 250 FOR IP): Performed by: NURSE ANESTHETIST, CERTIFIED REGISTERED

## 2023-10-26 PROCEDURE — 2580000003 HC RX 258: Performed by: NURSE ANESTHETIST, CERTIFIED REGISTERED

## 2023-10-26 PROCEDURE — 1200000000 HC SEMI PRIVATE

## 2023-10-26 PROCEDURE — 2580000003 HC RX 258: Performed by: ORTHOPAEDIC SURGERY

## 2023-10-26 PROCEDURE — 93010 ELECTROCARDIOGRAM REPORT: CPT | Performed by: INTERNAL MEDICINE

## 2023-10-26 PROCEDURE — 3700000000 HC ANESTHESIA ATTENDED CARE: Performed by: ORTHOPAEDIC SURGERY

## 2023-10-26 PROCEDURE — 7100000001 HC PACU RECOVERY - ADDTL 15 MIN: Performed by: ORTHOPAEDIC SURGERY

## 2023-10-26 PROCEDURE — 3600000014 HC SURGERY LEVEL 4 ADDTL 15MIN: Performed by: ORTHOPAEDIC SURGERY

## 2023-10-26 PROCEDURE — 2500000003 HC RX 250 WO HCPCS: Performed by: NURSE ANESTHETIST, CERTIFIED REGISTERED

## 2023-10-26 PROCEDURE — A4217 STERILE WATER/SALINE, 500 ML: HCPCS | Performed by: ORTHOPAEDIC SURGERY

## 2023-10-26 PROCEDURE — 2709999900 HC NON-CHARGEABLE SUPPLY: Performed by: ORTHOPAEDIC SURGERY

## 2023-10-26 PROCEDURE — 85025 COMPLETE CBC W/AUTO DIFF WBC: CPT

## 2023-10-26 PROCEDURE — 0QS634Z REPOSITION RIGHT UPPER FEMUR WITH INTERNAL FIXATION DEVICE, PERCUTANEOUS APPROACH: ICD-10-PCS | Performed by: ORTHOPAEDIC SURGERY

## 2023-10-26 PROCEDURE — 6360000002 HC RX W HCPCS: Performed by: ORTHOPAEDIC SURGERY

## 2023-10-26 PROCEDURE — 36415 COLL VENOUS BLD VENIPUNCTURE: CPT

## 2023-10-26 PROCEDURE — 73502 X-RAY EXAM HIP UNI 2-3 VIEWS: CPT

## 2023-10-26 PROCEDURE — 6370000000 HC RX 637 (ALT 250 FOR IP): Performed by: INTERNAL MEDICINE

## 2023-10-26 PROCEDURE — 3700000001 HC ADD 15 MINUTES (ANESTHESIA): Performed by: ORTHOPAEDIC SURGERY

## 2023-10-26 PROCEDURE — 7100000000 HC PACU RECOVERY - FIRST 15 MIN: Performed by: ORTHOPAEDIC SURGERY

## 2023-10-26 PROCEDURE — 27235 TREAT THIGH FRACTURE: CPT | Performed by: ORTHOPAEDIC SURGERY

## 2023-10-26 DEVICE — CANNULATED SCREW
Type: IMPLANTABLE DEVICE | Site: FEMUR | Status: FUNCTIONAL
Brand: ASNIS

## 2023-10-26 RX ORDER — ALBUTEROL SULFATE 90 UG/1
AEROSOL, METERED RESPIRATORY (INHALATION) PRN
Status: DISCONTINUED | OUTPATIENT
Start: 2023-10-26 | End: 2023-10-26 | Stop reason: SDUPTHER

## 2023-10-26 RX ORDER — ONDANSETRON 2 MG/ML
INJECTION INTRAMUSCULAR; INTRAVENOUS PRN
Status: DISCONTINUED | OUTPATIENT
Start: 2023-10-26 | End: 2023-10-26 | Stop reason: SDUPTHER

## 2023-10-26 RX ORDER — TRAVOPROST OPHTHALMIC SOLUTION 0.04 MG/ML
1 SOLUTION OPHTHALMIC NIGHTLY
Status: DISCONTINUED | OUTPATIENT
Start: 2023-10-26 | End: 2023-11-05 | Stop reason: HOSPADM

## 2023-10-26 RX ORDER — SODIUM CHLORIDE 9 MG/ML
INJECTION, SOLUTION INTRAVENOUS CONTINUOUS PRN
Status: DISCONTINUED | OUTPATIENT
Start: 2023-10-26 | End: 2023-10-26 | Stop reason: SDUPTHER

## 2023-10-26 RX ORDER — ACETAMINOPHEN 325 MG/1
650 TABLET ORAL 3 TIMES DAILY
Status: DISCONTINUED | OUTPATIENT
Start: 2023-10-26 | End: 2023-11-05 | Stop reason: HOSPADM

## 2023-10-26 RX ORDER — MAGNESIUM HYDROXIDE 1200 MG/15ML
LIQUID ORAL CONTINUOUS PRN
Status: COMPLETED | OUTPATIENT
Start: 2023-10-26 | End: 2023-10-26

## 2023-10-26 RX ORDER — TIMOLOL 5 MG/ML
1 SOLUTION/ DROPS OPHTHALMIC 2 TIMES DAILY
Status: DISCONTINUED | OUTPATIENT
Start: 2023-10-26 | End: 2023-11-05 | Stop reason: HOSPADM

## 2023-10-26 RX ORDER — CARBOXYMETHYLCELLULOSE SODIUM 10 MG/ML
GEL OPHTHALMIC PRN
Status: DISCONTINUED | OUTPATIENT
Start: 2023-10-26 | End: 2023-10-26 | Stop reason: SDUPTHER

## 2023-10-26 RX ORDER — SUCCINYLCHOLINE/SOD CL,ISO/PF 200MG/10ML
SYRINGE (ML) INTRAVENOUS PRN
Status: DISCONTINUED | OUTPATIENT
Start: 2023-10-26 | End: 2023-10-26 | Stop reason: SDUPTHER

## 2023-10-26 RX ORDER — ROCURONIUM BROMIDE 10 MG/ML
INJECTION, SOLUTION INTRAVENOUS PRN
Status: DISCONTINUED | OUTPATIENT
Start: 2023-10-26 | End: 2023-10-26 | Stop reason: SDUPTHER

## 2023-10-26 RX ORDER — GLIMEPIRIDE 2 MG/1
1 TABLET ORAL 2 TIMES DAILY
Status: DISCONTINUED | OUTPATIENT
Start: 2023-10-26 | End: 2023-10-26

## 2023-10-26 RX ORDER — DEXAMETHASONE SODIUM PHOSPHATE 4 MG/ML
INJECTION, SOLUTION INTRA-ARTICULAR; INTRALESIONAL; INTRAMUSCULAR; INTRAVENOUS; SOFT TISSUE PRN
Status: DISCONTINUED | OUTPATIENT
Start: 2023-10-26 | End: 2023-10-26 | Stop reason: SDUPTHER

## 2023-10-26 RX ORDER — KETAMINE HCL IN NACL, ISO-OSM 100MG/10ML
SYRINGE (ML) INJECTION PRN
Status: DISCONTINUED | OUTPATIENT
Start: 2023-10-26 | End: 2023-10-26 | Stop reason: SDUPTHER

## 2023-10-26 RX ORDER — LIDOCAINE HYDROCHLORIDE 20 MG/ML
INJECTION, SOLUTION EPIDURAL; INFILTRATION; INTRACAUDAL; PERINEURAL PRN
Status: DISCONTINUED | OUTPATIENT
Start: 2023-10-26 | End: 2023-10-26 | Stop reason: SDUPTHER

## 2023-10-26 RX ORDER — PROPOFOL 10 MG/ML
INJECTION, EMULSION INTRAVENOUS PRN
Status: DISCONTINUED | OUTPATIENT
Start: 2023-10-26 | End: 2023-10-26 | Stop reason: SDUPTHER

## 2023-10-26 RX ORDER — FENTANYL CITRATE 50 UG/ML
INJECTION, SOLUTION INTRAMUSCULAR; INTRAVENOUS PRN
Status: DISCONTINUED | OUTPATIENT
Start: 2023-10-26 | End: 2023-10-26 | Stop reason: SDUPTHER

## 2023-10-26 RX ORDER — BUPIVACAINE HYDROCHLORIDE 5 MG/ML
INJECTION, SOLUTION EPIDURAL; INTRACAUDAL
Status: COMPLETED | OUTPATIENT
Start: 2023-10-26 | End: 2023-10-26

## 2023-10-26 RX ADMIN — DEXAMETHASONE SODIUM PHOSPHATE 8 MG: 4 INJECTION, SOLUTION INTRAMUSCULAR; INTRAVENOUS at 15:04

## 2023-10-26 RX ADMIN — Medication 10 ML: at 21:36

## 2023-10-26 RX ADMIN — FENTANYL CITRATE 50 MCG: 50 INJECTION, SOLUTION INTRAMUSCULAR; INTRAVENOUS at 15:34

## 2023-10-26 RX ADMIN — ROCURONIUM BROMIDE 20 MG: 10 INJECTION, SOLUTION INTRAVENOUS at 14:55

## 2023-10-26 RX ADMIN — CEFTRIAXONE SODIUM 1000 MG: 1 INJECTION, POWDER, FOR SOLUTION INTRAMUSCULAR; INTRAVENOUS at 23:53

## 2023-10-26 RX ADMIN — Medication 120 MG: at 14:47

## 2023-10-26 RX ADMIN — LEVOTHYROXINE SODIUM 200 MCG: 0.1 TABLET ORAL at 06:09

## 2023-10-26 RX ADMIN — SODIUM CHLORIDE: 9 INJECTION, SOLUTION INTRAVENOUS at 14:42

## 2023-10-26 RX ADMIN — INSULIN GLARGINE 15 UNITS: 100 INJECTION, SOLUTION SUBCUTANEOUS at 22:29

## 2023-10-26 RX ADMIN — Medication 10 ML: at 08:15

## 2023-10-26 RX ADMIN — ALBUTEROL SULFATE 8 PUFF: 90 AEROSOL, METERED RESPIRATORY (INHALATION) at 15:00

## 2023-10-26 RX ADMIN — ACETAMINOPHEN 650 MG: 325 TABLET ORAL at 17:42

## 2023-10-26 RX ADMIN — ACETAMINOPHEN 650 MG: 325 TABLET ORAL at 21:11

## 2023-10-26 RX ADMIN — ATORVASTATIN CALCIUM 20 MG: 20 TABLET, FILM COATED ORAL at 21:11

## 2023-10-26 RX ADMIN — Medication 1 TABLET: at 22:29

## 2023-10-26 RX ADMIN — TRAVOPROST OPHTHALMIC SOLUTION 1 DROP: 0.04 SOLUTION OPHTHALMIC at 22:29

## 2023-10-26 RX ADMIN — HYDROCODONE BITARTRATE AND ACETAMINOPHEN 1 TABLET: 5; 325 TABLET ORAL at 06:09

## 2023-10-26 RX ADMIN — SUGAMMADEX 200 MG: 100 INJECTION, SOLUTION INTRAVENOUS at 15:20

## 2023-10-26 RX ADMIN — LIDOCAINE HYDROCHLORIDE 60 MG: 20 INJECTION, SOLUTION EPIDURAL; INFILTRATION; INTRACAUDAL; PERINEURAL at 14:47

## 2023-10-26 RX ADMIN — CARBOXYMETHYLCELLULOSE SODIUM 1 DROP: 10 GEL OPHTHALMIC at 14:49

## 2023-10-26 RX ADMIN — ENOXAPARIN SODIUM 40 MG: 100 INJECTION SUBCUTANEOUS at 21:11

## 2023-10-26 RX ADMIN — ONDANSETRON 4 MG: 2 INJECTION INTRAMUSCULAR; INTRAVENOUS at 15:17

## 2023-10-26 RX ADMIN — Medication 20 MG: at 15:00

## 2023-10-26 RX ADMIN — Medication 10 MG: at 15:15

## 2023-10-26 RX ADMIN — TIMOLOL 1 DROP: 5 SOLUTION/ DROPS OPHTHALMIC at 22:29

## 2023-10-26 RX ADMIN — PROPOFOL 100 MG: 10 INJECTION, EMULSION INTRAVENOUS at 14:47

## 2023-10-26 RX ADMIN — FENTANYL CITRATE 50 MCG: 50 INJECTION, SOLUTION INTRAMUSCULAR; INTRAVENOUS at 14:47

## 2023-10-26 RX ADMIN — Medication 20 MG: at 15:34

## 2023-10-26 RX ADMIN — PROPOFOL 40 MG: 10 INJECTION, EMULSION INTRAVENOUS at 15:00

## 2023-10-26 ASSESSMENT — PAIN SCALES - GENERAL
PAINLEVEL_OUTOF10: 0
PAINLEVEL_OUTOF10: 0
PAINLEVEL_OUTOF10: 6

## 2023-10-26 ASSESSMENT — PAIN SCALES - WONG BAKER
WONGBAKER_NUMERICALRESPONSE: 0

## 2023-10-26 ASSESSMENT — PAIN - FUNCTIONAL ASSESSMENT: PAIN_FUNCTIONAL_ASSESSMENT: 0-10

## 2023-10-26 ASSESSMENT — LIFESTYLE VARIABLES: SMOKING_STATUS: 0

## 2023-10-26 NOTE — PLAN OF CARE
[facility] Orthopedic Surgery  Consult Note          Orthopedic Consult, full note to follow. Glenna Lopez 74 y.o. admitted for a fall with right hip pain. CT scan and Xray reviewed, and showed impacted right femoral neck fracture. Plan:  - Recommend right hip pinning.  - Surgery tomorrow if medically stable. Thank you very much for the kind consultation and allowing me to participate in this patient's care. I will continue to keep you apprised of her progress.          Xena Solis MD, 10/25/2023 9:13 PM

## 2023-10-26 NOTE — DISCHARGE INSTR - COC
Continuity of Care Form    Patient Name: Bea Ramos   :  1950  MRN:  [id number]    Admit date:  10/25/2023  Discharge date:  ***    Code Status Order: Full Code   Advance Directives:     Admitting Physician:  Colton Rodriguez MD  PCP: Erika Hankins MD    Discharging Nurse: [facility] Unit/Room#: 5MK-4361/8653-55  Discharging Unit Phone Number: ***    Emergency Contact:   Extended Emergency Contact Information  Primary Emergency Contact: [facility] Phone: [phone]  Mobile Phone: [phone]  Relation: Other  Secondary Emergency Contact: [address]  Mobile Phone: [phone]  Relation: Niece/Nephew    Past Surgical History:  Past Surgical History:   Procedure Laterality Date    CARPAL TUNNEL RELEASE      bilateral    CHOLECYSTECTOMY      COLONOSCOPY      ELBOW SURGERY      ENDOSCOPY, COLON, DIAGNOSTIC      EYE SURGERY      FOOT SURGERY      SHOULDER SURGERY         Immunization History:   Immunization History   Administered Date(s) Administered    Influenza, FLUARIX, Angela Shoulder (age 10 mo+) AND AFLURIA, (age 1 y+), PF, 0.5mL 10/02/2020    PPD Test 2019    TDaP, ADACEL (age 6y-58y), [facility] (age 10y+), IM, 0.5mL 2022       Active Problems:  Patient Active Problem List   Diagnosis Code    WALLER (dyspnea on exertion) R06.09    Abnormal CT of the chest R93.89    SIRS (systemic inflammatory response syndrome) ([facility]) R65.10    Sepsis ([address]) A41.9    Sacral wound, initial encounter S31.000A    Pressure 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, stage 3 ([facility]) L89.43    Poor mobility Z74.09    Stage 3 skin ulcer of sacral region [facility]) L98.429    Hypotension I95.9    Type 2 diabetes mellitus with hyperglycemia, with long-term current use of insulin ([facility]) E11.65, Z79.4    Pneumonia due to COVID-19 virus U07.1, J12.82    Hypoxia R09.02    COVID U07.1    Decubitus ulcer of sacral region, stage 4 ([facility]) L89.154    Closed right hip fracture, initial

## 2023-10-26 NOTE — H&P
abnormality. CT CERVICAL SPINE WO CONTRAST    Result Date: 10/25/2023  EXAMINATION: CT OF THE CERVICAL SPINE WITHOUT CONTRAST 10/25/2023 5:58 pm TECHNIQUE: CT of the cervical spine was performed without the administration of intravenous contrast. Multiplanar reformatted images are provided for review. Automated exposure control, iterative reconstruction, and/or weight based adjustment of the mA/kV was utilized to reduce the radiation dose to as low as reasonably achievable. COMPARISON: None. HISTORY: ORDERING SYSTEM PROVIDED HISTORY: fall TECHNOLOGIST PROVIDED HISTORY: Reason for exam:->fall Decision Support Exception - unselect if not a suspected or confirmed emergency medical condition->Emergency Medical Condition (MA) Reason for Exam: Fall (From store via [facility] EMS cc fall. Patient has neuropathy, went to step down and right leg \"just gave out\". C/o pain in right elbow, right shoulder, right hip, and right side of head. Endorses head strike, denies LOC, denies blood thinner use. EMS reports blood glucose 43 in transport, given oral glucose with  after intervention. EMS reports SPO2 86% during transport, pt does not wear O2 at baseline.). FINDINGS: BONES/ALIGNMENT: There is mild disc space narrowing throughout with prominent marginal osteophytes. No fracture or subluxation is seen. The atlantoaxial joint is intact. There is mild bony deformity and sclerosis along the head of the clavicle on the right which is probably due to old trauma with no acute fracture seen in the area. DEGENERATIVE CHANGES: There are prominent linear calcifications along the posterior longitudinal ligament from C2 through C4 causing moderately severe anterior dural sac compression and probable mild cord effacement throughout. The posterior elements are intact.   There is mild disc osteophyte complexes with calcifications of the ligament posteriorly at C4-5 and C5-6 causing moderate anterior dural sac effacement

## 2023-10-26 NOTE — BRIEF OP NOTE
Brief Postoperative Note      Patient: Eula Betancur  YOB: 1950  MRN: [id number]    Date of Procedure: 10/26/2023    Pre-Op Diagnosis Codes:     * Closed subcapital fracture of right femur, initial encounter ([facility]) [S72.011A]    Post-Op Diagnosis: Same       Procedure(s):  CLOSED REDUCTION PERCUTANEOUS PINNING OF RIGHT FEMORAL NECK USING CANNULATED SCREWS    Surgeon(s):  Ronan Guadarrama MD    Assistant:  Surgical Assistant: Jorge Luis VARELA    Anesthesia: General    Estimated Blood Loss (mL): Minimal    Complications: None    Specimens:   * No specimens in log *    Implants:  Implant Name Type Inv. Item Serial No.  Lot No. LRB No. Used Action   SCREW BNE L80MM DIA6.5MM THRD L20MM STD CANC ZEESHAN TI ST - [id number]  SCREW BNE L80MM DIA6.5MM THRD L20MM STD CANC ZEESHAN TI ST  [employer] [facility]  Right 1 Implanted   SCREW BNE L85MM DIA6.5MM ST CANC ZEESHAN TI SELF DRL ST [facility] - [id number]  SCREW BNE L85MM DIA6.5MM ST CANC ZEESHAN TI SELF DRL ST AUGUST  [employer] [facility]  Right 2 Implanted         Drains:   Urinary Catheter 10/25/23 Cline (Active)   $ Urethral catheter insertion Inserted for procedure 10/26/23 0810   Catheter Indications Perioperative use for selected surgical procedures;Prolonged immobilization (e.g. unstable thoracic or lumbar spine, multiple traumatic injuries such as pelvic fractures) 10/26/23 0810   Site Assessment No urethral drainage 10/26/23 0810   Urine Color Yellow 10/26/23 0810   Urine Appearance Clear 10/26/23 0810   Urine Odor Other (Comment) 10/26/23 0810   Collection Container Standard 10/26/23 0810   Securement Method Securing device (Describe) 10/26/23 0810   Catheter Care  Perineal wipes 10/25/23 2256   Catheter Best Practices  Drainage tube clipped to bed;Catheter secured to thigh; Tamper seal intact; Bag below bladder;Bag not on floor; Lack of dependent loop in tubing;Drainage bag less than half full 10/26/23 0810   Status Draining 10/26/23 0810

## 2023-10-26 NOTE — ED PROVIDER NOTES
[facility]        Pt Name: Mukul Farmer  MRN: [id number]  [address] [facility] [place] 1950  Date of evaluation: 10/25/2023  Provider: JANN Sherman  PCP: Kay Huynh MD  Note Started: 9:12 PM EDT 10/25/23      VENKATESH. I have evaluated this patient. CHIEF COMPLAINT       Chief Complaint   Patient presents with    Fall     From store via [facility] EMS cc fall. Patient has neuropathy, went to step down and right leg \"just gave out\". C/o pain in right elbow, right shoulder, right hip, and right side of head. Endorses head strike, denies LOC, denies blood thinner use. EMS reports blood glucose 43 in transport, given oral glucose with  after intervention. EMS reports SPO2 86% during transport, pt does not wear O2 at baseline. HISTORY OF PRESENT ILLNESS: 1 or more Elements     History From: Patient  Limitations to history : None    Mukul Farmre is a 74 y.o. female with past medical history of diabetes, chronic sacral wound who presents to the ED with complaint of a fall. Patient arrives from home by EMS. She reports she has neuropathy. Patient states she was at [address] [place] [address]. States she went to step down and her right leg gave out. She fell onto her right side. Complaining of edema and abrasions to right elbow. Also has pain to her right shoulder and right hip. She reports she hit her head. She denies any loss of conscious. Denies blood thinners. She went home after she fell with family. Apparently is having difficulty getting around at home so they called EMS and they brought her to the ED for further evaluation treatment. Blood sugar apparently was 43 in route by EMS and she was given oral glucose. Patient's glucose much improved here in the emergency department. Oxygen was noted to be 86 to 88% in transport. She does not wear oxygen at home.   States she did wear oxygen about a year ago when she had

## 2023-10-26 NOTE — ED NOTES
ED TO INPATIENT SBAR HANDOFF    Patient Name: Jania Mark   :  1950  74 y.o. Preferred Name  Maximo Juares  Family/Caregiver Present yes   Restraints no   C-SSRS: Risk of Suicide: No Risk  Sitter no   Sepsis Risk Score Sepsis Risk Score: 7.69      Situation  Chief Complaint   Patient presents with    Fall     From store via [facility] cc fall. Patient has neuropathy, went to step down and right leg \"just gave out\". C/o pain in right elbow, right shoulder, right hip, and right side of head. Endorses head strike, denies LOC, denies blood thinner use. EMS reports blood glucose 43 in transport, given oral glucose with  after intervention. EMS reports SPO2 86% during transport, pt does not wear O2 at baseline. Brief Description of Patient's Condition: Pt presents to the ED from home after a fall. Pt states \"her leg just gave out. \" Pt is alert and oriented. Has been on bed rest while in ED. Cline placed and urine sample sent. Niece in from out of town, note placed with niece information. Mental Status: oriented and alert  Arrived from: home    Imaging:   CT CHEST PULMONARY EMBOLISM W CONTRAST   Final Result   No pulmonary embolism. No acute abnormality of the chest and thoracic spine. Main pulmonary artery remains enlarged, compatible with pulmonary   hypertension. CT THORACIC SPINE BONY RECONSTRUCTION   Final Result   No pulmonary embolism. No acute abnormality of the chest and thoracic spine. Main pulmonary artery remains enlarged, compatible with pulmonary   hypertension. CT HIP RIGHT WO CONTRAST   Final Result   1. Acute and mildly impacted subcapital right femoral neck fracture. 2. Osteopenia. 3. Mild osteoarthritis of the hips.          CT LUMBAR SPINE WO CONTRAST   Final Result   Moderate degenerative disc changes throughout with no acute abnormality seen      Mild subluxation of L4 on L5 which appears degenerative in etiology with and   associated small

## 2023-10-26 NOTE — ED NOTES
[facility] [phone]    Pt would like Niece to get any updates.       Chacorta Menjivar RN  10/25/23 2111

## 2023-10-26 NOTE — OP NOTE
was still in good  anatomic position. At this point, we have the right hip and the lower  extremity prepped and draped in a regular sterile routine fashion. A  time-out was called confirming the patient name, the site, and the  procedure. An incision was made over the proximal femur and we placed three pins  across the femoral neck into the femoral head. All three pins were  confirmed with the C-arm to be in good position. Appropriate size  cannulated screws were placed, two in the upper part of the neck and one  in the lower part. All of these screws were with good purchase. The  pins were then removed and confirmed that all three screws were in good  position within the femoral head with no articular penetration. At this  point, we irrigated the incision copiously with normal saline. We  closed the incision with a 2-0 and a 3-0 Vicryl and the skin with the  staples. Dressing was then applied in the form of Mepilex. The patient tolerated the procedure well and was taken to the recovery  in a stable condition. POSTOPERATIVE PLAN:  The patient will be readmitted as an inpatient. We  will start PT and OT with 25% partial weightbearing on the right lower  extremity for six weeks. Then, she may need a rehab placement.         iKnsey Naidu MD    D: 10/26/2023 16:39:02       T: 10/26/2023 17:20:46     [email or web address]  Job#: [id number]     Doc#: [id number]    CC:  Aislinn Mccloud MD

## 2023-10-26 NOTE — PLAN OF CARE
Problem: Discharge Planning  Goal: Discharge to home or other facility with appropriate resources  10/26/2023 0958 by Cindy Lopez RN  Outcome: Progressing  10/26/2023 0440 by Hortencia Montaño RN  Outcome: Progressing     Problem: Pain  Goal: Verbalizes/displays adequate comfort level or baseline comfort level  10/26/2023 0958 by Cindy Lopez RN  Outcome: Progressing  10/26/2023 0440 by Hortencia Montaño RN  Outcome: Progressing     Problem: Skin/Tissue Integrity  Goal: Absence of new skin breakdown  Description: 1. Monitor for areas of redness and/or skin breakdown  2. Assess vascular access sites hourly  3. Every 4-6 hours minimum:  Change oxygen saturation probe site  4. Every 4-6 hours:  If on nasal continuous positive airway pressure, respiratory therapy assess nares and determine need for appliance change or resting period.   Outcome: Progressing     Problem: ABCDS Injury Assessment  Goal: Absence of physical injury  Outcome: Progressing     Problem: Safety - Adult  Goal: Free from fall injury  Outcome: Progressing     Problem: Chronic Conditions and Co-morbidities  Goal: Patient's chronic conditions and co-morbidity symptoms are monitored and maintained or improved  Outcome: Progressing

## 2023-10-27 LAB
ALBUMIN SERPL-MCNC: 3.6 G/DL (ref 3.4–5)
ALBUMIN/GLOB SERPL: 1.4 {RATIO} (ref 1.1–2.2)
ALP SERPL-CCNC: 53 U/L (ref 40–129)
ALT SERPL-CCNC: 16 U/L (ref 10–40)
ANION GAP SERPL CALCULATED.3IONS-SCNC: 10 MMOL/L (ref 3–16)
AST SERPL-CCNC: 19 U/L (ref 15–37)
BASOPHILS # BLD: 0 K/UL (ref 0–0.2)
BASOPHILS NFR BLD: 0.5 %
BILIRUB SERPL-MCNC: 0.6 MG/DL (ref 0–1)
BUN SERPL-MCNC: 16 MG/DL (ref 7–20)
CALCIUM SERPL-MCNC: 9.5 MG/DL (ref 8.3–10.6)
CHLORIDE SERPL-SCNC: 103 MMOL/L (ref 99–110)
CO2 SERPL-SCNC: 27 MMOL/L (ref 21–32)
CREAT SERPL-MCNC: 0.6 MG/DL (ref 0.6–1.2)
DEPRECATED RDW RBC AUTO: 15.2 % (ref 12.4–15.4)
EOSINOPHIL # BLD: 0 K/UL (ref 0–0.6)
EOSINOPHIL NFR BLD: 0.1 %
GFR SERPLBLD CREATININE-BSD FMLA CKD-EPI: >60 ML/MIN/{1.73_M2}
GLUCOSE BLD-MCNC: 143 MG/DL (ref 70–99)
GLUCOSE BLD-MCNC: 151 MG/DL (ref 70–99)
GLUCOSE BLD-MCNC: 179 MG/DL (ref 70–99)
GLUCOSE BLD-MCNC: 186 MG/DL (ref 70–99)
GLUCOSE SERPL-MCNC: 180 MG/DL (ref 70–99)
HCT VFR BLD AUTO: 40.7 % (ref 36–48)
HGB BLD-MCNC: 13.3 G/DL (ref 12–16)
LYMPHOCYTES # BLD: 0.4 K/UL (ref 1–5.1)
LYMPHOCYTES NFR BLD: 4.7 %
MAGNESIUM SERPL-MCNC: 2.1 MG/DL (ref 1.8–2.4)
MCH RBC QN AUTO: 29.4 PG (ref 26–34)
MCHC RBC AUTO-ENTMCNC: 32.6 G/DL (ref 31–36)
MCV RBC AUTO: 90.2 FL (ref 80–100)
MONOCYTES # BLD: 0.3 K/UL (ref 0–1.3)
MONOCYTES NFR BLD: 3.6 %
NEUTROPHILS # BLD: 7.1 K/UL (ref 1.7–7.7)
NEUTROPHILS NFR BLD: 91.1 %
NT-PROBNP SERPL-MCNC: 581 PG/ML (ref 0–449)
PERFORMED ON: ABNORMAL
PHOSPHATE SERPL-MCNC: 3.5 MG/DL (ref 2.5–4.9)
PLATELET # BLD AUTO: 128 K/UL (ref 135–450)
PMV BLD AUTO: 7.7 FL (ref 5–10.5)
POTASSIUM SERPL-SCNC: 4.7 MMOL/L (ref 3.5–5.1)
PROT SERPL-MCNC: 6.1 G/DL (ref 6.4–8.2)
RBC # BLD AUTO: 4.52 M/UL (ref 4–5.2)
SODIUM SERPL-SCNC: 140 MMOL/L (ref 136–145)
WBC # BLD AUTO: 7.8 K/UL (ref 4–11)

## 2023-10-27 PROCEDURE — 2580000003 HC RX 258: Performed by: NURSE PRACTITIONER

## 2023-10-27 PROCEDURE — 1200000000 HC SEMI PRIVATE

## 2023-10-27 PROCEDURE — 80053 COMPREHEN METABOLIC PANEL: CPT

## 2023-10-27 PROCEDURE — 97530 THERAPEUTIC ACTIVITIES: CPT

## 2023-10-27 PROCEDURE — 6370000000 HC RX 637 (ALT 250 FOR IP): Performed by: NURSE PRACTITIONER

## 2023-10-27 PROCEDURE — 83735 ASSAY OF MAGNESIUM: CPT

## 2023-10-27 PROCEDURE — APPNB45 APP NON BILLABLE 31-45 MINUTES: Performed by: NURSE PRACTITIONER

## 2023-10-27 PROCEDURE — 6360000002 HC RX W HCPCS: Performed by: INTERNAL MEDICINE

## 2023-10-27 PROCEDURE — 83880 ASSAY OF NATRIURETIC PEPTIDE: CPT

## 2023-10-27 PROCEDURE — 83036 HEMOGLOBIN GLYCOSYLATED A1C: CPT

## 2023-10-27 PROCEDURE — 97535 SELF CARE MNGMENT TRAINING: CPT

## 2023-10-27 PROCEDURE — 97166 OT EVAL MOD COMPLEX 45 MIN: CPT

## 2023-10-27 PROCEDURE — 6370000000 HC RX 637 (ALT 250 FOR IP): Performed by: INTERNAL MEDICINE

## 2023-10-27 PROCEDURE — 36415 COLL VENOUS BLD VENIPUNCTURE: CPT

## 2023-10-27 PROCEDURE — 6360000002 HC RX W HCPCS: Performed by: NURSE PRACTITIONER

## 2023-10-27 PROCEDURE — 97162 PT EVAL MOD COMPLEX 30 MIN: CPT

## 2023-10-27 PROCEDURE — 85025 COMPLETE CBC W/AUTO DIFF WBC: CPT

## 2023-10-27 PROCEDURE — 97116 GAIT TRAINING THERAPY: CPT

## 2023-10-27 PROCEDURE — 84100 ASSAY OF PHOSPHORUS: CPT

## 2023-10-27 RX ORDER — HYDROCODONE BITARTRATE AND ACETAMINOPHEN 5; 325 MG/1; MG/1
1 TABLET ORAL EVERY 6 HOURS PRN
Qty: 25 TABLET | Refills: 0 | Status: SHIPPED | OUTPATIENT
Start: 2023-10-27 | End: 2023-11-03

## 2023-10-27 RX ORDER — IPRATROPIUM BROMIDE AND ALBUTEROL SULFATE 2.5; .5 MG/3ML; MG/3ML
1 SOLUTION RESPIRATORY (INHALATION) EVERY 4 HOURS PRN
Status: DISCONTINUED | OUTPATIENT
Start: 2023-10-27 | End: 2023-11-04

## 2023-10-27 RX ORDER — ASPIRIN 325 MG
325 TABLET ORAL DAILY
Qty: 30 TABLET | Refills: 0 | Status: ON HOLD | OUTPATIENT
Start: 2023-10-27 | End: 2023-11-26

## 2023-10-27 RX ORDER — IPRATROPIUM BROMIDE AND ALBUTEROL SULFATE 2.5; .5 MG/3ML; MG/3ML
1 SOLUTION RESPIRATORY (INHALATION)
Status: DISCONTINUED | OUTPATIENT
Start: 2023-10-27 | End: 2023-10-27

## 2023-10-27 RX ORDER — FUROSEMIDE 10 MG/ML
40 INJECTION INTRAMUSCULAR; INTRAVENOUS ONCE
Status: COMPLETED | OUTPATIENT
Start: 2023-10-27 | End: 2023-10-27

## 2023-10-27 RX ADMIN — ACETAMINOPHEN 650 MG: 325 TABLET ORAL at 21:43

## 2023-10-27 RX ADMIN — FUROSEMIDE 40 MG: 10 INJECTION, SOLUTION INTRAMUSCULAR; INTRAVENOUS at 11:54

## 2023-10-27 RX ADMIN — TRAVOPROST OPHTHALMIC SOLUTION 1 DROP: 0.04 SOLUTION OPHTHALMIC at 23:16

## 2023-10-27 RX ADMIN — DOCUSATE SODIUM 100 MG: 100 CAPSULE, LIQUID FILLED ORAL at 07:46

## 2023-10-27 RX ADMIN — SODIUM CHLORIDE 20 ML: 9 INJECTION, SOLUTION INTRAVENOUS at 23:31

## 2023-10-27 RX ADMIN — DULOXETINE HYDROCHLORIDE 60 MG: 60 CAPSULE, DELAYED RELEASE ORAL at 07:46

## 2023-10-27 RX ADMIN — ATORVASTATIN CALCIUM 20 MG: 20 TABLET, FILM COATED ORAL at 21:44

## 2023-10-27 RX ADMIN — HYDROCODONE BITARTRATE AND ACETAMINOPHEN 1 TABLET: 5; 325 TABLET ORAL at 12:47

## 2023-10-27 RX ADMIN — TIMOLOL 1 DROP: 5 SOLUTION/ DROPS OPHTHALMIC at 23:16

## 2023-10-27 RX ADMIN — ACETAMINOPHEN 650 MG: 325 TABLET ORAL at 07:46

## 2023-10-27 RX ADMIN — ENOXAPARIN SODIUM 40 MG: 100 INJECTION SUBCUTANEOUS at 07:47

## 2023-10-27 RX ADMIN — CEFTRIAXONE SODIUM 1000 MG: 1 INJECTION, POWDER, FOR SOLUTION INTRAMUSCULAR; INTRAVENOUS at 23:34

## 2023-10-27 RX ADMIN — Medication 10 ML: at 11:56

## 2023-10-27 RX ADMIN — Medication 1 TABLET: at 23:14

## 2023-10-27 RX ADMIN — INSULIN GLARGINE 15 UNITS: 100 INJECTION, SOLUTION SUBCUTANEOUS at 21:44

## 2023-10-27 RX ADMIN — Medication 1 TABLET: at 11:54

## 2023-10-27 RX ADMIN — ACETAMINOPHEN 650 MG: 325 TABLET ORAL at 16:06

## 2023-10-27 RX ADMIN — LEVOTHYROXINE SODIUM 200 MCG: 0.1 TABLET ORAL at 05:27

## 2023-10-27 RX ADMIN — Medication 10 ML: at 21:47

## 2023-10-27 RX ADMIN — TIMOLOL 1 DROP: 5 SOLUTION/ DROPS OPHTHALMIC at 07:48

## 2023-10-27 ASSESSMENT — PAIN DESCRIPTION - ORIENTATION
ORIENTATION: RIGHT
ORIENTATION: RIGHT

## 2023-10-27 ASSESSMENT — PAIN DESCRIPTION - DESCRIPTORS
DESCRIPTORS: ACHING;DISCOMFORT
DESCRIPTORS: ACHING
DESCRIPTORS: ACHING

## 2023-10-27 ASSESSMENT — PAIN - FUNCTIONAL ASSESSMENT: PAIN_FUNCTIONAL_ASSESSMENT: PREVENTS OR INTERFERES SOME ACTIVE ACTIVITIES AND ADLS

## 2023-10-27 ASSESSMENT — PAIN DESCRIPTION - LOCATION
LOCATION: SHOULDER
LOCATION: HIP
LOCATION: ELBOW;SHOULDER

## 2023-10-27 ASSESSMENT — PAIN SCALES - GENERAL
PAINLEVEL_OUTOF10: 4
PAINLEVEL_OUTOF10: 7
PAINLEVEL_OUTOF10: 3

## 2023-10-27 NOTE — CONSULTS
[facility] Orthopedic Surgery  Consult Note         This patient is seen in consultation at the request of CARLA Betancur CNP    Reason for Consult:  Right hip fracture. CHIEF COMPLAINT:  Right hip pain. History Obtained From:  patient, electronic medical record    HISTORY OF PRESENT ILLNESS:    Ms. Bliss Nose 74 y.o.  female seen today for consultation and evaluation of a right hip pain which occurred when she fell. She is complaining of right hip pain. This is better with rest and worse with bearing any wt. The pain is sharp and not radiating. No numbness or tingling sensation. No other complaint. She was seen 1st at [facility], came via EMS, where she was x-rayed and I was consulted. 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 Thyroid disease        Past Surgical History:        Procedure Laterality Date    CARPAL TUNNEL RELEASE      bilateral    CHOLECYSTECTOMY      COLONOSCOPY      ELBOW SURGERY      ENDOSCOPY, COLON, DIAGNOSTIC      EYE SURGERY      FOOT SURGERY      SHOULDER SURGERY         Medications prior to admission:   Prior to Admission medications    Medication Sig Start Date End Date Taking? Authorizing Provider   gentamicin (GARAMYCIN) 0.1 % cream Apply topically daily. Patient not taking: Reported on 10/25/2023 9/14/22   Niko Conley MD   cephALEXin [facility]) 500 MG capsule Take 1 capsule by mouth 4 times daily 9/6/22   Tina Aquino PA-C   miconazole (MICOTIN) 2 % powder Apply topically 2 times daily to abdominal folds. Patient taking differently: Apply topically as needed Apply topically 2 times daily to abdominal folds.  7/22/22   Shirley Lauren MD   traMADol Sully Matos) 50 MG
CANNULATED SCREWS performed by Angie Daniel MD at [email or web address]         Family History   Problem Relation Age of Onset    No Known Problems Mother     No Known Problems Father        Social History     Socioeconomic History    Marital status: Single     Spouse name: None    Number of children: None    Years of education: None    Highest education level: None   Tobacco Use    Smoking status: Never    Smokeless tobacco: Never   Vaping Use    Vaping Use: Never used   Substance and Sexual Activity    Alcohol use: Not Currently    Drug use: Never    Sexual activity: Never           REVIEW OF SYSTEMS:   CONSTITUTIONAL: negative for fevers, chills, diaphoresis, appetite change, night sweats, unexpected weight change, fatigue  EYES: negative for blurred vision, eye discharge, visual disturbance and icterus; + chronic vision impairment (baseline 20/200)  HEENT: negative for hearing loss, tinnitus, ear drainage, sinus pressure, nasal congestion, epistaxis and snoring  RESPIRATORY: Negative for hemoptysis, cough, sputum production  CARDIOVASCULAR: negative for chest pain, palpitations, exertional chest pressure/discomfort, syncope, edema  GASTROINTESTINAL: negative for nausea, vomiting, diarrhea, blood in stool, abdominal pain, constipation  GENITOURINARY: negative for frequency, dysuria, urinary incontinence, decreased urine volume, and hematuria  HEMATOLOGIC/LYMPHATIC: negative for easy bruising, bleeding and lymphadenopathy  ALLERGIC/IMMUNOLOGIC: negative for recurrent infections, angioedema, anaphylaxis and drug reactions  ENDOCRINE: negative for weight changes and diabetic symptoms including polyuria, polydipsia and polyphagia  MUSCULOSKELETAL: refer to HPI  NEUROLOGICAL: negative for headaches, slurred speech, unilateral weakness  PSYCHIATRIC/BEHAVIORAL: negative for hallucinations, behavioral problems, agitation, confusion.     Physical Examination:  Vitals: Patient Vitals for the past 24 hrs:   BP Temp

## 2023-10-28 LAB
BACTERIA UR CULT: ABNORMAL
EST. AVERAGE GLUCOSE BLD GHB EST-MCNC: 119.8 MG/DL
GLUCOSE BLD-MCNC: 128 MG/DL (ref 70–99)
GLUCOSE BLD-MCNC: 154 MG/DL (ref 70–99)
GLUCOSE BLD-MCNC: 174 MG/DL (ref 70–99)
GLUCOSE BLD-MCNC: 97 MG/DL (ref 70–99)
HBA1C MFR BLD: 5.8 %
ORGANISM: ABNORMAL
PERFORMED ON: ABNORMAL
PERFORMED ON: NORMAL

## 2023-10-28 PROCEDURE — 6370000000 HC RX 637 (ALT 250 FOR IP): Performed by: INTERNAL MEDICINE

## 2023-10-28 PROCEDURE — 6360000002 HC RX W HCPCS: Performed by: NURSE PRACTITIONER

## 2023-10-28 PROCEDURE — 6370000000 HC RX 637 (ALT 250 FOR IP): Performed by: NURSE PRACTITIONER

## 2023-10-28 PROCEDURE — 2580000003 HC RX 258: Performed by: NURSE PRACTITIONER

## 2023-10-28 PROCEDURE — 97535 SELF CARE MNGMENT TRAINING: CPT

## 2023-10-28 PROCEDURE — 97530 THERAPEUTIC ACTIVITIES: CPT

## 2023-10-28 PROCEDURE — 97116 GAIT TRAINING THERAPY: CPT

## 2023-10-28 PROCEDURE — 1200000000 HC SEMI PRIVATE

## 2023-10-28 RX ORDER — LIDOCAINE 4 G/G
1 PATCH TOPICAL DAILY
Status: DISCONTINUED | OUTPATIENT
Start: 2023-10-28 | End: 2023-10-29

## 2023-10-28 RX ADMIN — DOCUSATE SODIUM 100 MG: 100 CAPSULE, LIQUID FILLED ORAL at 09:40

## 2023-10-28 RX ADMIN — Medication 10 ML: at 10:07

## 2023-10-28 RX ADMIN — ACETAMINOPHEN 650 MG: 325 TABLET ORAL at 15:15

## 2023-10-28 RX ADMIN — ENOXAPARIN SODIUM 40 MG: 100 INJECTION SUBCUTANEOUS at 09:40

## 2023-10-28 RX ADMIN — Medication 10 ML: at 21:25

## 2023-10-28 RX ADMIN — ACETAMINOPHEN 650 MG: 325 TABLET ORAL at 21:24

## 2023-10-28 RX ADMIN — TRAVOPROST OPHTHALMIC SOLUTION 1 DROP: 0.04 SOLUTION OPHTHALMIC at 21:22

## 2023-10-28 RX ADMIN — TIMOLOL 1 DROP: 5 SOLUTION/ DROPS OPHTHALMIC at 09:41

## 2023-10-28 RX ADMIN — DULOXETINE HYDROCHLORIDE 60 MG: 60 CAPSULE, DELAYED RELEASE ORAL at 09:40

## 2023-10-28 RX ADMIN — ACETAMINOPHEN 650 MG: 325 TABLET ORAL at 09:40

## 2023-10-28 RX ADMIN — TIMOLOL 1 DROP: 5 SOLUTION/ DROPS OPHTHALMIC at 21:23

## 2023-10-28 RX ADMIN — MORPHINE SULFATE 2 MG: 2 INJECTION, SOLUTION INTRAMUSCULAR; INTRAVENOUS at 21:29

## 2023-10-28 RX ADMIN — Medication 1 TABLET: at 21:28

## 2023-10-28 RX ADMIN — HYDROCODONE BITARTRATE AND ACETAMINOPHEN 1 TABLET: 5; 325 TABLET ORAL at 06:50

## 2023-10-28 RX ADMIN — Medication 1 TABLET: at 09:40

## 2023-10-28 RX ADMIN — ATORVASTATIN CALCIUM 20 MG: 20 TABLET, FILM COATED ORAL at 21:24

## 2023-10-28 RX ADMIN — LEVOTHYROXINE SODIUM 200 MCG: 0.1 TABLET ORAL at 06:04

## 2023-10-28 RX ADMIN — INSULIN GLARGINE 15 UNITS: 100 INJECTION, SOLUTION SUBCUTANEOUS at 21:25

## 2023-10-28 ASSESSMENT — PAIN DESCRIPTION - ORIENTATION: ORIENTATION: RIGHT

## 2023-10-28 ASSESSMENT — PAIN SCALES - GENERAL
PAINLEVEL_OUTOF10: 7
PAINLEVEL_OUTOF10: 8
PAINLEVEL_OUTOF10: 10

## 2023-10-28 ASSESSMENT — PAIN DESCRIPTION - LOCATION: LOCATION: SHOULDER

## 2023-10-28 ASSESSMENT — PAIN DESCRIPTION - DESCRIPTORS: DESCRIPTORS: ACHING

## 2023-10-28 NOTE — PLAN OF CARE
Problem: Discharge Planning  Goal: Discharge to home or other facility with appropriate resources  10/28/2023 1222 by Pacheco Lui RN  Outcome: Progressing     Problem: Pain  Goal: Verbalizes/displays adequate comfort level or baseline comfort level  10/28/2023 1222 by Pacheco Lui RN  Outcome: Progressing     Problem: Skin/Tissue Integrity  Goal: Absence of new skin breakdown  Description: 1. Monitor for areas of redness and/or skin breakdown  2. Assess vascular access sites hourly  3. Every 4-6 hours minimum:  Change oxygen saturation probe site  4. Every 4-6 hours:  If on nasal continuous positive airway pressure, respiratory therapy assess nares and determine need for appliance change or resting period.   10/28/2023 1222 by Pacheco Lui RN  Outcome: Progressing     Problem: ABCDS Injury Assessment  Goal: Absence of physical injury  10/28/2023 1222 by Pacheco Lui RN  Outcome: Progressing     Problem: Safety - Adult  Goal: Free from fall injury  10/28/2023 1222 by Pacheco Lui RN  Outcome: Progressing     Problem: Chronic Conditions and Co-morbidities  Goal: Patient's chronic conditions and co-morbidity symptoms are monitored and maintained or improved  10/28/2023 1222 by Pacheco Lui RN  Outcome: Progressing

## 2023-10-29 LAB
GLUCOSE BLD-MCNC: 131 MG/DL (ref 70–99)
GLUCOSE BLD-MCNC: 139 MG/DL (ref 70–99)
GLUCOSE BLD-MCNC: 140 MG/DL (ref 70–99)
GLUCOSE BLD-MCNC: 199 MG/DL (ref 70–99)
PERFORMED ON: ABNORMAL

## 2023-10-29 PROCEDURE — 6370000000 HC RX 637 (ALT 250 FOR IP): Performed by: NURSE PRACTITIONER

## 2023-10-29 PROCEDURE — 1200000000 HC SEMI PRIVATE

## 2023-10-29 PROCEDURE — 97530 THERAPEUTIC ACTIVITIES: CPT

## 2023-10-29 PROCEDURE — 6370000000 HC RX 637 (ALT 250 FOR IP): Performed by: INTERNAL MEDICINE

## 2023-10-29 PROCEDURE — 6360000002 HC RX W HCPCS: Performed by: NURSE PRACTITIONER

## 2023-10-29 PROCEDURE — 2580000003 HC RX 258: Performed by: NURSE PRACTITIONER

## 2023-10-29 RX ORDER — CYCLOBENZAPRINE HCL 10 MG
5 TABLET ORAL 3 TIMES DAILY PRN
Status: DISCONTINUED | OUTPATIENT
Start: 2023-10-29 | End: 2023-11-05 | Stop reason: HOSPADM

## 2023-10-29 RX ORDER — POLYETHYLENE GLYCOL 3350 17 G/17G
17 POWDER, FOR SOLUTION ORAL DAILY
Status: DISCONTINUED | OUTPATIENT
Start: 2023-10-29 | End: 2023-11-05 | Stop reason: HOSPADM

## 2023-10-29 RX ORDER — GABAPENTIN 100 MG/1
100 CAPSULE ORAL 3 TIMES DAILY
Status: DISCONTINUED | OUTPATIENT
Start: 2023-10-29 | End: 2023-11-05 | Stop reason: HOSPADM

## 2023-10-29 RX ORDER — SENNA AND DOCUSATE SODIUM 50; 8.6 MG/1; MG/1
2 TABLET, FILM COATED ORAL DAILY
Status: DISCONTINUED | OUTPATIENT
Start: 2023-10-29 | End: 2023-11-05 | Stop reason: HOSPADM

## 2023-10-29 RX ORDER — CEFDINIR 300 MG/1
300 CAPSULE ORAL EVERY 12 HOURS SCHEDULED
Status: COMPLETED | OUTPATIENT
Start: 2023-10-29 | End: 2023-10-31

## 2023-10-29 RX ADMIN — ATORVASTATIN CALCIUM 20 MG: 20 TABLET, FILM COATED ORAL at 20:28

## 2023-10-29 RX ADMIN — CEFTRIAXONE SODIUM 1000 MG: 1 INJECTION, POWDER, FOR SOLUTION INTRAMUSCULAR; INTRAVENOUS at 00:32

## 2023-10-29 RX ADMIN — GABAPENTIN 100 MG: 100 CAPSULE ORAL at 17:19

## 2023-10-29 RX ADMIN — Medication 10 ML: at 09:01

## 2023-10-29 RX ADMIN — DICLOFENAC SODIUM 2 G: 10 GEL TOPICAL at 20:28

## 2023-10-29 RX ADMIN — DOCUSATE SODIUM 100 MG: 100 CAPSULE, LIQUID FILLED ORAL at 08:52

## 2023-10-29 RX ADMIN — INSULIN GLARGINE 15 UNITS: 100 INJECTION, SOLUTION SUBCUTANEOUS at 20:28

## 2023-10-29 RX ADMIN — CEFDINIR 300 MG: 300 CAPSULE ORAL at 12:10

## 2023-10-29 RX ADMIN — ACETAMINOPHEN 650 MG: 325 TABLET ORAL at 20:27

## 2023-10-29 RX ADMIN — DICLOFENAC SODIUM 2 G: 10 GEL TOPICAL at 17:18

## 2023-10-29 RX ADMIN — LEVOTHYROXINE SODIUM 200 MCG: 0.1 TABLET ORAL at 06:08

## 2023-10-29 RX ADMIN — Medication 1 TABLET: at 20:36

## 2023-10-29 RX ADMIN — TIMOLOL 1 DROP: 5 SOLUTION/ DROPS OPHTHALMIC at 08:56

## 2023-10-29 RX ADMIN — Medication 10 ML: at 20:29

## 2023-10-29 RX ADMIN — TIMOLOL 1 DROP: 5 SOLUTION/ DROPS OPHTHALMIC at 20:35

## 2023-10-29 RX ADMIN — MELATONIN TAB 3 MG 3 MG: 3 TAB at 03:45

## 2023-10-29 RX ADMIN — HYDROCODONE BITARTRATE AND ACETAMINOPHEN 1 TABLET: 5; 325 TABLET ORAL at 08:52

## 2023-10-29 RX ADMIN — Medication 1 TABLET: at 08:56

## 2023-10-29 RX ADMIN — STANDARDIZED SENNA CONCENTRATE AND DOCUSATE SODIUM 2 TABLET: 8.6; 5 TABLET ORAL at 12:10

## 2023-10-29 RX ADMIN — TRAVOPROST OPHTHALMIC SOLUTION 1 DROP: 0.04 SOLUTION OPHTHALMIC at 20:30

## 2023-10-29 RX ADMIN — DULOXETINE HYDROCHLORIDE 60 MG: 60 CAPSULE, DELAYED RELEASE ORAL at 08:52

## 2023-10-29 RX ADMIN — MORPHINE SULFATE 2 MG: 2 INJECTION, SOLUTION INTRAMUSCULAR; INTRAVENOUS at 03:45

## 2023-10-29 RX ADMIN — ACETAMINOPHEN 650 MG: 325 TABLET ORAL at 17:19

## 2023-10-29 RX ADMIN — GABAPENTIN 100 MG: 100 CAPSULE ORAL at 20:27

## 2023-10-29 RX ADMIN — POLYETHYLENE GLYCOL 3350 17 G: 17 POWDER, FOR SOLUTION ORAL at 12:10

## 2023-10-29 RX ADMIN — CEFDINIR 300 MG: 300 CAPSULE ORAL at 22:42

## 2023-10-29 RX ADMIN — ENOXAPARIN SODIUM 40 MG: 100 INJECTION SUBCUTANEOUS at 08:53

## 2023-10-29 RX ADMIN — ACETAMINOPHEN 650 MG: 325 TABLET ORAL at 08:52

## 2023-10-29 ASSESSMENT — PAIN DESCRIPTION - DESCRIPTORS
DESCRIPTORS: ACHING

## 2023-10-29 ASSESSMENT — PAIN DESCRIPTION - ORIENTATION
ORIENTATION: RIGHT
ORIENTATION: RIGHT

## 2023-10-29 ASSESSMENT — PAIN DESCRIPTION - LOCATION
LOCATION: SHOULDER
LOCATION: SHOULDER
LOCATION: SHOULDER;HEAD

## 2023-10-29 ASSESSMENT — PAIN SCALES - GENERAL
PAINLEVEL_OUTOF10: 5
PAINLEVEL_OUTOF10: 9
PAINLEVEL_OUTOF10: 7

## 2023-10-29 ASSESSMENT — PAIN DESCRIPTION - FREQUENCY
FREQUENCY: CONTINUOUS
FREQUENCY: CONTINUOUS

## 2023-10-29 ASSESSMENT — PAIN DESCRIPTION - PAIN TYPE
TYPE: ACUTE PAIN

## 2023-10-29 ASSESSMENT — PAIN - FUNCTIONAL ASSESSMENT: PAIN_FUNCTIONAL_ASSESSMENT: PREVENTS OR INTERFERES SOME ACTIVE ACTIVITIES AND ADLS

## 2023-10-29 ASSESSMENT — PAIN DESCRIPTION - ONSET: ONSET: ON-GOING

## 2023-10-29 NOTE — PLAN OF CARE
Problem: Discharge Planning  Goal: Discharge to home or other facility with appropriate resources  10/28/2023 2227 by Ernie Hernandez RN  Outcome: Progressing  10/28/2023 1222 by Freda Guzman RN  Outcome: Progressing     Problem: Pain  Goal: Verbalizes/displays adequate comfort level or baseline comfort level  10/28/2023 2227 by Ernie Hernandez RN  Outcome: Progressing  10/28/2023 1222 by Freda Guzman RN  Outcome: Progressing     Problem: Skin/Tissue Integrity  Goal: Absence of new skin breakdown  Description: 1. Monitor for areas of redness and/or skin breakdown  2. Assess vascular access sites hourly  3. Every 4-6 hours minimum:  Change oxygen saturation probe site  4. Every 4-6 hours:  If on nasal continuous positive airway pressure, respiratory therapy assess nares and determine need for appliance change or resting period.   10/28/2023 2227 by Ernie Hernandez RN  Outcome: Progressing  10/28/2023 1222 by Freda Guzman RN  Outcome: Progressing     Problem: ABCDS Injury Assessment  Goal: Absence of physical injury  10/28/2023 2227 by Ernie Hernandez RN  Outcome: Progressing  10/28/2023 1222 by Freda Guzman RN  Outcome: Progressing     Problem: Safety - Adult  Goal: Free from fall injury  10/28/2023 2227 by Ernie Hernandez RN  Outcome: Progressing  10/28/2023 1222 by Freda Guzman RN  Outcome: Progressing     Problem: Chronic Conditions and Co-morbidities  Goal: Patient's chronic conditions and co-morbidity symptoms are monitored and maintained or improved  10/28/2023 2227 by Ernie Hernandez RN  Outcome: Progressing  10/28/2023 1222 by Freda Guzman RN  Outcome: Progressing

## 2023-10-30 LAB
ALBUMIN SERPL-MCNC: 3.2 G/DL (ref 3.4–5)
ALBUMIN/GLOB SERPL: 1.4 {RATIO} (ref 1.1–2.2)
ALP SERPL-CCNC: 49 U/L (ref 40–129)
ALT SERPL-CCNC: 12 U/L (ref 10–40)
ANION GAP SERPL CALCULATED.3IONS-SCNC: 9 MMOL/L (ref 3–16)
AST SERPL-CCNC: 9 U/L (ref 15–37)
BASOPHILS # BLD: 0 K/UL (ref 0–0.2)
BASOPHILS NFR BLD: 0.2 %
BILIRUB SERPL-MCNC: 0.7 MG/DL (ref 0–1)
BUN SERPL-MCNC: 15 MG/DL (ref 7–20)
CALCIUM SERPL-MCNC: 9.4 MG/DL (ref 8.3–10.6)
CHLORIDE SERPL-SCNC: 101 MMOL/L (ref 99–110)
CO2 SERPL-SCNC: 30 MMOL/L (ref 21–32)
CREAT SERPL-MCNC: <0.5 MG/DL (ref 0.6–1.2)
DEPRECATED RDW RBC AUTO: 15 % (ref 12.4–15.4)
EOSINOPHIL # BLD: 0 K/UL (ref 0–0.6)
EOSINOPHIL NFR BLD: 0.1 %
GFR SERPLBLD CREATININE-BSD FMLA CKD-EPI: >60 ML/MIN/{1.73_M2}
GLUCOSE BLD-MCNC: 121 MG/DL (ref 70–99)
GLUCOSE BLD-MCNC: 123 MG/DL (ref 70–99)
GLUCOSE BLD-MCNC: 127 MG/DL (ref 70–99)
GLUCOSE BLD-MCNC: 162 MG/DL (ref 70–99)
GLUCOSE SERPL-MCNC: 143 MG/DL (ref 70–99)
HCT VFR BLD AUTO: 37.2 % (ref 36–48)
HGB BLD-MCNC: 12 G/DL (ref 12–16)
LYMPHOCYTES # BLD: 0.8 K/UL (ref 1–5.1)
LYMPHOCYTES NFR BLD: 15 %
MAGNESIUM SERPL-MCNC: 1.8 MG/DL (ref 1.8–2.4)
MCH RBC QN AUTO: 29.3 PG (ref 26–34)
MCHC RBC AUTO-ENTMCNC: 32.3 G/DL (ref 31–36)
MCV RBC AUTO: 90.7 FL (ref 80–100)
MONOCYTES # BLD: 0.6 K/UL (ref 0–1.3)
MONOCYTES NFR BLD: 11.1 %
NEUTROPHILS # BLD: 4 K/UL (ref 1.7–7.7)
NEUTROPHILS NFR BLD: 73.6 %
PERFORMED ON: ABNORMAL
PHOSPHATE SERPL-MCNC: 4.2 MG/DL (ref 2.5–4.9)
PLATELET # BLD AUTO: 127 K/UL (ref 135–450)
PMV BLD AUTO: 8.2 FL (ref 5–10.5)
POTASSIUM SERPL-SCNC: 4.1 MMOL/L (ref 3.5–5.1)
PROT SERPL-MCNC: 5.5 G/DL (ref 6.4–8.2)
RBC # BLD AUTO: 4.1 M/UL (ref 4–5.2)
SODIUM SERPL-SCNC: 140 MMOL/L (ref 136–145)
T4 FREE SERPL-MCNC: 2.1 NG/DL (ref 0.9–1.8)
TSH SERPL DL<=0.005 MIU/L-ACNC: 0.15 UIU/ML (ref 0.27–4.2)
WBC # BLD AUTO: 5.5 K/UL (ref 4–11)

## 2023-10-30 PROCEDURE — 36415 COLL VENOUS BLD VENIPUNCTURE: CPT

## 2023-10-30 PROCEDURE — 6370000000 HC RX 637 (ALT 250 FOR IP): Performed by: NURSE PRACTITIONER

## 2023-10-30 PROCEDURE — 1200000000 HC SEMI PRIVATE

## 2023-10-30 PROCEDURE — APPNB45 APP NON BILLABLE 31-45 MINUTES: Performed by: NURSE PRACTITIONER

## 2023-10-30 PROCEDURE — 97110 THERAPEUTIC EXERCISES: CPT

## 2023-10-30 PROCEDURE — 97530 THERAPEUTIC ACTIVITIES: CPT

## 2023-10-30 PROCEDURE — 6370000000 HC RX 637 (ALT 250 FOR IP): Performed by: INTERNAL MEDICINE

## 2023-10-30 PROCEDURE — 84100 ASSAY OF PHOSPHORUS: CPT

## 2023-10-30 PROCEDURE — 80053 COMPREHEN METABOLIC PANEL: CPT

## 2023-10-30 PROCEDURE — 97116 GAIT TRAINING THERAPY: CPT

## 2023-10-30 PROCEDURE — 97535 SELF CARE MNGMENT TRAINING: CPT

## 2023-10-30 PROCEDURE — 83735 ASSAY OF MAGNESIUM: CPT

## 2023-10-30 PROCEDURE — 84439 ASSAY OF FREE THYROXINE: CPT

## 2023-10-30 PROCEDURE — 99024 POSTOP FOLLOW-UP VISIT: CPT | Performed by: NURSE PRACTITIONER

## 2023-10-30 PROCEDURE — 6360000002 HC RX W HCPCS: Performed by: NURSE PRACTITIONER

## 2023-10-30 PROCEDURE — 2580000003 HC RX 258: Performed by: NURSE PRACTITIONER

## 2023-10-30 PROCEDURE — 84443 ASSAY THYROID STIM HORMONE: CPT

## 2023-10-30 PROCEDURE — 85025 COMPLETE CBC W/AUTO DIFF WBC: CPT

## 2023-10-30 RX ADMIN — Medication 1 TABLET: at 20:46

## 2023-10-30 RX ADMIN — ACETAMINOPHEN 650 MG: 325 TABLET ORAL at 08:29

## 2023-10-30 RX ADMIN — GABAPENTIN 100 MG: 100 CAPSULE ORAL at 14:30

## 2023-10-30 RX ADMIN — ATORVASTATIN CALCIUM 20 MG: 20 TABLET, FILM COATED ORAL at 20:47

## 2023-10-30 RX ADMIN — Medication 10 ML: at 08:31

## 2023-10-30 RX ADMIN — GABAPENTIN 100 MG: 100 CAPSULE ORAL at 20:47

## 2023-10-30 RX ADMIN — DICLOFENAC SODIUM 2 G: 10 GEL TOPICAL at 20:47

## 2023-10-30 RX ADMIN — ENOXAPARIN SODIUM 40 MG: 100 INJECTION SUBCUTANEOUS at 08:28

## 2023-10-30 RX ADMIN — HYDROCODONE BITARTRATE AND ACETAMINOPHEN 1 TABLET: 5; 325 TABLET ORAL at 18:37

## 2023-10-30 RX ADMIN — DICLOFENAC SODIUM 2 G: 10 GEL TOPICAL at 08:28

## 2023-10-30 RX ADMIN — ACETAMINOPHEN 650 MG: 325 TABLET ORAL at 15:11

## 2023-10-30 RX ADMIN — ACETAMINOPHEN 650 MG: 325 TABLET ORAL at 20:47

## 2023-10-30 RX ADMIN — GABAPENTIN 100 MG: 100 CAPSULE ORAL at 08:29

## 2023-10-30 RX ADMIN — TIMOLOL 1 DROP: 5 SOLUTION/ DROPS OPHTHALMIC at 08:30

## 2023-10-30 RX ADMIN — CEFDINIR 300 MG: 300 CAPSULE ORAL at 08:29

## 2023-10-30 RX ADMIN — CEFDINIR 300 MG: 300 CAPSULE ORAL at 20:46

## 2023-10-30 RX ADMIN — POLYETHYLENE GLYCOL 3350 17 G: 17 POWDER, FOR SOLUTION ORAL at 08:29

## 2023-10-30 RX ADMIN — INSULIN GLARGINE 15 UNITS: 100 INJECTION, SOLUTION SUBCUTANEOUS at 20:56

## 2023-10-30 RX ADMIN — TIMOLOL 1 DROP: 5 SOLUTION/ DROPS OPHTHALMIC at 20:46

## 2023-10-30 RX ADMIN — DULOXETINE HYDROCHLORIDE 60 MG: 60 CAPSULE, DELAYED RELEASE ORAL at 08:29

## 2023-10-30 RX ADMIN — HYDROCODONE BITARTRATE AND ACETAMINOPHEN 1 TABLET: 5; 325 TABLET ORAL at 01:31

## 2023-10-30 RX ADMIN — STANDARDIZED SENNA CONCENTRATE AND DOCUSATE SODIUM 2 TABLET: 8.6; 5 TABLET ORAL at 08:29

## 2023-10-30 RX ADMIN — Medication 1 TABLET: at 08:29

## 2023-10-30 RX ADMIN — TRAVOPROST OPHTHALMIC SOLUTION 1 DROP: 0.04 SOLUTION OPHTHALMIC at 20:46

## 2023-10-30 RX ADMIN — Medication 10 ML: at 20:49

## 2023-10-30 RX ADMIN — LEVOTHYROXINE SODIUM 200 MCG: 0.1 TABLET ORAL at 05:19

## 2023-10-30 ASSESSMENT — PAIN SCALES - GENERAL
PAINLEVEL_OUTOF10: 4
PAINLEVEL_OUTOF10: 6
PAINLEVEL_OUTOF10: 8

## 2023-10-30 ASSESSMENT — PAIN DESCRIPTION - DESCRIPTORS
DESCRIPTORS: ACHING
DESCRIPTORS: ACHING

## 2023-10-30 ASSESSMENT — PAIN DESCRIPTION - LOCATION
LOCATION: HIP
LOCATION: SHOULDER

## 2023-10-30 ASSESSMENT — PAIN DESCRIPTION - PAIN TYPE: TYPE: ACUTE PAIN

## 2023-10-30 ASSESSMENT — PAIN - FUNCTIONAL ASSESSMENT: PAIN_FUNCTIONAL_ASSESSMENT: PREVENTS OR INTERFERES SOME ACTIVE ACTIVITIES AND ADLS

## 2023-10-30 ASSESSMENT — PAIN DESCRIPTION - ORIENTATION
ORIENTATION: RIGHT
ORIENTATION: RIGHT

## 2023-10-31 LAB
GLUCOSE BLD-MCNC: 105 MG/DL (ref 70–99)
GLUCOSE BLD-MCNC: 154 MG/DL (ref 70–99)
GLUCOSE BLD-MCNC: 179 MG/DL (ref 70–99)
GLUCOSE BLD-MCNC: 209 MG/DL (ref 70–99)
PERFORMED ON: ABNORMAL

## 2023-10-31 PROCEDURE — 6360000002 HC RX W HCPCS: Performed by: NURSE PRACTITIONER

## 2023-10-31 PROCEDURE — 97116 GAIT TRAINING THERAPY: CPT

## 2023-10-31 PROCEDURE — 1200000000 HC SEMI PRIVATE

## 2023-10-31 PROCEDURE — 99024 POSTOP FOLLOW-UP VISIT: CPT | Performed by: NURSE PRACTITIONER

## 2023-10-31 PROCEDURE — 2580000003 HC RX 258: Performed by: NURSE PRACTITIONER

## 2023-10-31 PROCEDURE — 6370000000 HC RX 637 (ALT 250 FOR IP): Performed by: INTERNAL MEDICINE

## 2023-10-31 PROCEDURE — APPNB45 APP NON BILLABLE 31-45 MINUTES: Performed by: NURSE PRACTITIONER

## 2023-10-31 PROCEDURE — 6370000000 HC RX 637 (ALT 250 FOR IP): Performed by: NURSE PRACTITIONER

## 2023-10-31 PROCEDURE — 97110 THERAPEUTIC EXERCISES: CPT

## 2023-10-31 PROCEDURE — 97530 THERAPEUTIC ACTIVITIES: CPT

## 2023-10-31 PROCEDURE — 97535 SELF CARE MNGMENT TRAINING: CPT

## 2023-10-31 RX ADMIN — ACETAMINOPHEN 650 MG: 325 TABLET ORAL at 09:33

## 2023-10-31 RX ADMIN — ACETAMINOPHEN 650 MG: 325 TABLET ORAL at 20:41

## 2023-10-31 RX ADMIN — CEFDINIR 300 MG: 300 CAPSULE ORAL at 11:14

## 2023-10-31 RX ADMIN — GABAPENTIN 100 MG: 100 CAPSULE ORAL at 20:41

## 2023-10-31 RX ADMIN — CEFDINIR 300 MG: 300 CAPSULE ORAL at 23:18

## 2023-10-31 RX ADMIN — Medication 1 TABLET: at 20:42

## 2023-10-31 RX ADMIN — INSULIN GLARGINE 15 UNITS: 100 INJECTION, SOLUTION SUBCUTANEOUS at 20:42

## 2023-10-31 RX ADMIN — HYDROCODONE BITARTRATE AND ACETAMINOPHEN 1 TABLET: 5; 325 TABLET ORAL at 11:50

## 2023-10-31 RX ADMIN — DULOXETINE HYDROCHLORIDE 60 MG: 60 CAPSULE, DELAYED RELEASE ORAL at 09:33

## 2023-10-31 RX ADMIN — Medication 1 TABLET: at 09:33

## 2023-10-31 RX ADMIN — TRAVOPROST OPHTHALMIC SOLUTION 1 DROP: 0.04 SOLUTION OPHTHALMIC at 22:11

## 2023-10-31 RX ADMIN — Medication 10 ML: at 20:45

## 2023-10-31 RX ADMIN — GABAPENTIN 100 MG: 100 CAPSULE ORAL at 09:33

## 2023-10-31 RX ADMIN — ENOXAPARIN SODIUM 40 MG: 100 INJECTION SUBCUTANEOUS at 09:32

## 2023-10-31 RX ADMIN — DICLOFENAC SODIUM 2 G: 10 GEL TOPICAL at 23:18

## 2023-10-31 RX ADMIN — Medication 10 ML: at 09:41

## 2023-10-31 RX ADMIN — POLYETHYLENE GLYCOL 3350 17 G: 17 POWDER, FOR SOLUTION ORAL at 09:34

## 2023-10-31 RX ADMIN — ATORVASTATIN CALCIUM 20 MG: 20 TABLET, FILM COATED ORAL at 20:42

## 2023-10-31 RX ADMIN — ACETAMINOPHEN 650 MG: 325 TABLET ORAL at 14:12

## 2023-10-31 RX ADMIN — DICLOFENAC SODIUM 2 G: 10 GEL TOPICAL at 09:41

## 2023-10-31 RX ADMIN — TIMOLOL 1 DROP: 5 SOLUTION/ DROPS OPHTHALMIC at 22:12

## 2023-10-31 RX ADMIN — LEVOTHYROXINE SODIUM 188 MCG: 0.1 TABLET ORAL at 05:26

## 2023-10-31 RX ADMIN — GABAPENTIN 100 MG: 100 CAPSULE ORAL at 14:12

## 2023-10-31 RX ADMIN — HYDROCODONE BITARTRATE AND ACETAMINOPHEN 1 TABLET: 5; 325 TABLET ORAL at 05:31

## 2023-10-31 RX ADMIN — TIMOLOL 1 DROP: 5 SOLUTION/ DROPS OPHTHALMIC at 09:41

## 2023-10-31 RX ADMIN — STANDARDIZED SENNA CONCENTRATE AND DOCUSATE SODIUM 2 TABLET: 8.6; 5 TABLET ORAL at 09:33

## 2023-10-31 RX ADMIN — INSULIN LISPRO 1 UNITS: 100 INJECTION, SOLUTION INTRAVENOUS; SUBCUTANEOUS at 09:33

## 2023-10-31 ASSESSMENT — PAIN SCALES - GENERAL
PAINLEVEL_OUTOF10: 4
PAINLEVEL_OUTOF10: 6
PAINLEVEL_OUTOF10: 0
PAINLEVEL_OUTOF10: 5
PAINLEVEL_OUTOF10: 4

## 2023-10-31 ASSESSMENT — PAIN DESCRIPTION - LOCATION
LOCATION: HIP
LOCATION: HIP
LOCATION: HIP;LEG

## 2023-10-31 ASSESSMENT — PAIN DESCRIPTION - DESCRIPTORS
DESCRIPTORS: ACHING

## 2023-10-31 ASSESSMENT — PAIN DESCRIPTION - ORIENTATION
ORIENTATION: UPPER
ORIENTATION: RIGHT
ORIENTATION: RIGHT

## 2023-10-31 NOTE — PLAN OF CARE
Problem: Discharge Planning  Goal: Discharge to home or other facility with appropriate resources  10/30/2023 2235 by Dominic Sandoval RN  Outcome: Progressing     Problem: Pain  Goal: Verbalizes/displays adequate comfort level or baseline comfort level  10/30/2023 2235 by Dominic Sandoval RN  Outcome: Progressing     Problem: Skin/Tissue Integrity  Goal: Absence of new skin breakdown  Description: 1. Monitor for areas of redness and/or skin breakdown  2. Assess vascular access sites hourly  3. Every 4-6 hours minimum:  Change oxygen saturation probe site  4. Every 4-6 hours:  If on nasal continuous positive airway pressure, respiratory therapy assess nares and determine need for appliance change or resting period.   10/30/2023 2235 by Dominic Sandoval RN  Outcome: Progressing     Problem: ABCDS Injury Assessment  Goal: Absence of physical injury  10/30/2023 2235 by Dominic Sandoval RN  Outcome: Progressing     Problem: Safety - Adult  Goal: Free from fall injury  10/30/2023 2235 by Dominic Sandoval RN  Outcome: Progressing     Problem: Chronic Conditions and Co-morbidities  Goal: Patient's chronic conditions and co-morbidity symptoms are monitored and maintained or improved  10/30/2023 2235 by Dominic Sandoval RN  Outcome: Progressing

## 2023-11-01 LAB
GLUCOSE BLD-MCNC: 104 MG/DL (ref 70–99)
GLUCOSE BLD-MCNC: 153 MG/DL (ref 70–99)
GLUCOSE BLD-MCNC: 157 MG/DL (ref 70–99)
GLUCOSE BLD-MCNC: 161 MG/DL (ref 70–99)
GLUCOSE BLD-MCNC: 175 MG/DL (ref 70–99)
PERFORMED ON: ABNORMAL

## 2023-11-01 PROCEDURE — 97110 THERAPEUTIC EXERCISES: CPT

## 2023-11-01 PROCEDURE — 6370000000 HC RX 637 (ALT 250 FOR IP): Performed by: NURSE PRACTITIONER

## 2023-11-01 PROCEDURE — 2580000003 HC RX 258: Performed by: NURSE PRACTITIONER

## 2023-11-01 PROCEDURE — 97116 GAIT TRAINING THERAPY: CPT

## 2023-11-01 PROCEDURE — 97535 SELF CARE MNGMENT TRAINING: CPT

## 2023-11-01 PROCEDURE — 97530 THERAPEUTIC ACTIVITIES: CPT

## 2023-11-01 PROCEDURE — 6360000002 HC RX W HCPCS: Performed by: NURSE PRACTITIONER

## 2023-11-01 PROCEDURE — 1200000000 HC SEMI PRIVATE

## 2023-11-01 PROCEDURE — 6370000000 HC RX 637 (ALT 250 FOR IP): Performed by: INTERNAL MEDICINE

## 2023-11-01 RX ORDER — GABAPENTIN 100 MG/1
100 CAPSULE ORAL 3 TIMES DAILY
Qty: 9 CAPSULE | Refills: 0 | Status: ON HOLD
Start: 2023-11-01 | End: 2023-11-04

## 2023-11-01 RX ORDER — LANOLIN ALCOHOL/MO/W.PET/CERES
3 CREAM (GRAM) TOPICAL NIGHTLY PRN
Qty: 30 TABLET | Refills: 0 | Status: ON HOLD
Start: 2023-11-01

## 2023-11-01 RX ORDER — SENNA AND DOCUSATE SODIUM 50; 8.6 MG/1; MG/1
2 TABLET, FILM COATED ORAL DAILY
Qty: 60 TABLET | Refills: 0 | Status: ON HOLD
Start: 2023-11-02

## 2023-11-01 RX ORDER — TRAVOPROST OPHTHALMIC SOLUTION 0.04 MG/ML
1 SOLUTION OPHTHALMIC NIGHTLY
Status: ON HOLD | COMMUNITY
Start: 2023-11-01

## 2023-11-01 RX ORDER — TIMOLOL 5 MG/ML
1 SOLUTION/ DROPS OPHTHALMIC 2 TIMES DAILY
Status: ON HOLD | COMMUNITY
Start: 2023-11-01

## 2023-11-01 RX ORDER — LEVOTHYROXINE SODIUM 0.12 MG/1
188 TABLET ORAL DAILY
Qty: 30 TABLET | Refills: 3 | Status: ON HOLD
Start: 2023-11-02

## 2023-11-01 RX ORDER — POLYETHYLENE GLYCOL 3350 17 G/17G
17 POWDER, FOR SOLUTION ORAL DAILY
Qty: 30 PACKET | Refills: 0 | Status: ON HOLD
Start: 2023-11-02 | End: 2023-12-02

## 2023-11-01 RX ORDER — CYCLOBENZAPRINE HCL 5 MG
5 TABLET ORAL 3 TIMES DAILY PRN
Qty: 21 TABLET | Refills: 0 | Status: ON HOLD
Start: 2023-11-01 | End: 2023-11-11

## 2023-11-01 RX ADMIN — POLYETHYLENE GLYCOL 3350 17 G: 17 POWDER, FOR SOLUTION ORAL at 09:27

## 2023-11-01 RX ADMIN — MELATONIN TAB 3 MG 3 MG: 3 TAB at 21:35

## 2023-11-01 RX ADMIN — HYDROCODONE BITARTRATE AND ACETAMINOPHEN 1 TABLET: 5; 325 TABLET ORAL at 11:24

## 2023-11-01 RX ADMIN — GABAPENTIN 100 MG: 100 CAPSULE ORAL at 21:35

## 2023-11-01 RX ADMIN — TIMOLOL 1 DROP: 5 SOLUTION/ DROPS OPHTHALMIC at 21:37

## 2023-11-01 RX ADMIN — Medication 1 TABLET: at 21:37

## 2023-11-01 RX ADMIN — Medication 1 TABLET: at 09:27

## 2023-11-01 RX ADMIN — TRAVOPROST OPHTHALMIC SOLUTION 1 DROP: 0.04 SOLUTION OPHTHALMIC at 21:37

## 2023-11-01 RX ADMIN — DICLOFENAC SODIUM 2 G: 10 GEL TOPICAL at 09:29

## 2023-11-01 RX ADMIN — INSULIN GLARGINE 15 UNITS: 100 INJECTION, SOLUTION SUBCUTANEOUS at 21:35

## 2023-11-01 RX ADMIN — ACETAMINOPHEN 650 MG: 325 TABLET ORAL at 09:26

## 2023-11-01 RX ADMIN — LEVOTHYROXINE SODIUM 188 MCG: 0.1 TABLET ORAL at 06:46

## 2023-11-01 RX ADMIN — ENOXAPARIN SODIUM 40 MG: 100 INJECTION SUBCUTANEOUS at 09:25

## 2023-11-01 RX ADMIN — MELATONIN TAB 3 MG 3 MG: 3 TAB at 01:59

## 2023-11-01 RX ADMIN — ACETAMINOPHEN 650 MG: 325 TABLET ORAL at 15:02

## 2023-11-01 RX ADMIN — DULOXETINE HYDROCHLORIDE 60 MG: 60 CAPSULE, DELAYED RELEASE ORAL at 09:27

## 2023-11-01 RX ADMIN — Medication 10 ML: at 09:32

## 2023-11-01 RX ADMIN — Medication 10 ML: at 21:40

## 2023-11-01 RX ADMIN — GABAPENTIN 100 MG: 100 CAPSULE ORAL at 13:07

## 2023-11-01 RX ADMIN — HYDROCODONE BITARTRATE AND ACETAMINOPHEN 1 TABLET: 5; 325 TABLET ORAL at 01:59

## 2023-11-01 RX ADMIN — TIMOLOL 1 DROP: 5 SOLUTION/ DROPS OPHTHALMIC at 09:30

## 2023-11-01 RX ADMIN — HYDROCODONE BITARTRATE AND ACETAMINOPHEN 1 TABLET: 5; 325 TABLET ORAL at 21:32

## 2023-11-01 RX ADMIN — GABAPENTIN 100 MG: 100 CAPSULE ORAL at 09:27

## 2023-11-01 RX ADMIN — ATORVASTATIN CALCIUM 20 MG: 20 TABLET, FILM COATED ORAL at 21:35

## 2023-11-01 RX ADMIN — STANDARDIZED SENNA CONCENTRATE AND DOCUSATE SODIUM 2 TABLET: 8.6; 5 TABLET ORAL at 09:26

## 2023-11-01 RX ADMIN — DICLOFENAC SODIUM 2 G: 10 GEL TOPICAL at 21:37

## 2023-11-01 RX ADMIN — ACETAMINOPHEN 650 MG: 325 TABLET ORAL at 23:25

## 2023-11-01 ASSESSMENT — PAIN DESCRIPTION - LOCATION
LOCATION: BACK
LOCATION: HIP
LOCATION: SHOULDER
LOCATION: HIP;SHOULDER;LEG
LOCATION: HIP;SHOULDER

## 2023-11-01 ASSESSMENT — PAIN DESCRIPTION - ORIENTATION
ORIENTATION: RIGHT
ORIENTATION: MID

## 2023-11-01 ASSESSMENT — PAIN SCALES - GENERAL
PAINLEVEL_OUTOF10: 9
PAINLEVEL_OUTOF10: 7
PAINLEVEL_OUTOF10: 8
PAINLEVEL_OUTOF10: 4
PAINLEVEL_OUTOF10: 7
PAINLEVEL_OUTOF10: 5
PAINLEVEL_OUTOF10: 4

## 2023-11-01 ASSESSMENT — PAIN DESCRIPTION - DESCRIPTORS
DESCRIPTORS: ACHING
DESCRIPTORS: ACHING
DESCRIPTORS: SHARP
DESCRIPTORS: ACHING
DESCRIPTORS: ACHING

## 2023-11-01 NOTE — PLAN OF CARE
Problem: Discharge Planning  Goal: Discharge to home or other facility with appropriate resources  11/1/2023 0521 by Ekta Jaramillo RN  Outcome: Progressing  11/1/2023 0258 by Ekta Jaramillo RN  Outcome: Progressing     Problem: Pain  Goal: Verbalizes/displays adequate comfort level or baseline comfort level  11/1/2023 0521 by Ekta Jaramillo RN  Outcome: Progressing  11/1/2023 0258 by Ekta Jaramillo RN  Outcome: Progressing     Problem: Skin/Tissue Integrity  Goal: Absence of new skin breakdown  Description: 1. Monitor for areas of redness and/or skin breakdown  2. Assess vascular access sites hourly  3. Every 4-6 hours minimum:  Change oxygen saturation probe site  4. Every 4-6 hours:  If on nasal continuous positive airway pressure, respiratory therapy assess nares and determine need for appliance change or resting period.   11/1/2023 0521 by Ekta Jaramillo RN  Outcome: Progressing  11/1/2023 0258 by Ekta Jaramillo RN  Outcome: Progressing     Problem: ABCDS Injury Assessment  Goal: Absence of physical injury  11/1/2023 0521 by Ekta Jaramillo RN  Outcome: Progressing  11/1/2023 0258 by Ekta Jaramillo RN  Outcome: Progressing     Problem: Safety - Adult  Goal: Free from fall injury  11/1/2023 0521 by Ekta Jaramillo RN  Outcome: Progressing  11/1/2023 0258 by Ekta Jaramillo RN  Outcome: Progressing     Problem: Chronic Conditions and Co-morbidities  Goal: Patient's chronic conditions and co-morbidity symptoms are monitored and maintained or improved  11/1/2023 0521 by Ekta Jaramillo RN  Outcome: Progressing  11/1/2023 0258 by Ekta Jaramillo RN  Outcome: Progressing

## 2023-11-02 LAB
GLUCOSE BLD-MCNC: 106 MG/DL (ref 70–99)
GLUCOSE BLD-MCNC: 149 MG/DL (ref 70–99)
GLUCOSE BLD-MCNC: 194 MG/DL (ref 70–99)
GLUCOSE BLD-MCNC: 198 MG/DL (ref 70–99)
PERFORMED ON: ABNORMAL

## 2023-11-02 PROCEDURE — 6370000000 HC RX 637 (ALT 250 FOR IP): Performed by: NURSE PRACTITIONER

## 2023-11-02 PROCEDURE — 6370000000 HC RX 637 (ALT 250 FOR IP): Performed by: INTERNAL MEDICINE

## 2023-11-02 PROCEDURE — 97530 THERAPEUTIC ACTIVITIES: CPT

## 2023-11-02 PROCEDURE — 1200000000 HC SEMI PRIVATE

## 2023-11-02 PROCEDURE — 97535 SELF CARE MNGMENT TRAINING: CPT

## 2023-11-02 PROCEDURE — 97110 THERAPEUTIC EXERCISES: CPT

## 2023-11-02 PROCEDURE — 6360000002 HC RX W HCPCS: Performed by: NURSE PRACTITIONER

## 2023-11-02 PROCEDURE — 2580000003 HC RX 258: Performed by: NURSE PRACTITIONER

## 2023-11-02 PROCEDURE — 97116 GAIT TRAINING THERAPY: CPT

## 2023-11-02 RX ADMIN — LEVOTHYROXINE SODIUM 188 MCG: 0.1 TABLET ORAL at 05:30

## 2023-11-02 RX ADMIN — Medication 1 TABLET: at 20:47

## 2023-11-02 RX ADMIN — GABAPENTIN 100 MG: 100 CAPSULE ORAL at 09:48

## 2023-11-02 RX ADMIN — TIMOLOL 1 DROP: 5 SOLUTION/ DROPS OPHTHALMIC at 09:49

## 2023-11-02 RX ADMIN — TRAVOPROST OPHTHALMIC SOLUTION 1 DROP: 0.04 SOLUTION OPHTHALMIC at 20:46

## 2023-11-02 RX ADMIN — INSULIN GLARGINE 15 UNITS: 100 INJECTION, SOLUTION SUBCUTANEOUS at 20:48

## 2023-11-02 RX ADMIN — Medication 10 ML: at 09:51

## 2023-11-02 RX ADMIN — DICLOFENAC SODIUM 2 G: 10 GEL TOPICAL at 20:46

## 2023-11-02 RX ADMIN — TIMOLOL 1 DROP: 5 SOLUTION/ DROPS OPHTHALMIC at 20:47

## 2023-11-02 RX ADMIN — ATORVASTATIN CALCIUM 20 MG: 20 TABLET, FILM COATED ORAL at 20:46

## 2023-11-02 RX ADMIN — HYDROCODONE BITARTRATE AND ACETAMINOPHEN 1 TABLET: 5; 325 TABLET ORAL at 05:36

## 2023-11-02 RX ADMIN — ENOXAPARIN SODIUM 40 MG: 100 INJECTION SUBCUTANEOUS at 09:49

## 2023-11-02 RX ADMIN — ACETAMINOPHEN 650 MG: 325 TABLET ORAL at 09:49

## 2023-11-02 RX ADMIN — ACETAMINOPHEN 650 MG: 325 TABLET ORAL at 14:20

## 2023-11-02 RX ADMIN — Medication 1 TABLET: at 09:49

## 2023-11-02 RX ADMIN — DICLOFENAC SODIUM 2 G: 10 GEL TOPICAL at 09:49

## 2023-11-02 RX ADMIN — GABAPENTIN 100 MG: 100 CAPSULE ORAL at 14:20

## 2023-11-02 RX ADMIN — STANDARDIZED SENNA CONCENTRATE AND DOCUSATE SODIUM 2 TABLET: 8.6; 5 TABLET ORAL at 09:48

## 2023-11-02 RX ADMIN — HYDROCODONE BITARTRATE AND ACETAMINOPHEN 1 TABLET: 5; 325 TABLET ORAL at 23:53

## 2023-11-02 RX ADMIN — POLYETHYLENE GLYCOL 3350 17 G: 17 POWDER, FOR SOLUTION ORAL at 09:49

## 2023-11-02 RX ADMIN — DULOXETINE HYDROCHLORIDE 60 MG: 60 CAPSULE, DELAYED RELEASE ORAL at 09:49

## 2023-11-02 RX ADMIN — Medication 10 ML: at 20:47

## 2023-11-02 RX ADMIN — GABAPENTIN 100 MG: 100 CAPSULE ORAL at 20:46

## 2023-11-02 RX ADMIN — HYDROCODONE BITARTRATE AND ACETAMINOPHEN 1 TABLET: 5; 325 TABLET ORAL at 17:29

## 2023-11-02 RX ADMIN — MELATONIN TAB 3 MG 3 MG: 3 TAB at 23:53

## 2023-11-02 RX ADMIN — ACETAMINOPHEN 650 MG: 325 TABLET ORAL at 20:46

## 2023-11-02 ASSESSMENT — PAIN SCALES - GENERAL
PAINLEVEL_OUTOF10: 5
PAINLEVEL_OUTOF10: 8
PAINLEVEL_OUTOF10: 8
PAINLEVEL_OUTOF10: 6
PAINLEVEL_OUTOF10: 7
PAINLEVEL_OUTOF10: 6

## 2023-11-02 ASSESSMENT — PAIN DESCRIPTION - ONSET: ONSET: ON-GOING

## 2023-11-02 ASSESSMENT — PAIN DESCRIPTION - DESCRIPTORS
DESCRIPTORS: ACHING
DESCRIPTORS: ACHING

## 2023-11-02 ASSESSMENT — PAIN DESCRIPTION - FREQUENCY: FREQUENCY: CONTINUOUS

## 2023-11-02 ASSESSMENT — PAIN DESCRIPTION - LOCATION
LOCATION: HIP
LOCATION: SHOULDER
LOCATION: HIP
LOCATION: HIP

## 2023-11-02 ASSESSMENT — PAIN DESCRIPTION - ORIENTATION
ORIENTATION: RIGHT
ORIENTATION: RIGHT

## 2023-11-02 ASSESSMENT — PAIN DESCRIPTION - PAIN TYPE: TYPE: SURGICAL PAIN

## 2023-11-02 NOTE — PLAN OF CARE
Problem: Discharge Planning  Goal: Discharge to home or other facility with appropriate resources  11/2/2023 1129 by Alicia Gallegos RN  Outcome: Progressing  11/2/2023 0340 by Avani Clark RN  Outcome: Progressing     Problem: Pain  Goal: Verbalizes/displays adequate comfort level or baseline comfort level  11/2/2023 1129 by Alicia Gallegos RN  Outcome: Progressing  11/2/2023 0340 by Avani Clark RN  Outcome: Progressing     Problem: Skin/Tissue Integrity  Goal: Absence of new skin breakdown  Description: 1. Monitor for areas of redness and/or skin breakdown  2. Assess vascular access sites hourly  3. Every 4-6 hours minimum:  Change oxygen saturation probe site  4. Every 4-6 hours:  If on nasal continuous positive airway pressure, respiratory therapy assess nares and determine need for appliance change or resting period.   11/2/2023 1129 by Alicia Gallegos RN  Outcome: Progressing  11/2/2023 0340 by Avani Clark RN  Outcome: Progressing     Problem: ABCDS Injury Assessment  Goal: Absence of physical injury  11/2/2023 1129 by Alicia Gallegos RN  Outcome: Progressing  11/2/2023 0340 by Avani Clark RN  Outcome: Progressing     Problem: Safety - Adult  Goal: Free from fall injury  11/2/2023 1129 by Alicia Gallegos RN  Outcome: Progressing  11/2/2023 0340 by Avani Clark RN  Outcome: Progressing     Problem: Chronic Conditions and Co-morbidities  Goal: Patient's chronic conditions and co-morbidity symptoms are monitored and maintained or improved  11/2/2023 1129 by Alicia Gallegos RN  Outcome: Progressing  11/2/2023 0340 by Avani Clark RN  Outcome: Progressing

## 2023-11-03 LAB
ANION GAP SERPL CALCULATED.3IONS-SCNC: 10 MMOL/L (ref 3–16)
BUN SERPL-MCNC: 15 MG/DL (ref 7–20)
CALCIUM SERPL-MCNC: 8.9 MG/DL (ref 8.3–10.6)
CHLORIDE SERPL-SCNC: 101 MMOL/L (ref 99–110)
CO2 SERPL-SCNC: 26 MMOL/L (ref 21–32)
CREAT SERPL-MCNC: 0.7 MG/DL (ref 0.6–1.2)
DEPRECATED RDW RBC AUTO: 15.3 % (ref 12.4–15.4)
GFR SERPLBLD CREATININE-BSD FMLA CKD-EPI: >60 ML/MIN/{1.73_M2}
GLUCOSE BLD-MCNC: 126 MG/DL (ref 70–99)
GLUCOSE BLD-MCNC: 132 MG/DL (ref 70–99)
GLUCOSE BLD-MCNC: 139 MG/DL (ref 70–99)
GLUCOSE BLD-MCNC: 182 MG/DL (ref 70–99)
GLUCOSE SERPL-MCNC: 209 MG/DL (ref 70–99)
HCT VFR BLD AUTO: 39.1 % (ref 36–48)
HGB BLD-MCNC: 12.6 G/DL (ref 12–16)
MCH RBC QN AUTO: 29.4 PG (ref 26–34)
MCHC RBC AUTO-ENTMCNC: 32.4 G/DL (ref 31–36)
MCV RBC AUTO: 90.8 FL (ref 80–100)
PERFORMED ON: ABNORMAL
PLATELET # BLD AUTO: 154 K/UL (ref 135–450)
PMV BLD AUTO: 8 FL (ref 5–10.5)
POTASSIUM SERPL-SCNC: 4.1 MMOL/L (ref 3.5–5.1)
RBC # BLD AUTO: 4.3 M/UL (ref 4–5.2)
SODIUM SERPL-SCNC: 137 MMOL/L (ref 136–145)
WBC # BLD AUTO: 5.9 K/UL (ref 4–11)

## 2023-11-03 PROCEDURE — 80048 BASIC METABOLIC PNL TOTAL CA: CPT

## 2023-11-03 PROCEDURE — 1200000000 HC SEMI PRIVATE

## 2023-11-03 PROCEDURE — 36415 COLL VENOUS BLD VENIPUNCTURE: CPT

## 2023-11-03 PROCEDURE — 97530 THERAPEUTIC ACTIVITIES: CPT

## 2023-11-03 PROCEDURE — 6360000002 HC RX W HCPCS: Performed by: NURSE PRACTITIONER

## 2023-11-03 PROCEDURE — 97535 SELF CARE MNGMENT TRAINING: CPT

## 2023-11-03 PROCEDURE — 85027 COMPLETE CBC AUTOMATED: CPT

## 2023-11-03 PROCEDURE — 6370000000 HC RX 637 (ALT 250 FOR IP): Performed by: NURSE PRACTITIONER

## 2023-11-03 PROCEDURE — 6370000000 HC RX 637 (ALT 250 FOR IP): Performed by: INTERNAL MEDICINE

## 2023-11-03 PROCEDURE — 97110 THERAPEUTIC EXERCISES: CPT

## 2023-11-03 PROCEDURE — 2580000003 HC RX 258: Performed by: NURSE PRACTITIONER

## 2023-11-03 PROCEDURE — 97116 GAIT TRAINING THERAPY: CPT

## 2023-11-03 RX ADMIN — POLYETHYLENE GLYCOL 3350 17 G: 17 POWDER, FOR SOLUTION ORAL at 09:41

## 2023-11-03 RX ADMIN — ENOXAPARIN SODIUM 40 MG: 100 INJECTION SUBCUTANEOUS at 09:42

## 2023-11-03 RX ADMIN — GABAPENTIN 100 MG: 100 CAPSULE ORAL at 09:42

## 2023-11-03 RX ADMIN — LEVOTHYROXINE SODIUM 188 MCG: 0.1 TABLET ORAL at 06:13

## 2023-11-03 RX ADMIN — STANDARDIZED SENNA CONCENTRATE AND DOCUSATE SODIUM 2 TABLET: 8.6; 5 TABLET ORAL at 09:41

## 2023-11-03 RX ADMIN — DULOXETINE HYDROCHLORIDE 60 MG: 60 CAPSULE, DELAYED RELEASE ORAL at 09:42

## 2023-11-03 RX ADMIN — TRAVOPROST OPHTHALMIC SOLUTION 1 DROP: 0.04 SOLUTION OPHTHALMIC at 21:53

## 2023-11-03 RX ADMIN — Medication 1 TABLET: at 09:41

## 2023-11-03 RX ADMIN — GABAPENTIN 100 MG: 100 CAPSULE ORAL at 14:59

## 2023-11-03 RX ADMIN — TIMOLOL 1 DROP: 5 SOLUTION/ DROPS OPHTHALMIC at 09:42

## 2023-11-03 RX ADMIN — Medication 10 ML: at 09:42

## 2023-11-03 RX ADMIN — ATORVASTATIN CALCIUM 20 MG: 20 TABLET, FILM COATED ORAL at 21:51

## 2023-11-03 RX ADMIN — TIMOLOL 1 DROP: 5 SOLUTION/ DROPS OPHTHALMIC at 21:53

## 2023-11-03 RX ADMIN — ACETAMINOPHEN 650 MG: 325 TABLET ORAL at 14:59

## 2023-11-03 RX ADMIN — ACETAMINOPHEN 650 MG: 325 TABLET ORAL at 21:51

## 2023-11-03 RX ADMIN — DICLOFENAC SODIUM 2 G: 10 GEL TOPICAL at 09:42

## 2023-11-03 RX ADMIN — INSULIN GLARGINE 15 UNITS: 100 INJECTION, SOLUTION SUBCUTANEOUS at 21:51

## 2023-11-03 RX ADMIN — DICLOFENAC SODIUM 2 G: 10 GEL TOPICAL at 22:10

## 2023-11-03 RX ADMIN — ACETAMINOPHEN 650 MG: 325 TABLET ORAL at 09:42

## 2023-11-03 RX ADMIN — Medication 10 ML: at 21:50

## 2023-11-03 RX ADMIN — GABAPENTIN 100 MG: 100 CAPSULE ORAL at 21:51

## 2023-11-03 RX ADMIN — Medication 1 TABLET: at 21:53

## 2023-11-03 RX ADMIN — MELATONIN TAB 3 MG 3 MG: 3 TAB at 22:50

## 2023-11-03 ASSESSMENT — PAIN DESCRIPTION - LOCATION
LOCATION: HIP

## 2023-11-03 ASSESSMENT — PAIN SCALES - GENERAL
PAINLEVEL_OUTOF10: 5

## 2023-11-03 ASSESSMENT — PAIN DESCRIPTION - ORIENTATION
ORIENTATION: RIGHT

## 2023-11-03 ASSESSMENT — PAIN DESCRIPTION - DESCRIPTORS
DESCRIPTORS: ACHING

## 2023-11-03 ASSESSMENT — PAIN DESCRIPTION - PAIN TYPE
TYPE: SURGICAL PAIN
TYPE: SURGICAL PAIN

## 2023-11-03 ASSESSMENT — PAIN DESCRIPTION - ONSET: ONSET: ON-GOING

## 2023-11-03 ASSESSMENT — PAIN DESCRIPTION - FREQUENCY: FREQUENCY: CONTINUOUS

## 2023-11-04 LAB
GLUCOSE BLD-MCNC: 119 MG/DL (ref 70–99)
GLUCOSE BLD-MCNC: 150 MG/DL (ref 70–99)
GLUCOSE BLD-MCNC: 193 MG/DL (ref 70–99)
GLUCOSE BLD-MCNC: 197 MG/DL (ref 70–99)
PERFORMED ON: ABNORMAL

## 2023-11-04 PROCEDURE — 6370000000 HC RX 637 (ALT 250 FOR IP): Performed by: NURSE PRACTITIONER

## 2023-11-04 PROCEDURE — 6370000000 HC RX 637 (ALT 250 FOR IP): Performed by: INTERNAL MEDICINE

## 2023-11-04 PROCEDURE — 97530 THERAPEUTIC ACTIVITIES: CPT

## 2023-11-04 PROCEDURE — 1200000000 HC SEMI PRIVATE

## 2023-11-04 PROCEDURE — 2580000003 HC RX 258: Performed by: NURSE PRACTITIONER

## 2023-11-04 PROCEDURE — 6360000002 HC RX W HCPCS: Performed by: NURSE PRACTITIONER

## 2023-11-04 PROCEDURE — 97535 SELF CARE MNGMENT TRAINING: CPT

## 2023-11-04 RX ORDER — INSULIN GLARGINE 100 [IU]/ML
15 INJECTION, SOLUTION SUBCUTANEOUS NIGHTLY
Qty: 10 ML | Refills: 3 | Status: ON HOLD
Start: 2023-11-04

## 2023-11-04 RX ADMIN — TIMOLOL 1 DROP: 5 SOLUTION/ DROPS OPHTHALMIC at 09:26

## 2023-11-04 RX ADMIN — Medication 1 TABLET: at 09:25

## 2023-11-04 RX ADMIN — GABAPENTIN 100 MG: 100 CAPSULE ORAL at 09:25

## 2023-11-04 RX ADMIN — GABAPENTIN 100 MG: 100 CAPSULE ORAL at 21:47

## 2023-11-04 RX ADMIN — DICLOFENAC SODIUM 2 G: 10 GEL TOPICAL at 09:26

## 2023-11-04 RX ADMIN — STANDARDIZED SENNA CONCENTRATE AND DOCUSATE SODIUM 2 TABLET: 8.6; 5 TABLET ORAL at 09:25

## 2023-11-04 RX ADMIN — ENOXAPARIN SODIUM 40 MG: 100 INJECTION SUBCUTANEOUS at 09:25

## 2023-11-04 RX ADMIN — Medication 10 ML: at 21:47

## 2023-11-04 RX ADMIN — Medication 1 TABLET: at 21:47

## 2023-11-04 RX ADMIN — LEVOTHYROXINE SODIUM 188 MCG: 0.1 TABLET ORAL at 05:49

## 2023-11-04 RX ADMIN — GABAPENTIN 100 MG: 100 CAPSULE ORAL at 17:18

## 2023-11-04 RX ADMIN — DICLOFENAC SODIUM 2 G: 10 GEL TOPICAL at 21:48

## 2023-11-04 RX ADMIN — INSULIN GLARGINE 15 UNITS: 100 INJECTION, SOLUTION SUBCUTANEOUS at 21:53

## 2023-11-04 RX ADMIN — TRAVOPROST OPHTHALMIC SOLUTION 1 DROP: 0.04 SOLUTION OPHTHALMIC at 21:49

## 2023-11-04 RX ADMIN — ACETAMINOPHEN 650 MG: 325 TABLET ORAL at 17:18

## 2023-11-04 RX ADMIN — ACETAMINOPHEN 650 MG: 325 TABLET ORAL at 09:25

## 2023-11-04 RX ADMIN — DULOXETINE HYDROCHLORIDE 60 MG: 60 CAPSULE, DELAYED RELEASE ORAL at 09:25

## 2023-11-04 RX ADMIN — HYDROCODONE BITARTRATE AND ACETAMINOPHEN 1 TABLET: 5; 325 TABLET ORAL at 22:21

## 2023-11-04 RX ADMIN — POLYETHYLENE GLYCOL 3350 17 G: 17 POWDER, FOR SOLUTION ORAL at 09:25

## 2023-11-04 RX ADMIN — ATORVASTATIN CALCIUM 20 MG: 20 TABLET, FILM COATED ORAL at 21:47

## 2023-11-04 RX ADMIN — Medication 10 ML: at 09:26

## 2023-11-04 RX ADMIN — TIMOLOL 1 DROP: 5 SOLUTION/ DROPS OPHTHALMIC at 21:59

## 2023-11-04 ASSESSMENT — PAIN DESCRIPTION - DESCRIPTORS
DESCRIPTORS: PINS AND NEEDLES
DESCRIPTORS: PINS AND NEEDLES
DESCRIPTORS: NUMBNESS

## 2023-11-04 ASSESSMENT — PAIN SCALES - GENERAL
PAINLEVEL_OUTOF10: 5
PAINLEVEL_OUTOF10: 3
PAINLEVEL_OUTOF10: 3
PAINLEVEL_OUTOF10: 5

## 2023-11-04 ASSESSMENT — PAIN DESCRIPTION - ORIENTATION
ORIENTATION: RIGHT

## 2023-11-04 ASSESSMENT — PAIN DESCRIPTION - LOCATION
LOCATION: LEG

## 2023-11-04 NOTE — PLAN OF CARE
Problem: Discharge Planning  Goal: Discharge to home or other facility with appropriate resources  Outcome: Progressing  Flowsheets (Taken 11/3/2023 2130 by Eve Smiley)  Discharge to home or other facility with appropriate resources: Identify barriers to discharge with patient and caregiver     Problem: Pain  Goal: Verbalizes/displays adequate comfort level or baseline comfort level  Outcome: Progressing     Problem: Skin/Tissue Integrity  Goal: Absence of new skin breakdown  Description: 1. Monitor for areas of redness and/or skin breakdown  2. Assess vascular access sites hourly  3. Every 4-6 hours minimum:  Change oxygen saturation probe site  4. Every 4-6 hours:  If on nasal continuous positive airway pressure, respiratory therapy assess nares and determine need for appliance change or resting period.   Outcome: Progressing     Problem: ABCDS Injury Assessment  Goal: Absence of physical injury  Outcome: Progressing     Problem: Safety - Adult  Goal: Free from fall injury  Outcome: Progressing     Problem: Chronic Conditions and Co-morbidities  Goal: Patient's chronic conditions and co-morbidity symptoms are monitored and maintained or improved  Outcome: Progressing  Flowsheets (Taken 11/3/2023 2130 by Eve Gonzalez)  Care Plan - Patient's Chronic Conditions and Co-Morbidity Symptoms are Monitored and Maintained or Improved: Monitor and assess patient's chronic conditions and comorbid symptoms for stability, deterioration, or improvement     Problem: Nutrition Deficit:  Goal: Optimize nutritional status  Outcome: Progressing

## 2023-11-05 ENCOUNTER — HOSPITAL ENCOUNTER (INPATIENT)
Age: 73
DRG: 559 | End: 2023-11-05
Attending: PHYSICAL MEDICINE & REHABILITATION | Admitting: PHYSICAL MEDICINE & REHABILITATION
Payer: MEDICARE

## 2023-11-05 VITALS
DIASTOLIC BLOOD PRESSURE: 70 MMHG | SYSTOLIC BLOOD PRESSURE: 135 MMHG | WEIGHT: 206 LBS | RESPIRATION RATE: 18 BRPM | TEMPERATURE: 98 F | HEIGHT: 65 IN | HEART RATE: 89 BPM | OXYGEN SATURATION: 92 % | BODY MASS INDEX: 34.32 KG/M2

## 2023-11-05 DIAGNOSIS — S72.001A HIP FRACTURE, RIGHT, CLOSED, INITIAL ENCOUNTER (HCC): Primary | ICD-10-CM

## 2023-11-05 LAB
GLUCOSE BLD-MCNC: 108 MG/DL (ref 70–99)
GLUCOSE BLD-MCNC: 133 MG/DL (ref 70–99)
GLUCOSE BLD-MCNC: 136 MG/DL (ref 70–99)
GLUCOSE BLD-MCNC: 196 MG/DL (ref 70–99)
PERFORMED ON: ABNORMAL

## 2023-11-05 PROCEDURE — 6370000000 HC RX 637 (ALT 250 FOR IP): Performed by: NURSE PRACTITIONER

## 2023-11-05 PROCEDURE — 1280000000 HC REHAB R&B

## 2023-11-05 PROCEDURE — 2580000003 HC RX 258: Performed by: PHYSICAL MEDICINE & REHABILITATION

## 2023-11-05 PROCEDURE — 2580000003 HC RX 258: Performed by: NURSE PRACTITIONER

## 2023-11-05 PROCEDURE — 6360000002 HC RX W HCPCS: Performed by: NURSE PRACTITIONER

## 2023-11-05 PROCEDURE — 94760 N-INVAS EAR/PLS OXIMETRY 1: CPT

## 2023-11-05 PROCEDURE — 6370000000 HC RX 637 (ALT 250 FOR IP): Performed by: INTERNAL MEDICINE

## 2023-11-05 PROCEDURE — 94150 VITAL CAPACITY TEST: CPT

## 2023-11-05 PROCEDURE — 6370000000 HC RX 637 (ALT 250 FOR IP): Performed by: PHYSICAL MEDICINE & REHABILITATION

## 2023-11-05 RX ORDER — POLYETHYLENE GLYCOL 3350 17 G/17G
17 POWDER, FOR SOLUTION ORAL DAILY PRN
Status: CANCELLED | OUTPATIENT
Start: 2023-11-05

## 2023-11-05 RX ORDER — GLUCAGON 1 MG/ML
1 KIT INJECTION PRN
Status: ACTIVE | OUTPATIENT
Start: 2023-11-05

## 2023-11-05 RX ORDER — DEXTROSE MONOHYDRATE 100 MG/ML
INJECTION, SOLUTION INTRAVENOUS CONTINUOUS PRN
Status: CANCELLED | OUTPATIENT
Start: 2023-11-05

## 2023-11-05 RX ORDER — BISACODYL 5 MG/1
5 TABLET, DELAYED RELEASE ORAL DAILY
Status: CANCELLED | OUTPATIENT
Start: 2023-11-05

## 2023-11-05 RX ORDER — CYCLOBENZAPRINE HCL 10 MG
5 TABLET ORAL 3 TIMES DAILY PRN
Status: CANCELLED | OUTPATIENT
Start: 2023-11-05

## 2023-11-05 RX ORDER — ACETAMINOPHEN 325 MG/1
650 TABLET ORAL EVERY 4 HOURS PRN
Status: CANCELLED | OUTPATIENT
Start: 2023-11-05

## 2023-11-05 RX ORDER — ATORVASTATIN CALCIUM 20 MG/1
20 TABLET, FILM COATED ORAL NIGHTLY
Status: DISPENSED | OUTPATIENT
Start: 2023-11-05

## 2023-11-05 RX ORDER — INSULIN LISPRO 100 [IU]/ML
0-4 INJECTION, SOLUTION INTRAVENOUS; SUBCUTANEOUS NIGHTLY
Status: CANCELLED | OUTPATIENT
Start: 2023-11-05

## 2023-11-05 RX ORDER — ENOXAPARIN SODIUM 100 MG/ML
40 INJECTION SUBCUTANEOUS DAILY
Status: DISPENSED | OUTPATIENT
Start: 2023-11-06

## 2023-11-05 RX ORDER — ONDANSETRON 4 MG/1
4 TABLET, ORALLY DISINTEGRATING ORAL EVERY 8 HOURS PRN
Status: ACTIVE | OUTPATIENT
Start: 2023-11-05

## 2023-11-05 RX ORDER — LANOLIN ALCOHOL/MO/W.PET/CERES
3 CREAM (GRAM) TOPICAL NIGHTLY PRN
Status: DISPENSED | OUTPATIENT
Start: 2023-11-05

## 2023-11-05 RX ORDER — DEXTROSE MONOHYDRATE 100 MG/ML
INJECTION, SOLUTION INTRAVENOUS CONTINUOUS PRN
Status: ACTIVE | OUTPATIENT
Start: 2023-11-05

## 2023-11-05 RX ORDER — INSULIN GLARGINE 100 [IU]/ML
15 INJECTION, SOLUTION SUBCUTANEOUS NIGHTLY
Status: DISPENSED | OUTPATIENT
Start: 2023-11-05

## 2023-11-05 RX ORDER — SODIUM CHLORIDE 0.9 % (FLUSH) 0.9 %
5-40 SYRINGE (ML) INJECTION EVERY 12 HOURS SCHEDULED
Status: DISCONTINUED | OUTPATIENT
Start: 2023-11-05 | End: 2023-11-09

## 2023-11-05 RX ORDER — ONDANSETRON 4 MG/1
4 TABLET, ORALLY DISINTEGRATING ORAL EVERY 8 HOURS PRN
Status: CANCELLED | OUTPATIENT
Start: 2023-11-05

## 2023-11-05 RX ORDER — TRAVOPROST OPHTHALMIC SOLUTION 0.04 MG/ML
1 SOLUTION OPHTHALMIC NIGHTLY
Status: CANCELLED | OUTPATIENT
Start: 2023-11-05

## 2023-11-05 RX ORDER — DULOXETIN HYDROCHLORIDE 60 MG/1
60 CAPSULE, DELAYED RELEASE ORAL DAILY
Status: CANCELLED | OUTPATIENT
Start: 2023-11-06

## 2023-11-05 RX ORDER — ONDANSETRON 2 MG/ML
4 INJECTION INTRAMUSCULAR; INTRAVENOUS EVERY 6 HOURS PRN
Status: CANCELLED | OUTPATIENT
Start: 2023-11-05

## 2023-11-05 RX ORDER — SENNA AND DOCUSATE SODIUM 50; 8.6 MG/1; MG/1
2 TABLET, FILM COATED ORAL DAILY
Status: CANCELLED | OUTPATIENT
Start: 2023-11-06

## 2023-11-05 RX ORDER — INSULIN LISPRO 100 [IU]/ML
0-4 INJECTION, SOLUTION INTRAVENOUS; SUBCUTANEOUS
Status: CANCELLED | OUTPATIENT
Start: 2023-11-05

## 2023-11-05 RX ORDER — HYDROCODONE BITARTRATE AND ACETAMINOPHEN 5; 325 MG/1; MG/1
1 TABLET ORAL EVERY 6 HOURS PRN
Status: CANCELLED | OUTPATIENT
Start: 2023-11-05

## 2023-11-05 RX ORDER — CYCLOBENZAPRINE HCL 10 MG
5 TABLET ORAL 3 TIMES DAILY PRN
Status: DISPENSED | OUTPATIENT
Start: 2023-11-05

## 2023-11-05 RX ORDER — INSULIN GLARGINE 100 [IU]/ML
15 INJECTION, SOLUTION SUBCUTANEOUS NIGHTLY
Status: CANCELLED | OUTPATIENT
Start: 2023-11-05

## 2023-11-05 RX ORDER — GABAPENTIN 100 MG/1
100 CAPSULE ORAL 3 TIMES DAILY
Status: DISPENSED | OUTPATIENT
Start: 2023-11-05

## 2023-11-05 RX ORDER — BISACODYL 5 MG/1
5 TABLET, DELAYED RELEASE ORAL DAILY
Status: DISPENSED | OUTPATIENT
Start: 2023-11-06

## 2023-11-05 RX ORDER — TIMOLOL 5 MG/ML
1 SOLUTION/ DROPS OPHTHALMIC 2 TIMES DAILY
Status: CANCELLED | OUTPATIENT
Start: 2023-11-05

## 2023-11-05 RX ORDER — TRAMADOL HYDROCHLORIDE 50 MG/1
50 TABLET ORAL EVERY 6 HOURS PRN
Status: ON HOLD | COMMUNITY

## 2023-11-05 RX ORDER — SODIUM CHLORIDE 0.9 % (FLUSH) 0.9 %
5-40 SYRINGE (ML) INJECTION PRN
Status: DISCONTINUED | OUTPATIENT
Start: 2023-11-05 | End: 2023-11-09

## 2023-11-05 RX ORDER — ATORVASTATIN CALCIUM 20 MG/1
20 TABLET, FILM COATED ORAL NIGHTLY
Status: CANCELLED | OUTPATIENT
Start: 2023-11-05

## 2023-11-05 RX ORDER — TRAVOPROST OPHTHALMIC SOLUTION 0.04 MG/ML
1 SOLUTION OPHTHALMIC NIGHTLY
Status: ACTIVE | OUTPATIENT
Start: 2023-11-05

## 2023-11-05 RX ORDER — POLYETHYLENE GLYCOL 3350 17 G/17G
17 POWDER, FOR SOLUTION ORAL DAILY PRN
Status: ACTIVE | OUTPATIENT
Start: 2023-11-05

## 2023-11-05 RX ORDER — HYDROCODONE BITARTRATE AND ACETAMINOPHEN 5; 325 MG/1; MG/1
1 TABLET ORAL EVERY 6 HOURS PRN
Status: DISPENSED | OUTPATIENT
Start: 2023-11-05

## 2023-11-05 RX ORDER — SODIUM CHLORIDE 0.9 % (FLUSH) 0.9 %
5-40 SYRINGE (ML) INJECTION EVERY 12 HOURS SCHEDULED
Status: CANCELLED | OUTPATIENT
Start: 2023-11-05

## 2023-11-05 RX ORDER — SENNA AND DOCUSATE SODIUM 50; 8.6 MG/1; MG/1
2 TABLET, FILM COATED ORAL DAILY
Status: DISPENSED | OUTPATIENT
Start: 2023-11-06

## 2023-11-05 RX ORDER — ACETAMINOPHEN 325 MG/1
650 TABLET ORAL EVERY 4 HOURS PRN
Status: DISPENSED | OUTPATIENT
Start: 2023-11-05

## 2023-11-05 RX ORDER — GABAPENTIN 100 MG/1
100 CAPSULE ORAL 3 TIMES DAILY
Status: CANCELLED | OUTPATIENT
Start: 2023-11-05

## 2023-11-05 RX ORDER — DULOXETIN HYDROCHLORIDE 60 MG/1
60 CAPSULE, DELAYED RELEASE ORAL DAILY
Status: DISPENSED | OUTPATIENT
Start: 2023-11-06

## 2023-11-05 RX ORDER — TIMOLOL 5 MG/ML
1 SOLUTION/ DROPS OPHTHALMIC 2 TIMES DAILY
Status: ACTIVE | OUTPATIENT
Start: 2023-11-05

## 2023-11-05 RX ORDER — LANOLIN ALCOHOL/MO/W.PET/CERES
3 CREAM (GRAM) TOPICAL NIGHTLY PRN
Status: CANCELLED | OUTPATIENT
Start: 2023-11-05

## 2023-11-05 RX ORDER — ONDANSETRON 2 MG/ML
4 INJECTION INTRAMUSCULAR; INTRAVENOUS EVERY 6 HOURS PRN
Status: ACTIVE | OUTPATIENT
Start: 2023-11-05

## 2023-11-05 RX ORDER — CHOLECALCIFEROL (VITAMIN D3) 125 MCG
500 CAPSULE ORAL DAILY
Status: ON HOLD | COMMUNITY

## 2023-11-05 RX ORDER — GLUCAGON 1 MG/ML
1 KIT INJECTION PRN
Status: CANCELLED | OUTPATIENT
Start: 2023-11-05

## 2023-11-05 RX ORDER — SODIUM CHLORIDE 0.9 % (FLUSH) 0.9 %
5-40 SYRINGE (ML) INJECTION PRN
Status: CANCELLED | OUTPATIENT
Start: 2023-11-05

## 2023-11-05 RX ORDER — INSULIN LISPRO 100 [IU]/ML
0-4 INJECTION, SOLUTION INTRAVENOUS; SUBCUTANEOUS
Status: DISPENSED | OUTPATIENT
Start: 2023-11-05

## 2023-11-05 RX ORDER — INSULIN LISPRO 100 [IU]/ML
0-4 INJECTION, SOLUTION INTRAVENOUS; SUBCUTANEOUS NIGHTLY
Status: ACTIVE | OUTPATIENT
Start: 2023-11-05

## 2023-11-05 RX ORDER — ENOXAPARIN SODIUM 100 MG/ML
40 INJECTION SUBCUTANEOUS DAILY
Status: CANCELLED | OUTPATIENT
Start: 2023-11-05

## 2023-11-05 RX ADMIN — STANDARDIZED SENNA CONCENTRATE AND DOCUSATE SODIUM 2 TABLET: 8.6; 5 TABLET ORAL at 09:47

## 2023-11-05 RX ADMIN — DULOXETINE HYDROCHLORIDE 60 MG: 60 CAPSULE, DELAYED RELEASE ORAL at 09:47

## 2023-11-05 RX ADMIN — GABAPENTIN 100 MG: 100 CAPSULE ORAL at 09:47

## 2023-11-05 RX ADMIN — TRAVOPROST OPHTHALMIC SOLUTION 1 DROP: 0.04 SOLUTION OPHTHALMIC at 20:52

## 2023-11-05 RX ADMIN — ACETAMINOPHEN 650 MG: 325 TABLET ORAL at 09:47

## 2023-11-05 RX ADMIN — Medication 10 ML: at 09:48

## 2023-11-05 RX ADMIN — GABAPENTIN 100 MG: 100 CAPSULE ORAL at 20:41

## 2023-11-05 RX ADMIN — LEVOTHYROXINE SODIUM 188 MCG: 0.1 TABLET ORAL at 06:20

## 2023-11-05 RX ADMIN — INSULIN GLARGINE 15 UNITS: 100 INJECTION, SOLUTION SUBCUTANEOUS at 22:52

## 2023-11-05 RX ADMIN — Medication 1 TABLET: at 20:42

## 2023-11-05 RX ADMIN — DICLOFENAC SODIUM 2 G: 10 GEL TOPICAL at 09:48

## 2023-11-05 RX ADMIN — ATORVASTATIN CALCIUM 20 MG: 20 TABLET, FILM COATED ORAL at 20:41

## 2023-11-05 RX ADMIN — ENOXAPARIN SODIUM 40 MG: 100 INJECTION SUBCUTANEOUS at 09:47

## 2023-11-05 RX ADMIN — ACETAMINOPHEN 650 MG: 325 TABLET ORAL at 20:41

## 2023-11-05 RX ADMIN — DICLOFENAC SODIUM 2 G: 10 GEL TOPICAL at 20:42

## 2023-11-05 RX ADMIN — Medication 1 TABLET: at 09:47

## 2023-11-05 RX ADMIN — TIMOLOL 1 DROP: 5 SOLUTION/ DROPS OPHTHALMIC at 20:45

## 2023-11-05 RX ADMIN — TIMOLOL 1 DROP: 5 SOLUTION/ DROPS OPHTHALMIC at 09:48

## 2023-11-05 RX ADMIN — ACETAMINOPHEN 650 MG: 325 TABLET ORAL at 02:04

## 2023-11-05 RX ADMIN — SODIUM CHLORIDE, PRESERVATIVE FREE 5 ML: 5 INJECTION INTRAVENOUS at 22:53

## 2023-11-05 RX ADMIN — MELATONIN TAB 3 MG 3 MG: 3 TAB at 22:53

## 2023-11-05 ASSESSMENT — PAIN - FUNCTIONAL ASSESSMENT: PAIN_FUNCTIONAL_ASSESSMENT: PREVENTS OR INTERFERES SOME ACTIVE ACTIVITIES AND ADLS

## 2023-11-05 ASSESSMENT — PAIN SCALES - WONG BAKER
WONGBAKER_NUMERICALRESPONSE: 0

## 2023-11-05 ASSESSMENT — PAIN SCALES - GENERAL
PAINLEVEL_OUTOF10: 0
PAINLEVEL_OUTOF10: 5
PAINLEVEL_OUTOF10: 5
PAINLEVEL_OUTOF10: 0
PAINLEVEL_OUTOF10: 5

## 2023-11-05 ASSESSMENT — PAIN DESCRIPTION - DESCRIPTORS
DESCRIPTORS: ACHING
DESCRIPTORS: ACHING;THROBBING
DESCRIPTORS: ACHING

## 2023-11-05 ASSESSMENT — PAIN DESCRIPTION - LOCATION
LOCATION: HIP;SHOULDER

## 2023-11-05 ASSESSMENT — PAIN DESCRIPTION - ORIENTATION
ORIENTATION: RIGHT

## 2023-11-05 ASSESSMENT — PAIN DESCRIPTION - FREQUENCY: FREQUENCY: CONTINUOUS

## 2023-11-05 ASSESSMENT — PAIN DESCRIPTION - PAIN TYPE: TYPE: SURGICAL PAIN;ACUTE PAIN

## 2023-11-05 ASSESSMENT — PAIN DESCRIPTION - ONSET: ONSET: ON-GOING

## 2023-11-05 NOTE — PROGRESS NOTES
11/05/23 1715   Incentive Spirometry Tx   Treatment Effort Assisted by RT; Initial;Independent; Instructed; Well   Predicted Volume 570   Achieved Volume (mL) 1000 mL

## 2023-11-05 NOTE — PROGRESS NOTES
4 Eyes Skin Assessment     NAME:  Comfort Jovel  YOB: 1950  MEDICAL RECORD NUMBER:  [id number]    The patient is being assessed for  Admission    I agree that at least one RN has performed a thorough Head to Toe Skin Assessment on the patient. ALL assessment sites listed below have been assessed. Areas assessed by both nurses:    Head, Face, Ears, Shoulders, Back, Chest, Arms, Elbows, Hands, Sacrum. Buttock, Coccyx, Ischium, and Legs. Feet and Heels        Does the Patient have a Wound? Yes wound(s) were present on assessment.  LDA wound assessment was Initiated and completed by RN       Roque Prevention initiated by RN: Yes  Wound Care Orders initiated by RN: Yes    Pressure Injury (Stage 3,4, Unstageable, DTI, NWPT, and Complex wounds) if present, place Wound referral order by RN under : Yes    New Ostomies, if present place, Ostomy referral order under : No     Nurse 1 eSignature: Electronically signed by Gaviota Ortega RN on 11/5/23 at 5:38 PM EST    **SHARE this note so that the co-signing nurse can place an eSignature**    Nurse 2 eSignature: Electronically signed by Haja Rodríguez RN on 11/5/23 at 5:40 PM EST

## 2023-11-05 NOTE — CARE COORDINATION
10/31/23 1102   IMM Letter   IMM Letter given to Patient/Family/Significant other/Guardian/POA/by: Reviewed with pt, signed & copy provided   IMM Letter date given: 10/31/23   IMM Letter time given: 2295
11/05/23 1027   IMM Letter   IMM Letter given to Patient/Family/Significant other/Guardian/POA/by: Given to Patient  by . IMM Letter date given: 11/05/23   IMM Letter time given: 1020     Patient informed of ARU admission today and in agreement.     Electronically signed by SUKHDEV Briggs on 11/5/2023 at 10:27 AM
CM review [facility] Portal   Auth ID [id number] is still pending. Assigned CM made aware. CM LVM on Pt's voicemail.        Electronically signed by Milton Solomon on 11/4/2023 at 3:53 PM
Call received from Jesse Pepe from [facility] on behalf of patient. She would like to know if she has been accepted to ARU per the pre-cert with [facility]. CM will f/u with the patient at [phone].     Electronically signed by Reggie Ortiz RN on 11/4/2023 at 9:36 AM
Discharge Planning:     (ROXIE) checked the [facility] Portal which states that patient's appeal was overturned and patient is able to admit to ARU. CM called AUR staff, Shanti Phan, who confirmed that he received that voicemail from insurance today. Shanti Needs to callback with admit time.       José Manuel SANTIZO, [place], [address] [facility] -   [phone]    Electronically signed by SUKHDEV Osorio on 11/5/2023 at 8:27 AM
Expedited appeal to [facility] initiated. ARU will continue to follow progress and update discharge plan as needed.     GARRETT KingN, .[phone]
[facility]ound Ostomy Continence Nurse  Consult Note       NAME:  Jessica Arnold  MEDICAL RECORD NUMBER:  [id number]  AGE: 74 y.o. GENDER: female  : 1950  TODAY'S DATE:  10/27/2023    Subjective   Reason for WOCN Evaluation and Assessment: stage 4 to coccyx      Jessica Arnold is a 74 y.o. female referred by:   [x] Physician  [x] Nursing  [] Other:     Wound Identification:  Wound Type: pressure  Contributing Factors: chronic pressure, decreased mobility, shear force, and obesity    Wound History: present on admission   Current Wound Care Treatment:  foam dressing     Patient Goal of Care:  [x] Wound Healing  [] Odor Control  [] Palliative Care  [] Pain Control   [] Other:         PAST MEDICAL HISTORY        Diagnosis Date    Arthritis     back    Cataracts, bilateral     CHF (congestive heart failure) (HCC)     Diabetes mellitus ([address])     Glaucoma     Hyperlipidemia     Hypertension     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       Thyroid disease        PAST SURGICAL HISTORY    Past Surgical History:   Procedure Laterality Date    CARPAL TUNNEL RELEASE      bilateral    CHOLECYSTECTOMY      COLONOSCOPY      ELBOW SURGERY      ENDOSCOPY, COLON, DIAGNOSTIC      EYE SURGERY      FOOT SURGERY      HIP SURGERY Right 10/26/2023    CLOSED REDUCTION PERCUTANEOUS PINNING OF RIGHT FEMORAL NECK USING CANNULATED SCREWS performed by Clark Colon MD at [address] HISTORY    Family History   Problem Relation Age of Onset    No Known Problems Mother     No Known Problems Father        SOCIAL HISTORY    Social History     Tobacco Use    Smoking status: Never    Smokeless tobacco: Never   Vaping Use    Vaping Use: Never used   Substance Use Topics    Alcohol use: Not Currently    Drug use: Never       ALLERGIES    Allergies   Allergen Reactions    Other Other (See Comments)     Preservatives in
Spoke w/ Supa Mariano RN re: difficulty w/ portal.  Expedited precert faxed, pending auth: [id number]. ARU will continue to follow progress and update discharge plan as needed.     Hari Avila, BSN, .[phone]
accepted. Backup option is [facility] for SNF (she has been in the past). The Plan for Transition of Care is related to the following treatment goals of Hypoxemia [R09.02]  Closed fracture of right hip, initial encounter ([address]) [S72.001A]  Closed right hip fracture, initial encounter ([address]) Lorena Dunne  Fall from standing, initial encounter [W19. XXXA]    IF APPLICABLE: The Patient and/or patient representative Jenny Romero and her family were provided with a choice of provider and agrees with the discharge plan. Freedom of choice list with basic dialogue that supports the patient's individualized plan of care/goals and shares the quality data associated with the providers was provided to: Patient   Patient Representative Name:       The Patient and/or Patient Representative Agree with the Discharge Plan?  Asif Crow  Case Management Department  [phone]

## 2023-11-05 NOTE — DISCHARGE SUMMARY
[address]  HOSPITALISTS DISCHARGE SUMMARY    Patient Demographics    Patient. Prakash Espinal  Date of Birth. 1950  MRN. [id number]     Primary care provider. Laura Castro MD  (Tel: [phone])    Admit date: 10/25/2023    Discharge date (blank if same as Note Date): Note Date: 11/5/2023     Reason for Hospitalization. Chief Complaint   Patient presents with    Fall     From store via [facility] cc fall. Patient has neuropathy, went to step down and right leg \"just gave out\". C/o pain in right elbow, right shoulder, right hip, and right side of head. Endorses head strike, denies LOC, denies blood thinner use. EMS reports blood glucose 43 in transport, given oral glucose with  after intervention. EMS reports SPO2 86% during transport, pt does not wear O2 at baseline. Significant Findings. Principal Problem:    Closed fracture of neck of right femur ([address])  Resolved Problems:    * No resolved hospital problems. *       Problems and results from this hospitalization that need follow up. Right femur fracture. Needs orthopedic follow up in 2 weeks  DM2. Hypothyroidism. Levothyroxine decreased. Recommend repeat thyroid studies in 4 weeks. Sacral decubitus ulcer. Follow up in [address]. Significant test results and incidental findings. CXR 10/25/2023:  IMPRESSION:  No acute cardiopulmonary findings     Xray right hip/pelvis 10/25/2023:  IMPRESSION:  1. Moderate right hip degenerative changes. No superimposed acute radiographic finding to account for patient's pain. Please note that radiography may not reveal non-displaced fractures and stress changes. If that is the clinical suspicion, MRI is the study of choice. Xray right shoulder and elbow 10/25/2023:  IMPRESSION:  1. No acute radiographic finding to account for patient's right shoulder pain.   2. Soft tissue

## 2023-11-05 NOTE — PROGRESS NOTES
ARU Admission Assessment    Ethnicity  \"Are you of , /a, or O[place]i origin? \"  Check all that apply:  [x] A. No, not of , /a, or [place] Origin  [] B.  Yes, [place], [place] American, Chicano/a  [] C.  Yes, [facility]  [] D.  Yes, [place]  [] E.  Yes, another , , or [place]i origin  [] X. Patient unable to respond  [] Y. Patient declines to respond    Race  \"What is your race? \"  Check all that apply:  [x] A. White  [] B. Black or   [] C. American Jd or [place] Native  [] D.  Jd  [] E. [place]  [] F. Israeli  [] G. [place]  [] Mary Massed  [] I. El Mikael  [] J.  Other   [] K.   [] L. Iraqi or Jeramy  [] M. Montserratian  [] N. Other [address] [place] [address]  [] X. Patient unable to respond  [] Y. Patient declines to respond  [] Z. None of the above    Language  A. \"What is your preferred language? \"   [facility]. \"Do you need or want an  to communicate with a doctor or health care staff? \"  Check only one:  [x] 0. No  [] 1. Yes  [] 9. Unable to determine    Transportation  \"Has lack of transportation kept you from medical appointments, meetings, work, or from getting things needed for daily living? \"Check all that apply:  [] A.  Yes, it has kept me from medical appointments or from getting my medications  [x] B.  Yes, it has kept me from non-medical meetings, appointments, work, or from getting things that I need  [] C.  No  [] X. Patient unable to respond  [] Y. Patient declines to respond    Hearing  Ability to hear (with hearing aid or hearing appliances if normally used)  [x]  0. Adequate - no difficulty in normal conversation, social interaction, listening to TV  []  1. Minimal difficulty - difficulty in some environments (e.g. when person speaks softly or setting is noisy)  []  2. Moderate difficulty - speaker has to increase volume and speak distinctly   []  3.   Highly impaired - absence of been reviewed and updated as necessary, and are accurate for the admission assessment period.     Assessing/Reviewing RN: Fernandez Sparks RN     Assessing/Reviewing RN: Memo Lopez, 11/8/23, 6376

## 2023-11-05 NOTE — PLAN OF CARE
:Short Term Goals:  Time Frame: 7 days   Patient will complete lower body ADL at modified independent   Patient will complete toileting at modified independent   Patient will complete functional transfers at [facility] independent   Patient will complete functional mobility at modified independent   Patient to gather and transport IADL items at modified independent      These goals were reviewed with this patient at the time of assessment and Clare Rodriguez is in agreement    Plan of Care:  Pt to be seen 5 out of 7 days per week per ARU protocol ( 90 minutes with OT)    Therapy Treatments will include:  [x]  therapeutic exercises    [x]  gait training     [x]  neuromuscular re-ed                            [x]  transfer training             [x] community reintegration    [x] bed mobility                          []  w/c mobility and training  [x]  self care    [x]home mgmt    []  cognitive training            [x]  energy conservation        []  dysphagia tx    []  speech/language/communication therapy   []  group therapy    [x]  patient/family education    [] Other:    CASE MANAGEMENT:  Goals:  Assist patient/family with discharge planning, patient/family counseling, and coordination with insurance during ARU stay. Clare Rodriguez will be seen a minimum of 3 hours of therapy per day, a minimum of 5 out of 7 days per week  (please see above for specific treatment plan per PT/OT/SLP). [] In this rare instance due to the nature of this patient's medical involvement, this patient will be seen 15 hours per week (900 minutes within a 7 day period). In addition, dietician/nutritionist may monitor calorie count as well as intake and collaboratively work with SLP on dietary upgrades. Neuropsychology/Psychology may evaluate and provide necessary support.     Medical issues being managed closely and that require 24 hour availability of a physician:  [] Swallowing Precautions  [] Bowel/Bladder Fx  [] 11/7/23, 10 AM    Case Mgmt: Katarina Venus, MSW, LSW 11/6/2023 @ 8:54.     OT: Spencer Proctor, OTR/L #[id number], 11/6/2023, 1029    PT: Santosh Cordova PT, DPT - [id number], 11/6/2023 3:34 PM    RN: Safia Monet RN 11/5/23 1651    :  Lien Alvarado PT, DPT [id number]  11/7/23  9:35 AM

## 2023-11-06 LAB
ANION GAP SERPL CALCULATED.3IONS-SCNC: 12 MMOL/L (ref 3–16)
BASOPHILS # BLD: 0 K/UL (ref 0–0.2)
BASOPHILS NFR BLD: 0.3 %
BUN SERPL-MCNC: 15 MG/DL (ref 7–20)
CALCIUM SERPL-MCNC: 8.9 MG/DL (ref 8.3–10.6)
CHLORIDE SERPL-SCNC: 104 MMOL/L (ref 99–110)
CO2 SERPL-SCNC: 26 MMOL/L (ref 21–32)
CREAT SERPL-MCNC: 0.6 MG/DL (ref 0.6–1.2)
DEPRECATED RDW RBC AUTO: 15.6 % (ref 12.4–15.4)
EOSINOPHIL # BLD: 0 K/UL (ref 0–0.6)
EOSINOPHIL NFR BLD: 0.2 %
GFR SERPLBLD CREATININE-BSD FMLA CKD-EPI: >60 ML/MIN/{1.73_M2}
GLUCOSE BLD-MCNC: 122 MG/DL (ref 70–99)
GLUCOSE BLD-MCNC: 128 MG/DL (ref 70–99)
GLUCOSE BLD-MCNC: 130 MG/DL (ref 70–99)
GLUCOSE BLD-MCNC: 132 MG/DL (ref 70–99)
GLUCOSE SERPL-MCNC: 150 MG/DL (ref 70–99)
HCT VFR BLD AUTO: 41.6 % (ref 36–48)
HGB BLD-MCNC: 13.2 G/DL (ref 12–16)
LYMPHOCYTES # BLD: 1 K/UL (ref 1–5.1)
LYMPHOCYTES NFR BLD: 15.5 %
MCH RBC QN AUTO: 29.3 PG (ref 26–34)
MCHC RBC AUTO-ENTMCNC: 31.8 G/DL (ref 31–36)
MCV RBC AUTO: 92.2 FL (ref 80–100)
MONOCYTES # BLD: 0.6 K/UL (ref 0–1.3)
MONOCYTES NFR BLD: 10.4 %
NEUTROPHILS # BLD: 4.6 K/UL (ref 1.7–7.7)
NEUTROPHILS NFR BLD: 73.6 %
PERFORMED ON: ABNORMAL
PLATELET # BLD AUTO: 189 K/UL (ref 135–450)
PMV BLD AUTO: 7.9 FL (ref 5–10.5)
POTASSIUM SERPL-SCNC: 4 MMOL/L (ref 3.5–5.1)
RBC # BLD AUTO: 4.52 M/UL (ref 4–5.2)
SODIUM SERPL-SCNC: 142 MMOL/L (ref 136–145)
WBC # BLD AUTO: 6.2 K/UL (ref 4–11)

## 2023-11-06 PROCEDURE — 6360000002 HC RX W HCPCS: Performed by: PHYSICAL MEDICINE & REHABILITATION

## 2023-11-06 PROCEDURE — 97530 THERAPEUTIC ACTIVITIES: CPT

## 2023-11-06 PROCEDURE — 97116 GAIT TRAINING THERAPY: CPT

## 2023-11-06 PROCEDURE — 97161 PT EVAL LOW COMPLEX 20 MIN: CPT

## 2023-11-06 PROCEDURE — 97110 THERAPEUTIC EXERCISES: CPT

## 2023-11-06 PROCEDURE — 2580000003 HC RX 258: Performed by: PHYSICAL MEDICINE & REHABILITATION

## 2023-11-06 PROCEDURE — 36415 COLL VENOUS BLD VENIPUNCTURE: CPT

## 2023-11-06 PROCEDURE — 1280000000 HC REHAB R&B

## 2023-11-06 PROCEDURE — 97535 SELF CARE MNGMENT TRAINING: CPT

## 2023-11-06 PROCEDURE — 80048 BASIC METABOLIC PNL TOTAL CA: CPT

## 2023-11-06 PROCEDURE — 85025 COMPLETE CBC W/AUTO DIFF WBC: CPT

## 2023-11-06 PROCEDURE — 97165 OT EVAL LOW COMPLEX 30 MIN: CPT

## 2023-11-06 PROCEDURE — 6370000000 HC RX 637 (ALT 250 FOR IP): Performed by: PHYSICAL MEDICINE & REHABILITATION

## 2023-11-06 RX ORDER — SPIRONOLACTONE 25 MG/1
25 TABLET ORAL DAILY
Status: DISPENSED | OUTPATIENT
Start: 2023-11-06

## 2023-11-06 RX ADMIN — MELATONIN TAB 3 MG 3 MG: 3 TAB at 22:05

## 2023-11-06 RX ADMIN — SPIRONOLACTONE 25 MG: 25 TABLET ORAL at 12:35

## 2023-11-06 RX ADMIN — INSULIN GLARGINE 15 UNITS: 100 INJECTION, SOLUTION SUBCUTANEOUS at 22:18

## 2023-11-06 RX ADMIN — SODIUM CHLORIDE, PRESERVATIVE FREE 10 ML: 5 INJECTION INTRAVENOUS at 08:41

## 2023-11-06 RX ADMIN — GABAPENTIN 100 MG: 100 CAPSULE ORAL at 22:05

## 2023-11-06 RX ADMIN — ATORVASTATIN CALCIUM 20 MG: 20 TABLET, FILM COATED ORAL at 22:05

## 2023-11-06 RX ADMIN — LEVOTHYROXINE SODIUM 188 MCG: 0.1 TABLET ORAL at 05:03

## 2023-11-06 RX ADMIN — HYDROCODONE BITARTRATE AND ACETAMINOPHEN 1 TABLET: 5; 325 TABLET ORAL at 14:40

## 2023-11-06 RX ADMIN — ACETAMINOPHEN 650 MG: 325 TABLET ORAL at 10:40

## 2023-11-06 RX ADMIN — DICLOFENAC SODIUM 2 G: 10 GEL TOPICAL at 08:55

## 2023-11-06 RX ADMIN — HYDROCODONE BITARTRATE AND ACETAMINOPHEN 1 TABLET: 5; 325 TABLET ORAL at 05:03

## 2023-11-06 RX ADMIN — ENOXAPARIN SODIUM 40 MG: 100 INJECTION SUBCUTANEOUS at 08:40

## 2023-11-06 RX ADMIN — DULOXETINE HYDROCHLORIDE 60 MG: 60 CAPSULE, DELAYED RELEASE ORAL at 08:40

## 2023-11-06 RX ADMIN — Medication 1 TABLET: at 22:05

## 2023-11-06 RX ADMIN — STANDARDIZED SENNA CONCENTRATE AND DOCUSATE SODIUM 2 TABLET: 8.6; 5 TABLET ORAL at 08:40

## 2023-11-06 RX ADMIN — GABAPENTIN 100 MG: 100 CAPSULE ORAL at 14:40

## 2023-11-06 RX ADMIN — SODIUM CHLORIDE, PRESERVATIVE FREE 5 ML: 5 INJECTION INTRAVENOUS at 23:29

## 2023-11-06 RX ADMIN — GABAPENTIN 100 MG: 100 CAPSULE ORAL at 08:40

## 2023-11-06 RX ADMIN — DICLOFENAC SODIUM 2 G: 10 GEL TOPICAL at 22:07

## 2023-11-06 RX ADMIN — HYDROCODONE BITARTRATE AND ACETAMINOPHEN 1 TABLET: 5; 325 TABLET ORAL at 22:04

## 2023-11-06 RX ADMIN — BISACODYL 5 MG: 5 TABLET, COATED ORAL at 08:40

## 2023-11-06 RX ADMIN — Medication 1 TABLET: at 08:40

## 2023-11-06 ASSESSMENT — PAIN DESCRIPTION - PAIN TYPE: TYPE: SURGICAL PAIN;ACUTE PAIN

## 2023-11-06 ASSESSMENT — PAIN DESCRIPTION - FREQUENCY: FREQUENCY: CONTINUOUS

## 2023-11-06 ASSESSMENT — PAIN SCALES - GENERAL
PAINLEVEL_OUTOF10: 8
PAINLEVEL_OUTOF10: 5
PAINLEVEL_OUTOF10: 9
PAINLEVEL_OUTOF10: 5
PAINLEVEL_OUTOF10: 10
PAINLEVEL_OUTOF10: 3

## 2023-11-06 ASSESSMENT — PAIN DESCRIPTION - LOCATION
LOCATION: SHOULDER
LOCATION: SHOULDER;LEG
LOCATION: HIP;SHOULDER
LOCATION: SHOULDER;LEG

## 2023-11-06 ASSESSMENT — PAIN SCALES - WONG BAKER
WONGBAKER_NUMERICALRESPONSE: 0

## 2023-11-06 ASSESSMENT — PAIN - FUNCTIONAL ASSESSMENT
PAIN_FUNCTIONAL_ASSESSMENT: ACTIVITIES ARE NOT PREVENTED
PAIN_FUNCTIONAL_ASSESSMENT: ACTIVITIES ARE NOT PREVENTED
PAIN_FUNCTIONAL_ASSESSMENT: PREVENTS OR INTERFERES SOME ACTIVE ACTIVITIES AND ADLS
PAIN_FUNCTIONAL_ASSESSMENT: ACTIVITIES ARE NOT PREVENTED

## 2023-11-06 ASSESSMENT — PAIN DESCRIPTION - ORIENTATION
ORIENTATION: RIGHT

## 2023-11-06 ASSESSMENT — PAIN DESCRIPTION - DESCRIPTORS
DESCRIPTORS: ACHING;DISCOMFORT
DESCRIPTORS: SHARP
DESCRIPTORS: ACHING;DISCOMFORT
DESCRIPTORS: ACHING

## 2023-11-06 ASSESSMENT — PAIN DESCRIPTION - ONSET: ONSET: ON-GOING

## 2023-11-06 NOTE — PROGRESS NOTES
[address] [facility] Department   Phone: [phone]    Physical Therapy    [x] Initial Evaluation            [] Daily Treatment Note         [] Discharge Summary      Patient: Mirta Whitten   : 1950   MRN: [id number]   Date of Service:  2023  Admitting Diagnosis: Hip fracture, right, closed, initial encounter [facility])  Current Admission Summary: Rehab unit follow-up. Patient is a 73 yo F with pmh CHF, HTN, HLD, DM2, thyroid disease, chronic back pain, and recently diagnosed UTI who initially presented 10/25/2023 with right hip pain s/p mechanical fall. Reports leaving [employer] and stepping off curb when her right leg \"gave out\" and she fell onto right side. Imaging revealed right impacted subcapital femur fracture. She underwent percutaneous fixation (10/26 with Dr. Princess Sánchez). Post-op course complicated by hypoxia. Currently, patient reports moderate right hip pain. Worse with movement. Improves with rest and medication. In therapies yesterday, the patient requires contact-guard assist for her transfers and ambulation with a rolling walker, using supplemental oxygen. She would like to come to ARU to improve her function prior to returning home. We sent her medical information into her insurance company to get approval for rehab unit treatment. She was denied by [facility], but we have filed an appeal, and they usually approve her on appeal for rehab unit treatment. 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 Past Surgical History:  has a past surgical history that includes eye surgery; Foot surgery; Carpal tunnel release; Elbow surgery; shoulder surgery;  Cholecystectomy; Colonoscopy; Endoscopy, colon, diagnostic;

## 2023-11-06 NOTE — CARE COORDINATION
Social Work Admission Assessment    Objective:  Met with pt to complete initial assessment and review role of  in rehab process. Pt oriented to unit. Pt states understanding of this. Current Home Situation:  Patient lives alone. Patient has nieces and nephews that assist with her care. Patient resides in a one story home with a basement. Patient has a stair lift to the basement. Pt's plans are:  Patient is retired. Accessibility to community resources/transportation: Patient is active with the [employer]. She has Meals On Wheels, Homemaker Services and Transportation. Patient is also active with [employer] [address] [place] [address]. Has pt experienced a recent loss or signigicant life event that would impact their care or ability to participate? No    Has pt ever been treated for emotional disorders? Yes in the past.  Nothing now. How does pt and family cope with stressful events and this hospitalization? Special Problem Areas: None     Discharge Plan: To home with home health care and needed supports. Impression/Plan:  Liliam Parham is a 74year old female that has been admitted to ARU. Provided patient with this SW's contact information to contact as needed. Will continue to follow for support and discharge planning.      Electronically signed by SUKHDEV Russ on 11/6/2023 at 4:44 PM

## 2023-11-06 NOTE — PROGRESS NOTES
Morning assessment completed. Pt comfortably resting in chair. VSS. Denies any pain. Morning meds given per MAR. PWWW. AXOX4. Call light and bedside table in reach. Chair locked and alarm on. Pt's supplied Timolol, eye drop is . This RN called pharmacy to see if they have med available for her, pharmacy said they don't carry preservative free eye drop. Pt wants to put the eye drop herself regardless of it being  since her doc is ok with her using it per pt. This nurse didn't feel safe to administer it,  however, pt wants to use it. The care plan and education has been reviewed and mutually agreed upon with the patient.

## 2023-11-06 NOTE — PROGRESS NOTES
[address] [facility] Department   Phone: [phone]    Occupational Therapy    [x] Initial Evaluation            [] Daily Treatment Note         [] Discharge Summary      Patient: Neil Mason   : 1950   MRN: [id number]   Date of Service:  2023    Admitting Diagnosis:  Hip fracture, right, closed, initial encounter [facility])  Current Admission Summary: Patient is a 75 yo F with pmh CHF, HTN, HLD, DM2, thyroid disease, chronic back pain, and recently diagnosed UTI who initially presented 10/25/2023 with right hip pain s/p mechanical fall. Reports leaving [employer] and stepping off curb when her right leg \"gave out\" and she fell onto right side. Imaging revealed right impacted subcapital femur fracture. She underwent percutaneous fixation (10/26 with Dr. Aminata Collier). Post-op course complicated by hypoxia. Currently, patient reports moderate right hip pain. Patient lives at home alone in a 1 level house with laundry in the basement. She does have a ramped entry. Patient has [facility], which initially denied to allow rehab unit treatment, but we did get approval on appeal, and now she is admitted to the rehab unit for intensive therapies to improve her transfers, gait, balance, and self-care activities before discharge to home alone. 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 Past Surgical History:  has a past surgical history that includes eye surgery; Foot surgery; Carpal tunnel release; Elbow surgery; shoulder surgery; Cholecystectomy; Colonoscopy; Endoscopy, colon, diagnostic; and hip surgery (Right, 10/26/2023).     Discharge Recommendations: [facility] OT S3    DME Required For Discharge: DME to be determined pending patient

## 2023-11-06 NOTE — PROGRESS NOTES
Admission Period/Goal QM Codes for Jass Soto. QM Admit Code Goal Code   Eating 5 -   Oral Hygiene 4 -   Toileting Hygiene 3 6   Shower/Bathing 3 6   UB Dressing 5 6   LB Dressing 3 6   Putting on/off Footwear 3 6   Rolling Left and Right 4 6   Sit To Lying 3 6   Lying to Sitting on Bedside 3 6   Sit to Stand 3 6   Chair/Bed to Chair Transfer 3 6   Toilet Transfers 3 6   Car Transfers 4 6   Walk 10 Feet 4 6   Walk 50 Feet with Two Turns 4 6   Walk 150 Feet 4 6   Walk 10 Feet on Uneven Surfaces 4 6   1 Step (Curb) 4 6   4 Steps 88 6   12 Steps 88 6   Picking up Object from Floor 4 6   Wheel 50 Feet with 2 Turns 9 -   Type - -   Wheel 150 Feet 9 -   Type - -       The above codes were determined by the treatment team to be the patient's accurate admission assessment codes based on assessment performed soon after the patient's admission and prior to the benefit of services provided by staff, or if appropriate, the patient's usual performance at admission.     OT:  Natalya Donahue, DOMINIQUE OTR/L, [id number] 11/8/2023 2:51 PM     PT:  Harsha Adan PT, DPT - [id number], 11/10/2023 11:10 AM     RN:  Michelle Toledo, 11/8/23, [address] [facility]    :  Delilah Alberto PT, [place] [id number]  11/10/23  12:05 PM

## 2023-11-06 NOTE — PROGRESS NOTES
Pt complain of pain on rt shoulder and rt thigh, prn tylenol given. 1440: pt complain of pain, 9/10, prn norco given.

## 2023-11-06 NOTE — PROGRESS NOTES
Nutrition Note    RECOMMENDATIONS  PO Diet: Regular  ONS: Octavia Glucerna BID     NUTRITION ASSESSMENT   Pt admitted to ARU s/p rt hip fx. Receives a regular diet with chocolate Glucerna BID. Pt has increased protein needs d/t stage 4 ulcer to coccyx. No po intake documented x past week. No appetite issues reported. Will con't to monitor & encourage po & supplement intake to promote healing & strength. Nutrition Related Findings: nonpitting RLE edema; gluc 150; POCG 132  Wounds: Pressure Injury, Stage IV, Surgical Incision  Nutrition Education:  Education not indicated   Nutrition Goals: PO intake 75% or greater     MALNUTRITION ASSESSMENT      Malnutrition Status: No malnutrition    NUTRITION DIAGNOSIS   Increased nutrient needs related to increase demand for energy/nutrients as evidenced by wounds      CURRENT NUTRITION THERAPIES  ADULT DIET; Regular  ADULT ORAL NUTRITION SUPPLEMENT; Dinner, Breakfast; Diabetic Oral Supplement     PO Intake: 51-75%, %   PO Supplement Intake:Unable to assess (but reported accepting.)    ANTHROPOMETRICS  Current Height: 165.1 cm (5' 5\")  Current Weight - Scale: 93.4 kg (206 lb)    Ideal Body Weight (IBW): 125 lbs  (57 kg)        BMI: 34.3      COMPARATIVE STANDARDS  Total Energy Requirements (kcals/day): 1401- 1681     Protein (g):  68-114g       Fluid (mL/day):  1 ml/kcal    The patient will be monitored per nutrition standards of care. Consult dietitian if additional nutrition interventions are needed prior to RD reassessment.      Lynn Ward, AMBER, LD    Contact: [phone]

## 2023-11-06 NOTE — PROGRESS NOTES
Assessment complete. Alert, oriented x4. Reports pain to right hip and shoulder. Given PRN acetaminophen. Voltaren gel applied to right shoulder and neck per MAR. Patient reports fibromyalgia at baseline in BUE; pain has been worse than baseline in right shoulder since her fall prior to surgery. Incision to RLE clean, dry, and well-approximated with staples in place. The care plan and education has been reviewed and mutually agreed upon with the patient. In bed, alarm on, bed in lowest position, call light and table within reach. No further needs expressed at this time. 2315  Ambulated to bathroom using walker and standby assistance. Voided. Continent. Patient wanting to sleep in the chair tonight. Per patient, she likes to sleep on her side, but sleeping on her side puts her in too much pain secondary to her injury/surgery. 0505  Complaining of pain in right shoulder.  Given PRN Norco.

## 2023-11-07 LAB
GLUCOSE BLD-MCNC: 116 MG/DL (ref 70–99)
GLUCOSE BLD-MCNC: 133 MG/DL (ref 70–99)
GLUCOSE BLD-MCNC: 165 MG/DL (ref 70–99)
GLUCOSE BLD-MCNC: 266 MG/DL (ref 70–99)
PERFORMED ON: ABNORMAL

## 2023-11-07 PROCEDURE — 97110 THERAPEUTIC EXERCISES: CPT

## 2023-11-07 PROCEDURE — 6360000002 HC RX W HCPCS: Performed by: PHYSICAL MEDICINE & REHABILITATION

## 2023-11-07 PROCEDURE — 2580000003 HC RX 258: Performed by: PHYSICAL MEDICINE & REHABILITATION

## 2023-11-07 PROCEDURE — 6370000000 HC RX 637 (ALT 250 FOR IP): Performed by: PHYSICAL MEDICINE & REHABILITATION

## 2023-11-07 PROCEDURE — 97116 GAIT TRAINING THERAPY: CPT

## 2023-11-07 PROCEDURE — 97535 SELF CARE MNGMENT TRAINING: CPT

## 2023-11-07 PROCEDURE — 97530 THERAPEUTIC ACTIVITIES: CPT

## 2023-11-07 PROCEDURE — 1280000000 HC REHAB R&B

## 2023-11-07 RX ORDER — LEVOTHYROXINE SODIUM 0.1 MG/1
200 TABLET ORAL DAILY
Status: DISPENSED | OUTPATIENT
Start: 2023-11-08

## 2023-11-07 RX ADMIN — HYDROCODONE BITARTRATE AND ACETAMINOPHEN 1 TABLET: 5; 325 TABLET ORAL at 07:19

## 2023-11-07 RX ADMIN — DULOXETINE HYDROCHLORIDE 60 MG: 60 CAPSULE, DELAYED RELEASE ORAL at 08:38

## 2023-11-07 RX ADMIN — INSULIN GLARGINE 15 UNITS: 100 INJECTION, SOLUTION SUBCUTANEOUS at 20:38

## 2023-11-07 RX ADMIN — SODIUM CHLORIDE, PRESERVATIVE FREE 10 ML: 5 INJECTION INTRAVENOUS at 20:41

## 2023-11-07 RX ADMIN — DICLOFENAC SODIUM 2 G: 10 GEL TOPICAL at 20:41

## 2023-11-07 RX ADMIN — GABAPENTIN 100 MG: 100 CAPSULE ORAL at 20:38

## 2023-11-07 RX ADMIN — MELATONIN TAB 3 MG 3 MG: 3 TAB at 21:59

## 2023-11-07 RX ADMIN — HYDROCODONE BITARTRATE AND ACETAMINOPHEN 1 TABLET: 5; 325 TABLET ORAL at 21:59

## 2023-11-07 RX ADMIN — GABAPENTIN 100 MG: 100 CAPSULE ORAL at 15:05

## 2023-11-07 RX ADMIN — GABAPENTIN 100 MG: 100 CAPSULE ORAL at 08:38

## 2023-11-07 RX ADMIN — SODIUM CHLORIDE, PRESERVATIVE FREE 10 ML: 5 INJECTION INTRAVENOUS at 08:40

## 2023-11-07 RX ADMIN — SPIRONOLACTONE 25 MG: 25 TABLET ORAL at 08:38

## 2023-11-07 RX ADMIN — Medication 1 TABLET: at 08:38

## 2023-11-07 RX ADMIN — Medication 1 TABLET: at 20:38

## 2023-11-07 RX ADMIN — DICLOFENAC SODIUM 2 G: 10 GEL TOPICAL at 08:39

## 2023-11-07 RX ADMIN — LEVOTHYROXINE SODIUM 188 MCG: 0.1 TABLET ORAL at 07:20

## 2023-11-07 RX ADMIN — ATORVASTATIN CALCIUM 20 MG: 20 TABLET, FILM COATED ORAL at 20:38

## 2023-11-07 RX ADMIN — ENOXAPARIN SODIUM 40 MG: 100 INJECTION SUBCUTANEOUS at 08:37

## 2023-11-07 RX ADMIN — HYDROCODONE BITARTRATE AND ACETAMINOPHEN 1 TABLET: 5; 325 TABLET ORAL at 15:05

## 2023-11-07 RX ADMIN — ACETAMINOPHEN 650 MG: 325 TABLET ORAL at 12:13

## 2023-11-07 ASSESSMENT — PAIN SCALES - GENERAL
PAINLEVEL_OUTOF10: 4
PAINLEVEL_OUTOF10: 2
PAINLEVEL_OUTOF10: 6
PAINLEVEL_OUTOF10: 2
PAINLEVEL_OUTOF10: 5
PAINLEVEL_OUTOF10: 4
PAINLEVEL_OUTOF10: 0
PAINLEVEL_OUTOF10: 2
PAINLEVEL_OUTOF10: 5
PAINLEVEL_OUTOF10: 2
PAINLEVEL_OUTOF10: 4

## 2023-11-07 ASSESSMENT — PAIN DESCRIPTION - LOCATION
LOCATION: SHOULDER;HIP
LOCATION: SHOULDER;LEG
LOCATION: HIP

## 2023-11-07 ASSESSMENT — PAIN DESCRIPTION - ORIENTATION
ORIENTATION: RIGHT;LEFT
ORIENTATION: RIGHT

## 2023-11-07 ASSESSMENT — PAIN SCALES - WONG BAKER
WONGBAKER_NUMERICALRESPONSE: 2
WONGBAKER_NUMERICALRESPONSE: 0
WONGBAKER_NUMERICALRESPONSE: 2

## 2023-11-07 ASSESSMENT — PAIN DESCRIPTION - DESCRIPTORS
DESCRIPTORS: SHARP
DESCRIPTORS: OTHER (COMMENT)
DESCRIPTORS: ACHING;DISCOMFORT
DESCRIPTORS: ACHING;DISCOMFORT
DESCRIPTORS: SHARP

## 2023-11-07 ASSESSMENT — PAIN DESCRIPTION - ONSET
ONSET: ON-GOING

## 2023-11-07 ASSESSMENT — PAIN DESCRIPTION - PAIN TYPE
TYPE: SURGICAL PAIN;ACUTE PAIN
TYPE: SURGICAL PAIN
TYPE: SURGICAL PAIN;ACUTE PAIN

## 2023-11-07 ASSESSMENT — PAIN - FUNCTIONAL ASSESSMENT
PAIN_FUNCTIONAL_ASSESSMENT: ACTIVITIES ARE NOT PREVENTED
PAIN_FUNCTIONAL_ASSESSMENT: PREVENTS OR INTERFERES SOME ACTIVE ACTIVITIES AND ADLS
PAIN_FUNCTIONAL_ASSESSMENT: ACTIVITIES ARE NOT PREVENTED

## 2023-11-07 ASSESSMENT — PAIN DESCRIPTION - FREQUENCY
FREQUENCY: INTERMITTENT
FREQUENCY: CONTINUOUS
FREQUENCY: INTERMITTENT

## 2023-11-07 NOTE — PROGRESS NOTES
[address] [facility] Department   Phone: [phone]    Physical Therapy    [] Initial Evaluation            [x] Daily Treatment Note         [] Discharge Summary      Patient: Mukul Farmer   : 1950   MRN: [id number]   Date of Service:  2023  Admitting Diagnosis: Hip fracture, right, closed, initial encounter [facility])  Current Admission Summary: Rehab unit follow-up. Patient is a 75 yo F with pmh CHF, HTN, HLD, DM2, thyroid disease, chronic back pain, and recently diagnosed UTI who initially presented 10/25/2023 with right hip pain s/p mechanical fall. Reports leaving [employer] and stepping off curb when her right leg \"gave out\" and she fell onto right side. Imaging revealed right impacted subcapital femur fracture. She underwent percutaneous fixation (10/26 with Dr. So Marie). Post-op course complicated by hypoxia. Currently, patient reports moderate right hip pain. Worse with movement. Improves with rest and medication. In therapies yesterday, the patient requires contact-guard assist for her transfers and ambulation with a rolling walker, using supplemental oxygen. She would like to come to ARU to improve her function prior to returning home. We sent her medical information into her insurance company to get approval for rehab unit treatment. She was denied by [facility], but we have filed an appeal, and they usually approve her on appeal for rehab unit treatment. 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 Past Surgical History:  has a past surgical history that includes eye surgery; Foot surgery; Carpal tunnel release; Elbow surgery; shoulder surgery;  Cholecystectomy; Colonoscopy; Endoscopy, colon, diagnostic; arrival, agreeable to therapy session. Pt requesting use of restroom at beginning of session, assisted to toilet with stand by assist. Ambulated 54 ft with same mechanics as documented above. Stepper 1 x 6 minutes for LE mobility and strengthening. Alternating tap ups onto 6 inch step with unilateral UE support x5 (B). Pt reporting worsening (R) hip pain to 5/10 with mobility of (R) LE. Ended exercise and allowed for seated rest break. Standing exercises on airex pad with sensory ball: chest press x10, overhead x10, trunk rotation x10 (B). Pt with intermittent posterior weight shifting requiring Min A or UE support on walker to correct. Standing LE exercises at ballet bar: abduction x10 (B), extension x10 (B). (B) UE support with (R) LE exercising and unilateral UE support with (L) LE exercising. Completed with contact guard assist and verbal cueing for technique. Lateral step ups onto airex without UE support x5 (B). Pt presenting with difficulty in step height and length. Intermittent LOB requiring Min A or UE support on bar to recover. Stepping to target forward, diagonal and laterally x4 with (R) LE without UE support. Intermittent LOB requiring Min A to recover. Pt ambulated 54 ft with same mechanics as documented above except for increased impairment in gait speed. Pt left in recliner with chair alarm on and call light in reach. Ice provided with instructions to remove after 20 minutes--RN aware.         Functional Outcomes                 Cognition  Overall Cognitive Status: WFL  Following Commands: follows all commands without difficulty  Attention Span: appears intact  Safety Judgement: good awareness of safety precautions  Sequencing: requires cues for some  Command Following:   [facility]    Education  Barriers To Learning: visual  Patient Education: patient educated on goals, PT role and benefits, plan of care, general safety, functional mobility training, proper use of assistive device/equipment, injury

## 2023-11-07 NOTE — PLAN OF CARE
Problem: Safety - Adult  Goal: Free from fall injury  Outcome: Progressing     Problem: Skin/Tissue Integrity  Goal: Absence of new skin breakdown  Description: 1. Monitor for areas of redness and/or skin breakdown  2. Assess vascular access sites hourly  3. Every 4-6 hours minimum:  Change oxygen saturation probe site  4. Every 4-6 hours:  If on nasal continuous positive airway pressure, respiratory therapy assess nares and determine need for appliance change or resting period.   Outcome: Progressing     Problem: ABCDS Injury Assessment  Goal: Absence of physical injury  Outcome: Progressing  Flowsheets (Taken 11/7/2023 0631)  Absence of Physical Injury: Implement safety measures based on patient assessment     Problem: Pain  Goal: Verbalizes/displays adequate comfort level or baseline comfort level  Outcome: Progressing  Flowsheets (Taken 11/6/2023 2200)  Verbalizes/displays adequate comfort level or baseline comfort level: Encourage patient to monitor pain and request assistance

## 2023-11-07 NOTE — PROGRESS NOTES
Morning assessment completed. Pt comfortably resting in chair. VSS. Denies any pain. Morning meds given per MAR. PWWW. AXOX4. Chair locked and alarm on. Bedside table and call light in reach. The care plan and education has been reviewed and mutually agreed upon with the patient.

## 2023-11-07 NOTE — CARE COORDINATION
Team conference held today. Spoke with patient to discuss progress with therapy, barriers to discharge, and plans to return home. Estimated discharge date set for 11/14/2023. Patient anticipates discharging to home with assistance from family, home health care and other needed supports. Will continue to follow for support and discharge planning.      Electronically signed by SUKHDEV Chao on 11/7/2023 at 3:44 PM

## 2023-11-07 NOTE — PROGRESS NOTES
Consult placed to wound care nurse to obtain orders for care of patient's coccyx wound. Per patient, her wound doctor recommends she sleep in the bed so to minimize pressure on her coccyx wound; however, patient states she cannot tolerate sleeping in bed at this time due to pain in her shoulder and hip. Request made for wound care nurse to determine which specialty mattress should be ordered to increase patient comfort and promote wound healing. 2300  Assessment complete. Alert, oriented x4. Reports pain to right hip and shoulder. Reports Kathya Economy had been bringing her relief following administration throughout today. Given PRN Norco. Voltaren gel applied to right shoulder and neck per MAR. Ambulated to bathroom using walker and standby assistance. Incision to RLE clean, dry, and well-approximated with staples in place. The care plan and education has been reviewed and mutually agreed upon with the patient. In chair, alarm on, bed in lowest position, call light and table within reach. No further needs expressed at this time. 18  Overlay mattress obtained for placement while awaiting recommendations from wound care nurse. At this time, patient does not want to move from the chair to try the overlay mattress as she has finally found a comfortable sitting position. 0740  Patient slept comfortably in the chair throughout the night. No pain experienced at rest, but experiences increased pain with therapy. PRN Norco given in preparation for therapy.

## 2023-11-07 NOTE — PROGRESS NOTES
[address] [facility] Department   Phone: [phone]    Occupational Therapy    [] Initial Evaluation            [x] Daily Treatment Note         [] Discharge Summary      Patient: Thien Miller   : 1950   MRN: [id number]   Date of Service:  2023    Admitting Diagnosis:  Hip fracture, right, closed, initial encounter [facility])  Current Admission Summary: Patient is a 73 yo F with pmh CHF, HTN, HLD, DM2, thyroid disease, chronic back pain, and recently diagnosed UTI who initially presented 10/25/2023 with right hip pain s/p mechanical fall. Reports leaving [employer] and stepping off curb when her right leg \"gave out\" and she fell onto right side. Imaging revealed right impacted subcapital femur fracture. She underwent percutaneous fixation (10/26 with Dr. Marlena Paniagua). Post-op course complicated by hypoxia. Currently, patient reports moderate right hip pain. Patient lives at home alone in a 1 level house with laundry in the basement. She does have a ramped entry. Patient has [facility], which initially denied to allow rehab unit treatment, but we did get approval on appeal, and now she is admitted to the rehab unit for intensive therapies to improve her transfers, gait, balance, and self-care activities before discharge to home alone. 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 Past Surgical History:  has a past surgical history that includes eye surgery; Foot surgery; Carpal tunnel release; Elbow surgery; shoulder surgery; Cholecystectomy; Colonoscopy; Endoscopy, colon, diagnostic; and hip surgery (Right, 10/26/2023).     [facility]    DME Required For Discharge: rolling walker, reacher, walker

## 2023-11-07 NOTE — DISCHARGE INSTRUCTIONS
Decubitus ulcer, cleanse with soap and water. Pack with collgen dressing, cut to fit. Cover with foam dressing. Change every three days and if soiled. Apply barrier cream to skin around wound to protect from moisture. Please follodw up in wound center after discharge. [id number] [facility]  [address] [place], [place] [address]an, [address] [facility]  Tel: [phone]  Hours:    Wednesday 8AM-4PM  Thursday 8AM-4PM  Friday 8AM-1PM  Saturday Closed  Sunday Closed  Monday 8AM-4PM  Tuesday 8AM-4PM

## 2023-11-07 NOTE — PLAN OF CARE
Problem: Discharge Planning  Goal: Discharge to home or other facility with appropriate resources  Outcome: Progressing  Flowsheets (Taken 11/6/2023 2200)  Discharge to home or other facility with appropriate resources: Identify discharge learning needs (meds, wound care, etc)     Problem: Safety - Adult  Goal: Free from fall injury  11/7/2023 0803 by Tony Rodriguez RN  Outcome: Progressing  11/7/2023 0802 by Tony Rodriguez RN  Outcome: Progressing     Problem: Skin/Tissue Integrity  Goal: Absence of new skin breakdown  Description: 1. Monitor for areas of redness and/or skin breakdown  2. Assess vascular access sites hourly  3. Every 4-6 hours minimum:  Change oxygen saturation probe site  4. Every 4-6 hours:  If on nasal continuous positive airway pressure, respiratory therapy assess nares and determine need for appliance change or resting period.   11/7/2023 0803 by Tony Rodriguez RN  Outcome: Progressing  11/7/2023 0802 by Tony Rodriguez RN  Outcome: Progressing     Problem: ABCDS Injury Assessment  Goal: Absence of physical injury  11/7/2023 0803 by Tony Rodriguez RN  Outcome: Progressing  11/7/2023 0802 by Tony Rodriguez RN  Outcome: Progressing  Flowsheets (Taken 11/7/2023 0631)  Absence of Physical Injury: Implement safety measures based on patient assessment     Problem: Pain  Goal: Verbalizes/displays adequate comfort level or baseline comfort level  11/7/2023 0803 by Tony Rodriguez RN  Outcome: Progressing  11/7/2023 0802 by Tony Rodriguez RN  Outcome: Progressing  Flowsheets (Taken 11/6/2023 2200)  Verbalizes/displays adequate comfort level or baseline comfort level: Encourage patient to monitor pain and request assistance

## 2023-11-07 NOTE — PLAN OF CARE
Problem: Discharge Planning  Goal: Discharge to home or other facility with appropriate resources  11/7/2023 0932 by Radha Wilson RN  Outcome: Progressing     Problem: Safety - Adult  Goal: Free from fall injury  11/7/2023 0803 by Magali Kate RN  Outcome: Progressing     Problem: Pain  Goal: Verbalizes/displays adequate comfort level or baseline comfort level  11/7/2023 0803 by Magali Kate RN  Outcome: Progressing

## 2023-11-08 ENCOUNTER — HOSPITAL ENCOUNTER (OUTPATIENT)
Dept: WOUND CARE | Age: 73
Discharge: HOME | End: 2023-11-08
Attending: SURGERY

## 2023-11-08 LAB
ANION GAP SERPL CALCULATED.3IONS-SCNC: 10 MMOL/L (ref 3–16)
BASOPHILS # BLD: 0 K/UL (ref 0–0.2)
BASOPHILS NFR BLD: 0.3 %
BUN SERPL-MCNC: 17 MG/DL (ref 7–20)
CALCIUM SERPL-MCNC: 8.8 MG/DL (ref 8.3–10.6)
CHLORIDE SERPL-SCNC: 104 MMOL/L (ref 99–110)
CO2 SERPL-SCNC: 26 MMOL/L (ref 21–32)
CREAT SERPL-MCNC: 0.5 MG/DL (ref 0.6–1.2)
DEPRECATED RDW RBC AUTO: 15.4 % (ref 12.4–15.4)
EOSINOPHIL # BLD: 0 K/UL (ref 0–0.6)
EOSINOPHIL NFR BLD: 0.2 %
GFR SERPLBLD CREATININE-BSD FMLA CKD-EPI: >60 ML/MIN/{1.73_M2}
GLUCOSE BLD-MCNC: 116 MG/DL (ref 70–99)
GLUCOSE BLD-MCNC: 142 MG/DL (ref 70–99)
GLUCOSE BLD-MCNC: 154 MG/DL (ref 70–99)
GLUCOSE BLD-MCNC: 240 MG/DL (ref 70–99)
GLUCOSE SERPL-MCNC: 160 MG/DL (ref 70–99)
HCT VFR BLD AUTO: 37.6 % (ref 36–48)
HGB BLD-MCNC: 12.1 G/DL (ref 12–16)
LYMPHOCYTES # BLD: 0.8 K/UL (ref 1–5.1)
LYMPHOCYTES NFR BLD: 14.1 %
MCH RBC QN AUTO: 29.5 PG (ref 26–34)
MCHC RBC AUTO-ENTMCNC: 32.1 G/DL (ref 31–36)
MCV RBC AUTO: 91.8 FL (ref 80–100)
MONOCYTES # BLD: 0.6 K/UL (ref 0–1.3)
MONOCYTES NFR BLD: 10.5 %
NEUTROPHILS # BLD: 4.3 K/UL (ref 1.7–7.7)
NEUTROPHILS NFR BLD: 74.9 %
PERFORMED ON: ABNORMAL
PLATELET # BLD AUTO: 163 K/UL (ref 135–450)
PMV BLD AUTO: 7.5 FL (ref 5–10.5)
POTASSIUM SERPL-SCNC: 4.3 MMOL/L (ref 3.5–5.1)
RBC # BLD AUTO: 4.09 M/UL (ref 4–5.2)
SODIUM SERPL-SCNC: 140 MMOL/L (ref 136–145)
WBC # BLD AUTO: 5.7 K/UL (ref 4–11)

## 2023-11-08 PROCEDURE — 6370000000 HC RX 637 (ALT 250 FOR IP): Performed by: PHYSICAL MEDICINE & REHABILITATION

## 2023-11-08 PROCEDURE — 97116 GAIT TRAINING THERAPY: CPT

## 2023-11-08 PROCEDURE — 36415 COLL VENOUS BLD VENIPUNCTURE: CPT

## 2023-11-08 PROCEDURE — 97535 SELF CARE MNGMENT TRAINING: CPT

## 2023-11-08 PROCEDURE — 80048 BASIC METABOLIC PNL TOTAL CA: CPT

## 2023-11-08 PROCEDURE — 85025 COMPLETE CBC W/AUTO DIFF WBC: CPT

## 2023-11-08 PROCEDURE — 1280000000 HC REHAB R&B

## 2023-11-08 PROCEDURE — 97530 THERAPEUTIC ACTIVITIES: CPT

## 2023-11-08 PROCEDURE — 6360000002 HC RX W HCPCS: Performed by: PHYSICAL MEDICINE & REHABILITATION

## 2023-11-08 PROCEDURE — 97110 THERAPEUTIC EXERCISES: CPT

## 2023-11-08 RX ADMIN — HYDROCODONE BITARTRATE AND ACETAMINOPHEN 1 TABLET: 5; 325 TABLET ORAL at 07:22

## 2023-11-08 RX ADMIN — DICLOFENAC SODIUM 2 G: 10 GEL TOPICAL at 21:22

## 2023-11-08 RX ADMIN — DICLOFENAC SODIUM 2 G: 10 GEL TOPICAL at 10:57

## 2023-11-08 RX ADMIN — LEVOTHYROXINE SODIUM 200 MCG: 0.1 TABLET ORAL at 06:27

## 2023-11-08 RX ADMIN — ATORVASTATIN CALCIUM 20 MG: 20 TABLET, FILM COATED ORAL at 21:20

## 2023-11-08 RX ADMIN — ACETAMINOPHEN 650 MG: 325 TABLET ORAL at 22:43

## 2023-11-08 RX ADMIN — BISACODYL 5 MG: 5 TABLET, COATED ORAL at 10:55

## 2023-11-08 RX ADMIN — TIMOLOL 1 DROP: 5 SOLUTION/ DROPS OPHTHALMIC at 21:26

## 2023-11-08 RX ADMIN — MELATONIN TAB 3 MG 3 MG: 3 TAB at 22:37

## 2023-11-08 RX ADMIN — Medication 1 TABLET: at 21:20

## 2023-11-08 RX ADMIN — HYDROCODONE BITARTRATE AND ACETAMINOPHEN 1 TABLET: 5; 325 TABLET ORAL at 13:32

## 2023-11-08 RX ADMIN — INSULIN GLARGINE 15 UNITS: 100 INJECTION, SOLUTION SUBCUTANEOUS at 21:21

## 2023-11-08 RX ADMIN — TRAVOPROST OPHTHALMIC SOLUTION 1 DROP: 0.04 SOLUTION OPHTHALMIC at 21:26

## 2023-11-08 RX ADMIN — SPIRONOLACTONE 25 MG: 25 TABLET ORAL at 10:54

## 2023-11-08 RX ADMIN — Medication 1 TABLET: at 10:55

## 2023-11-08 RX ADMIN — GABAPENTIN 100 MG: 100 CAPSULE ORAL at 21:20

## 2023-11-08 RX ADMIN — ENOXAPARIN SODIUM 40 MG: 100 INJECTION SUBCUTANEOUS at 10:55

## 2023-11-08 RX ADMIN — GABAPENTIN 100 MG: 100 CAPSULE ORAL at 14:55

## 2023-11-08 RX ADMIN — HYDROCODONE BITARTRATE AND ACETAMINOPHEN 1 TABLET: 5; 325 TABLET ORAL at 19:34

## 2023-11-08 RX ADMIN — GABAPENTIN 100 MG: 100 CAPSULE ORAL at 10:55

## 2023-11-08 RX ADMIN — STANDARDIZED SENNA CONCENTRATE AND DOCUSATE SODIUM 2 TABLET: 8.6; 5 TABLET ORAL at 10:54

## 2023-11-08 RX ADMIN — DULOXETINE HYDROCHLORIDE 60 MG: 60 CAPSULE, DELAYED RELEASE ORAL at 10:54

## 2023-11-08 ASSESSMENT — PAIN DESCRIPTION - FREQUENCY: FREQUENCY: CONTINUOUS

## 2023-11-08 ASSESSMENT — PAIN SCALES - GENERAL
PAINLEVEL_OUTOF10: 5
PAINLEVEL_OUTOF10: 0
PAINLEVEL_OUTOF10: 7
PAINLEVEL_OUTOF10: 8
PAINLEVEL_OUTOF10: 5

## 2023-11-08 ASSESSMENT — PAIN DESCRIPTION - LOCATION
LOCATION: HIP
LOCATION: SHOULDER
LOCATION: SHOULDER;PERINEUM

## 2023-11-08 ASSESSMENT — PAIN DESCRIPTION - DESCRIPTORS
DESCRIPTORS: SORE
DESCRIPTORS: SORE;DISCOMFORT
DESCRIPTORS: SHARP
DESCRIPTORS: ACHING;SORE
DESCRIPTORS: ACHING;SORE

## 2023-11-08 ASSESSMENT — PAIN DESCRIPTION - ORIENTATION
ORIENTATION: RIGHT
ORIENTATION: RIGHT;MID
ORIENTATION: RIGHT

## 2023-11-08 ASSESSMENT — PAIN - FUNCTIONAL ASSESSMENT
PAIN_FUNCTIONAL_ASSESSMENT: PREVENTS OR INTERFERES SOME ACTIVE ACTIVITIES AND ADLS
PAIN_FUNCTIONAL_ASSESSMENT: ACTIVITIES ARE NOT PREVENTED

## 2023-11-08 ASSESSMENT — PAIN DESCRIPTION - ONSET: ONSET: GRADUAL

## 2023-11-08 ASSESSMENT — PAIN DESCRIPTION - PAIN TYPE
TYPE: ACUTE PAIN
TYPE: ACUTE PAIN

## 2023-11-08 ASSESSMENT — PAIN SCALES - WONG BAKER
WONGBAKER_NUMERICALRESPONSE: 2
WONGBAKER_NUMERICALRESPONSE: 2

## 2023-11-08 NOTE — PLAN OF CARE
Problem: Discharge Planning  Goal: Discharge to home or other facility with appropriate resources  Outcome: Progressing  Flowsheets (Taken 11/7/2023 2035 by Radha Dumas RN)  Discharge to home or other facility with appropriate resources: Identify barriers to discharge with patient and caregiver     Problem: Safety - Adult  Goal: Free from fall injury  Outcome: Progressing  Flowsheets (Taken 11/8/2023 0909)  Free From Fall Injury: Instruct family/caregiver on patient safety     Problem: Skin/Tissue Integrity  Goal: Absence of new skin breakdown  Description: 1. Monitor for areas of redness and/or skin breakdown  2. Assess vascular access sites hourly  3. Every 4-6 hours minimum:  Change oxygen saturation probe site  4. Every 4-6 hours:  If on nasal continuous positive airway pressure, respiratory therapy assess nares and determine need for appliance change or resting period.   Outcome: Progressing     Problem: ABCDS Injury Assessment  Goal: Absence of physical injury  Outcome: Progressing  Flowsheets (Taken 11/8/2023 0909)  Absence of Physical Injury: Implement safety measures based on patient assessment     Problem: Pain  Goal: Verbalizes/displays adequate comfort level or baseline comfort level  Outcome: Progressing     Problem: Nutrition Deficit:  Goal: Optimize nutritional status  Outcome: Progressing

## 2023-11-08 NOTE — PROGRESS NOTES
Ambulated to bathroom using walker and standby assistance. Voided. Patient awaiting her coccyx to feel better following silver dressing removal. Complaining of pain to bilateral shoulders and right hip. Given PRN Norco for pain relief.  Given PRN melatonin as sleep aid per patient request.

## 2023-11-08 NOTE — PROGRESS NOTES
Around 1830, Christian ALDANA primary day shift RN, performed coccyx wound care per orders placed by Luis Manuel Voss, Wound Care RN. Around 1900, patient agitated due to discomfort at the wound site. Patient unable to tolerate the hydrofiber with silver dressing that was used to pack the wound. Per patient, she has not used this material in the wound for a long time. Per patient, Dr. Oswaldo Land, who normally cares for her wound, uses collagen at the site; therefore, patient would like collagen ordered for care of her coccyx wound. Patient states that she did not have the opportunity to tell the Wound Care RN about her normal wound care orders during their interaction today. This RN explained to patient that the reason Aaliyah Barnett had packed the site with hydrofiber with silver was because that was what the Wound Care RN had ordered for care of the site. Patient did not want to offend RNs at bedside, but stated that only Wound Care nurses can properly execute wound care orders. Explained to patient that while she is a patient on the Acute Rehab Unit, her bedside RNs are most likely to perform wound care instead of the Wound Care RN. Patient agreeable to this, but would like it if Wound Care RN could teach/show primary RN how to perform wound care tomorrow. Hydrofiber with silver packing removed from the wound per patient request. Triad cream applied to buttocks. Sacral Mepilex placed over site. Specialty mattress in place. Patient reports that her new mattress is more comfortable than the standard foam one. In bed, alarm on, bed in lowest position, call light and table within reach. No further needs expressed at this time. Sam Dugan, RN, and Vanesa Hernandes, primary night shift RN, also at bedside during interaction. 2030  Chart review reveals the following from Dr. Nichols Been most recent note (10/25/2023) in regard to the care of patient's coccyx wound:   \"With each dressing change, rinse wounds with 0.9% Saline.  (May use wound wash or soft contact solution. Both can be purchased at a local drug store). If unable to obtain saline, may use a gentle soap and water. \"  \"Dressing care: Ana M, Mepilex border or Kerramx Gentle Border- change Monday, Wednesday, & Friday. Turn & reposition every 1-2 hours and as needed for pressure relief and comfort. Keep pressure off of your wound as much as possible. Eat plenty of protein. Place a pillow under one side when sitting & reposition pillow on the other side every hour and as needed for comfort\"    2127  Wound evaluation order placed for Wound Care RN to re-evaluate wound care orders. Per patient request, order asks Wound Care RN to teach/show ARU RN how to perform wound care for the patient.

## 2023-11-08 NOTE — PROGRESS NOTES
[address] [facility] Department   Phone: [phone]    Physical Therapy    [] Initial Evaluation            [x] Daily Treatment Note         [] Discharge Summary      Patient: Clare Rodriguez   : 1950   MRN: [id number]   Date of Service:  2023  Admitting Diagnosis: Hip fracture, right, closed, initial encounter [facility])  Current Admission Summary: Rehab unit follow-up. Patient is a 75 yo F with pmh CHF, HTN, HLD, DM2, thyroid disease, chronic back pain, and recently diagnosed UTI who initially presented 10/25/2023 with right hip pain s/p mechanical fall. Reports leaving [employer] and stepping off curb when her right leg \"gave out\" and she fell onto right side. Imaging revealed right impacted subcapital femur fracture. She underwent percutaneous fixation (10/26 with Dr. Juan J Mishra). Post-op course complicated by hypoxia. Currently, patient reports moderate right hip pain. Worse with movement. Improves with rest and medication. In therapies yesterday, the patient requires contact-guard assist for her transfers and ambulation with a rolling walker, using supplemental oxygen. She would like to come to ARU to improve her function prior to returning home. We sent her medical information into her insurance company to get approval for rehab unit treatment. She was denied by [facility], but we have filed an appeal, and they usually approve her on appeal for rehab unit treatment. 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 Past Surgical History:  has a past surgical history that includes eye surgery; Foot surgery; Carpal tunnel release; Elbow surgery; shoulder surgery;  Cholecystectomy; Colonoscopy; Endoscopy, colon, diagnostic; agreeable to therapy session. Pain: 0/10 Pt reports mild (R) hip pain with mobility  Pain Interventions: pain medication in place prior to arrival       Functional Mobility  Bed Mobility:  Bed mobility not completed on this date. Comments: Pt sitting in recliner at beginning and end of session. Transfers:  Sit to stand transfer: stand by assistance  Stand to sit transfer: stand by assistance  Stand step transfer: stand by assistance  Toilet transfer: stand by assistance  Comments: All transfers completed to rolling walker  Ambulation:  Surface:level surface  Assistive Device: rolling walker  Assistance: stand by assistance  Distance: 240 ft + 55 ft  Gait Mechanics: Decreased carolann and gait speed, even weight distribution (occasionally favoring (L) LE), equal but decreased step length, narrow JOSE  Comments:    Stair Mobility:  Number of Steps: 3 + 3 with seated rest break in between  Step Height: 6 inch  Hand Rails: (B) handrail  Assistance: stand by assistance  Comments:   Wheelchair Mobility:  No w/c mobility completed on this date. Comments:  Balance:  Static Sitting Balance: good: independent with functional balance in unsupported position  Dynamic Sitting Balance: fair (+): maintains balance at SBA/supervision without use of UE support  Static Standing Balance: fair: maintains balance at CGA without use of UE support  Dynamic Standing Balance: fair (-): maintains balance at SBA with use of UE support  Comments:    Other Therapeutic Interventions   First Session:  See functional mobility above. Pt requesting to don shoes. Unilateral forward/backward step over cane x10 (B) with no UE support. Completed with contact guard assist with occasional minor LOB that were self-recovered. Pt with difficulty with step length. Bilateral forward/backward step over cane x5 (B) with no UE support. Completed with contact guard assist with occasional minor LOB that were self-recovered.  Sit to stands on airex from elevated

## 2023-11-08 NOTE — PROGRESS NOTES
visual scanning board activity for balance, endurance, and problem solving- pt stood on [employer] pad statically with minimal to no UE support while engaged in task for 9 min and 30 sec, pt required 2 VC for location of 2 cards on board. Pt with 3 posterior LOB however pt able to self-correct with table to or RW at [facility]. Second Session:  Pt met seated in recliner upon arrival - pt pleasant and agreeable to therapy. Pt c/o of 8/10 pain d/t saral wound care that occurred prior to OT session. Pain meds in place prior to start of session. Ice applied to R hip at EOS. Pt amb to/from therapy room this date w/ significant time d/t sacral wound pain. Pt amb SBA. Sit><stands completed this date w/ SBA w/ increased time to/from low, soft, chair w/ arms, standard chair w/ arms, recliner, couch. Pt donned socks and shoes in modified figure four seated on couch w/ increased time d/t pain - Bennie - assist to josh sock over R toes and josh R shoe d/t increased pain decreasing ROM. Dynamic standing balance targeted this date w/ completion of ring toss in stance. Pt tossed 24 various sized rings at targeted in staggered stance w/o use of RW - pt tossed 12 w/ L foot anterior and 12 w/ R foot anterior. 1 minor LOB corrected w/ CGA - pt completed w/ CGA overall. Rings all thrown to R of target during completion as pt demo'ing difficulty w/ controlled and timing of release. 2nd rep completed in stance on ther-ex pad for additional dynamic balance challenge - pt required CGA w/ 2 instances of Bennie to correct posterior LOB during task completion - task completed w/ intermittent L UE support on RW. Pt demo'd good balance and endurance - improved accuracy to target during second rep getting 3 rings around target. Pt left seated in recliner w/ ice at EOS. Bed alarm, call light, and all other needs within reach.          Cognition  WFL  Orientation:    oriented to person, oriented to place, oriented to time, and oriented to modified independent   Patient will complete functional mobility at modified independent   Patient to gather and transport IADL items at modified independent     Above goals reviewed on 11/8/2023. All goals are ongoing at this time unless indicated above.        Therapy Session Time     Individual Group Co-treatment   Time In 4961      Time Out 1000      Minutes 55            Individual Group Co-treatment   Time In 1400      Time Out 1435      Minutes 35        Timed Code Treatment Minutes:  55 + 35 minutes    Total Treatment Minutes: 90 minutes       Electronically Signed By:   AM session: MARÍA Lane/L #[id number]     PM session: DOMINIQUE Henry OTR/L, [id number] 11/8/2023 2:50 PM

## 2023-11-09 LAB
GLUCOSE BLD-MCNC: 120 MG/DL (ref 70–99)
GLUCOSE BLD-MCNC: 168 MG/DL (ref 70–99)
GLUCOSE BLD-MCNC: 180 MG/DL (ref 70–99)
GLUCOSE BLD-MCNC: 201 MG/DL (ref 70–99)
PERFORMED ON: ABNORMAL

## 2023-11-09 PROCEDURE — 97110 THERAPEUTIC EXERCISES: CPT

## 2023-11-09 PROCEDURE — 97116 GAIT TRAINING THERAPY: CPT

## 2023-11-09 PROCEDURE — 6370000000 HC RX 637 (ALT 250 FOR IP): Performed by: PHYSICAL MEDICINE & REHABILITATION

## 2023-11-09 PROCEDURE — 97530 THERAPEUTIC ACTIVITIES: CPT

## 2023-11-09 PROCEDURE — 1280000000 HC REHAB R&B

## 2023-11-09 PROCEDURE — 97535 SELF CARE MNGMENT TRAINING: CPT

## 2023-11-09 PROCEDURE — 6360000002 HC RX W HCPCS: Performed by: PHYSICAL MEDICINE & REHABILITATION

## 2023-11-09 RX ADMIN — ACETAMINOPHEN 650 MG: 325 TABLET ORAL at 10:21

## 2023-11-09 RX ADMIN — DICLOFENAC SODIUM 2 G: 10 GEL TOPICAL at 11:28

## 2023-11-09 RX ADMIN — SPIRONOLACTONE 25 MG: 25 TABLET ORAL at 10:22

## 2023-11-09 RX ADMIN — DULOXETINE HYDROCHLORIDE 60 MG: 60 CAPSULE, DELAYED RELEASE ORAL at 10:22

## 2023-11-09 RX ADMIN — GABAPENTIN 100 MG: 100 CAPSULE ORAL at 20:45

## 2023-11-09 RX ADMIN — DICLOFENAC SODIUM 2 G: 10 GEL TOPICAL at 20:44

## 2023-11-09 RX ADMIN — GABAPENTIN 100 MG: 100 CAPSULE ORAL at 15:18

## 2023-11-09 RX ADMIN — Medication 1 TABLET: at 20:45

## 2023-11-09 RX ADMIN — ENOXAPARIN SODIUM 40 MG: 100 INJECTION SUBCUTANEOUS at 10:23

## 2023-11-09 RX ADMIN — Medication 1 TABLET: at 10:22

## 2023-11-09 RX ADMIN — INSULIN GLARGINE 15 UNITS: 100 INJECTION, SOLUTION SUBCUTANEOUS at 20:43

## 2023-11-09 RX ADMIN — TIMOLOL 1 DROP: 5 SOLUTION/ DROPS OPHTHALMIC at 20:43

## 2023-11-09 RX ADMIN — TRAVOPROST OPHTHALMIC SOLUTION 1 DROP: 0.04 SOLUTION OPHTHALMIC at 20:43

## 2023-11-09 RX ADMIN — LEVOTHYROXINE SODIUM 200 MCG: 0.1 TABLET ORAL at 07:02

## 2023-11-09 RX ADMIN — HYDROCODONE BITARTRATE AND ACETAMINOPHEN 1 TABLET: 5; 325 TABLET ORAL at 07:02

## 2023-11-09 RX ADMIN — INSULIN LISPRO 1 UNITS: 100 INJECTION, SOLUTION INTRAVENOUS; SUBCUTANEOUS at 16:24

## 2023-11-09 RX ADMIN — BISACODYL 5 MG: 5 TABLET, COATED ORAL at 10:23

## 2023-11-09 RX ADMIN — HYDROCODONE BITARTRATE AND ACETAMINOPHEN 1 TABLET: 5; 325 TABLET ORAL at 13:45

## 2023-11-09 RX ADMIN — ATORVASTATIN CALCIUM 20 MG: 20 TABLET, FILM COATED ORAL at 20:45

## 2023-11-09 RX ADMIN — HYDROCODONE BITARTRATE AND ACETAMINOPHEN 1 TABLET: 5; 325 TABLET ORAL at 20:44

## 2023-11-09 RX ADMIN — GABAPENTIN 100 MG: 100 CAPSULE ORAL at 10:22

## 2023-11-09 RX ADMIN — STANDARDIZED SENNA CONCENTRATE AND DOCUSATE SODIUM 2 TABLET: 8.6; 5 TABLET ORAL at 10:22

## 2023-11-09 ASSESSMENT — PAIN SCALES - GENERAL
PAINLEVEL_OUTOF10: 2
PAINLEVEL_OUTOF10: 4
PAINLEVEL_OUTOF10: 2
PAINLEVEL_OUTOF10: 5
PAINLEVEL_OUTOF10: 3
PAINLEVEL_OUTOF10: 5
PAINLEVEL_OUTOF10: 5
PAINLEVEL_OUTOF10: 3
PAINLEVEL_OUTOF10: 2
PAINLEVEL_OUTOF10: 3

## 2023-11-09 ASSESSMENT — PAIN DESCRIPTION - PAIN TYPE: TYPE: ACUTE PAIN

## 2023-11-09 ASSESSMENT — PAIN - FUNCTIONAL ASSESSMENT
PAIN_FUNCTIONAL_ASSESSMENT: ACTIVITIES ARE NOT PREVENTED

## 2023-11-09 ASSESSMENT — PAIN DESCRIPTION - LOCATION
LOCATION: SHOULDER
LOCATION: HEAD
LOCATION: SHOULDER
LOCATION: HIP
LOCATION: SHOULDER

## 2023-11-09 ASSESSMENT — PAIN DESCRIPTION - DESCRIPTORS
DESCRIPTORS: ACHING
DESCRIPTORS: ACHING
DESCRIPTORS: ACHING;SORE
DESCRIPTORS: ACHING
DESCRIPTORS: ACHING

## 2023-11-09 ASSESSMENT — PAIN DESCRIPTION - ORIENTATION
ORIENTATION: LEFT
ORIENTATION: LEFT
ORIENTATION: RIGHT
ORIENTATION: RIGHT

## 2023-11-09 ASSESSMENT — PAIN DESCRIPTION - FREQUENCY: FREQUENCY: CONTINUOUS

## 2023-11-09 ASSESSMENT — PAIN DESCRIPTION - ONSET: ONSET: ON-GOING

## 2023-11-09 NOTE — PROGRESS NOTES
Nutrition Note    RECOMMENDATIONS  No new nutrition rec's @ this time. NUTRITION ASSESSMENT   Pt receives a regular diet with po intake % of meals. Pt is also drnking Glucerna BID to help promote healing. Pt received Anna Nash but did not like, therefore d/c'd. Will con't to follow for further needs as identified. Nutrition Related Findings: LBM 11/8; nonpitting RLE edema; gluc 180  Wounds: Stage IV, Surgical Incision, Pressure Injury  Nutrition Education:  Education not indicated   Nutrition Goals: PO intake 75% or greater     MALNUTRITION ASSESSMENT      Malnutrition Status: No malnutrition    NUTRITION DIAGNOSIS   Increased nutrient needs related to increase demand for energy/nutrients as evidenced by wounds      CURRENT NUTRITION THERAPIES  ADULT DIET; Regular  ADULT ORAL NUTRITION SUPPLEMENT; Dinner, Breakfast; Diabetic Oral Supplement     PO Intake: %   PO Supplement Intake:%    ANTHROPOMETRICS  Current Height: 165.1 cm (5' 5\")  Current Weight - Scale: 93.4 kg (206 lb)    Ideal Body Weight (IBW): 125 lbs  (57 kg)        BMI: 34.3      COMPARATIVE STANDARDS  Total Energy Requirements (kcals/day): 1401- 1681     Protein (g):  68-114g       Fluid (mL/day):  1 ml/kcal    The patient will be monitored per nutrition standards of care. Consult dietitian if additional nutrition interventions are needed prior to RD reassessment.      Cody Sandoval RD, LD    Contact: [phone]

## 2023-11-09 NOTE — PLAN OF CARE
Problem: Discharge Planning  Goal: Discharge to home or other facility with appropriate resources  Outcome: Progressing  Flowsheets  Taken 11/9/2023 0724 by Violet Myers RN  Discharge to home or other facility with appropriate resources: Identify barriers to discharge with patient and caregiver  Taken 11/8/2023 1933 by Odessa Fernandez RN  Discharge to home or other facility with appropriate resources: Identify barriers to discharge with patient and caregiver     Problem: Safety - Adult  Goal: Free from fall injury  Outcome: Progressing  Flowsheets (Taken 11/9/2023 0727)  Free From Fall Injury: Instruct family/caregiver on patient safety     Problem: Skin/Tissue Integrity  Goal: Absence of new skin breakdown  Description: 1. Monitor for areas of redness and/or skin breakdown  2. Assess vascular access sites hourly  3. Every 4-6 hours minimum:  Change oxygen saturation probe site  4. Every 4-6 hours:  If on nasal continuous positive airway pressure, respiratory therapy assess nares and determine need for appliance change or resting period.   Outcome: Progressing     Problem: ABCDS Injury Assessment  Goal: Absence of physical injury  Outcome: Progressing  Flowsheets (Taken 11/9/2023 0727)  Absence of Physical Injury: Implement safety measures based on patient assessment     Problem: Pain  Goal: Verbalizes/displays adequate comfort level or baseline comfort level  Outcome: Progressing  Flowsheets  Taken 11/9/2023 0724 by Violet Myers RN  Verbalizes/displays adequate comfort level or baseline comfort level: Encourage patient to monitor pain and request assistance  Taken 11/8/2023 1933 by Odessa Fernandez RN  Verbalizes/displays adequate comfort level or baseline comfort level: Encourage patient to monitor pain and request assistance     Problem: Nutrition Deficit:  Goal: Optimize nutritional status  Outcome: Progressing

## 2023-11-09 NOTE — PROGRESS NOTES
[address] [facility] Department   Phone: [phone]    Occupational Therapy    [] Initial Evaluation            [x] Daily Treatment Note         [] Discharge Summary      Patient: Shawnee Haile   : 1950   MRN: [id number]   Date of Service:  2023    Admitting Diagnosis:  Hip fracture, right, closed, initial encounter [facility])  Current Admission Summary: Patient is a 73 yo F with pmh CHF, HTN, HLD, DM2, thyroid disease, chronic back pain, and recently diagnosed UTI who initially presented 10/25/2023 with right hip pain s/p mechanical fall. Reports leaving [employer] and stepping off curb when her right leg \"gave out\" and she fell onto right side. Imaging revealed right impacted subcapital femur fracture. She underwent percutaneous fixation (10/26 with Dr. Carl Dunn). Post-op course complicated by hypoxia. Currently, patient reports moderate right hip pain. Patient lives at home alone in a 1 level house with laundry in the basement. She does have a ramped entry. Patient has [facility], which initially denied to allow rehab unit treatment, but we did get approval on appeal, and now she is admitted to the rehab unit for intensive therapies to improve her transfers, gait, balance, and self-care activities before discharge to home alone. 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 Past Surgical History:  has a past surgical history that includes eye surgery; Foot surgery; Carpal tunnel release; Elbow surgery; shoulder surgery; Cholecystectomy; Colonoscopy; Endoscopy, colon, diagnostic; and hip surgery (Right, 10/26/2023).     [facility]    DME Required For Discharge: rolling walker, reacher, walker

## 2023-11-09 NOTE — PROGRESS NOTES
[address] [facility] Department   Phone: [phone]    Physical Therapy    [] Initial Evaluation            [x] Daily Treatment Note         [] Discharge Summary      Patient: Ruby Oneil   : 1950   MRN: [id number]   Date of Service:  2023  Admitting Diagnosis: Hip fracture, right, closed, initial encounter [facility])  Current Admission Summary: Rehab unit follow-up. Patient is a 73 yo F with pmh CHF, HTN, HLD, DM2, thyroid disease, chronic back pain, and recently diagnosed UTI who initially presented 10/25/2023 with right hip pain s/p mechanical fall. Reports leaving [employer] and stepping off curb when her right leg \"gave out\" and she fell onto right side. Imaging revealed right impacted subcapital femur fracture. She underwent percutaneous fixation (10/26 with Dr. Jessica An). Post-op course complicated by hypoxia. Currently, patient reports moderate right hip pain. Worse with movement. Improves with rest and medication. In therapies yesterday, the patient requires contact-guard assist for her transfers and ambulation with a rolling walker, using supplemental oxygen. She would like to come to ARU to improve her function prior to returning home. We sent her medical information into her insurance company to get approval for rehab unit treatment. She was denied by [facility], but we have filed an appeal, and they usually approve her on appeal for rehab unit treatment. 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 Past Surgical History:  has a past surgical history that includes eye surgery; Foot surgery; Carpal tunnel release; Elbow surgery; shoulder surgery;  Cholecystectomy; Colonoscopy; Endoscopy, colon, diagnostic; ups x4 minutes + 2 minutes. Completed with occasional Min A for external stabilization and intermittent unilateral UE support. Pt requiring extended seated rest break between bouts of blazepod tap ups. Agility ladder completed on this date 10 ft x 2 ea without UE support including: forward reciprocal, diagonal fwd/bkwd step 2 foot in/out, two steps forward and one step backward. Patient requires up to [address] for balance stabilization during task completion. Second Session:  Pt sitting in recliner upon arrival, agreeable to therapy session. Pt ambulated 55 ft x22 throughout session with same mechanics as documented above. Stepper L1 x7 minutes for LE mobility and strengthening. Sit to stands without UE support from elevated mat table x5, from low mat table x5 and from low mat table with airex x5. Completed with contact guard assist and no LOB. Stair negotiation x9 on 6 inch steps with (B) UE support. Verbal cueing for technique. Pt requesting use of restroom at end of session, assisted to toilet with stand by assist. Pt sitting in recliner at end of session, chair alarm on and call light in reach. Functional Outcomes                 Cognition  Overall Cognitive Status: WFL  Following Commands: follows all commands without difficulty  Attention Span: appears intact  Safety Judgement: good awareness of safety precautions  Sequencing: requires cues for some  Command Following:   [facility]    Education  Barriers To Learning: visual  Patient Education: patient educated on goals, PT role and benefits, plan of care, general safety, functional mobility training, proper use of assistive device/equipment, injury prevention, transfer training  Learning Assessment:  patient verbalizes understanding, would benefit from continued reinforcement    Assessment  Activity Tolerance: Good, no SOB noted throughout session.    Impairments Requiring Therapeutic Intervention: decreased functional mobility, decreased ADL status,

## 2023-11-10 LAB
ANION GAP SERPL CALCULATED.3IONS-SCNC: 8 MMOL/L (ref 3–16)
BASOPHILS # BLD: 0 K/UL (ref 0–0.2)
BASOPHILS NFR BLD: 0.4 %
BUN SERPL-MCNC: 14 MG/DL (ref 7–20)
CALCIUM SERPL-MCNC: 8.8 MG/DL (ref 8.3–10.6)
CHLORIDE SERPL-SCNC: 106 MMOL/L (ref 99–110)
CO2 SERPL-SCNC: 28 MMOL/L (ref 21–32)
CREAT SERPL-MCNC: <0.5 MG/DL (ref 0.6–1.2)
DEPRECATED RDW RBC AUTO: 15.4 % (ref 12.4–15.4)
EOSINOPHIL # BLD: 0 K/UL (ref 0–0.6)
EOSINOPHIL NFR BLD: 0 %
GFR SERPLBLD CREATININE-BSD FMLA CKD-EPI: >60 ML/MIN/{1.73_M2}
GLUCOSE BLD-MCNC: 153 MG/DL (ref 70–99)
GLUCOSE BLD-MCNC: 204 MG/DL (ref 70–99)
GLUCOSE BLD-MCNC: 205 MG/DL (ref 70–99)
GLUCOSE SERPL-MCNC: 163 MG/DL (ref 70–99)
HCT VFR BLD AUTO: 38.4 % (ref 36–48)
HGB BLD-MCNC: 12.3 G/DL (ref 12–16)
LYMPHOCYTES # BLD: 0.8 K/UL (ref 1–5.1)
LYMPHOCYTES NFR BLD: 14.2 %
MCH RBC QN AUTO: 29.5 PG (ref 26–34)
MCHC RBC AUTO-ENTMCNC: 32.1 G/DL (ref 31–36)
MCV RBC AUTO: 91.8 FL (ref 80–100)
MONOCYTES # BLD: 0.6 K/UL (ref 0–1.3)
MONOCYTES NFR BLD: 10.4 %
NEUTROPHILS # BLD: 4.1 K/UL (ref 1.7–7.7)
NEUTROPHILS NFR BLD: 75 %
PERFORMED ON: ABNORMAL
PLATELET # BLD AUTO: 177 K/UL (ref 135–450)
PMV BLD AUTO: 7.6 FL (ref 5–10.5)
POTASSIUM SERPL-SCNC: 4.3 MMOL/L (ref 3.5–5.1)
RBC # BLD AUTO: 4.18 M/UL (ref 4–5.2)
SODIUM SERPL-SCNC: 142 MMOL/L (ref 136–145)
WBC # BLD AUTO: 5.5 K/UL (ref 4–11)

## 2023-11-10 PROCEDURE — 97530 THERAPEUTIC ACTIVITIES: CPT

## 2023-11-10 PROCEDURE — 97535 SELF CARE MNGMENT TRAINING: CPT

## 2023-11-10 PROCEDURE — 97110 THERAPEUTIC EXERCISES: CPT

## 2023-11-10 PROCEDURE — 80048 BASIC METABOLIC PNL TOTAL CA: CPT

## 2023-11-10 PROCEDURE — 85025 COMPLETE CBC W/AUTO DIFF WBC: CPT

## 2023-11-10 PROCEDURE — 1280000000 HC REHAB R&B

## 2023-11-10 PROCEDURE — 6370000000 HC RX 637 (ALT 250 FOR IP): Performed by: PHYSICAL MEDICINE & REHABILITATION

## 2023-11-10 PROCEDURE — 36415 COLL VENOUS BLD VENIPUNCTURE: CPT

## 2023-11-10 PROCEDURE — 6360000002 HC RX W HCPCS: Performed by: PHYSICAL MEDICINE & REHABILITATION

## 2023-11-10 PROCEDURE — 97116 GAIT TRAINING THERAPY: CPT

## 2023-11-10 RX ADMIN — DICLOFENAC SODIUM 2 G: 10 GEL TOPICAL at 21:39

## 2023-11-10 RX ADMIN — TIMOLOL 1 DROP: 5 SOLUTION/ DROPS OPHTHALMIC at 08:46

## 2023-11-10 RX ADMIN — BISACODYL 5 MG: 5 TABLET, COATED ORAL at 08:42

## 2023-11-10 RX ADMIN — HYDROCODONE BITARTRATE AND ACETAMINOPHEN 1 TABLET: 5; 325 TABLET ORAL at 05:57

## 2023-11-10 RX ADMIN — HYDROCODONE BITARTRATE AND ACETAMINOPHEN 1 TABLET: 5; 325 TABLET ORAL at 18:19

## 2023-11-10 RX ADMIN — DULOXETINE HYDROCHLORIDE 60 MG: 60 CAPSULE, DELAYED RELEASE ORAL at 08:43

## 2023-11-10 RX ADMIN — STANDARDIZED SENNA CONCENTRATE AND DOCUSATE SODIUM 2 TABLET: 8.6; 5 TABLET ORAL at 08:44

## 2023-11-10 RX ADMIN — DICLOFENAC SODIUM 2 G: 10 GEL TOPICAL at 15:23

## 2023-11-10 RX ADMIN — INSULIN GLARGINE 15 UNITS: 100 INJECTION, SOLUTION SUBCUTANEOUS at 21:33

## 2023-11-10 RX ADMIN — ATORVASTATIN CALCIUM 20 MG: 20 TABLET, FILM COATED ORAL at 21:38

## 2023-11-10 RX ADMIN — HYDROCODONE BITARTRATE AND ACETAMINOPHEN 1 TABLET: 5; 325 TABLET ORAL at 12:38

## 2023-11-10 RX ADMIN — ACETAMINOPHEN 650 MG: 325 TABLET ORAL at 21:37

## 2023-11-10 RX ADMIN — Medication 1 TABLET: at 21:38

## 2023-11-10 RX ADMIN — SPIRONOLACTONE 25 MG: 25 TABLET ORAL at 08:44

## 2023-11-10 RX ADMIN — GABAPENTIN 100 MG: 100 CAPSULE ORAL at 15:22

## 2023-11-10 RX ADMIN — ENOXAPARIN SODIUM 40 MG: 100 INJECTION SUBCUTANEOUS at 08:42

## 2023-11-10 RX ADMIN — Medication 1 TABLET: at 08:44

## 2023-11-10 RX ADMIN — TIMOLOL 1 DROP: 5 SOLUTION/ DROPS OPHTHALMIC at 21:49

## 2023-11-10 RX ADMIN — TRAVOPROST OPHTHALMIC SOLUTION 1 DROP: 0.04 SOLUTION OPHTHALMIC at 21:49

## 2023-11-10 RX ADMIN — INSULIN LISPRO 1 UNITS: 100 INJECTION, SOLUTION INTRAVENOUS; SUBCUTANEOUS at 09:00

## 2023-11-10 RX ADMIN — MELATONIN TAB 3 MG 3 MG: 3 TAB at 21:38

## 2023-11-10 RX ADMIN — GABAPENTIN 100 MG: 100 CAPSULE ORAL at 21:38

## 2023-11-10 RX ADMIN — GABAPENTIN 100 MG: 100 CAPSULE ORAL at 08:43

## 2023-11-10 RX ADMIN — LEVOTHYROXINE SODIUM 200 MCG: 0.1 TABLET ORAL at 05:48

## 2023-11-10 ASSESSMENT — PAIN DESCRIPTION - DESCRIPTORS
DESCRIPTORS: SORE
DESCRIPTORS: SORE
DESCRIPTORS: ACHING;DISCOMFORT
DESCRIPTORS: ACHING
DESCRIPTORS: ACHING

## 2023-11-10 ASSESSMENT — PAIN DESCRIPTION - LOCATION
LOCATION: HIP
LOCATION: SHOULDER;HIP
LOCATION: HIP
LOCATION: HIP
LOCATION: SHOULDER

## 2023-11-10 ASSESSMENT — PAIN SCALES - GENERAL
PAINLEVEL_OUTOF10: 2
PAINLEVEL_OUTOF10: 5
PAINLEVEL_OUTOF10: 5
PAINLEVEL_OUTOF10: 3
PAINLEVEL_OUTOF10: 5

## 2023-11-10 ASSESSMENT — PAIN SCALES - WONG BAKER: WONGBAKER_NUMERICALRESPONSE: 2

## 2023-11-10 ASSESSMENT — PAIN DESCRIPTION - ORIENTATION
ORIENTATION: RIGHT

## 2023-11-10 ASSESSMENT — PAIN - FUNCTIONAL ASSESSMENT
PAIN_FUNCTIONAL_ASSESSMENT: ACTIVITIES ARE NOT PREVENTED

## 2023-11-10 ASSESSMENT — PAIN DESCRIPTION - ONSET: ONSET: ON-GOING

## 2023-11-10 ASSESSMENT — PAIN DESCRIPTION - FREQUENCY: FREQUENCY: CONTINUOUS

## 2023-11-10 ASSESSMENT — PAIN DESCRIPTION - PAIN TYPE: TYPE: ACUTE PAIN

## 2023-11-10 NOTE — PROGRESS NOTES
Removed staples per order. The pt tolerated it well. Applied steri strips. Noted no drainage. Applied new dressing on coccyx per order.

## 2023-11-10 NOTE — PROGRESS NOTES
light, and all other needs within reach. Cognition  WFL  Orientation:    oriented to person, oriented to place, oriented to time, and oriented to situation  Command Following:   [facility]     Education  Barriers To Learning: none  Patient Education: patient educated on goals, OT role and benefits, plan of care, precautions, weight-bearing education, energy conservation, transfer training, discharge recommendations  Learning Assessment:  patient verbalizes understanding, would benefit from continued reinforcement    Assessment  Activity Tolerance: well   Impairments Requiring Therapeutic Intervention: decreased functional mobility, decreased ADL status, decreased strength, decreased endurance, decreased balance, decreased IADL, increased pain  Prognosis: good  Clinical Assessment: Pt is a 74 yr old who presents s/p fall resulting in R hip/LE pinning. Currently progressing from SBA>>>SUP for transfers and mobility w/ increased time. Increased success w/ LB sock and shoe management in modified and support figure four seated EOB or on couch - pt able to reach R foot to josh sock this date. Standing tolerance and endurance is progressing (7 min this date) w/ less rest breaks required within sessions overall. Skilled OT services required in order to maximize IND/safety with all occupational pursuits. Con't per POC. Safety Interventions: patient left in chair, chair alarm in place, call light within reach, gait belt, and patient at risk for falls    Plan  Frequency: 5 x/week, 90 min/day  Current Treatment Recommendations: strengthening, balance training, functional mobility training, transfer training, endurance training, patient/caregiver education, ADL/self-care training, IADL training, home management training, safety education, and equipment evaluation/education    Goals  Patient Goals:  \"Get back home\" \"get back to taking care of myself\"    Short Term Goals:  Time Frame: 7 days   Patient will complete lower body ADL at

## 2023-11-10 NOTE — CARE COORDINATION
SW called Ryley Oakes w/[facility] [address], [phone]. She stated that patient was previously active with their [address] services in August but not currently. Informed pt will need HHC again at discharge from ARU projected to be next week on 11/14. Ryley Champ stated yes they can accept the patient for these needed services.      Electronically signed by SUKHDEV Vaughn, KATIE on 11/10/2023 at 3:10 PM

## 2023-11-10 NOTE — PROGRESS NOTES
[address] [facility] Department   Phone: [phone]    Physical Therapy    [] Initial Evaluation            [x] Daily Treatment Note         [] Discharge Summary      Patient: Shawnee Haile   : 1950   MRN: [id number]   Date of Service:  11/10/2023  Admitting Diagnosis: Hip fracture, right, closed, initial encounter [facility])  Current Admission Summary: Rehab unit follow-up. Patient is a 75 yo F with pmh CHF, HTN, HLD, DM2, thyroid disease, chronic back pain, and recently diagnosed UTI who initially presented 10/25/2023 with right hip pain s/p mechanical fall. Reports leaving [place] and stepping off curb when her right leg \"gave out\" and she fell onto right side. Imaging revealed right impacted subcapital femur fracture. She underwent percutaneous fixation (10/26 with Dr. Carl Dunn). Post-op course complicated by hypoxia. Currently, patient reports moderate right hip pain. Worse with movement. Improves with rest and medication. In therapies yesterday, the patient requires contact-guard assist for her transfers and ambulation with a rolling walker, using supplemental oxygen. She would like to come to ARU to improve her function prior to returning home. We sent her medical information into her insurance company to get approval for rehab unit treatment. She was denied by [facility], but we have filed an appeal, and they usually approve her on appeal for rehab unit treatment. 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 Past Surgical History:  has a past surgical history that includes eye surgery; Foot surgery; Carpal tunnel release; Elbow surgery; shoulder surgery;  Cholecystectomy; Colonoscopy; Endoscopy, colon, diagnostic; each foot in front: chest press, overhead and trunk rotation. Pt with one moderate LOB while completing trunk rotations, requiring Mod A to control descent into chair. Otherwise, pt completed with CGA, demonstrating improved ability to utilize ankle and hip strategies to maintain balance. Sit to stands from elevated chair with 4 inch box under (L) LE x10. Completed with CGA. Pt reporting very minor increase in (R) hip pain. At end of session, pt reporting no persistent increase in pain. Pt requesting to use restroom at end of session, assisted as documented above. Second Session:  Pt sitting in recliner upon arrival, agreeable to therapy session. Pt reporting ongoing (R) shoulder pain but denies (R) hip pain. Pt ambulated 240 ft with same mechanics as above. Stepper L1 x7 minutes for LE mobility and strengthening. Attempted staggered stance at stairs with (R) LE on second 4 inch step and (L) LE on ground but pt reporting 5/10 pain. Staggered stance at stairs with (R) LE on 4 inch step and (L) LE on ground, reaching forward for cones x10. Pt reporting mild increase in (R) hip pain but would not rate. Stair negotiation x9 steps (6 inches) with (B) UE support and SBA. Standing LE exercises at ballet bar x10 each (B): hip flexion, hip abduction and hip extension. Pt demonstrating decreased AROM in all directions on (R). Pt reporting mild pain in posterior (R) buttock/thigh; pt with hx of sciatica but pt reports her sciatic pain is normally in anterior/lateral thigh. Pt education on course of healing for hip fractures and on pain management/active rest. Pt sitting in recliner at end of session, chair alarm on and call light in reach.         Functional Outcomes                 Cognition  Overall Cognitive Status: WFL  Following Commands: follows all commands without difficulty  Attention Span: appears intact  Safety Judgement: good awareness of safety precautions  Sequencing: requires cues for some  Command

## 2023-11-11 LAB
GLUCOSE BLD-MCNC: 154 MG/DL (ref 70–99)
GLUCOSE BLD-MCNC: 191 MG/DL (ref 70–99)
GLUCOSE BLD-MCNC: 206 MG/DL (ref 70–99)
GLUCOSE BLD-MCNC: 215 MG/DL (ref 70–99)
PERFORMED ON: ABNORMAL

## 2023-11-11 PROCEDURE — 6370000000 HC RX 637 (ALT 250 FOR IP): Performed by: PHYSICAL MEDICINE & REHABILITATION

## 2023-11-11 PROCEDURE — 6360000002 HC RX W HCPCS: Performed by: PHYSICAL MEDICINE & REHABILITATION

## 2023-11-11 PROCEDURE — 1280000000 HC REHAB R&B

## 2023-11-11 RX ADMIN — ACETAMINOPHEN 650 MG: 325 TABLET ORAL at 14:52

## 2023-11-11 RX ADMIN — DICLOFENAC SODIUM 2 G: 10 GEL TOPICAL at 08:15

## 2023-11-11 RX ADMIN — GABAPENTIN 100 MG: 100 CAPSULE ORAL at 14:50

## 2023-11-11 RX ADMIN — SPIRONOLACTONE 25 MG: 25 TABLET ORAL at 08:02

## 2023-11-11 RX ADMIN — Medication 1 TABLET: at 20:07

## 2023-11-11 RX ADMIN — DICLOFENAC SODIUM 2 G: 10 GEL TOPICAL at 20:08

## 2023-11-11 RX ADMIN — INSULIN GLARGINE 15 UNITS: 100 INJECTION, SOLUTION SUBCUTANEOUS at 20:07

## 2023-11-11 RX ADMIN — ENOXAPARIN SODIUM 40 MG: 100 INJECTION SUBCUTANEOUS at 08:01

## 2023-11-11 RX ADMIN — TRAVOPROST OPHTHALMIC SOLUTION 1 DROP: 0.04 SOLUTION OPHTHALMIC at 20:12

## 2023-11-11 RX ADMIN — ATORVASTATIN CALCIUM 20 MG: 20 TABLET, FILM COATED ORAL at 20:07

## 2023-11-11 RX ADMIN — TIMOLOL 1 DROP: 5 SOLUTION/ DROPS OPHTHALMIC at 08:02

## 2023-11-11 RX ADMIN — TIMOLOL 1 DROP: 5 SOLUTION/ DROPS OPHTHALMIC at 20:12

## 2023-11-11 RX ADMIN — HYDROCODONE BITARTRATE AND ACETAMINOPHEN 1 TABLET: 5; 325 TABLET ORAL at 04:08

## 2023-11-11 RX ADMIN — Medication 1 TABLET: at 08:02

## 2023-11-11 RX ADMIN — HYDROCODONE BITARTRATE AND ACETAMINOPHEN 1 TABLET: 5; 325 TABLET ORAL at 20:07

## 2023-11-11 RX ADMIN — GABAPENTIN 100 MG: 100 CAPSULE ORAL at 08:02

## 2023-11-11 RX ADMIN — GABAPENTIN 100 MG: 100 CAPSULE ORAL at 20:07

## 2023-11-11 RX ADMIN — LEVOTHYROXINE SODIUM 200 MCG: 0.1 TABLET ORAL at 06:12

## 2023-11-11 RX ADMIN — DULOXETINE HYDROCHLORIDE 60 MG: 60 CAPSULE, DELAYED RELEASE ORAL at 08:02

## 2023-11-11 ASSESSMENT — PAIN - FUNCTIONAL ASSESSMENT
PAIN_FUNCTIONAL_ASSESSMENT: ACTIVITIES ARE NOT PREVENTED

## 2023-11-11 ASSESSMENT — PAIN DESCRIPTION - PAIN TYPE
TYPE: ACUTE PAIN
TYPE: ACUTE PAIN;SURGICAL PAIN
TYPE: ACUTE PAIN

## 2023-11-11 ASSESSMENT — PAIN SCALES - GENERAL
PAINLEVEL_OUTOF10: 2
PAINLEVEL_OUTOF10: 3
PAINLEVEL_OUTOF10: 2
PAINLEVEL_OUTOF10: 5
PAINLEVEL_OUTOF10: 4
PAINLEVEL_OUTOF10: 3
PAINLEVEL_OUTOF10: 4
PAINLEVEL_OUTOF10: 4
PAINLEVEL_OUTOF10: 3
PAINLEVEL_OUTOF10: 5

## 2023-11-11 ASSESSMENT — PAIN DESCRIPTION - ORIENTATION
ORIENTATION: RIGHT

## 2023-11-11 ASSESSMENT — PAIN DESCRIPTION - LOCATION
LOCATION: HIP

## 2023-11-11 ASSESSMENT — PAIN DESCRIPTION - FREQUENCY
FREQUENCY: CONTINUOUS

## 2023-11-11 ASSESSMENT — PAIN DESCRIPTION - ONSET
ONSET: ON-GOING

## 2023-11-11 ASSESSMENT — PAIN SCALES - WONG BAKER
WONGBAKER_NUMERICALRESPONSE: 2

## 2023-11-11 ASSESSMENT — PAIN DESCRIPTION - DESCRIPTORS
DESCRIPTORS: ACHING;DISCOMFORT;SORE
DESCRIPTORS: SORE
DESCRIPTORS: ACHING;DISCOMFORT
DESCRIPTORS: ACHING

## 2023-11-11 NOTE — PLAN OF CARE
Problem: Discharge Planning  Goal: Discharge to home or other facility with appropriate resources  Outcome: Progressing     Problem: Safety - Adult  Goal: Free from fall injury  Outcome: Progressing     Problem: Skin/Tissue Integrity  Goal: Absence of new skin breakdown  Outcome: Progressing     Problem: ABCDS Injury Assessment  Goal: Absence of physical injury  Outcome: Progressing     Problem: Pain  Goal: Verbalizes/displays adequate comfort level or baseline comfort level  Outcome: Progressing     Problem: Nutrition Deficit:  Goal: Optimize nutritional status  Outcome: Progressing

## 2023-11-11 NOTE — PROGRESS NOTES
Outreach call to provider [facility] to evaluate pt abdominal incision due to appearance. Provider to evaluate today.     Azra Clark BSN, RN   [phone]

## 2023-11-11 NOTE — PROGRESS NOTES
Pt ambulated 1 lap around T. Pt tolerated well with minor c/o soreness from previous walk. Pt given prn tylenol prior to ambulating per request. Pt back in chair with alarm on and functioning. Call light in reach.     Jean Pierre TUCKERN, RN   [phone]

## 2023-11-11 NOTE — PROGRESS NOTES
Pt ambulated 1 lap around ARU. Pt toileted and back to chair. Call light in reach. Chair alarm on and fluctuationing.      Jean Pierre TUCKERN, RN   [phone]

## 2023-11-12 VITALS
WEIGHT: 206 LBS | HEIGHT: 65 IN | TEMPERATURE: 100 F | SYSTOLIC BLOOD PRESSURE: 124 MMHG | DIASTOLIC BLOOD PRESSURE: 80 MMHG | HEART RATE: 99 BPM | RESPIRATION RATE: 16 BRPM | BODY MASS INDEX: 34.32 KG/M2 | OXYGEN SATURATION: 91 %

## 2023-11-12 LAB
GLUCOSE BLD-MCNC: 180 MG/DL (ref 70–99)
GLUCOSE BLD-MCNC: 191 MG/DL (ref 70–99)
GLUCOSE BLD-MCNC: 194 MG/DL (ref 70–99)
GLUCOSE BLD-MCNC: 227 MG/DL (ref 70–99)
PERFORMED ON: ABNORMAL

## 2023-11-12 PROCEDURE — 6370000000 HC RX 637 (ALT 250 FOR IP): Performed by: PHYSICAL MEDICINE & REHABILITATION

## 2023-11-12 PROCEDURE — 6360000002 HC RX W HCPCS: Performed by: PHYSICAL MEDICINE & REHABILITATION

## 2023-11-12 PROCEDURE — 1280000000 HC REHAB R&B

## 2023-11-12 RX ADMIN — GABAPENTIN 100 MG: 100 CAPSULE ORAL at 20:16

## 2023-11-12 RX ADMIN — ATORVASTATIN CALCIUM 20 MG: 20 TABLET, FILM COATED ORAL at 20:17

## 2023-11-12 RX ADMIN — DULOXETINE HYDROCHLORIDE 60 MG: 60 CAPSULE, DELAYED RELEASE ORAL at 08:16

## 2023-11-12 RX ADMIN — ACETAMINOPHEN 650 MG: 325 TABLET ORAL at 23:37

## 2023-11-12 RX ADMIN — MELATONIN TAB 3 MG 3 MG: 3 TAB at 23:41

## 2023-11-12 RX ADMIN — INSULIN GLARGINE 15 UNITS: 100 INJECTION, SOLUTION SUBCUTANEOUS at 20:17

## 2023-11-12 RX ADMIN — TIMOLOL 1 DROP: 5 SOLUTION/ DROPS OPHTHALMIC at 08:15

## 2023-11-12 RX ADMIN — DICLOFENAC SODIUM 2 G: 10 GEL TOPICAL at 08:16

## 2023-11-12 RX ADMIN — SPIRONOLACTONE 25 MG: 25 TABLET ORAL at 08:16

## 2023-11-12 RX ADMIN — MELATONIN TAB 3 MG 3 MG: 3 TAB at 00:09

## 2023-11-12 RX ADMIN — HYDROCODONE BITARTRATE AND ACETAMINOPHEN 1 TABLET: 5; 325 TABLET ORAL at 12:17

## 2023-11-12 RX ADMIN — HYDROCODONE BITARTRATE AND ACETAMINOPHEN 1 TABLET: 5; 325 TABLET ORAL at 20:16

## 2023-11-12 RX ADMIN — STANDARDIZED SENNA CONCENTRATE AND DOCUSATE SODIUM 2 TABLET: 8.6; 5 TABLET ORAL at 08:16

## 2023-11-12 RX ADMIN — Medication 1 TABLET: at 20:17

## 2023-11-12 RX ADMIN — GABAPENTIN 100 MG: 100 CAPSULE ORAL at 08:15

## 2023-11-12 RX ADMIN — ENOXAPARIN SODIUM 40 MG: 100 INJECTION SUBCUTANEOUS at 08:15

## 2023-11-12 RX ADMIN — Medication 1 TABLET: at 08:15

## 2023-11-12 RX ADMIN — DICLOFENAC SODIUM 2 G: 10 GEL TOPICAL at 20:18

## 2023-11-12 RX ADMIN — LEVOTHYROXINE SODIUM 200 MCG: 0.1 TABLET ORAL at 05:47

## 2023-11-12 RX ADMIN — CYCLOBENZAPRINE 5 MG: 10 TABLET, FILM COATED ORAL at 08:35

## 2023-11-12 RX ADMIN — GABAPENTIN 100 MG: 100 CAPSULE ORAL at 13:33

## 2023-11-12 ASSESSMENT — PAIN - FUNCTIONAL ASSESSMENT
PAIN_FUNCTIONAL_ASSESSMENT: ACTIVITIES ARE NOT PREVENTED
PAIN_FUNCTIONAL_ASSESSMENT: ACTIVITIES ARE NOT PREVENTED

## 2023-11-12 ASSESSMENT — PAIN SCALES - GENERAL
PAINLEVEL_OUTOF10: 4
PAINLEVEL_OUTOF10: 2
PAINLEVEL_OUTOF10: 6
PAINLEVEL_OUTOF10: 5
PAINLEVEL_OUTOF10: 3
PAINLEVEL_OUTOF10: 5
PAINLEVEL_OUTOF10: 5
PAINLEVEL_OUTOF10: 4
PAINLEVEL_OUTOF10: 4
PAINLEVEL_OUTOF10: 5

## 2023-11-12 ASSESSMENT — PAIN SCALES - WONG BAKER
WONGBAKER_NUMERICALRESPONSE: 2
WONGBAKER_NUMERICALRESPONSE: 2

## 2023-11-12 ASSESSMENT — PAIN DESCRIPTION - DESCRIPTORS
DESCRIPTORS: ACHING
DESCRIPTORS: ACHING;DISCOMFORT;SORE
DESCRIPTORS: ACHING
DESCRIPTORS: ACHING
DESCRIPTORS: ACHING;DISCOMFORT

## 2023-11-12 ASSESSMENT — PAIN DESCRIPTION - ONSET
ONSET: ON-GOING
ONSET: ON-GOING

## 2023-11-12 ASSESSMENT — PAIN DESCRIPTION - ORIENTATION
ORIENTATION: RIGHT

## 2023-11-12 ASSESSMENT — PAIN DESCRIPTION - LOCATION
LOCATION: HIP;SHOULDER
LOCATION: SHOULDER
LOCATION: HIP;SCROTUM
LOCATION: HIP
LOCATION: HIP;SHOULDER

## 2023-11-12 ASSESSMENT — PAIN DESCRIPTION - FREQUENCY
FREQUENCY: CONTINUOUS
FREQUENCY: CONTINUOUS

## 2023-11-12 ASSESSMENT — PAIN DESCRIPTION - PAIN TYPE
TYPE: ACUTE PAIN
TYPE: ACUTE PAIN

## 2023-11-12 NOTE — PLAN OF CARE
Problem: Discharge Planning  Goal: Discharge to home or other facility with appropriate resources  Outcome: Progressing     Problem: Safety - Adult  Goal: Free from fall injury  11/12/2023 0655 by Omar Lozano RN  Outcome: Progressing  11/11/2023 2308 by Zayda Choi RN  Outcome: Progressing     Problem: Skin/Tissue Integrity  Goal: Absence of new skin breakdown  11/12/2023 0655 by Omar Lozano RN  Outcome: Progressing  11/11/2023 2308 by Zayda Choi RN  Outcome: Progressing     Problem: ABCDS Injury Assessment  Goal: Absence of physical injury  11/12/2023 0655 by Omar Lozano RN  Outcome: Progressing  11/11/2023 2308 by Zayda Choi RN  Outcome: Progressing     Problem: Pain  Goal: Verbalizes/displays adequate comfort level or baseline comfort level  11/12/2023 0655 by Omar Lozano RN  Outcome: Progressing  11/11/2023 2308 by Zayda Choi RN  Outcome: Progressing     Problem: Nutrition Deficit:  Goal: Optimize nutritional status  Outcome: Progressing

## 2023-11-13 LAB
ANION GAP SERPL CALCULATED.3IONS-SCNC: 11 MMOL/L (ref 3–16)
BASOPHILS # BLD: 0 K/UL (ref 0–0.2)
BASOPHILS NFR BLD: 0.5 %
BUN SERPL-MCNC: 12 MG/DL (ref 7–20)
CALCIUM SERPL-MCNC: 8.9 MG/DL (ref 8.3–10.6)
CHLORIDE SERPL-SCNC: 103 MMOL/L (ref 99–110)
CO2 SERPL-SCNC: 28 MMOL/L (ref 21–32)
CREAT SERPL-MCNC: 0.6 MG/DL (ref 0.6–1.2)
DEPRECATED RDW RBC AUTO: 15.4 % (ref 12.4–15.4)
EOSINOPHIL # BLD: 0 K/UL (ref 0–0.6)
EOSINOPHIL NFR BLD: 0 %
GFR SERPLBLD CREATININE-BSD FMLA CKD-EPI: >60 ML/MIN/{1.73_M2}
GLUCOSE BLD-MCNC: 138 MG/DL (ref 70–99)
GLUCOSE BLD-MCNC: 142 MG/DL (ref 70–99)
GLUCOSE BLD-MCNC: 224 MG/DL (ref 70–99)
GLUCOSE SERPL-MCNC: 154 MG/DL (ref 70–99)
HCT VFR BLD AUTO: 39.9 % (ref 36–48)
HGB BLD-MCNC: 12.9 G/DL (ref 12–16)
LYMPHOCYTES # BLD: 0.7 K/UL (ref 1–5.1)
LYMPHOCYTES NFR BLD: 15.5 %
MCH RBC QN AUTO: 29.6 PG (ref 26–34)
MCHC RBC AUTO-ENTMCNC: 32.3 G/DL (ref 31–36)
MCV RBC AUTO: 91.6 FL (ref 80–100)
MONOCYTES # BLD: 0.6 K/UL (ref 0–1.3)
MONOCYTES NFR BLD: 13.9 %
NEUTROPHILS # BLD: 3 K/UL (ref 1.7–7.7)
NEUTROPHILS NFR BLD: 70.1 %
PERFORMED ON: ABNORMAL
PLATELET # BLD AUTO: 155 K/UL (ref 135–450)
PMV BLD AUTO: 8 FL (ref 5–10.5)
POTASSIUM SERPL-SCNC: 4 MMOL/L (ref 3.5–5.1)
RBC # BLD AUTO: 4.35 M/UL (ref 4–5.2)
SODIUM SERPL-SCNC: 142 MMOL/L (ref 136–145)
WBC # BLD AUTO: 4.3 K/UL (ref 4–11)

## 2023-11-13 PROCEDURE — 36415 COLL VENOUS BLD VENIPUNCTURE: CPT

## 2023-11-13 PROCEDURE — 6370000000 HC RX 637 (ALT 250 FOR IP): Performed by: PHYSICAL MEDICINE & REHABILITATION

## 2023-11-13 PROCEDURE — 97535 SELF CARE MNGMENT TRAINING: CPT

## 2023-11-13 PROCEDURE — 1280000000 HC REHAB R&B

## 2023-11-13 PROCEDURE — 97530 THERAPEUTIC ACTIVITIES: CPT

## 2023-11-13 PROCEDURE — 85025 COMPLETE CBC W/AUTO DIFF WBC: CPT

## 2023-11-13 PROCEDURE — 6360000002 HC RX W HCPCS: Performed by: PHYSICAL MEDICINE & REHABILITATION

## 2023-11-13 PROCEDURE — 97110 THERAPEUTIC EXERCISES: CPT

## 2023-11-13 PROCEDURE — 80048 BASIC METABOLIC PNL TOTAL CA: CPT

## 2023-11-13 PROCEDURE — 97116 GAIT TRAINING THERAPY: CPT

## 2023-11-13 RX ORDER — INSULIN DETEMIR 100 [IU]/ML
15 INJECTION, SOLUTION SUBCUTANEOUS NIGHTLY
Qty: 2 EACH | Refills: 3 | Status: SHIPPED | OUTPATIENT
Start: 2023-11-13

## 2023-11-13 RX ORDER — SPIRONOLACTONE 25 MG/1
25 TABLET ORAL DAILY
Qty: 30 TABLET | Refills: 3 | Status: SHIPPED | OUTPATIENT
Start: 2023-11-13

## 2023-11-13 RX ORDER — HYDROCODONE BITARTRATE AND ACETAMINOPHEN 5; 325 MG/1; MG/1
1 TABLET ORAL EVERY 6 HOURS PRN
Qty: 20 TABLET | Refills: 0 | Status: SHIPPED | OUTPATIENT
Start: 2023-11-13 | End: 2023-11-16

## 2023-11-13 RX ORDER — CYCLOBENZAPRINE HCL 5 MG
5 TABLET ORAL 3 TIMES DAILY PRN
Qty: 21 TABLET | Refills: 0 | Status: SHIPPED | OUTPATIENT
Start: 2023-11-13 | End: 2023-11-23

## 2023-11-13 RX ORDER — LEVOTHYROXINE SODIUM 0.2 MG/1
200 TABLET ORAL DAILY
Qty: 30 TABLET | Refills: 3 | Status: SHIPPED | OUTPATIENT
Start: 2023-11-14

## 2023-11-13 RX ADMIN — Medication 1 TABLET: at 21:10

## 2023-11-13 RX ADMIN — BISACODYL 5 MG: 5 TABLET, COATED ORAL at 11:57

## 2023-11-13 RX ADMIN — ENOXAPARIN SODIUM 40 MG: 100 INJECTION SUBCUTANEOUS at 11:57

## 2023-11-13 RX ADMIN — SPIRONOLACTONE 25 MG: 25 TABLET ORAL at 11:57

## 2023-11-13 RX ADMIN — INSULIN GLARGINE 15 UNITS: 100 INJECTION, SOLUTION SUBCUTANEOUS at 21:09

## 2023-11-13 RX ADMIN — DICLOFENAC SODIUM 2 G: 10 GEL TOPICAL at 11:56

## 2023-11-13 RX ADMIN — MELATONIN TAB 3 MG 3 MG: 3 TAB at 22:20

## 2023-11-13 RX ADMIN — LEVOTHYROXINE SODIUM 200 MCG: 0.1 TABLET ORAL at 05:26

## 2023-11-13 RX ADMIN — GABAPENTIN 100 MG: 100 CAPSULE ORAL at 21:10

## 2023-11-13 RX ADMIN — DULOXETINE HYDROCHLORIDE 60 MG: 60 CAPSULE, DELAYED RELEASE ORAL at 11:57

## 2023-11-13 RX ADMIN — ATORVASTATIN CALCIUM 20 MG: 20 TABLET, FILM COATED ORAL at 21:10

## 2023-11-13 RX ADMIN — DICLOFENAC SODIUM 2 G: 10 GEL TOPICAL at 21:10

## 2023-11-13 RX ADMIN — Medication 1 TABLET: at 12:00

## 2023-11-13 RX ADMIN — HYDROCODONE BITARTRATE AND ACETAMINOPHEN 1 TABLET: 5; 325 TABLET ORAL at 05:40

## 2023-11-13 RX ADMIN — HYDROCODONE BITARTRATE AND ACETAMINOPHEN 1 TABLET: 5; 325 TABLET ORAL at 11:57

## 2023-11-13 RX ADMIN — GABAPENTIN 100 MG: 100 CAPSULE ORAL at 14:16

## 2023-11-13 RX ADMIN — TIMOLOL 1 DROP: 5 SOLUTION/ DROPS OPHTHALMIC at 12:00

## 2023-11-13 RX ADMIN — HYDROCODONE BITARTRATE AND ACETAMINOPHEN 1 TABLET: 5; 325 TABLET ORAL at 17:49

## 2023-11-13 RX ADMIN — STANDARDIZED SENNA CONCENTRATE AND DOCUSATE SODIUM 2 TABLET: 8.6; 5 TABLET ORAL at 11:58

## 2023-11-13 ASSESSMENT — PAIN DESCRIPTION - FREQUENCY: FREQUENCY: INTERMITTENT

## 2023-11-13 ASSESSMENT — PAIN DESCRIPTION - ONSET: ONSET: ON-GOING

## 2023-11-13 ASSESSMENT — PAIN SCALES - GENERAL
PAINLEVEL_OUTOF10: 4
PAINLEVEL_OUTOF10: 8
PAINLEVEL_OUTOF10: 6
PAINLEVEL_OUTOF10: 5
PAINLEVEL_OUTOF10: 2
PAINLEVEL_OUTOF10: 1

## 2023-11-13 ASSESSMENT — PAIN DESCRIPTION - ORIENTATION
ORIENTATION: RIGHT
ORIENTATION: RIGHT

## 2023-11-13 ASSESSMENT — PAIN DESCRIPTION - LOCATION
LOCATION: GENERALIZED
LOCATION: HIP;SHOULDER
LOCATION: GENERALIZED
LOCATION: GENERALIZED

## 2023-11-13 ASSESSMENT — PAIN DESCRIPTION - DESCRIPTORS
DESCRIPTORS: ACHING;SORE
DESCRIPTORS: ACHING
DESCRIPTORS: ACHING;DISCOMFORT
DESCRIPTORS: ACHING;SORE

## 2023-11-13 ASSESSMENT — PAIN SCALES - WONG BAKER
WONGBAKER_NUMERICALRESPONSE: 0
WONGBAKER_NUMERICALRESPONSE: 2

## 2023-11-13 ASSESSMENT — PAIN DESCRIPTION - PAIN TYPE: TYPE: ACUTE PAIN

## 2023-11-13 ASSESSMENT — PAIN - FUNCTIONAL ASSESSMENT
PAIN_FUNCTIONAL_ASSESSMENT: ACTIVITIES ARE NOT PREVENTED

## 2023-11-13 NOTE — DISCHARGE INSTR - COC
Continuity of Care Form    Patient Name: Prakash Espinal   :  1950  MRN:  [id number]    Admit date:  2023  Discharge date:  23    Code Status Order: Full Code   Advance Directives:     Admitting Physician:  Aj Ratliff DO  PCP: Laura Castro MD    Discharging Nurse: [facility] Unit/Room#: KPE-3628/3223-42  Discharging Unit Phone Number: [phone]    Emergency Contact:   Extended Emergency Contact Information  [facility] Phone: [phone]  Mobile Phone: [phone]  Relation: Other  Secondary Emergency Contact: Mckayla Hunt  Mobile Phone: [phone]  Relation: Niece/Nephew    Past Surgical History:  Past Surgical History:   Procedure Laterality Date    CARPAL TUNNEL RELEASE      bilateral    CHOLECYSTECTOMY      COLONOSCOPY      ELBOW SURGERY      ENDOSCOPY, COLON, DIAGNOSTIC      EYE SURGERY      FOOT SURGERY      HIP SURGERY Right 10/26/2023    CLOSED REDUCTION PERCUTANEOUS PINNING OF RIGHT FEMORAL NECK USING CANNULATED SCREWS performed by Ruth Ruiz MD at [facility]         Immunization History:   Immunization History   Administered Date(s) Administered    Influenza, FLUARIX, Ruthie Appl (age 10 mo+) AND AFLURIA, (age 1 y+), PF, 0.5mL 10/02/2020    PPD Test 2019    TDaP, ADACEL (age 6y-58y), BOOSTRIX (age 10y+), IM, 0.5mL 2022       Active Problems:  Patient Active Problem List   Diagnosis Code    WALLER (dyspnea on exertion) R06.09    Abnormal CT of the chest R93.89    SIRS (systemic inflammatory response syndrome) ([facility]) R65.10    Sepsis ([address]) A41.9    Sacral wound, initial encounter S31.000A    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

## 2023-11-13 NOTE — PLAN OF CARE
Problem: Discharge Planning  Goal: Discharge to home or other facility with appropriate resources  Outcome: Progressing  Flowsheets (Taken 11/13/2023 1200)  Discharge to home or other facility with appropriate resources: Identify barriers to discharge with patient and caregiver     Problem: Safety - Adult  Goal: Free from fall injury  Outcome: Progressing  Flowsheets (Taken 11/13/2023 1222)  Free From Fall Injury: Instruct family/caregiver on patient safety     Problem: Skin/Tissue Integrity  Goal: Absence of new skin breakdown  Description: 1. Monitor for areas of redness and/or skin breakdown  2. Assess vascular access sites hourly  3. Every 4-6 hours minimum:  Change oxygen saturation probe site  4. Every 4-6 hours:  If on nasal continuous positive airway pressure, respiratory therapy assess nares and determine need for appliance change or resting period.   Outcome: Progressing     Problem: ABCDS Injury Assessment  Goal: Absence of physical injury  Outcome: Progressing  Flowsheets (Taken 11/13/2023 1222)  Absence of Physical Injury: Implement safety measures based on patient assessment     Problem: Pain  Goal: Verbalizes/displays adequate comfort level or baseline comfort level  Outcome: Progressing  Flowsheets (Taken 11/13/2023 1200)  Verbalizes/displays adequate comfort level or baseline comfort level: Encourage patient to monitor pain and request assistance     Problem: Nutrition Deficit:  Goal: Optimize nutritional status  Outcome: Progressing

## 2023-11-13 NOTE — PROGRESS NOTES
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)       The patient has progressed functionally as demonstrated by their improved leg strength and right hip pain.       Rehabilitation Diagnosis:   Right hip fracture, ORIF 10/22        Assessment and Plan:     Right hip fracture-s/p ORIF, 10/26, Dr. [employer], hydrocodone, Flexeril     CHF-Aldactone     HTN-monitor     HLD -Lipitor     DM2 -Lantus, SSI     Hypothyroid - Synthroid     chronic back pain -Cymbalta, Neurontin     UTI -treated       Glaucoma-timolol, Travatan    Prep DC today     Bowels: Per protocol  Bladder: Per protocol   Pain: Hydrocodone  DVT PPx: Lovenox  ELOS: 11/14     Viviana Benito D.O. M.P.H  PM&R  11/13/2023  9:16 AM

## 2023-11-13 NOTE — PATIENT CARE CONFERENCE
[facility]  Inpatient Rehabilitation  Weekly Team Conference Note    Patient Name: Comfort Jovel        MRN: [id number]    : 1950  (74 y.o.)  Gender: female           The team conference for this patient was held on 23 at 1000 am and led by:  Ab Toscano. MD Alonso     CASE MANAGEMENT:  Assessment:   Patient is a 74year old Female who admitted to ARU on 2023 with the admitting diagnosis of  Hip fracture, right, closed, initial encounter ([facility]). Patient resides at home alone. Patient lives in a ranch style home with a ramp entry and a stair lift to basement. Patient is retired. Patient was receiving uiu prior to admission and is followed by the [facility] services. Patient would benefit from skilled PT/OT to promote increased safety and independence in order to return to her prior level of functioning. Patient anticipates discharging to home with home health care services and with recommended DME. PHYSICAL THERAPY:    Bed Mobility:  Supine to Sit: minimal assistance  Sit to Supine: stand by assistance  Rolling Left: stand by assistance  Rolling Right: stand by assistance  Comments: Completed on flat bed without use of HR. Pt required Min A at trunk for supine => sit due to pain with propping on (R) elbow/shoulder. Transfers:  Sit to stand transfer: stand by assistance  Stand to sit transfer: stand by assistance  Stand step transfer: stand by assistance  Car transfer: stand by assistance  Comments:  All transfers completed to rolling walker  Ambulation:  Surface:level surface  Assistive Device: rolling walker  Assistance: stand by assistance  Distance: 200 ft+ 55 ft  Gait Mechanics: Decreased carolann and gait speed, even weight distribution (occasionally favoring (L) LE), equal step length, narrow JOSE  Comments:    Ambulation Trial 2  Surface:carpet  Assistive Device: rolling walker  Assistance: stand by assistance  Distance: 20 ft  Gait
Cooperative, and Pleasant  Barriers to the achievement of predicted outcomes: Pain, Lives alone (niece temporarily moving in)    Patient Goals: \"Get back home\" \"get back to taking care of myself\", Go home     Interdisciplinary Individualized Plan of Care Review of Previous Week:    Medical and functional progress towards goals:  Medically the patient is doing well, labs and vitals stable, incision looks good, ready to discharge today. Pt progressing to independent level for bed mobility and modified independent level for ambulation and stair negotiation this date, meeting 4/4 goals. Discharge to home today. Pt has made significant progress in dynamic balance with changes in base of support and on uneven surfaces. Pt also demonstrates good safety awareness and carryover of education on safe mobility. Despite improvements, pt continues to be limited by gait mechanics such as decreased gait speed, and by poor vision. Pt's BADL status has improved to Shirley  for all tasks with AE as needed with pt meeting all OT goals. Pt is Shirley with light IADl tasks. Barriers towards progress:  pain, dynamic balance  Interventions to address Barriers:  Transition to home health PT/OT  Goals still appropriate:  Yes  Modifications to goals: none  Continue Current Plan of Care:  Yes  Modifications to Plan of Care: none    Rehab Team Members in attendance for Team Conference:  Merissa Gallo, MSW, LSW    Starla Dunaway, RD, LD    Lizzette Correa OTR/FELIPA Bennett, PT, DPT    Alla Lester RN (Charge RN)    TOMY Page PT, DPT,     I, the Rehab Physician, led the above team conference and agree with all decisions made, and approve the established interdisciplinary plan of care as documented within the medical record of Chance Kimble. Shawnee Verma.  Alonso, DO

## 2023-11-13 NOTE — PROGRESS NOTES
and transport IADL items at modified independent - goal met 11/13    Above goals reviewed on 11/13/2023. All goals are ongoing at this time unless indicated above.        Therapy Session Time     Individual Group Co-treatment   Time In 0845      Time Out 0945      Minutes 60            Individual Group Co-treatment   Time In 7267      Time Out 1350      Minutes 35        Timed Code Treatment Minutes: 60 + 35    Total Treatment Minutes: 95 minutes       Electronically Signed By:  MARÍA Mahoney/FELIPA #[id number]

## 2023-11-13 NOTE — PROGRESS NOTES
[address] [facility] Department   Phone: [phone]    Physical Therapy    [] Initial Evaluation            [x] Daily Treatment Note         [x] Discharge Summary      Patient: Linh Ann   : 1950   MRN: [id number]   Date of Service:  2023  Admitting Diagnosis: Hip fracture, right, closed, initial encounter [facility])  Current Admission Summary: Rehab unit follow-up. Patient is a 73 yo F with pmh CHF, HTN, HLD, DM2, thyroid disease, chronic back pain, and recently diagnosed UTI who initially presented 10/25/2023 with right hip pain s/p mechanical fall. Reports leaving [employer] and stepping off curb when her right leg \"gave out\" and she fell onto right side. Imaging revealed right impacted subcapital femur fracture. She underwent percutaneous fixation (10/26 with Dr. Hortencia Lomas). Post-op course complicated by hypoxia. Currently, patient reports moderate right hip pain. Worse with movement. Improves with rest and medication. In therapies yesterday, the patient requires contact-guard assist for her transfers and ambulation with a rolling walker, using supplemental oxygen. She would like to come to ARU to improve her function prior to returning home. We sent her medical information into her insurance company to get approval for rehab unit treatment. She was denied by [facility], but we have filed an appeal, and they usually approve her on appeal for rehab unit treatment. 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 Past Surgical History:  has a past surgical history that includes eye surgery; Foot surgery; Carpal tunnel release; Elbow surgery; shoulder surgery;  Cholecystectomy; Colonoscopy; Endoscopy, colon, diagnostic;

## 2023-11-13 NOTE — CARE COORDINATION
11/13/23 1448   IMM Letter   IMM Letter given to Patient/Family/Significant other/Guardian/POA/by: Given to Patient by . IMM Letter date given: 11/13/23   IMM Letter time given: 1444      (CM) called [phone] [address] Santiago [facility]. [facility]) staff, Erasmo Hurtado ([phone]), and informed of patient's discharge tomorrow. CM spoke with patient who confirmed that she would like a rolling walker at discharge, as recommended by therapy staff. Patient discussed desire for shower durable medical equipment (DME). CM educated patient on insurance not covering bathroom DME and ability for patient to utilize places like [address] [place] [address] or [employer]. Patient verbalized understanding. CM Team to provide patient with rolling walker on day of discharge (tomorrow).       Rae SANTIZO, YEHUDA, [facility] -   [phone]    Electronically signed by SUKHDEV Bright on 11/13/2023 at 2:53 PM

## 2023-11-14 VITALS
SYSTOLIC BLOOD PRESSURE: 146 MMHG | WEIGHT: 207.89 LBS | BODY MASS INDEX: 34.64 KG/M2 | HEIGHT: 65 IN | HEART RATE: 91 BPM | RESPIRATION RATE: 16 BRPM | OXYGEN SATURATION: 92 % | DIASTOLIC BLOOD PRESSURE: 67 MMHG | TEMPERATURE: 98.1 F

## 2023-11-14 LAB
GLUCOSE BLD-MCNC: 141 MG/DL (ref 70–99)
PERFORMED ON: ABNORMAL

## 2023-11-14 PROCEDURE — 6370000000 HC RX 637 (ALT 250 FOR IP): Performed by: PHYSICAL MEDICINE & REHABILITATION

## 2023-11-14 PROCEDURE — 6360000002 HC RX W HCPCS: Performed by: PHYSICAL MEDICINE & REHABILITATION

## 2023-11-14 RX ADMIN — DICLOFENAC SODIUM 2 G: 10 GEL TOPICAL at 08:42

## 2023-11-14 RX ADMIN — BISACODYL 5 MG: 5 TABLET, COATED ORAL at 08:42

## 2023-11-14 RX ADMIN — GABAPENTIN 100 MG: 100 CAPSULE ORAL at 08:42

## 2023-11-14 RX ADMIN — ENOXAPARIN SODIUM 40 MG: 100 INJECTION SUBCUTANEOUS at 08:42

## 2023-11-14 RX ADMIN — STANDARDIZED SENNA CONCENTRATE AND DOCUSATE SODIUM 2 TABLET: 8.6; 5 TABLET ORAL at 08:41

## 2023-11-14 RX ADMIN — Medication 1 TABLET: at 08:42

## 2023-11-14 RX ADMIN — DULOXETINE HYDROCHLORIDE 60 MG: 60 CAPSULE, DELAYED RELEASE ORAL at 08:42

## 2023-11-14 RX ADMIN — HYDROCODONE BITARTRATE AND ACETAMINOPHEN 1 TABLET: 5; 325 TABLET ORAL at 03:54

## 2023-11-14 RX ADMIN — SPIRONOLACTONE 25 MG: 25 TABLET ORAL at 08:42

## 2023-11-14 RX ADMIN — LEVOTHYROXINE SODIUM 200 MCG: 0.1 TABLET ORAL at 05:21

## 2023-11-14 RX ADMIN — HYDROCODONE BITARTRATE AND ACETAMINOPHEN 1 TABLET: 5; 325 TABLET ORAL at 11:01

## 2023-11-14 RX ADMIN — TIMOLOL 1 DROP: 5 SOLUTION/ DROPS OPHTHALMIC at 08:43

## 2023-11-14 ASSESSMENT — PAIN DESCRIPTION - DESCRIPTORS
DESCRIPTORS: ACHING;SORE
DESCRIPTORS: ACHING

## 2023-11-14 ASSESSMENT — PAIN - FUNCTIONAL ASSESSMENT: PAIN_FUNCTIONAL_ASSESSMENT: ACTIVITIES ARE NOT PREVENTED

## 2023-11-14 ASSESSMENT — PAIN DESCRIPTION - LOCATION
LOCATION: HIP;SHOULDER
LOCATION: HIP

## 2023-11-14 ASSESSMENT — PAIN DESCRIPTION - ORIENTATION
ORIENTATION: RIGHT
ORIENTATION: RIGHT

## 2023-11-14 ASSESSMENT — PAIN SCALES - GENERAL
PAINLEVEL_OUTOF10: 5
PAINLEVEL_OUTOF10: 5

## 2023-11-14 NOTE — DISCHARGE SUMMARY
levothyroxine 200 MCG tablet  Commonly known as: SYNTHROID  Take 1 tablet by mouth Daily  What changed:   medication strength  how much to take  Notes to patient: Use: increases thyroid stimulating hormone  Side effects: weight loss, nervousness   For best results: take on an empty stomach, 30 minutes prior to eating      spironolactone 25 MG tablet  Commonly known as: ALDACTONE  Take 1 tablet by mouth daily  What changed: how much to take  Notes to patient: Spironolactone/Aldactone  Use: Potassium sparing diuretic  Side effects: Dizziness, headache             CONTINUE taking these medications      acetaminophen 500 MG tablet  Commonly known as: TYLENOL  Notes to patient: The maximum daily dose of acetaminophen was discussed with the patient. She was encouraged not to exceed 4,000 mg of acetaminophen during a 24 hour period and was asked to keep in mind that acetaminophen can also be found in many over-the-counter cold medications as well as narcotics that may be given for pain. The patient expresses understanding of these issues and questions were answered. cyclobenzaprine 5 MG tablet  Commonly known as: FLEXERIL  Take 1 tablet by mouth 3 times daily as needed for Muscle spasms  Notes to patient: Cyclobenzaprine (Flexeril)  Use: Calm the muscles, ease pain  Side Effects: feeling lightheaded, sleepy, having blurred eyesight, or confusion       diclofenac sodium 1 % Gel  Commonly known as: VOLTAREN  Apply 2 g topically 2 times daily  Notes to patient: Uses: It is used to treat a precancerous skin problem called actinic keratosis. It is used to ease pain. It is used to treat some types of arthritis    Side Effects:  Dizziness or headache. Constipation, diarrhea, stomach pain, upset stomach, or throwing up.       DULoxetine 60 MG extended release capsule  Commonly known as: CYMBALTA  Notes to patient: Uses: for treatment of mental health like depression or anxiety  Side Effects: nausea, dry mouth,

## 2023-11-14 NOTE — CARE COORDINATION
Team conference held today. Spoke with patient to discuss progress with therapy, barriers to discharge, and plans to return home. Estimated discharge date set for today, 11/14/2023. Patient is discharging to home with family support, home health care PT/OT/Nursing and recommended DME. Will continue to follow for support and discharge planning.     Electronically signed by Marguerite Mcardle, MSW on 11/14/2023 at 10:41 AM

## 2023-11-14 NOTE — CARE COORDINATION
PT/OT recommending that patient discharge to home with a rolling walker,  Damon agreed to provide walker to patient. Damon delivered walker to patient.     Electronically signed by SUKHDEV Nguyen on 11/14/2023 at 9:24 AM

## 2023-11-14 NOTE — PROGRESS NOTES
- I have not received rehabilitation therapy in the past 5 days  []  1. Rarely or not at all  [x]  2. Occasionally  []  3. Frequently  []  4. Almost constantly  []  8. Unable to answer    Pain Interference with Day-to-Day Activities: \"Over the past 5 days, how often have you limited your day-to-day activities (excluding rehabilitation therapy session)? \"  []  1. Rarely or not at all  [x]  2. Occasionally  []  3. Frequently  []  4. Almost constantly  []  8. Unable to answer    Nutritional Approaches  Last 7 Days - Check all of the following nutritional approaches that were received within the last 7 days:  []  A. Parenteral/IV feeding  []  B. Feeding tube (e.g., nasogastric or abdominal (PEG))  []  C. Mechanically altered diet - requires change in texture of food or liquids (e.g., pureed food, thickened liquids)  []  D. Therapeutic diet (e.g., low salt, diabetic, low cholesterol)  [x]  Z. None of the above    At time of Discharge - Check all of the following nutritional approaches that were being received at discharge:  []  A. Parenteral/IV feeding (including IV fluids if needed for hydration, but not as part of dialysis/chemo)  []  B. Feeding tube (e.g., nasogastric or abdominal (PEG))  []  C. Mechanically altered diet - requires change in texture of food or liquids (e.g., pureed food, thickened liquids)  []  D. Therapeutic diet (e.g., low salt, diabetic, low cholesterol  [x]  Z. None of the above    High Risk Drug Classes:  Use and Indication (THROUGHOUT LAST 3 DAYS OF STAY)    Is taking: Check if the pt is taking any medications by pharmacological classification, not how it is used, in the following classes  Indication noted:  If column 1 is checked, check if there is an indication noted for all meds in the drug class Is taking  (check all that apply) Indication noted (check all that apply)   Antipsychotic [] []   Anticoagulant [x] [x]   Antibiotic [] []   Opioid [x] [x]   Antiplatelet [] []

## 2023-11-15 NOTE — DISCHARGE SUMMARY
increases thyroid stimulating hormone  Side effects: weight loss, nervousness   For best results: take on an empty stomach, 30 minutes prior to eating      spironolactone 25 MG tablet  Commonly known as: ALDACTONE  Take 1 tablet by mouth daily  What changed: how much to take  Notes to patient: Spironolactone/Aldactone  Use: Potassium sparing diuretic  Side effects: Dizziness, headache             CONTINUE taking these medications      acetaminophen 500 MG tablet  Commonly known as: TYLENOL  Notes to patient: The maximum daily dose of acetaminophen was discussed with the patient. She was encouraged not to exceed 4,000 mg of acetaminophen during a 24 hour period and was asked to keep in mind that acetaminophen can also be found in many over-the-counter cold medications as well as narcotics that may be given for pain. The patient expresses understanding of these issues and questions were answered. cyclobenzaprine 5 MG tablet  Commonly known as: FLEXERIL  Take 1 tablet by mouth 3 times daily as needed for Muscle spasms  Notes to patient: Cyclobenzaprine (Flexeril)  Use: Calm the muscles, ease pain  Side Effects: feeling lightheaded, sleepy, having blurred eyesight, or confusion       diclofenac sodium 1 % Gel  Commonly known as: VOLTAREN  Apply 2 g topically 2 times daily  Notes to patient: Uses: It is used to treat a precancerous skin problem called actinic keratosis. It is used to ease pain. It is used to treat some types of arthritis    Side Effects:  Dizziness or headache. Constipation, diarrhea, stomach pain, upset stomach, or throwing up. DULoxetine 60 MG extended release capsule  Commonly known as: CYMBALTA  Notes to patient: Uses: for treatment of mental health like depression or anxiety  Side Effects: nausea, dry mouth, constipation, sweating           gabapentin 100 MG capsule  Commonly known as: NEURONTIN  Take 1 capsule by mouth 3 times daily for 3 days.   Notes to patient: Uses: for treatment of

## 2023-11-15 NOTE — CARE COORDINATION
Patient discharged home via car with her niece on 11/14/2023. Patient to receive home nursing and therapy from [facility]. Patient received a rolling walker from [address] [place] [address] supplier.      SUKHDEV Heredia, LSW   [phone]

## 2023-11-22 ENCOUNTER — TELEPHONE (OUTPATIENT)
Dept: ORTHOPEDIC SURGERY | Age: 73
End: 2023-11-22

## 2023-11-22 NOTE — TELEPHONE ENCOUNTER
POSTOPERATIVE PLAN:  The patient will be readmitted as an inpatient. We  will start PT and OT with 25% partial weightbearing on the right lower  extremity for six weeks. Then, she may need a rehab placement. Returned call and provided verbal to start PT/OT with the above limitation.

## 2023-11-28 ENCOUNTER — OFFICE VISIT (OUTPATIENT)
Dept: ORTHOPEDIC SURGERY | Age: 73
End: 2023-11-28

## 2023-11-28 DIAGNOSIS — S72.001D CLOSED FRACTURE OF NECK OF RIGHT FEMUR WITH ROUTINE HEALING, SUBSEQUENT ENCOUNTER: Primary | ICD-10-CM

## 2023-11-28 PROCEDURE — 99024 POSTOP FOLLOW-UP VISIT: CPT | Performed by: NURSE PRACTITIONER

## 2023-11-28 PROCEDURE — APPNB15 APP NON BILLABLE TIME 0-15 MINS: Performed by: NURSE PRACTITIONER

## 2023-11-28 NOTE — PROGRESS NOTES
DIAGNOSIS:  Right femur subcapital impacted neck fracture, status post Hip pinning with cannulated screws. DATE OF SURGERY: 10/26/2023. HISTORY OF PRESENT ILLNESS: Ms. Lew List 74 y.o.  female  who came in today for 2 weeks postoperative visit. The patient denies any significant pain in the right  hip. Rates pain a 5/10 VAS moderate, aching, intermittent and are improving. Aggravating factors walking. Alleviating factors rest. She has been working on ROM and full WB. She reports that her physical therapist told her that she could put all her weight on her leg. No numbness or tingling sensation. No fever or Chills. She is at home with home health PT.    PHYSICAL EXAMINATION:  The incision is healed well, right hip. No signs of any erythema or drainage. She has no pain with the active or passive range of motion of the right  hip. She has intact sensation, distally, and  is neurovascularly intact. IMAGING:  Two views right hip and femur, and AP pelvis taken today in the office showed good alignment of the impacted femoral neck fracture, 3 cannulated screws in good position, no loosening, or hardware failure. IMPRESSION:  2 weeks out from right Hip pinning with cannulated screws, and doing very well. PLAN:  I have told the patient to work on ROM with  PT, gradual WB as well as strengthening exercises. The patient will come back for a follow up in 6 weeks. At that time, we will take Two views right  Hip, and AP pelvis. As this patient has demonstrated risk factors for osteoporosis, such as age greater than fifty years and evidence of a fracture, I have referred the patient back to the primary care physician for evaluation for osteoporosis, including consideration for DEXA scanning, if this is felt to be clinically indicated. The patient is advised to contact the primary care physician to follow-up for further evaluation.        Amena Sprague, APRN - CNP

## 2023-12-06 ENCOUNTER — HOSPITAL ENCOUNTER (OUTPATIENT)
Dept: WOUND CARE | Age: 73
Discharge: HOME | End: 2023-12-06
Attending: SURGERY
Payer: MEDICARE

## 2023-12-06 VITALS
DIASTOLIC BLOOD PRESSURE: 83 MMHG | RESPIRATION RATE: 16 BRPM | HEART RATE: 89 BPM | TEMPERATURE: 96.8 F | SYSTOLIC BLOOD PRESSURE: 131 MMHG

## 2023-12-06 DIAGNOSIS — L89.43 PRESSURE INJURY OF CONTIGUOUS REGION INVOLVING BACK AND BUTTOCK, STAGE 3, UNSPECIFIED LATERALITY (HCC): ICD-10-CM

## 2023-12-06 DIAGNOSIS — S31.000A SACRAL WOUND, INITIAL ENCOUNTER: Primary | ICD-10-CM

## 2023-12-06 DIAGNOSIS — L89.154 DECUBITUS ULCER OF SACRAL REGION, STAGE 4 (HCC): ICD-10-CM

## 2023-12-06 PROCEDURE — 11042 DBRDMT SUBQ TIS 1ST 20SQCM/<: CPT | Performed by: SURGERY

## 2023-12-06 PROCEDURE — 11042 DBRDMT SUBQ TIS 1ST 20SQCM/<: CPT

## 2023-12-06 RX ORDER — IBUPROFEN 200 MG
TABLET ORAL ONCE
OUTPATIENT
Start: 2023-12-06 | End: 2023-12-06

## 2023-12-06 RX ORDER — SODIUM CHLOR/HYPOCHLOROUS ACID 0.033 %
SOLUTION, IRRIGATION IRRIGATION ONCE
OUTPATIENT
Start: 2023-12-06 | End: 2023-12-06

## 2023-12-06 RX ORDER — LIDOCAINE HYDROCHLORIDE 40 MG/ML
SOLUTION TOPICAL ONCE
OUTPATIENT
Start: 2023-12-06 | End: 2023-12-06

## 2023-12-06 RX ORDER — LIDOCAINE HYDROCHLORIDE 20 MG/ML
JELLY TOPICAL ONCE
OUTPATIENT
Start: 2023-12-06 | End: 2023-12-06

## 2023-12-06 RX ORDER — BACITRACIN ZINC AND POLYMYXIN B SULFATE 500; 1000 [USP'U]/G; [USP'U]/G
OINTMENT TOPICAL ONCE
OUTPATIENT
Start: 2023-12-06 | End: 2023-12-06

## 2023-12-06 RX ORDER — LIDOCAINE 50 MG/G
OINTMENT TOPICAL ONCE
OUTPATIENT
Start: 2023-12-06 | End: 2023-12-06

## 2023-12-06 RX ORDER — LIDOCAINE 40 MG/G
CREAM TOPICAL ONCE
OUTPATIENT
Start: 2023-12-06 | End: 2023-12-06

## 2023-12-06 RX ORDER — GENTAMICIN SULFATE 1 MG/G
OINTMENT TOPICAL ONCE
OUTPATIENT
Start: 2023-12-06 | End: 2023-12-06

## 2023-12-06 RX ORDER — BETAMETHASONE DIPROPIONATE 0.05 %
OINTMENT (GRAM) TOPICAL ONCE
OUTPATIENT
Start: 2023-12-06 | End: 2023-12-06

## 2023-12-06 RX ORDER — TRIAMCINOLONE ACETONIDE 1 MG/G
OINTMENT TOPICAL ONCE
OUTPATIENT
Start: 2023-12-06 | End: 2023-12-06

## 2023-12-06 RX ORDER — LIDOCAINE 40 MG/G
CREAM TOPICAL ONCE
Status: DISCONTINUED | OUTPATIENT
Start: 2023-12-06 | End: 2023-12-07 | Stop reason: HOSPADM

## 2023-12-06 RX ORDER — CLOBETASOL PROPIONATE 0.5 MG/G
OINTMENT TOPICAL ONCE
OUTPATIENT
Start: 2023-12-06 | End: 2023-12-06

## 2023-12-06 RX ORDER — GINSENG 100 MG
CAPSULE ORAL ONCE
OUTPATIENT
Start: 2023-12-06 | End: 2023-12-06

## 2023-12-06 ASSESSMENT — PAIN SCALES - GENERAL: PAINLEVEL_OUTOF10: 4

## 2023-12-06 ASSESSMENT — PAIN DESCRIPTION - DESCRIPTORS: DESCRIPTORS: ACHING

## 2023-12-06 ASSESSMENT — PAIN DESCRIPTION - ONSET: ONSET: ON-GOING

## 2023-12-06 ASSESSMENT — PAIN DESCRIPTION - LOCATION: LOCATION: HIP

## 2023-12-06 ASSESSMENT — PAIN DESCRIPTION - FREQUENCY: FREQUENCY: INTERMITTENT

## 2023-12-06 ASSESSMENT — PAIN - FUNCTIONAL ASSESSMENT: PAIN_FUNCTIONAL_ASSESSMENT: PREVENTS OR INTERFERES SOME ACTIVE ACTIVITIES AND ADLS

## 2023-12-06 ASSESSMENT — PAIN DESCRIPTION - ORIENTATION: ORIENTATION: RIGHT

## 2023-12-06 ASSESSMENT — PAIN DESCRIPTION - PAIN TYPE: TYPE: ACUTE PAIN

## 2023-12-06 NOTE — PROGRESS NOTES
[address] [place], [address] [facility]  Telephone: [phone]        [facility] Fax # [phone]      Patient Instructions        [address] [place], [address] [facility]  Telephone: [phone]     Discharge Instructions     Important reminders:     **If you have any signs and symptoms of illness (Cough, fever, congestion, nausea, vomiting, diarrhea, etc.) please call the wound care center prior to your appointment. 1. Increase Protein intake for optimal wound healing  2. No added salt to reduce any swelling  3. If diabetic, maintain good glucose control  4. If you smoke, smoking prohibits wound healing, we ask that you refrain from smoking. 5. When taking antibiotics take the entire prescription as ordered. Do not stop taking until medication is all gone unless otherwise instructed. 6. Exercise as tolerated. 7. Keep weight off wounds and reposition every 2 hours if applicable. 8. If wound(s) is on your lower extremity, elevate legs to the level of the heart or above for 30 minutes 4-5 times a day and/or when sitting. Avoid standing for long periods of time. 9. Do not get wounds wet in bath or shower unless otherwise instructed by your physician. If your wound is on your foot or leg, you may purchase a cast bag. Please ask at the pharmacy. If Vascular testing is ordered, please call [address] [place] [address] [phone]) to schedule. Vascular tests ordered by Wound Care Physicians may take up to 2 hours to complete. Please keep that in mind when scheduling. If Vascular testing is scheduled, please bring supplies to replace your dressing after testing is done. The vascular department does not stock supplies. Wound: Coccyx     With each dressing change, rinse wounds with 0.9% Saline. (May use wound wash or soft contact solution. Both can be purchased at a local drug store).  If unable to obtain

## 2023-12-06 NOTE — PLAN OF CARE
Discharge instructions given. Patient verbalized understanding. Return to [facility] in 2 week(s).   Called/faxed orders to  [facility]

## 2023-12-06 NOTE — PROGRESS NOTES
[facility],Third Floor Progress and Procedure Note    Jo Elizabeth  AGE: 74 y.o. GENDER: female    : 1950  TODAY'S DATE: 2023    Chief Complaint   Patient presents with    Wound Check     Sacrum        History of Present Illness     Jo Elizabeth is a 74 y.o. female who presents today for wound evaluation. History of Wound: pressure and diabetic wound located on the sacral decubitus ulcer stage 4 . Wound Pain:  mild  Severity: 2/10   Wound Type: pressure and diabetic  Modifying Factors:   diabetes, poor glucose control, chronic pressure, decreased mobility, shear force, obesity, malnutrition, and chronic back pain  Associated Signs/Symptoms:  pain    Past Medical History:   Diagnosis Date    Arthritis     back    Cataracts, bilateral     CHF (congestive heart failure) (HCC)     Diabetes mellitus ([address])     Glaucoma     Hyperlipidemia     Hypertension     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       Thyroid disease      Past Surgical History:   Procedure Laterality Date    CARPAL TUNNEL RELEASE      bilateral    CHOLECYSTECTOMY      COLONOSCOPY      ELBOW SURGERY      ENDOSCOPY, COLON, DIAGNOSTIC      EYE SURGERY      FOOT SURGERY      HIP SURGERY Right 10/26/2023    CLOSED REDUCTION PERCUTANEOUS PINNING OF RIGHT FEMORAL NECK USING CANNULATED SCREWS performed by Mandi Hassan MD at [address]       Current Outpatient Medications   Medication Sig Dispense Refill    insulin detemir (LEVEMIR) 100 UNIT/ML injection vial Inject 15 Units into the skin nightly 2 each 3    spironolactone (ALDACTONE) 25 MG tablet Take 1 tablet by mouth daily 30 tablet 3    levothyroxine (SYNTHROID) 200 MCG tablet Take 1 tablet by mouth Daily 30 tablet 3    vitamin B-12 (CYANOCOBALAMIN) 500 MCG tablet Take 1 tablet by mouth daily      insulin regular (HUMULIN R;NOVOLIN R) 100 UNIT/ML injection Inject into

## 2024-01-09 ENCOUNTER — OFFICE VISIT (OUTPATIENT)
Dept: ORTHOPEDIC SURGERY | Age: 74
End: 2024-01-09

## 2024-01-09 VITALS — HEIGHT: 65 IN | WEIGHT: 207 LBS | BODY MASS INDEX: 34.49 KG/M2

## 2024-01-09 DIAGNOSIS — S72.001D CLOSED FRACTURE OF NECK OF RIGHT FEMUR WITH ROUTINE HEALING, SUBSEQUENT ENCOUNTER: Primary | ICD-10-CM

## 2024-01-09 PROCEDURE — 99024 POSTOP FOLLOW-UP VISIT: CPT | Performed by: NURSE PRACTITIONER

## 2024-01-09 PROCEDURE — APPNB15 APP NON BILLABLE TIME 0-15 MINS: Performed by: NURSE PRACTITIONER

## 2024-01-09 NOTE — PROGRESS NOTES
DIAGNOSIS:  Right femur subcapital impacted neck fracture, status post Hip pinning with cannulated screws.    DATE OF SURGERY: 10/26/2023.    HISTORY OF PRESENT ILLNESS: Ms. Goldberg 73 y.o.  female  who came in today for 8 weeks postoperative visit.  The patient denies any significant pain in the right  hip. Rates pain a 4/10 VAS mild, aching, intermittent and are improving. Aggravating factors walking. Alleviating factors rest. She has been working on ROM and full WB using a walker.  No numbness or tingling sensation. No fever or Chills.  She is at home with home health PT.    PHYSICAL EXAMINATION:  The incision is healed well, right hip.  No signs of any erythema or drainage.  She has no pain with the active or passive range of motion of the right  hip.  She has intact sensation, distally, and  is neurovascularly intact.    IMAGING:  Two views right hip and femur, and AP pelvis taken today in the office showed good alignment of the impacted femoral neck fracture, 3 cannulated screws in good position, no loosening, or hardware failure.      IMPRESSION:  8 weeks out from right Hip pinning with cannulated screws, and doing very well.    PLAN:  I have told the patient to work on ROM with PT, WBAT as well as strengthening exercises.  The patient will come back for a follow up in 6 weeks.  At that time, we will take Two views right  Hip, and AP pelvis.    As this patient has demonstrated risk factors for osteoporosis, such as age greater than fifty years and evidence of a fracture, I have referred the patient back to the primary care physician for evaluation for osteoporosis, including consideration for DEXA scanning, if this is felt to be clinically indicated.  The patient is advised to contact the primary care physician to follow-up for further evaluation.       Orly Baires, CARLA - CNP

## 2024-02-07 ENCOUNTER — HOSPITAL ENCOUNTER (OUTPATIENT)
Dept: WOUND CARE | Age: 74
Discharge: HOME | End: 2024-02-07
Attending: SURGERY
Payer: MEDICARE

## 2024-02-07 VITALS
TEMPERATURE: 97 F | RESPIRATION RATE: 16 BRPM | HEART RATE: 89 BPM | SYSTOLIC BLOOD PRESSURE: 113 MMHG | DIASTOLIC BLOOD PRESSURE: 74 MMHG

## 2024-02-07 DIAGNOSIS — L89.43 PRESSURE INJURY OF CONTIGUOUS REGION INVOLVING BACK AND BUTTOCK, STAGE 3, UNSPECIFIED LATERALITY (HCC): ICD-10-CM

## 2024-02-07 DIAGNOSIS — S31.000A SACRAL WOUND, INITIAL ENCOUNTER: Primary | ICD-10-CM

## 2024-02-07 DIAGNOSIS — L89.154 DECUBITUS ULCER OF SACRAL REGION, STAGE 4 (HCC): ICD-10-CM

## 2024-02-07 PROCEDURE — 11042 DBRDMT SUBQ TIS 1ST 20SQCM/<: CPT | Performed by: SURGERY

## 2024-02-07 PROCEDURE — 11042 DBRDMT SUBQ TIS 1ST 20SQCM/<: CPT

## 2024-02-07 RX ORDER — CLOBETASOL PROPIONATE 0.5 MG/G
OINTMENT TOPICAL ONCE
OUTPATIENT
Start: 2024-02-07 | End: 2024-02-07

## 2024-02-07 RX ORDER — GENTAMICIN SULFATE 1 MG/G
OINTMENT TOPICAL ONCE
OUTPATIENT
Start: 2024-02-07 | End: 2024-02-07

## 2024-02-07 RX ORDER — SODIUM CHLOR/HYPOCHLOROUS ACID 0.033 %
SOLUTION, IRRIGATION IRRIGATION ONCE
OUTPATIENT
Start: 2024-02-07 | End: 2024-02-07

## 2024-02-07 RX ORDER — BETAMETHASONE DIPROPIONATE 0.05 %
OINTMENT (GRAM) TOPICAL ONCE
OUTPATIENT
Start: 2024-02-07 | End: 2024-02-07

## 2024-02-07 RX ORDER — BACITRACIN ZINC AND POLYMYXIN B SULFATE 500; 1000 [USP'U]/G; [USP'U]/G
OINTMENT TOPICAL ONCE
OUTPATIENT
Start: 2024-02-07 | End: 2024-02-07

## 2024-02-07 RX ORDER — LIDOCAINE HYDROCHLORIDE 20 MG/ML
JELLY TOPICAL ONCE
OUTPATIENT
Start: 2024-02-07 | End: 2024-02-07

## 2024-02-07 RX ORDER — IBUPROFEN 200 MG
TABLET ORAL ONCE
OUTPATIENT
Start: 2024-02-07 | End: 2024-02-07

## 2024-02-07 RX ORDER — LIDOCAINE HYDROCHLORIDE 40 MG/ML
SOLUTION TOPICAL ONCE
OUTPATIENT
Start: 2024-02-07 | End: 2024-02-07

## 2024-02-07 RX ORDER — LIDOCAINE 40 MG/G
CREAM TOPICAL ONCE
Status: DISCONTINUED | OUTPATIENT
Start: 2024-02-07 | End: 2024-02-08 | Stop reason: HOSPADM

## 2024-02-07 RX ORDER — GINSENG 100 MG
CAPSULE ORAL ONCE
OUTPATIENT
Start: 2024-02-07 | End: 2024-02-07

## 2024-02-07 RX ORDER — TRIAMCINOLONE ACETONIDE 1 MG/G
OINTMENT TOPICAL ONCE
OUTPATIENT
Start: 2024-02-07 | End: 2024-02-07

## 2024-02-07 RX ORDER — LIDOCAINE 40 MG/G
CREAM TOPICAL ONCE
OUTPATIENT
Start: 2024-02-07 | End: 2024-02-07

## 2024-02-07 RX ORDER — LIDOCAINE 50 MG/G
OINTMENT TOPICAL ONCE
OUTPATIENT
Start: 2024-02-07 | End: 2024-02-07

## 2024-02-07 NOTE — PROGRESS NOTES
[facility]  [address]   [place], [place] [address]  Telephone: [phone]     FAX [phone]        [facility] Fax # [phone]      Patient Instructions   [facility]  [address]  [place], [place] [address]  Telephone: [phone]     FAX [phone]     Discharge Instructions     Important reminders:     **If you have any signs and symptoms of illness (Cough, fever, congestion, nausea, vomiting, diarrhea, etc.) please call the wound care center prior to your appointment.     1. Increase Protein intake for optimal wound healing  2. No added salt to reduce any swelling  3. If diabetic, maintain good glucose control  4. If you smoke, smoking prohibits wound healing, we ask that you refrain from smoking.  5. When taking antibiotics take the entire prescription as ordered. Do not stop taking until medication is all gone unless otherwise instructed.  6. Exercise as tolerated.  7. Keep weight off wounds and reposition every 2 hours if applicable.  8. If wound(s) is on your lower extremity, elevate legs to the level of the heart or above for 30 minutes 4-5 times a day and/or when sitting. Avoid standing for long periods of time.  9. Do not get wounds wet in bath or shower unless otherwise instructed by your physician. If your wound is on your foot or leg, you may purchase a cast bag. Please ask at the pharmacy.        If Vascular testing is ordered, please call [address] [place] [address] [phone]) to schedule.     Vascular tests ordered by Wound Care Physicians may take up to 2 hours to complete. Please keep that in mind when scheduling.     If Vascular testing is scheduled, please bring supplies to replace your dressing after testing is done. The vascular department does not stock supplies.     Wound: Coccyx     With each dressing change, rinse wounds with 0.9% Saline. (May use wound wash or soft contact solution. Both can be purchased at a local drug store). If unable to obtain 
saline, may use a gentle soap and water.     Dressing care: Ana M, Mepilex border or Kerramx Gentle Border- change Monday, Wednesday, & Friday. Turn & reposition every 1-2 hours and as needed for pressure relief and comfort. Keep pressure off of your wound as much as possible. Eat plenty of protein. Place a pillow under one side when sitting & reposition pillow on the other side every hour and as needed for comfort     Home Care Agency/Facility: [facility]     Your wound-care supplies will be provided by:  Please note, depending on your insurance coverage, you may have out-of-pocket expenses for these supplies. Someone from the company should call you to confirm your order and discuss those potential costs before they ship your products -- please anticipate that call. If your out-of-pocket cost could be substantial, Many companies have financial hardship programs for patients who qualify, so please ask about that if you might need a hand. If you have any questions about your supplies or your potential out-of-pocket costs, or if you need to place an order for a refill of supplies (typically monthly), please call the company directly.     Your  is Ryanne Hernandez up with Dr Lincoln In 2 week(s) in the wound care center.        Wound Care Center Information: Should you experience any significant changes in your wound(s) or have questions about your wound care, please contact the [facility] at [phone] Monday  - Thursday 8:00 am - 4:00 pm and Friday 8:00 am - 1:00pm. If you need help with your wound outside these hours and cannot wait until we are again available, contact your PCP or go to the hospital emergency room.     PLEASE NOTE: IF YOU ARE UNABLE TO OBTAIN WOUND SUPPLIES, CONTINUE TO USE THE SUPPLIES YOU HAVE AVAILABLE UNTIL YOU ARE ABLE TO REACH US. IT IS MOST IMPORTANT TO KEEP THE WOUND COVERED AT ALL TIMES.     Patient Experience     Thank you for trusting us with your 
may receive a survey from a company called [employer] asking for your feedback.  We would appreciate it if you took a few minutes to share your experience.  Your input is very valuable to us    Dakota Lincoln MD, FACS  2/7/2024  2:41 PM

## 2024-02-07 NOTE — PLAN OF CARE
Discharge instructions given.  Patient verbalized understanding.  Return to [facility] in 2 week(s).  Called/faxed orders to [facility]

## 2024-02-20 ENCOUNTER — OFFICE VISIT (OUTPATIENT)
Dept: ORTHOPEDIC SURGERY | Age: 74
End: 2024-02-20
Payer: MEDICARE

## 2024-02-20 VITALS — HEIGHT: 65 IN | WEIGHT: 207 LBS | BODY MASS INDEX: 34.49 KG/M2

## 2024-02-20 DIAGNOSIS — S72.001D CLOSED FRACTURE OF NECK OF RIGHT FEMUR WITH ROUTINE HEALING, SUBSEQUENT ENCOUNTER: Primary | ICD-10-CM

## 2024-02-20 PROCEDURE — 1124F ACP DISCUSS-NO DSCNMKR DOCD: CPT | Performed by: NURSE PRACTITIONER

## 2024-02-20 PROCEDURE — G8400 PT W/DXA NO RESULTS DOC: HCPCS | Performed by: NURSE PRACTITIONER

## 2024-02-20 PROCEDURE — 1090F PRES/ABSN URINE INCON ASSESS: CPT | Performed by: NURSE PRACTITIONER

## 2024-02-20 PROCEDURE — G8417 CALC BMI ABV UP PARAM F/U: HCPCS | Performed by: NURSE PRACTITIONER

## 2024-02-20 PROCEDURE — G8484 FLU IMMUNIZE NO ADMIN: HCPCS | Performed by: NURSE PRACTITIONER

## 2024-02-20 PROCEDURE — 99213 OFFICE O/P EST LOW 20 MIN: CPT | Performed by: NURSE PRACTITIONER

## 2024-02-20 PROCEDURE — 3017F COLORECTAL CA SCREEN DOC REV: CPT | Performed by: NURSE PRACTITIONER

## 2024-02-20 PROCEDURE — 1036F TOBACCO NON-USER: CPT | Performed by: NURSE PRACTITIONER

## 2024-02-20 PROCEDURE — G8427 DOCREV CUR MEDS BY ELIG CLIN: HCPCS | Performed by: NURSE PRACTITIONER

## 2024-02-20 NOTE — PROGRESS NOTES
file    Highest education level: Not on file   Occupational History    Not on file   Tobacco Use    Smoking status: Never    Smokeless tobacco: Never   Vaping Use    Vaping Use: Never used   Substance and Sexual Activity    Alcohol use: Not Currently    Drug use: Never    Sexual activity: Never   Other Topics Concern    Not on file   Social History Narrative    Not on file     Social Determinants of Health     Financial Resource Strain: Not on file   Food Insecurity: Not on file (11/5/2023)   Transportation Needs: No Transportation Needs (11/17/2023)    Transportation Problems ([facility] HRSN)     In the past 12 months, has lack of reliable transportation kept you from medical appointments, meetings, work or from getting things needed for daily living?: Not on file   Recent Concern: Transportation Needs - Unmet Transportation Needs (11/5/2023)    PRAPARE - Transportation     Lack of Transportation (Medical): Yes     Lack of Transportation (Non-Medical): Yes   Physical Activity: Not on file   Stress: Not on file   Social Connections: Not on file   Intimate Partner Violence: Not on file   Housing Stability: Low Risk  (11/5/2023)    Housing Stability Vital Sign     Unable to Pay for Housing in the Last Year: No     Number of Places Lived in the Last Year: 1     Unstable Housing in the Last Year: No     Current Outpatient Medications   Medication Sig Dispense Refill    insulin detemir (LEVEMIR) 100 UNIT/ML injection vial Inject 15 Units into the skin nightly 2 each 3    spironolactone (ALDACTONE) 25 MG tablet Take 1 tablet by mouth daily 30 tablet 3    levothyroxine (SYNTHROID) 200 MCG tablet Take 1 tablet by mouth Daily 30 tablet 3    vitamin B-12 (CYANOCOBALAMIN) 500 MCG tablet Take 1 tablet by mouth daily      insulin regular (HUMULIN R;NOVOLIN R) 100 UNIT/ML injection Inject into the skin See Admin Instructions      gabapentin (NEURONTIN) 100 MG capsule Take 1 capsule by mouth 3 times daily for 3 days. 9 capsule 0

## 2024-02-21 ENCOUNTER — HOSPITAL ENCOUNTER (OUTPATIENT)
Dept: WOUND CARE | Age: 74
Discharge: HOME | End: 2024-02-21
Attending: SURGERY
Payer: MEDICARE

## 2024-02-21 VITALS — DIASTOLIC BLOOD PRESSURE: 49 MMHG | HEART RATE: 80 BPM | RESPIRATION RATE: 15 BRPM | SYSTOLIC BLOOD PRESSURE: 108 MMHG

## 2024-02-21 DIAGNOSIS — L89.43 PRESSURE INJURY OF CONTIGUOUS REGION INVOLVING BACK AND BUTTOCK, STAGE 3, UNSPECIFIED LATERALITY (HCC): ICD-10-CM

## 2024-02-21 DIAGNOSIS — S31.000A SACRAL WOUND, INITIAL ENCOUNTER: Primary | ICD-10-CM

## 2024-02-21 DIAGNOSIS — L89.154 DECUBITUS ULCER OF SACRAL REGION, STAGE 4 (HCC): ICD-10-CM

## 2024-02-21 PROCEDURE — 11042 DBRDMT SUBQ TIS 1ST 20SQCM/<: CPT | Performed by: SURGERY

## 2024-02-21 PROCEDURE — 11042 DBRDMT SUBQ TIS 1ST 20SQCM/<: CPT

## 2024-02-21 RX ORDER — LIDOCAINE 50 MG/G
OINTMENT TOPICAL ONCE
OUTPATIENT
Start: 2024-02-21 | End: 2024-02-21

## 2024-02-21 RX ORDER — GINSENG 100 MG
CAPSULE ORAL ONCE
OUTPATIENT
Start: 2024-02-21 | End: 2024-02-21

## 2024-02-21 RX ORDER — LIDOCAINE 40 MG/G
CREAM TOPICAL ONCE
OUTPATIENT
Start: 2024-02-21 | End: 2024-02-21

## 2024-02-21 RX ORDER — LIDOCAINE HYDROCHLORIDE 20 MG/ML
JELLY TOPICAL ONCE
OUTPATIENT
Start: 2024-02-21 | End: 2024-02-21

## 2024-02-21 RX ORDER — CLOBETASOL PROPIONATE 0.5 MG/G
OINTMENT TOPICAL ONCE
OUTPATIENT
Start: 2024-02-21 | End: 2024-02-21

## 2024-02-21 RX ORDER — GENTAMICIN SULFATE 1 MG/G
OINTMENT TOPICAL ONCE
OUTPATIENT
Start: 2024-02-21 | End: 2024-02-21

## 2024-02-21 RX ORDER — LIDOCAINE 40 MG/G
CREAM TOPICAL ONCE
Status: DISCONTINUED | OUTPATIENT
Start: 2024-02-21 | End: 2024-02-22 | Stop reason: HOSPADM

## 2024-02-21 RX ORDER — BETAMETHASONE DIPROPIONATE 0.05 %
OINTMENT (GRAM) TOPICAL ONCE
OUTPATIENT
Start: 2024-02-21 | End: 2024-02-21

## 2024-02-21 RX ORDER — TRIAMCINOLONE ACETONIDE 1 MG/G
OINTMENT TOPICAL ONCE
OUTPATIENT
Start: 2024-02-21 | End: 2024-02-21

## 2024-02-21 RX ORDER — SODIUM CHLOR/HYPOCHLOROUS ACID 0.033 %
SOLUTION, IRRIGATION IRRIGATION ONCE
OUTPATIENT
Start: 2024-02-21 | End: 2024-02-21

## 2024-02-21 RX ORDER — BACITRACIN ZINC AND POLYMYXIN B SULFATE 500; 1000 [USP'U]/G; [USP'U]/G
OINTMENT TOPICAL ONCE
OUTPATIENT
Start: 2024-02-21 | End: 2024-02-21

## 2024-02-21 RX ORDER — LIDOCAINE HYDROCHLORIDE 40 MG/ML
SOLUTION TOPICAL ONCE
OUTPATIENT
Start: 2024-02-21 | End: 2024-02-21

## 2024-02-21 RX ORDER — IBUPROFEN 200 MG
TABLET ORAL ONCE
OUTPATIENT
Start: 2024-02-21 | End: 2024-02-21

## 2024-02-21 ASSESSMENT — PAIN DESCRIPTION - LOCATION: LOCATION: HIP

## 2024-02-21 ASSESSMENT — PAIN SCALES - GENERAL: PAINLEVEL_OUTOF10: 6

## 2024-02-21 NOTE — PROGRESS NOTES
[facility] Progress and Procedure Note    Alysha Goldberg  AGE: 73 y.o.     GENDER: female    : 1950  TODAY'S DATE: 2024    Chief Complaint   Patient presents with    Wound Check     Follow up pressure wound        History of Present Illness     Alysha Goldberg is a 73 y.o. female who presents today for wound evaluation.   History of Wound: pressure and diabetic wound located on the sacral decubitus ulcer stage 4 .   Wound Pain:  mild  Severity: 2/10   Wound Type: pressure and diabetic  Modifying Factors:   diabetes, poor glucose control, chronic pressure, decreased mobility, shear force, obesity, malnutrition, and chronic back pain  Associated Signs/Symptoms:  pain    Past Medical History:   Diagnosis Date    Arthritis     back    Cataracts, bilateral     CHF (congestive heart failure) (HCC)     Diabetes mellitus (HCC)     Glaucoma     Hyperlipidemia     Hypertension     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      Thyroid disease      Past Surgical History:   Procedure Laterality Date    CARPAL TUNNEL RELEASE      bilateral    CHOLECYSTECTOMY      COLONOSCOPY      ELBOW SURGERY      ENDOSCOPY, COLON, DIAGNOSTIC      EYE SURGERY      FOOT SURGERY      HIP SURGERY Right 10/26/2023    CLOSED REDUCTION PERCUTANEOUS PINNING OF RIGHT FEMORAL NECK USING CANNULATED SCREWS performed by Sree Garcia MD at [facility] OR    SHOULDER SURGERY       Current Outpatient Medications   Medication Sig Dispense Refill    insulin detemir (LEVEMIR) 100 UNIT/ML injection vial Inject 15 Units into the skin nightly 2 each 3    spironolactone (ALDACTONE) 25 MG tablet Take 1 tablet by mouth daily 30 tablet 3    levothyroxine (SYNTHROID) 200 MCG tablet Take 1 tablet by mouth Daily 30 tablet 3    vitamin B-12 (CYANOCOBALAMIN) 500 MCG tablet Take 1 tablet by mouth daily      insulin regular (HUMULIN R;NOVOLIN R) 100 UNIT/ML

## 2024-02-21 NOTE — PLAN OF CARE
Discharge instructions given.  Patient verbalized understanding.  Return to [facility] in 2 week(s).

## 2024-02-21 NOTE — PATIENT INSTRUCTIONS
[facility]  [address]  [place], [place] [address]  Telephone: [phone]     FAX [phone]     Discharge Instructions     Important reminders:     **If you have any signs and symptoms of illness (Cough, fever, congestion, nausea, vomiting, diarrhea, etc.) please call the wound care center prior to your appointment.     1. Increase Protein intake for optimal wound healing  2. No added salt to reduce any swelling  3. If diabetic, maintain good glucose control  4. If you smoke, smoking prohibits wound healing, we ask that you refrain from smoking.  5. When taking antibiotics take the entire prescription as ordered. Do not stop taking until medication is all gone unless otherwise instructed.  6. Exercise as tolerated.  7. Keep weight off wounds and reposition every 2 hours if applicable.  8. If wound(s) is on your lower extremity, elevate legs to the level of the heart or above for 30 minutes 4-5 times a day and/or when sitting. Avoid standing for long periods of time.  9. Do not get wounds wet in bath or shower unless otherwise instructed by your physician. If your wound is on your foot or leg, you may purchase a cast bag. Please ask at the pharmacy.        If Vascular testing is ordered, please call [address] [place] [address] [phone]) to schedule.     Vascular tests ordered by Wound Care Physicians may take up to 2 hours to complete. Please keep that in mind when scheduling.     If Vascular testing is scheduled, please bring supplies to replace your dressing after testing is done. The vascular department does not stock supplies.     Wound: Coccyx     With each dressing change, rinse wounds with 0.9% Saline. (May use wound wash or soft contact solution. Both can be purchased at a local drug store). If unable to obtain saline, may use a gentle soap and water.     Dressing care: Ana M, Mepilex border or Kerramx Gentle Border- change Monday, Wednesday, & Friday. Turn & reposition every 1-2 hours and as

## 2024-03-06 ENCOUNTER — HOSPITAL ENCOUNTER (OUTPATIENT)
Dept: WOUND CARE | Age: 74
Discharge: HOME | End: 2024-03-06
Attending: SURGERY
Payer: MEDICARE

## 2024-03-06 VITALS
RESPIRATION RATE: 16 BRPM | HEART RATE: 75 BPM | TEMPERATURE: 97.3 F | DIASTOLIC BLOOD PRESSURE: 67 MMHG | SYSTOLIC BLOOD PRESSURE: 107 MMHG

## 2024-03-06 DIAGNOSIS — L89.43 PRESSURE INJURY OF CONTIGUOUS REGION INVOLVING BACK AND BUTTOCK, STAGE 3, UNSPECIFIED LATERALITY (HCC): ICD-10-CM

## 2024-03-06 DIAGNOSIS — L89.154 DECUBITUS ULCER OF SACRAL REGION, STAGE 4 (HCC): ICD-10-CM

## 2024-03-06 DIAGNOSIS — S31.000A SACRAL WOUND, INITIAL ENCOUNTER: Primary | ICD-10-CM

## 2024-03-06 PROCEDURE — 11042 DBRDMT SUBQ TIS 1ST 20SQCM/<: CPT | Performed by: SURGERY

## 2024-03-06 PROCEDURE — 11042 DBRDMT SUBQ TIS 1ST 20SQCM/<: CPT

## 2024-03-06 RX ORDER — LIDOCAINE HYDROCHLORIDE 20 MG/ML
JELLY TOPICAL ONCE
OUTPATIENT
Start: 2024-03-06 | End: 2024-03-06

## 2024-03-06 RX ORDER — IBUPROFEN 200 MG
TABLET ORAL ONCE
OUTPATIENT
Start: 2024-03-06 | End: 2024-03-06

## 2024-03-06 RX ORDER — BETAMETHASONE DIPROPIONATE 0.05 %
OINTMENT (GRAM) TOPICAL ONCE
OUTPATIENT
Start: 2024-03-06 | End: 2024-03-06

## 2024-03-06 RX ORDER — LIDOCAINE 40 MG/G
CREAM TOPICAL ONCE
OUTPATIENT
Start: 2024-03-06 | End: 2024-03-06

## 2024-03-06 RX ORDER — GINSENG 100 MG
CAPSULE ORAL ONCE
OUTPATIENT
Start: 2024-03-06 | End: 2024-03-06

## 2024-03-06 RX ORDER — SODIUM CHLOR/HYPOCHLOROUS ACID 0.033 %
SOLUTION, IRRIGATION IRRIGATION ONCE
OUTPATIENT
Start: 2024-03-06 | End: 2024-03-06

## 2024-03-06 RX ORDER — LIDOCAINE HYDROCHLORIDE 40 MG/ML
SOLUTION TOPICAL ONCE
OUTPATIENT
Start: 2024-03-06 | End: 2024-03-06

## 2024-03-06 RX ORDER — LIDOCAINE 50 MG/G
OINTMENT TOPICAL ONCE
OUTPATIENT
Start: 2024-03-06 | End: 2024-03-06

## 2024-03-06 RX ORDER — TRIAMCINOLONE ACETONIDE 1 MG/G
OINTMENT TOPICAL ONCE
OUTPATIENT
Start: 2024-03-06 | End: 2024-03-06

## 2024-03-06 RX ORDER — LIDOCAINE 40 MG/G
CREAM TOPICAL ONCE
Status: DISCONTINUED | OUTPATIENT
Start: 2024-03-06 | End: 2024-03-07 | Stop reason: HOSPADM

## 2024-03-06 RX ORDER — GENTAMICIN SULFATE 1 MG/G
OINTMENT TOPICAL ONCE
OUTPATIENT
Start: 2024-03-06 | End: 2024-03-06

## 2024-03-06 RX ORDER — CLOBETASOL PROPIONATE 0.5 MG/G
OINTMENT TOPICAL ONCE
OUTPATIENT
Start: 2024-03-06 | End: 2024-03-06

## 2024-03-06 RX ORDER — BACITRACIN ZINC AND POLYMYXIN B SULFATE 500; 1000 [USP'U]/G; [USP'U]/G
OINTMENT TOPICAL ONCE
OUTPATIENT
Start: 2024-03-06 | End: 2024-03-06

## 2024-03-06 NOTE — PROGRESS NOTES
soap and water.     Dressing care: Ana M, Mepilex border or Kerramx Gentle Border- change Monday, Wednesday, & Friday. Turn & reposition every 1-2 hours and as needed for pressure relief and comfort. Keep pressure off of your wound as much as possible. Eat plenty of protein. Place a pillow under one side when sitting & reposition pillow on the other side every hour and as needed for comfort     Home Care Agency/Facility: [facility]     Your wound-care supplies will be provided by:  Please note, depending on your insurance coverage, you may have out-of-pocket expenses for these supplies. Someone from the company should call you to confirm your order and discuss those potential costs before they ship your products -- please anticipate that call. If your out-of-pocket cost could be substantial, Many companies have financial hardship programs for patients who qualify, so please ask about that if you might need a hand. If you have any questions about your supplies or your potential out-of-pocket costs, or if you need to place an order for a refill of supplies (typically monthly), please call the company directly.     Your  is Ryanne     Follow up with Dr Lincoln In 4 week(s) in the wound care center.        Wound Care Center Information: Should you experience any significant changes in your wound(s) or have questions about your wound care, please contact the [facility] at [phone] Monday  - Thursday 8:00 am - 4:00 pm and Friday 8:00 am - 1:00pm. If you need help with your wound outside these hours and cannot wait until we are again available, contact your PCP or go to the hospital emergency room.     PLEASE NOTE: IF YOU ARE UNABLE TO OBTAIN WOUND SUPPLIES, CONTINUE TO USE THE SUPPLIES YOU HAVE AVAILABLE UNTIL YOU ARE ABLE TO REACH US. IT IS MOST IMPORTANT TO KEEP THE WOUND COVERED AT ALL TIMES.     Patient Experience     Thank you for trusting us with your care.  You may receive a

## 2024-03-06 NOTE — PLAN OF CARE
Discharge instructions given.  Patient verbalized understanding.  Return to [facility] in 4 week(s).

## 2024-04-01 NOTE — PATIENT INSTRUCTIONS
[facility]  [address]  [place], [place] [address]  Telephone: [phone]     FAX [phone]     Discharge Instructions     Important reminders:     **If you have any signs and symptoms of illness (Cough, fever, congestion, nausea, vomiting, diarrhea, etc.) please call the wound care center prior to your appointment.     1. Increase Protein intake for optimal wound healing  2. No added salt to reduce any swelling  3. If diabetic, maintain good glucose control  4. If you smoke, smoking prohibits wound healing, we ask that you refrain from smoking.  5. When taking antibiotics take the entire prescription as ordered. Do not stop taking until medication is all gone unless otherwise instructed.  6. Exercise as tolerated.  7. Keep weight off wounds and reposition every 2 hours if applicable.  8. If wound(s) is on your lower extremity, elevate legs to the level of the heart or above for 30 minutes 4-5 times a day and/or when sitting. Avoid standing for long periods of time.  9. Do not get wounds wet in bath or shower unless otherwise instructed by your physician. If your wound is on your foot or leg, you may purchase a cast bag. Please ask at the pharmacy.        If Vascular testing is ordered, please call [address] [place] [address] [phone]) to schedule.     Vascular tests ordered by Wound Care Physicians may take up to 2 hours to complete. Please keep that in mind when scheduling.     If Vascular testing is scheduled, please bring supplies to replace your dressing after testing is done. The vascular department does not stock supplies.     Wound: Coccyx     With each dressing change, rinse wounds with 0.9% Saline. (May use wound wash or soft contact solution. Both can be purchased at a local drug store). If unable to obtain saline, may use a gentle soap and water.     Dressing care: Ana M, Mepilex border or Kerramx Gentle Border- change Monday, Wednesday, & Friday. Turn & reposition every 1-2 hours and as

## 2024-04-03 ENCOUNTER — HOSPITAL ENCOUNTER (OUTPATIENT)
Dept: WOUND CARE | Age: 74
Discharge: HOME | End: 2024-04-03
Attending: SURGERY
Payer: MEDICARE

## 2024-04-03 VITALS
RESPIRATION RATE: 16 BRPM | SYSTOLIC BLOOD PRESSURE: 116 MMHG | TEMPERATURE: 97 F | DIASTOLIC BLOOD PRESSURE: 77 MMHG | HEART RATE: 76 BPM

## 2024-04-03 DIAGNOSIS — S31.000A SACRAL WOUND, INITIAL ENCOUNTER: Primary | ICD-10-CM

## 2024-04-03 DIAGNOSIS — L89.43 PRESSURE INJURY OF CONTIGUOUS REGION INVOLVING BACK AND BUTTOCK, STAGE 3, UNSPECIFIED LATERALITY (HCC): ICD-10-CM

## 2024-04-03 DIAGNOSIS — L89.154 DECUBITUS ULCER OF SACRAL REGION, STAGE 4 (HCC): ICD-10-CM

## 2024-04-03 PROCEDURE — 11042 DBRDMT SUBQ TIS 1ST 20SQCM/<: CPT

## 2024-04-03 PROCEDURE — 11042 DBRDMT SUBQ TIS 1ST 20SQCM/<: CPT | Performed by: SURGERY

## 2024-04-03 RX ORDER — LIDOCAINE 40 MG/G
CREAM TOPICAL ONCE
Status: DISCONTINUED | OUTPATIENT
Start: 2024-04-03 | End: 2024-04-04 | Stop reason: HOSPADM

## 2024-04-03 RX ORDER — IBUPROFEN 200 MG
TABLET ORAL ONCE
OUTPATIENT
Start: 2024-04-03 | End: 2024-04-03

## 2024-04-03 RX ORDER — GENTAMICIN SULFATE 1 MG/G
OINTMENT TOPICAL ONCE
OUTPATIENT
Start: 2024-04-03 | End: 2024-04-03

## 2024-04-03 RX ORDER — BETAMETHASONE DIPROPIONATE 0.05 %
OINTMENT (GRAM) TOPICAL ONCE
OUTPATIENT
Start: 2024-04-03 | End: 2024-04-03

## 2024-04-03 RX ORDER — TRIAMCINOLONE ACETONIDE 1 MG/G
OINTMENT TOPICAL ONCE
OUTPATIENT
Start: 2024-04-03 | End: 2024-04-03

## 2024-04-03 RX ORDER — LIDOCAINE 40 MG/G
CREAM TOPICAL ONCE
OUTPATIENT
Start: 2024-04-03 | End: 2024-04-03

## 2024-04-03 RX ORDER — LIDOCAINE HYDROCHLORIDE 20 MG/ML
JELLY TOPICAL ONCE
OUTPATIENT
Start: 2024-04-03 | End: 2024-04-03

## 2024-04-03 RX ORDER — BACITRACIN ZINC AND POLYMYXIN B SULFATE 500; 1000 [USP'U]/G; [USP'U]/G
OINTMENT TOPICAL ONCE
OUTPATIENT
Start: 2024-04-03 | End: 2024-04-03

## 2024-04-03 RX ORDER — LIDOCAINE 50 MG/G
OINTMENT TOPICAL ONCE
OUTPATIENT
Start: 2024-04-03 | End: 2024-04-03

## 2024-04-03 RX ORDER — GINSENG 100 MG
CAPSULE ORAL ONCE
OUTPATIENT
Start: 2024-04-03 | End: 2024-04-03

## 2024-04-03 RX ORDER — CLOBETASOL PROPIONATE 0.5 MG/G
OINTMENT TOPICAL ONCE
OUTPATIENT
Start: 2024-04-03 | End: 2024-04-03

## 2024-04-03 RX ORDER — SODIUM CHLOR/HYPOCHLOROUS ACID 0.033 %
SOLUTION, IRRIGATION IRRIGATION ONCE
OUTPATIENT
Start: 2024-04-03 | End: 2024-04-03

## 2024-04-03 RX ORDER — LIDOCAINE HYDROCHLORIDE 40 MG/ML
SOLUTION TOPICAL ONCE
OUTPATIENT
Start: 2024-04-03 | End: 2024-04-03

## 2024-04-03 NOTE — PROGRESS NOTES
[facility] Progress and Procedure Note    Alysha Goldberg  AGE: 73 y.o.     GENDER: female    : 1950  TODAY'S DATE: 4/3/2024    Chief Complaint   Patient presents with    Wound Check        History of Present Illness     Alysha Goldberg is a 73 y.o. female who presents today for wound evaluation.   History of Wound and Wound Type: pressure and diabetic wound located on the sacral decubitus ulcer stage 4 .   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      Thyroid disease      Past Surgical History:   Procedure Laterality Date    CARPAL TUNNEL RELEASE      bilateral    CHOLECYSTECTOMY      COLONOSCOPY      ELBOW SURGERY      ENDOSCOPY, COLON, DIAGNOSTIC      EYE SURGERY      FOOT SURGERY      HIP SURGERY Right 10/26/2023    CLOSED REDUCTION PERCUTANEOUS PINNING OF RIGHT FEMORAL NECK USING CANNULATED SCREWS performed by Sree Garcia MD at [facility] OR    SHOULDER SURGERY       Current Outpatient Medications   Medication Sig Dispense Refill    insulin detemir (LEVEMIR) 100 UNIT/ML injection vial Inject 15 Units into the skin nightly 2 each 3    spironolactone (ALDACTONE) 25 MG tablet Take 1 tablet by mouth daily 30 tablet 3    levothyroxine (SYNTHROID) 200 MCG tablet Take 1 tablet by mouth Daily 30 tablet 3    vitamin B-12 (CYANOCOBALAMIN) 500 MCG tablet Take 1 tablet by mouth daily      insulin regular (HUMULIN R;NOVOLIN R) 100 UNIT/ML injection Inject into the skin See Admin Instructions

## 2024-05-01 ENCOUNTER — HOSPITAL ENCOUNTER (OUTPATIENT)
Dept: WOUND CARE | Age: 74
Discharge: HOME | End: 2024-05-01
Attending: SURGERY
Payer: MEDICARE

## 2024-05-01 VITALS
DIASTOLIC BLOOD PRESSURE: 72 MMHG | HEART RATE: 75 BPM | RESPIRATION RATE: 16 BRPM | TEMPERATURE: 98 F | SYSTOLIC BLOOD PRESSURE: 127 MMHG

## 2024-05-01 DIAGNOSIS — L89.154 DECUBITUS ULCER OF SACRAL REGION, STAGE 4 (HCC): ICD-10-CM

## 2024-05-01 DIAGNOSIS — L89.43 PRESSURE INJURY OF CONTIGUOUS REGION INVOLVING BACK AND BUTTOCK, STAGE 3, UNSPECIFIED LATERALITY (HCC): ICD-10-CM

## 2024-05-01 DIAGNOSIS — S31.000A SACRAL WOUND, INITIAL ENCOUNTER: Primary | ICD-10-CM

## 2024-05-01 PROCEDURE — 11042 DBRDMT SUBQ TIS 1ST 20SQCM/<: CPT | Performed by: SURGERY

## 2024-05-01 PROCEDURE — 11042 DBRDMT SUBQ TIS 1ST 20SQCM/<: CPT

## 2024-05-01 RX ORDER — LIDOCAINE 50 MG/G
OINTMENT TOPICAL ONCE
OUTPATIENT
Start: 2024-05-01 | End: 2024-05-01

## 2024-05-01 RX ORDER — BACITRACIN ZINC AND POLYMYXIN B SULFATE 500; 1000 [USP'U]/G; [USP'U]/G
OINTMENT TOPICAL ONCE
OUTPATIENT
Start: 2024-05-01 | End: 2024-05-01

## 2024-05-01 RX ORDER — LIDOCAINE HYDROCHLORIDE 20 MG/ML
JELLY TOPICAL ONCE
OUTPATIENT
Start: 2024-05-01 | End: 2024-05-01

## 2024-05-01 RX ORDER — TRIAMCINOLONE ACETONIDE 1 MG/G
OINTMENT TOPICAL ONCE
OUTPATIENT
Start: 2024-05-01 | End: 2024-05-01

## 2024-05-01 RX ORDER — GENTAMICIN SULFATE 1 MG/G
OINTMENT TOPICAL ONCE
OUTPATIENT
Start: 2024-05-01 | End: 2024-05-01

## 2024-05-01 RX ORDER — LIDOCAINE HYDROCHLORIDE 40 MG/ML
SOLUTION TOPICAL ONCE
OUTPATIENT
Start: 2024-05-01 | End: 2024-05-01

## 2024-05-01 RX ORDER — LIDOCAINE 40 MG/G
CREAM TOPICAL ONCE
OUTPATIENT
Start: 2024-05-01 | End: 2024-05-01

## 2024-05-01 RX ORDER — IBUPROFEN 200 MG
TABLET ORAL ONCE
OUTPATIENT
Start: 2024-05-01 | End: 2024-05-01

## 2024-05-01 RX ORDER — CLOBETASOL PROPIONATE 0.5 MG/G
OINTMENT TOPICAL ONCE
OUTPATIENT
Start: 2024-05-01 | End: 2024-05-01

## 2024-05-01 RX ORDER — SODIUM CHLOR/HYPOCHLOROUS ACID 0.033 %
SOLUTION, IRRIGATION IRRIGATION ONCE
OUTPATIENT
Start: 2024-05-01 | End: 2024-05-01

## 2024-05-01 RX ORDER — GINSENG 100 MG
CAPSULE ORAL ONCE
OUTPATIENT
Start: 2024-05-01 | End: 2024-05-01

## 2024-05-01 RX ORDER — LIDOCAINE 40 MG/G
CREAM TOPICAL ONCE
Status: DISCONTINUED | OUTPATIENT
Start: 2024-05-01 | End: 2024-05-02 | Stop reason: HOSPADM

## 2024-05-01 RX ORDER — BETAMETHASONE DIPROPIONATE 0.05 %
OINTMENT (GRAM) TOPICAL ONCE
OUTPATIENT
Start: 2024-05-01 | End: 2024-05-01

## 2024-05-01 NOTE — PATIENT INSTRUCTIONS
[facility]  [address]  [place], [place] [address]  Telephone: [phone]     FAX [phone]     Discharge Instructions     Important reminders:     **If you have any signs and symptoms of illness (Cough, fever, congestion, nausea, vomiting, diarrhea, etc.) please call the wound care center prior to your appointment.     1. Increase Protein intake for optimal wound healing  2. No added salt to reduce any swelling  3. If diabetic, maintain good glucose control  4. If you smoke, smoking prohibits wound healing, we ask that you refrain from smoking.  5. When taking antibiotics take the entire prescription as ordered. Do not stop taking until medication is all gone unless otherwise instructed.  6. Exercise as tolerated.  7. Keep weight off wounds and reposition every 2 hours if applicable.  8. If wound(s) is on your lower extremity, elevate legs to the level of the heart or above for 30 minutes 4-5 times a day and/or when sitting. Avoid standing for long periods of time.  9. Do not get wounds wet in bath or shower unless otherwise instructed by your physician. If your wound is on your foot or leg, you may purchase a cast bag. Please ask at the pharmacy.        If Vascular testing is ordered, please call [address] [place] [address] [phone]) to schedule.     Vascular tests ordered by Wound Care Physicians may take up to 2 hours to complete. Please keep that in mind when scheduling.     If Vascular testing is scheduled, please bring supplies to replace your dressing after testing is done. The vascular department does not stock supplies.     Wound: Coccyx     With each dressing change, rinse wounds with 0.9% Saline. (May use wound wash or soft contact solution. Both can be purchased at a local drug store). If unable to obtain saline, may use a gentle soap and water.     Dressing care: Ana M, Mepilex border or Kerramx Gentle Border- change Monday, Wednesday, & Friday. Turn & reposition every 1-2 hours and as

## 2024-05-01 NOTE — PROGRESS NOTES
[facility] Progress and Procedure Note    Alysha Goldberg  AGE: 73 y.o.     GENDER: female    : 1950  TODAY'S DATE: 2024    Chief Complaint   Patient presents with    Wound Check        History of Present Illness     Alysha Goldberg is a 73 y.o. female who presents today for wound evaluation.   History of Wound and Wound Type: pressure and diabetic wound located on the sacral decubitus ulcer stage 4 .   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      Thyroid disease      Past Surgical History:   Procedure Laterality Date    CARPAL TUNNEL RELEASE      bilateral    CHOLECYSTECTOMY      COLONOSCOPY      ELBOW SURGERY      ENDOSCOPY, COLON, DIAGNOSTIC      EYE SURGERY      FOOT SURGERY      HIP SURGERY Right 10/26/2023    CLOSED REDUCTION PERCUTANEOUS PINNING OF RIGHT FEMORAL NECK USING CANNULATED SCREWS performed by Sree Garcia MD at [facility] OR    SHOULDER SURGERY       Current Outpatient Medications   Medication Sig Dispense Refill    insulin detemir (LEVEMIR) 100 UNIT/ML injection vial Inject 15 Units into the skin nightly 2 each 3    spironolactone (ALDACTONE) 25 MG tablet Take 1 tablet by mouth daily 30 tablet 3    levothyroxine (SYNTHROID) 200 MCG tablet Take 1 tablet by mouth Daily 30 tablet 3    vitamin B-12 (CYANOCOBALAMIN) 500 MCG tablet Take 1 tablet by mouth daily      insulin regular (HUMULIN R;NOVOLIN R) 100 UNIT/ML injection Inject into the skin See Admin

## 2024-05-01 NOTE — PLAN OF CARE
Discharge instructions given.  Patient verbalized understanding.  Return to [facility] in 4 week(s).

## 2024-05-21 ENCOUNTER — TELEPHONE (OUTPATIENT)
Dept: ORTHOPEDIC SURGERY | Age: 74
End: 2024-05-21

## 2024-05-21 ENCOUNTER — OFFICE VISIT (OUTPATIENT)
Dept: ORTHOPEDIC SURGERY | Age: 74
End: 2024-05-21
Payer: MEDICARE

## 2024-05-21 VITALS — WEIGHT: 207 LBS | BODY MASS INDEX: 34.49 KG/M2 | HEIGHT: 65 IN

## 2024-05-21 DIAGNOSIS — S72.001D CLOSED FRACTURE OF NECK OF RIGHT FEMUR WITH ROUTINE HEALING, SUBSEQUENT ENCOUNTER: Primary | ICD-10-CM

## 2024-05-21 PROCEDURE — 1036F TOBACCO NON-USER: CPT | Performed by: NURSE PRACTITIONER

## 2024-05-21 PROCEDURE — 1124F ACP DISCUSS-NO DSCNMKR DOCD: CPT | Performed by: NURSE PRACTITIONER

## 2024-05-21 PROCEDURE — G8417 CALC BMI ABV UP PARAM F/U: HCPCS | Performed by: NURSE PRACTITIONER

## 2024-05-21 PROCEDURE — 99213 OFFICE O/P EST LOW 20 MIN: CPT | Performed by: NURSE PRACTITIONER

## 2024-05-21 PROCEDURE — 1090F PRES/ABSN URINE INCON ASSESS: CPT | Performed by: NURSE PRACTITIONER

## 2024-05-21 PROCEDURE — G8427 DOCREV CUR MEDS BY ELIG CLIN: HCPCS | Performed by: NURSE PRACTITIONER

## 2024-05-21 PROCEDURE — 3017F COLORECTAL CA SCREEN DOC REV: CPT | Performed by: NURSE PRACTITIONER

## 2024-05-21 PROCEDURE — G8400 PT W/DXA NO RESULTS DOC: HCPCS | Performed by: NURSE PRACTITIONER

## 2024-05-21 NOTE — PROGRESS NOTES
DIAGNOSIS:  Right femur subcapital impacted neck fracture, status post Hip pinning with cannulated screws.    DATE OF SURGERY: 10/26/2023.    HISTORY OF PRESENT ILLNESS: Ms. Goldberg 73 y.o.  female  who came in today for 6 months postoperative visit.  The patient denies any significant pain in the right  hip. Rates pain a 5/10 VAS mild, aching, intermittent and are improving in her hip and states most of her pain is d/t her h/o sciatica. Aggravating factors walking and feels unsteady on her feet especially on uneven surface. Alleviating factors rest. She has been working on ROM and full WB using a walker.  She is complaining of  recurrent numbness or tingling sensation in the right lateral leg and also has diabetic neuropathy. She is on Gabapentin.  She states she has had this in the past due to her low back issues/sciatica and has had LESI's in the past with minimal to no improvement.  She states the numbness and tingling come and go and does not bother her daily.  No fever or Chills.  She states that her home health PT was canceled, but she would like this extended. She is in pain management on [facility].     Past Medical History:   Diagnosis Date    Arthritis     back    Cataracts, bilateral     CHF (congestive heart failure) (HCC)     Diabetes mellitus (HCC)     Glaucoma     Hyperlipidemia     Hypertension     Pressure 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, stage 3 (HCC) 06/17/2022    Similar wound 2 years ago.      Thyroid disease      Past Surgical History:   Procedure Laterality Date    CARPAL TUNNEL RELEASE      bilateral    CHOLECYSTECTOMY      COLONOSCOPY      ELBOW SURGERY      ENDOSCOPY, COLON, DIAGNOSTIC      EYE SURGERY      FOOT SURGERY      HIP SURGERY Right 10/26/2023    CLOSED REDUCTION PERCUTANEOUS PINNING OF RIGHT FEMORAL NECK USING CANNULATED SCREWS performed by Sree Garcia MD at [facility] OR    SHOULDER SURGERY       Family History   Problem Relation Age of Onset    No

## 2024-05-29 ENCOUNTER — HOSPITAL ENCOUNTER (OUTPATIENT)
Dept: WOUND CARE | Age: 74
Discharge: HOME | End: 2024-05-29
Attending: SURGERY
Payer: MEDICARE

## 2024-05-29 VITALS — HEART RATE: 71 BPM | DIASTOLIC BLOOD PRESSURE: 65 MMHG | RESPIRATION RATE: 16 BRPM | SYSTOLIC BLOOD PRESSURE: 114 MMHG

## 2024-05-29 DIAGNOSIS — L89.154 DECUBITUS ULCER OF SACRAL REGION, STAGE 4 (HCC): ICD-10-CM

## 2024-05-29 DIAGNOSIS — L89.43 PRESSURE INJURY OF CONTIGUOUS REGION INVOLVING BACK AND BUTTOCK, STAGE 3, UNSPECIFIED LATERALITY (HCC): ICD-10-CM

## 2024-05-29 DIAGNOSIS — S31.000A SACRAL WOUND, INITIAL ENCOUNTER: Primary | ICD-10-CM

## 2024-05-29 PROCEDURE — 99213 OFFICE O/P EST LOW 20 MIN: CPT

## 2024-05-29 PROCEDURE — 99212 OFFICE O/P EST SF 10 MIN: CPT | Performed by: SURGERY

## 2024-05-29 RX ORDER — TRIAMCINOLONE ACETONIDE 1 MG/G
OINTMENT TOPICAL ONCE
Status: CANCELLED | OUTPATIENT
Start: 2024-05-29 | End: 2024-05-29

## 2024-05-29 RX ORDER — LIDOCAINE HYDROCHLORIDE 40 MG/ML
SOLUTION TOPICAL ONCE
Status: CANCELLED | OUTPATIENT
Start: 2024-05-29 | End: 2024-05-29

## 2024-05-29 RX ORDER — LIDOCAINE 40 MG/G
CREAM TOPICAL ONCE
Status: DISCONTINUED | OUTPATIENT
Start: 2024-05-29 | End: 2024-05-30 | Stop reason: HOSPADM

## 2024-05-29 RX ORDER — LIDOCAINE 40 MG/G
CREAM TOPICAL ONCE
Status: CANCELLED | OUTPATIENT
Start: 2024-05-29 | End: 2024-05-29

## 2024-05-29 RX ORDER — BETAMETHASONE DIPROPIONATE 0.05 %
OINTMENT (GRAM) TOPICAL ONCE
Status: CANCELLED | OUTPATIENT
Start: 2024-05-29 | End: 2024-05-29

## 2024-05-29 RX ORDER — CLOBETASOL PROPIONATE 0.5 MG/G
OINTMENT TOPICAL ONCE
Status: CANCELLED | OUTPATIENT
Start: 2024-05-29 | End: 2024-05-29

## 2024-05-29 RX ORDER — LIDOCAINE HYDROCHLORIDE 20 MG/ML
JELLY TOPICAL ONCE
Status: CANCELLED | OUTPATIENT
Start: 2024-05-29 | End: 2024-05-29

## 2024-05-29 RX ORDER — SODIUM CHLOR/HYPOCHLOROUS ACID 0.033 %
SOLUTION, IRRIGATION IRRIGATION ONCE
Status: CANCELLED | OUTPATIENT
Start: 2024-05-29 | End: 2024-05-29

## 2024-05-29 RX ORDER — BACITRACIN ZINC AND POLYMYXIN B SULFATE 500; 1000 [USP'U]/G; [USP'U]/G
OINTMENT TOPICAL ONCE
Status: CANCELLED | OUTPATIENT
Start: 2024-05-29 | End: 2024-05-29

## 2024-05-29 RX ORDER — GENTAMICIN SULFATE 1 MG/G
OINTMENT TOPICAL ONCE
Status: CANCELLED | OUTPATIENT
Start: 2024-05-29 | End: 2024-05-29

## 2024-05-29 RX ORDER — LIDOCAINE 50 MG/G
OINTMENT TOPICAL ONCE
Status: CANCELLED | OUTPATIENT
Start: 2024-05-29 | End: 2024-05-29

## 2024-05-29 RX ORDER — IBUPROFEN 200 MG
TABLET ORAL ONCE
Status: CANCELLED | OUTPATIENT
Start: 2024-05-29 | End: 2024-05-29

## 2024-05-29 RX ORDER — GINSENG 100 MG
CAPSULE ORAL ONCE
Status: CANCELLED | OUTPATIENT
Start: 2024-05-29 | End: 2024-05-29

## 2024-05-29 NOTE — PATIENT INSTRUCTIONS
Congratulations! You have completed your treatment program!    To prevent Pressure Ulcers from recurring again:    Inspect areas prone to pressure such as heels, elbows, back of head, shoulders, buttocks, hips, and tailbone at least daily for redness or wounds.  If any redness develops, keep pressure of that area and call your physician immediately. Do not massage reddened areas!  Moisturize your skin.  If there are areas where skin is overly moist, protect skin with a skin-sealant or ointment as prescribed by your physician.  Use absorbent pads or undergarments and clean skin as soon as it becomes soiled if incontinent of urine or stool.  Use positioning techniques if mobility is limited to minimize pressure on any 1 area.  While in bed, change body position at least every 2 hours.  While up in chair, shift body side to side at least every 15-30 minutes. Continue to use pressure relief cushions/matress as directed.  Do not use donut-shaped cushions on seats.  Reduce friction to skin by lifting rather than dragging body during position changes.  Make sure to eat protein rich foods and drink plenty of fluids unless your physician tells you otherwise.   Call the wound clinic if your wound reopens, if you develop new wounds, or if you have any other questions about follow up care [phone]     Avoid pressure to coccyx area as much as possible.

## 2024-05-29 NOTE — PROGRESS NOTES
[facility] Progress Note    Alysha Goldberg  AGE: 73 y.o.     GENDER: female    : 1950  TODAY'S DATE: 2024    Subjective:     Chief Complaint   Patient presents with    Wound Check     Follow up coccyx wound         History of Present Illness     Alysha Goldberg presents today for wound evaluation.   History of Wound: 73 year old female presents with healed pressure and diabetic sacral decubitus ulcer stage 4    Wound Type: pressure and diabetic  Wound Location: sacral decubitus ulcer stage 4  Wound Pain:  mild  Severity:  1 / 10   Timing: resolved  Modifying Factors:   diabetes, poor glucose control, chronic pressure, decreased mobility, shear force, obesity, malnutrition, and chronic back pain  Associated Signs/Symptoms:  pain    Past Medical History:   Diagnosis Date    Arthritis     back    Cataracts, bilateral     CHF (congestive heart failure) (HCC)     Diabetes mellitus (HCC)     Glaucoma     Hyperlipidemia     Hypertension     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      Thyroid disease        Past Surgical History:   Procedure Laterality Date    CARPAL TUNNEL RELEASE      bilateral    CHOLECYSTECTOMY      COLONOSCOPY      ELBOW SURGERY      ENDOSCOPY, COLON, DIAGNOSTIC      EYE SURGERY      FOOT SURGERY      HIP SURGERY Right 10/26/2023    CLOSED REDUCTION PERCUTANEOUS PINNING OF RIGHT FEMORAL NECK USING CANNULATED SCREWS performed by Sree Garcia MD at [facility] OR    SHOULDER SURGERY         Current Outpatient Medications   Medication Sig Dispense Refill    insulin detemir (LEVEMIR) 100 UNIT/ML injection vial Inject 15 Units into the skin nightly 2 each 3    spironolactone (ALDACTONE) 25 MG tablet Take 1 tablet by mouth daily 30 tablet 3    levothyroxine (SYNTHROID) 200 MCG tablet Take 1 tablet by mouth Daily 30 tablet 3    vitamin B-12 (CYANOCOBALAMIN) 500 MCG tablet Take 1 tablet by

## 2024-05-29 NOTE — PLAN OF CARE
Discharge instructions given.  Patient verbalized understanding.  Return to [facility] as needed  Wound healed

## 2024-11-21 ENCOUNTER — OFFICE VISIT (OUTPATIENT)
Dept: ORTHOPEDIC SURGERY | Age: 74
End: 2024-11-21
Payer: MEDICARE

## 2024-11-21 VITALS — HEIGHT: 65 IN | WEIGHT: 207 LBS | BODY MASS INDEX: 34.49 KG/M2

## 2024-11-21 DIAGNOSIS — S72.001A CLOSED FRACTURE OF NECK OF RIGHT FEMUR, INITIAL ENCOUNTER (HCC): Primary | ICD-10-CM

## 2024-11-21 PROCEDURE — 1159F MED LIST DOCD IN RCRD: CPT | Performed by: ORTHOPAEDIC SURGERY

## 2024-11-21 PROCEDURE — 99213 OFFICE O/P EST LOW 20 MIN: CPT | Performed by: ORTHOPAEDIC SURGERY

## 2024-11-21 PROCEDURE — 1090F PRES/ABSN URINE INCON ASSESS: CPT | Performed by: ORTHOPAEDIC SURGERY

## 2024-11-21 PROCEDURE — G8484 FLU IMMUNIZE NO ADMIN: HCPCS | Performed by: ORTHOPAEDIC SURGERY

## 2024-11-21 PROCEDURE — G8400 PT W/DXA NO RESULTS DOC: HCPCS | Performed by: ORTHOPAEDIC SURGERY

## 2024-11-21 PROCEDURE — G8417 CALC BMI ABV UP PARAM F/U: HCPCS | Performed by: ORTHOPAEDIC SURGERY

## 2024-11-21 PROCEDURE — G8427 DOCREV CUR MEDS BY ELIG CLIN: HCPCS | Performed by: ORTHOPAEDIC SURGERY

## 2024-11-21 PROCEDURE — 1126F AMNT PAIN NOTED NONE PRSNT: CPT | Performed by: ORTHOPAEDIC SURGERY

## 2024-11-21 PROCEDURE — 1124F ACP DISCUSS-NO DSCNMKR DOCD: CPT | Performed by: ORTHOPAEDIC SURGERY

## 2024-11-21 PROCEDURE — 1036F TOBACCO NON-USER: CPT | Performed by: ORTHOPAEDIC SURGERY

## 2024-11-21 NOTE — PROGRESS NOTES
outside, in a car, in a tent, in an overnight shelter, or temporarily in someone else's home (i.e. couch-surfing)?: No     Within the past 12 months, have you been unable to get utilities (heat, electricity) when it was really needed?: No       Family History   Problem Relation Age of Onset    No Known Problems Mother     No Known Problems Father        Current Outpatient Medications on File Prior to Visit   Medication Sig Dispense Refill    insulin detemir (LEVEMIR) 100 UNIT/ML injection vial Inject 15 Units into the skin nightly 2 each 3    spironolactone (ALDACTONE) 25 MG tablet Take 1 tablet by mouth daily 30 tablet 3    levothyroxine (SYNTHROID) 200 MCG tablet Take 1 tablet by mouth Daily 30 tablet 3    vitamin B-12 (CYANOCOBALAMIN) 500 MCG tablet Take 1 tablet by mouth daily      insulin regular (HUMULIN R;NOVOLIN R) 100 UNIT/ML injection Inject into the skin See Admin Instructions      gabapentin (NEURONTIN) 100 MG capsule Take 1 capsule by mouth 3 times daily for 3 days. 9 capsule 0    diclofenac sodium (VOLTAREN) 1 % GEL Apply 2 g topically 2 times daily 2 g 1    sennosides-docusate sodium (SENOKOT-S) 8.6-50 MG tablet Take 2 tablets by mouth daily 60 tablet 0    melatonin 3 MG TABS tablet Take 1 tablet by mouth nightly as needed (sleep) 30 tablet 0    Calcium Carb-Cholecalciferol (OYSTER SHELL CALCIUM W/D) 500-5 MG-MCG TABS tablet Take 1 tablet by mouth 2 times daily 60 tablet 1    Timolol Maleate PF (TIMOLOL MALEATE OCUDOSE) 0.5 % SOLN Place 1 drop into both eyes in the morning and at bedtime      Travoprost, BAK Free, (TRAVATAN Z) 0.004 % SOLN ophthalmic solution Place 1 drop into the left eye nightly      DULoxetine (CYMBALTA) 60 MG extended release capsule Take 1 capsule by mouth daily 30 capsule 3    [DISCONTINUED] sodium chloride (OCEAN, BABY AYR) 0.65 % nasal spray 1 spray by Nasal route every 2 hours as needed for Congestion (Patient not taking: Reported on 7/21/2022) 1 Bottle 0    [DISCONTINUED]
